# Patient Record
Sex: FEMALE | Race: WHITE | NOT HISPANIC OR LATINO | Employment: OTHER | URBAN - METROPOLITAN AREA
[De-identification: names, ages, dates, MRNs, and addresses within clinical notes are randomized per-mention and may not be internally consistent; named-entity substitution may affect disease eponyms.]

---

## 2017-01-06 ENCOUNTER — TRANSCRIBE ORDERS (OUTPATIENT)
Dept: SLEEP CENTER | Facility: CLINIC | Age: 72
End: 2017-01-06

## 2017-01-06 DIAGNOSIS — G47.33 OSA (OBSTRUCTIVE SLEEP APNEA): Primary | ICD-10-CM

## 2017-01-09 ENCOUNTER — HOSPITAL ENCOUNTER (OUTPATIENT)
Dept: RADIOLOGY | Facility: HOSPITAL | Age: 72
Discharge: HOME | End: 2017-01-09
Admitting: INTERNAL MEDICINE
Payer: MEDICARE

## 2017-01-09 DIAGNOSIS — N18.6 END STAGE RENAL DISEASE (HCC): ICD-10-CM

## 2017-01-09 PROCEDURE — 36593 DECLOT VASCULAR DEVICE: CPT

## 2017-01-09 PROCEDURE — 36598 INJ W/FLUOR EVAL CV DEVICE: CPT

## 2017-01-09 RX ADMIN — ALTEPLASE 2 MG: 2.2 INJECTION, POWDER, LYOPHILIZED, FOR SOLUTION INTRAVENOUS at 09:31

## 2017-01-09 RX ADMIN — IOHEXOL 30 ML: 300 INJECTION, SOLUTION INTRAVENOUS at 09:20

## 2017-01-18 ENCOUNTER — GENERIC CONVERSION - ENCOUNTER (OUTPATIENT)
Dept: OTHER | Facility: OTHER | Age: 72
End: 2017-01-18

## 2017-02-08 ENCOUNTER — GENERIC CONVERSION - ENCOUNTER (OUTPATIENT)
Dept: OTHER | Facility: OTHER | Age: 72
End: 2017-02-08

## 2017-02-16 ENCOUNTER — HOSPITAL ENCOUNTER (OUTPATIENT)
Dept: SLEEP CENTER | Facility: CLINIC | Age: 72
Discharge: HOME | End: 2017-02-16
Payer: MEDICARE

## 2017-02-16 ENCOUNTER — ALLSCRIPTS OFFICE VISIT (OUTPATIENT)
Dept: OTHER | Facility: OTHER | Age: 72
End: 2017-02-16

## 2017-02-16 DIAGNOSIS — G47.33 OSA (OBSTRUCTIVE SLEEP APNEA): ICD-10-CM

## 2017-02-16 DIAGNOSIS — N18.6 END STAGE RENAL DISEASE (HCC): ICD-10-CM

## 2017-02-16 DIAGNOSIS — E66.2 MORBID (SEVERE) OBESITY WITH ALVEOLAR HYPOVENTILATION (HCC): ICD-10-CM

## 2017-02-16 DIAGNOSIS — L98.7 EXCESSIVE AND REDUNDANT SKIN AND SUBCUTANEOUS TISSUE: ICD-10-CM

## 2017-02-16 PROCEDURE — 95811 POLYSOM 6/>YRS CPAP 4/> PARM: CPT

## 2017-02-22 ENCOUNTER — GENERIC CONVERSION - ENCOUNTER (OUTPATIENT)
Dept: OTHER | Facility: OTHER | Age: 72
End: 2017-02-22

## 2017-03-09 ENCOUNTER — ALLSCRIPTS OFFICE VISIT (OUTPATIENT)
Dept: OTHER | Facility: OTHER | Age: 72
End: 2017-03-09

## 2017-03-22 ENCOUNTER — HOSPITAL ENCOUNTER (OUTPATIENT)
Dept: PULMONOLOGY | Facility: HOSPITAL | Age: 72
Discharge: HOME | End: 2017-03-22
Attending: INTERNAL MEDICINE
Payer: MEDICARE

## 2017-03-22 ENCOUNTER — TRANSCRIBE ORDERS (OUTPATIENT)
Dept: LAB | Facility: HOSPITAL | Age: 72
End: 2017-03-22

## 2017-03-22 DIAGNOSIS — E66.2 MORBID (SEVERE) OBESITY WITH ALVEOLAR HYPOVENTILATION (HCC): ICD-10-CM

## 2017-03-22 LAB
BASE EXCESS BLDA CALC-SCNC: 0 MMOL/L (ref -2–3)
CA-I BLD-SCNC: 1.15 MMOL/L (ref 1.12–1.32)
DS:DELIVERY SYSTEM: ABNORMAL
FIO2 GAS DIL.REBREATH: 28 L
GLUCOSE SERPL-MCNC: 228 MG/DL (ref 65–140)
HCO3 BLDA-SCNC: 26.5 MMOL/L (ref 22–28)
HCT VFR BLD CALC: 27 % (ref 34.8–46.1)
HGB BLDA-MCNC: 9.2 G/DL (ref 11.5–15.4)
PCO2 BLD: 28 MMOL/L (ref 21–32)
PCO2 BLD: 57.6 MM HG (ref 36–44)
PH BLD: 7.27 [PH] (ref 7.35–7.45)
PO2 BLD: 93 MM HG (ref 75–129)
POTASSIUM BLD-SCNC: 4.5 MMOL/L (ref 3.5–5.3)
RESPIRATORY RATE: 0
SAO2 % BLD FROM PO2: 96 % (ref 95–98)
SODIUM BLD-SCNC: 138 MMOL/L (ref 136–145)
SPECIMEN SOURCE: ABNORMAL
VENT TYPE: 0

## 2017-03-22 PROCEDURE — 85014 HEMATOCRIT: CPT

## 2017-03-22 PROCEDURE — 36600 WITHDRAWAL OF ARTERIAL BLOOD: CPT

## 2017-03-22 PROCEDURE — 82803 BLOOD GASES ANY COMBINATION: CPT

## 2017-03-22 PROCEDURE — 82947 ASSAY GLUCOSE BLOOD QUANT: CPT

## 2017-03-22 PROCEDURE — 84132 ASSAY OF SERUM POTASSIUM: CPT

## 2017-03-22 PROCEDURE — 82330 ASSAY OF CALCIUM: CPT

## 2017-03-22 PROCEDURE — 84295 ASSAY OF SERUM SODIUM: CPT

## 2017-03-28 ENCOUNTER — GENERIC CONVERSION - ENCOUNTER (OUTPATIENT)
Dept: OTHER | Facility: OTHER | Age: 72
End: 2017-03-28

## 2017-04-06 RX ORDER — SEVELAMER CARBONATE 800 MG/1
800 TABLET, FILM COATED ORAL
COMMUNITY
End: 2017-07-31

## 2017-04-06 RX ORDER — B-COMPLEX WITH VITAMIN C
1 TABLET ORAL
COMMUNITY
End: 2017-08-14

## 2017-04-06 RX ORDER — OMEPRAZOLE 20 MG/1
20 CAPSULE, DELAYED RELEASE ORAL DAILY
COMMUNITY

## 2017-04-06 RX ORDER — FENOPROFEN CALCIUM 200 MG
CAPSULE ORAL 2 TIMES DAILY
COMMUNITY
End: 2017-11-23

## 2017-04-06 RX ORDER — ZINC OXIDE
OINTMENT (GRAM) TOPICAL AS NEEDED
COMMUNITY

## 2017-04-11 ENCOUNTER — GENERIC CONVERSION - ENCOUNTER (OUTPATIENT)
Dept: OTHER | Facility: OTHER | Age: 72
End: 2017-04-11

## 2017-04-11 ENCOUNTER — ANESTHESIA EVENT (OUTPATIENT)
Dept: PERIOP | Facility: HOSPITAL | Age: 72
DRG: 673 | End: 2017-04-11
Payer: MEDICARE

## 2017-04-18 ENCOUNTER — HOSPITAL ENCOUNTER (INPATIENT)
Facility: HOSPITAL | Age: 72
LOS: 1 days | Discharge: NURSING, REHAB OR LONG-TERM CARE | DRG: 673 | End: 2017-04-19
Attending: SURGERY | Admitting: SURGERY
Payer: MEDICARE

## 2017-04-18 ENCOUNTER — ANESTHESIA (OUTPATIENT)
Dept: PERIOP | Facility: HOSPITAL | Age: 72
DRG: 673 | End: 2017-04-18
Payer: MEDICARE

## 2017-04-18 ENCOUNTER — GENERIC CONVERSION - ENCOUNTER (OUTPATIENT)
Dept: OTHER | Facility: OTHER | Age: 72
End: 2017-04-18

## 2017-04-18 VITALS — OXYGEN SATURATION: 98 %

## 2017-04-18 DIAGNOSIS — N18.4 CKD (CHRONIC KIDNEY DISEASE) STAGE 4, GFR 15-29 ML/MIN (HCC): Primary | Chronic | ICD-10-CM

## 2017-04-18 DIAGNOSIS — Z99.2 END-STAGE RENAL DISEASE ON HEMODIALYSIS (HCC): Chronic | ICD-10-CM

## 2017-04-18 DIAGNOSIS — N18.6 END-STAGE RENAL DISEASE ON HEMODIALYSIS (HCC): Chronic | ICD-10-CM

## 2017-04-18 LAB
ABO GROUP BLD: NORMAL
APTT PPP: 30 SECONDS (ref 24–36)
BASOPHILS # BLD AUTO: 0.02 THOUSANDS/ΜL (ref 0–0.1)
BASOPHILS NFR BLD AUTO: 0 % (ref 0–1)
BLD GP AB SCN SERPL QL: NEGATIVE
EOSINOPHIL # BLD AUTO: 0.22 THOUSAND/ΜL (ref 0–0.61)
EOSINOPHIL NFR BLD AUTO: 3 % (ref 0–6)
ERYTHROCYTE [DISTWIDTH] IN BLOOD BY AUTOMATED COUNT: 14.7 % (ref 11.6–15.1)
GLUCOSE SERPL-MCNC: 193 MG/DL (ref 65–140)
GLUCOSE SERPL-MCNC: 202 MG/DL (ref 65–140)
GLUCOSE SERPL-MCNC: 214 MG/DL (ref 65–140)
GLUCOSE SERPL-MCNC: 215 MG/DL (ref 65–140)
GLUCOSE SERPL-MCNC: 259 MG/DL (ref 65–140)
HCT VFR BLD AUTO: 28.6 % (ref 34.8–46.1)
HGB BLD-MCNC: 8.9 G/DL (ref 11.5–15.4)
INR PPP: 1.22 (ref 0.86–1.16)
LYMPHOCYTES # BLD AUTO: 1.48 THOUSANDS/ΜL (ref 0.6–4.47)
LYMPHOCYTES NFR BLD AUTO: 18 % (ref 14–44)
MCH RBC QN AUTO: 29.2 PG (ref 26.8–34.3)
MCHC RBC AUTO-ENTMCNC: 31.1 G/DL (ref 31.4–37.4)
MCV RBC AUTO: 94 FL (ref 82–98)
MONOCYTES # BLD AUTO: 0.69 THOUSAND/ΜL (ref 0.17–1.22)
MONOCYTES NFR BLD AUTO: 9 % (ref 4–12)
NEUTROPHILS # BLD AUTO: 5.65 THOUSANDS/ΜL (ref 1.85–7.62)
NEUTS SEG NFR BLD AUTO: 70 % (ref 43–75)
NRBC BLD AUTO-RTO: 0 /100 WBCS
PLATELET # BLD AUTO: 138 THOUSANDS/UL (ref 149–390)
PMV BLD AUTO: 12.3 FL (ref 8.9–12.7)
POTASSIUM SERPL-SCNC: 5.4 MMOL/L (ref 3.5–5.3)
PROTHROMBIN TIME: 15.5 SECONDS (ref 12–14.3)
RBC # BLD AUTO: 3.05 MILLION/UL (ref 3.81–5.12)
RH BLD: POSITIVE
SPECIMEN EXPIRATION DATE: NORMAL
WBC # BLD AUTO: 8.09 THOUSAND/UL (ref 4.31–10.16)

## 2017-04-18 PROCEDURE — 85610 PROTHROMBIN TIME: CPT | Performed by: SURGERY

## 2017-04-18 PROCEDURE — 0J0D3ZZ ALTERATION OF RIGHT UPPER ARM SUBCUTANEOUS TISSUE AND FASCIA, PERCUTANEOUS APPROACH: ICD-10-PCS | Performed by: SURGERY

## 2017-04-18 PROCEDURE — 84295 ASSAY OF SERUM SODIUM: CPT

## 2017-04-18 PROCEDURE — 94760 N-INVAS EAR/PLS OXIMETRY 1: CPT

## 2017-04-18 PROCEDURE — 0JBD0ZZ EXCISION OF RIGHT UPPER ARM SUBCUTANEOUS TISSUE AND FASCIA, OPEN APPROACH: ICD-10-PCS | Performed by: SURGERY

## 2017-04-18 PROCEDURE — 86901 BLOOD TYPING SEROLOGIC RH(D): CPT | Performed by: SURGERY

## 2017-04-18 PROCEDURE — 86900 BLOOD TYPING SEROLOGIC ABO: CPT | Performed by: SURGERY

## 2017-04-18 PROCEDURE — 86850 RBC ANTIBODY SCREEN: CPT | Performed by: SURGERY

## 2017-04-18 PROCEDURE — 05S90ZZ REPOSITION RIGHT BRACHIAL VEIN, OPEN APPROACH: ICD-10-PCS | Performed by: SURGERY

## 2017-04-18 PROCEDURE — 84132 ASSAY OF SERUM POTASSIUM: CPT | Performed by: SURGERY

## 2017-04-18 PROCEDURE — 85014 HEMATOCRIT: CPT

## 2017-04-18 PROCEDURE — 85730 THROMBOPLASTIN TIME PARTIAL: CPT | Performed by: SURGERY

## 2017-04-18 PROCEDURE — 85025 COMPLETE CBC W/AUTO DIFF WBC: CPT | Performed by: ANESTHESIOLOGY

## 2017-04-18 PROCEDURE — 82803 BLOOD GASES ANY COMBINATION: CPT

## 2017-04-18 PROCEDURE — 84132 ASSAY OF SERUM POTASSIUM: CPT

## 2017-04-18 PROCEDURE — 82948 REAGENT STRIP/BLOOD GLUCOSE: CPT

## 2017-04-18 PROCEDURE — 82330 ASSAY OF CALCIUM: CPT

## 2017-04-18 PROCEDURE — 94660 CPAP INITIATION&MGMT: CPT

## 2017-04-18 PROCEDURE — 82947 ASSAY GLUCOSE BLOOD QUANT: CPT

## 2017-04-18 RX ORDER — ONDANSETRON 2 MG/ML
4 INJECTION INTRAMUSCULAR; INTRAVENOUS EVERY 6 HOURS PRN
Status: DISCONTINUED | OUTPATIENT
Start: 2017-04-18 | End: 2017-04-19 | Stop reason: HOSPADM

## 2017-04-18 RX ORDER — ONDANSETRON 2 MG/ML
INJECTION INTRAMUSCULAR; INTRAVENOUS AS NEEDED
Status: DISCONTINUED | OUTPATIENT
Start: 2017-04-18 | End: 2017-04-18 | Stop reason: SURG

## 2017-04-18 RX ORDER — PROPOFOL 10 MG/ML
INJECTION, EMULSION INTRAVENOUS CONTINUOUS PRN
Status: DISCONTINUED | OUTPATIENT
Start: 2017-04-18 | End: 2017-04-18

## 2017-04-18 RX ORDER — FENTANYL CITRATE 50 UG/ML
INJECTION, SOLUTION INTRAMUSCULAR; INTRAVENOUS AS NEEDED
Status: DISCONTINUED | OUTPATIENT
Start: 2017-04-18 | End: 2017-04-18 | Stop reason: SURG

## 2017-04-18 RX ORDER — ALBUTEROL SULFATE 2.5 MG/3ML
2.5 SOLUTION RESPIRATORY (INHALATION) EVERY 6 HOURS PRN
Status: DISCONTINUED | OUTPATIENT
Start: 2017-04-18 | End: 2017-04-18

## 2017-04-18 RX ORDER — PROPOFOL 10 MG/ML
INJECTION, EMULSION INTRAVENOUS AS NEEDED
Status: DISCONTINUED | OUTPATIENT
Start: 2017-04-18 | End: 2017-04-18 | Stop reason: SURG

## 2017-04-18 RX ORDER — SENNOSIDES 8.6 MG
2 TABLET ORAL DAILY PRN
Status: DISCONTINUED | OUTPATIENT
Start: 2017-04-18 | End: 2017-04-19 | Stop reason: HOSPADM

## 2017-04-18 RX ORDER — ROCURONIUM BROMIDE 10 MG/ML
INJECTION, SOLUTION INTRAVENOUS AS NEEDED
Status: DISCONTINUED | OUTPATIENT
Start: 2017-04-18 | End: 2017-04-18 | Stop reason: SURG

## 2017-04-18 RX ORDER — CHLORHEXIDINE GLUCONATE 0.12 MG/ML
15 RINSE ORAL ONCE
Status: DISCONTINUED | OUTPATIENT
Start: 2017-04-18 | End: 2017-04-18 | Stop reason: HOSPADM

## 2017-04-18 RX ORDER — OXYCODONE HYDROCHLORIDE AND ACETAMINOPHEN 5; 325 MG/1; MG/1
1 TABLET ORAL EVERY 4 HOURS PRN
Status: DISCONTINUED | OUTPATIENT
Start: 2017-04-18 | End: 2017-04-19 | Stop reason: HOSPADM

## 2017-04-18 RX ORDER — CLINDAMYCIN PHOSPHATE 900 MG/50ML
900 INJECTION INTRAVENOUS ONCE
Status: COMPLETED | OUTPATIENT
Start: 2017-04-18 | End: 2017-04-18

## 2017-04-18 RX ORDER — EPHEDRINE SULFATE 50 MG/ML
INJECTION, SOLUTION INTRAVENOUS AS NEEDED
Status: DISCONTINUED | OUTPATIENT
Start: 2017-04-18 | End: 2017-04-18 | Stop reason: SURG

## 2017-04-18 RX ORDER — LEVOTHYROXINE SODIUM 0.03 MG/1
25 TABLET ORAL
Status: DISCONTINUED | OUTPATIENT
Start: 2017-04-19 | End: 2017-04-19 | Stop reason: HOSPADM

## 2017-04-18 RX ORDER — ALLOPURINOL 100 MG/1
200 TABLET ORAL
Status: DISCONTINUED | OUTPATIENT
Start: 2017-04-19 | End: 2017-04-19 | Stop reason: HOSPADM

## 2017-04-18 RX ORDER — SODIUM CHLORIDE 9 MG/ML
20 INJECTION, SOLUTION INTRAVENOUS CONTINUOUS
Status: DISCONTINUED | OUTPATIENT
Start: 2017-04-18 | End: 2017-04-18

## 2017-04-18 RX ORDER — SODIUM CHLORIDE 9 MG/ML
INJECTION, SOLUTION INTRAVENOUS CONTINUOUS PRN
Status: DISCONTINUED | OUTPATIENT
Start: 2017-04-18 | End: 2017-04-18

## 2017-04-18 RX ORDER — SODIUM CHLORIDE, SODIUM LACTATE, POTASSIUM CHLORIDE, CALCIUM CHLORIDE 600; 310; 30; 20 MG/100ML; MG/100ML; MG/100ML; MG/100ML
INJECTION, SOLUTION INTRAVENOUS CONTINUOUS PRN
Status: DISCONTINUED | OUTPATIENT
Start: 2017-04-18 | End: 2017-04-18

## 2017-04-18 RX ORDER — PREGABALIN 75 MG/1
150 CAPSULE ORAL
Status: DISCONTINUED | OUTPATIENT
Start: 2017-04-18 | End: 2017-04-19 | Stop reason: HOSPADM

## 2017-04-18 RX ORDER — HEPARIN SODIUM 5000 [USP'U]/ML
5000 INJECTION, SOLUTION INTRAVENOUS; SUBCUTANEOUS EVERY 8 HOURS SCHEDULED
Status: DISCONTINUED | OUTPATIENT
Start: 2017-04-18 | End: 2017-04-19 | Stop reason: HOSPADM

## 2017-04-18 RX ORDER — GLYCOPYRROLATE 0.2 MG/ML
INJECTION INTRAMUSCULAR; INTRAVENOUS AS NEEDED
Status: DISCONTINUED | OUTPATIENT
Start: 2017-04-18 | End: 2017-04-18 | Stop reason: SURG

## 2017-04-18 RX ORDER — PRAVASTATIN SODIUM 80 MG/1
80 TABLET ORAL DAILY
Status: DISCONTINUED | OUTPATIENT
Start: 2017-04-19 | End: 2017-04-19 | Stop reason: HOSPADM

## 2017-04-18 RX ORDER — FENTANYL CITRATE/PF 50 MCG/ML
25 SYRINGE (ML) INJECTION
Status: DISCONTINUED | OUTPATIENT
Start: 2017-04-18 | End: 2017-04-18 | Stop reason: HOSPADM

## 2017-04-18 RX ORDER — PANTOPRAZOLE SODIUM 20 MG/1
20 TABLET, DELAYED RELEASE ORAL
Status: DISCONTINUED | OUTPATIENT
Start: 2017-04-19 | End: 2017-04-19 | Stop reason: HOSPADM

## 2017-04-18 RX ORDER — LEVALBUTEROL 1.25 MG/.5ML
1.25 SOLUTION, CONCENTRATE RESPIRATORY (INHALATION) ONCE AS NEEDED
Status: DISCONTINUED | OUTPATIENT
Start: 2017-04-18 | End: 2017-04-18 | Stop reason: HOSPADM

## 2017-04-18 RX ORDER — FAMOTIDINE 20 MG/1
20 TABLET, FILM COATED ORAL 2 TIMES DAILY PRN
Status: DISCONTINUED | OUTPATIENT
Start: 2017-04-18 | End: 2017-04-19 | Stop reason: HOSPADM

## 2017-04-18 RX ORDER — LIDOCAINE HYDROCHLORIDE 10 MG/ML
INJECTION, SOLUTION INFILTRATION; PERINEURAL AS NEEDED
Status: DISCONTINUED | OUTPATIENT
Start: 2017-04-18 | End: 2017-04-18 | Stop reason: SURG

## 2017-04-18 RX ORDER — DIPHENHYDRAMINE HYDROCHLORIDE 50 MG/ML
12.5 INJECTION INTRAMUSCULAR; INTRAVENOUS
Status: DISCONTINUED | OUTPATIENT
Start: 2017-04-18 | End: 2017-04-18 | Stop reason: HOSPADM

## 2017-04-18 RX ORDER — DOCUSATE SODIUM 100 MG/1
100 CAPSULE, LIQUID FILLED ORAL DAILY
Status: DISCONTINUED | OUTPATIENT
Start: 2017-04-19 | End: 2017-04-19 | Stop reason: HOSPADM

## 2017-04-18 RX ORDER — SEVELAMER HYDROCHLORIDE 800 MG/1
800 TABLET, FILM COATED ORAL
Status: DISCONTINUED | OUTPATIENT
Start: 2017-04-18 | End: 2017-04-19 | Stop reason: HOSPADM

## 2017-04-18 RX ORDER — ONDANSETRON 2 MG/ML
4 INJECTION INTRAMUSCULAR; INTRAVENOUS ONCE
Status: DISCONTINUED | OUTPATIENT
Start: 2017-04-18 | End: 2017-04-18 | Stop reason: HOSPADM

## 2017-04-18 RX ORDER — ALBUMIN, HUMAN INJ 5% 5 %
12.5 SOLUTION INTRAVENOUS ONCE
Status: COMPLETED | OUTPATIENT
Start: 2017-04-18 | End: 2017-04-18

## 2017-04-18 RX ADMIN — LIDOCAINE HYDROCHLORIDE 50 MG: 10 INJECTION, SOLUTION INFILTRATION; PERINEURAL at 08:18

## 2017-04-18 RX ADMIN — PROPOFOL 100 MCG/KG/MIN: 10 INJECTION, EMULSION INTRAVENOUS at 08:23

## 2017-04-18 RX ADMIN — NEOSTIGMINE METHYLSULFATE 4 MG: 1 INJECTION INTRAMUSCULAR; INTRAVENOUS; SUBCUTANEOUS at 11:07

## 2017-04-18 RX ADMIN — HUMAN INSULIN 2 UNITS: 100 INJECTION, SOLUTION SUBCUTANEOUS at 14:56

## 2017-04-18 RX ADMIN — SODIUM CHLORIDE: 0.9 INJECTION, SOLUTION INTRAVENOUS at 08:28

## 2017-04-18 RX ADMIN — ALBUMIN HUMAN 12.5 G: 0.05 INJECTION, SOLUTION INTRAVENOUS at 13:30

## 2017-04-18 RX ADMIN — RENAGEL 800 MG: 800 TABLET ORAL at 20:30

## 2017-04-18 RX ADMIN — INSULIN LISPRO 2 UNITS: 100 INJECTION, SOLUTION INTRAVENOUS; SUBCUTANEOUS at 21:20

## 2017-04-18 RX ADMIN — CLINDAMYCIN PHOSPHATE 900 MG: 18 INJECTION, SOLUTION INTRAVENOUS at 08:40

## 2017-04-18 RX ADMIN — FENTANYL CITRATE 25 MCG: 50 INJECTION INTRAMUSCULAR; INTRAVENOUS at 14:02

## 2017-04-18 RX ADMIN — EPHEDRINE SULFATE 10 MG: 50 INJECTION, SOLUTION INTRAMUSCULAR; INTRAVENOUS; SUBCUTANEOUS at 08:42

## 2017-04-18 RX ADMIN — NEOSTIGMINE METHYLSULFATE 2 MG: 1 INJECTION INTRAMUSCULAR; INTRAVENOUS; SUBCUTANEOUS at 11:36

## 2017-04-18 RX ADMIN — GLYCOPYRROLATE 0.2 MG: 0.2 INJECTION INTRAMUSCULAR; INTRAVENOUS at 11:36

## 2017-04-18 RX ADMIN — PROPOFOL 300 MG: 10 INJECTION, EMULSION INTRAVENOUS at 08:18

## 2017-04-18 RX ADMIN — FENTANYL CITRATE 25 MCG: 50 INJECTION INTRAMUSCULAR; INTRAVENOUS at 15:47

## 2017-04-18 RX ADMIN — EPHEDRINE SULFATE 5 MG: 50 INJECTION, SOLUTION INTRAMUSCULAR; INTRAVENOUS; SUBCUTANEOUS at 08:33

## 2017-04-18 RX ADMIN — ROCURONIUM BROMIDE 10 MG: 10 INJECTION, SOLUTION INTRAVENOUS at 10:15

## 2017-04-18 RX ADMIN — EPHEDRINE SULFATE 5 MG: 50 INJECTION, SOLUTION INTRAMUSCULAR; INTRAVENOUS; SUBCUTANEOUS at 10:15

## 2017-04-18 RX ADMIN — EPHEDRINE SULFATE 10 MG: 50 INJECTION, SOLUTION INTRAMUSCULAR; INTRAVENOUS; SUBCUTANEOUS at 09:36

## 2017-04-18 RX ADMIN — PREGABALIN 150 MG: 75 CAPSULE ORAL at 21:19

## 2017-04-18 RX ADMIN — METOPROLOL TARTRATE 2.5 MG: 5 INJECTION, SOLUTION INTRAVENOUS at 11:17

## 2017-04-18 RX ADMIN — PHENYLEPHRINE HYDROCHLORIDE 25 MCG/MIN: 10 INJECTION INTRAVENOUS at 12:35

## 2017-04-18 RX ADMIN — ROCURONIUM BROMIDE 20 MG: 10 INJECTION, SOLUTION INTRAVENOUS at 09:05

## 2017-04-18 RX ADMIN — INSULIN HUMAN 5 UNITS: 100 INJECTION, SOLUTION PARENTERAL at 09:50

## 2017-04-18 RX ADMIN — FENTANYL CITRATE 50 MCG: 50 INJECTION, SOLUTION INTRAMUSCULAR; INTRAVENOUS at 10:15

## 2017-04-18 RX ADMIN — ONDANSETRON 4 MG: 2 INJECTION INTRAMUSCULAR; INTRAVENOUS at 11:07

## 2017-04-18 RX ADMIN — HEPARIN SODIUM 5000 UNITS: 5000 INJECTION, SOLUTION INTRAVENOUS; SUBCUTANEOUS at 17:49

## 2017-04-18 RX ADMIN — INSULIN HUMAN 5 UNITS: 100 INJECTION, SOLUTION PARENTERAL at 10:55

## 2017-04-18 RX ADMIN — DEXMEDETOMIDINE HYDROCHLORIDE 0.4 MCG/KG/HR: 100 INJECTION, SOLUTION INTRAVENOUS at 08:23

## 2017-04-18 RX ADMIN — FENTANYL CITRATE 50 MCG: 50 INJECTION, SOLUTION INTRAMUSCULAR; INTRAVENOUS at 09:03

## 2017-04-18 RX ADMIN — GLYCOPYRROLATE 0.6 MG: 0.2 INJECTION INTRAMUSCULAR; INTRAVENOUS at 11:07

## 2017-04-18 RX ADMIN — SODIUM CHLORIDE: 0.9 INJECTION, SOLUTION INTRAVENOUS at 07:34

## 2017-04-18 RX ADMIN — ROCURONIUM BROMIDE 50 MG: 10 INJECTION, SOLUTION INTRAVENOUS at 08:18

## 2017-04-18 RX ADMIN — FAMOTIDINE 20 MG: 20 TABLET ORAL at 22:46

## 2017-04-19 ENCOUNTER — APPOINTMENT (INPATIENT)
Dept: DIALYSIS | Facility: HOSPITAL | Age: 72
DRG: 673 | End: 2017-04-19
Payer: MEDICARE

## 2017-04-19 VITALS
HEART RATE: 81 BPM | RESPIRATION RATE: 20 BRPM | BODY MASS INDEX: 50.02 KG/M2 | HEIGHT: 64 IN | TEMPERATURE: 97.5 F | WEIGHT: 293 LBS | OXYGEN SATURATION: 100 % | SYSTOLIC BLOOD PRESSURE: 127 MMHG | DIASTOLIC BLOOD PRESSURE: 60 MMHG

## 2017-04-19 LAB
ANION GAP SERPL CALCULATED.3IONS-SCNC: 7 MMOL/L (ref 4–13)
ANION GAP SERPL CALCULATED.3IONS-SCNC: 7 MMOL/L (ref 4–13)
BASE EXCESS BLDA CALC-SCNC: -2.2 MMOL/L
BASE EXCESS BLDA CALC-SCNC: -4 MMOL/L (ref -2–3)
BASOPHILS # BLD AUTO: 0.01 THOUSANDS/ΜL (ref 0–0.1)
BASOPHILS NFR BLD AUTO: 0 % (ref 0–1)
BUN SERPL-MCNC: 41 MG/DL (ref 5–25)
BUN SERPL-MCNC: 43 MG/DL (ref 5–25)
CA-I BLD-SCNC: 1.09 MMOL/L (ref 1.12–1.32)
CA-I BLD-SCNC: 1.16 MMOL/L (ref 1.12–1.32)
CALCIUM SERPL-MCNC: 7.9 MG/DL (ref 8.3–10.1)
CALCIUM SERPL-MCNC: 8 MG/DL (ref 8.3–10.1)
CHLORIDE SERPL-SCNC: 103 MMOL/L (ref 100–108)
CHLORIDE SERPL-SCNC: 104 MMOL/L (ref 100–108)
CO2 SERPL-SCNC: 27 MMOL/L (ref 21–32)
CO2 SERPL-SCNC: 27 MMOL/L (ref 21–32)
CREAT SERPL-MCNC: 3.73 MG/DL (ref 0.6–1.3)
CREAT SERPL-MCNC: 3.83 MG/DL (ref 0.6–1.3)
EOSINOPHIL # BLD AUTO: 0.12 THOUSAND/ΜL (ref 0–0.61)
EOSINOPHIL NFR BLD AUTO: 1 % (ref 0–6)
ERYTHROCYTE [DISTWIDTH] IN BLOOD BY AUTOMATED COUNT: 14.9 % (ref 11.6–15.1)
ERYTHROCYTE [DISTWIDTH] IN BLOOD BY AUTOMATED COUNT: 15.1 % (ref 11.6–15.1)
EST. AVERAGE GLUCOSE BLD GHB EST-MCNC: 189 MG/DL
GFR SERPL CREATININE-BSD FRML MDRD: 11.5 ML/MIN/1.73SQ M
GFR SERPL CREATININE-BSD FRML MDRD: 11.9 ML/MIN/1.73SQ M
GLUCOSE SERPL-MCNC: 206 MG/DL (ref 65–140)
GLUCOSE SERPL-MCNC: 235 MG/DL (ref 65–140)
GLUCOSE SERPL-MCNC: 249 MG/DL (ref 65–140)
GLUCOSE SERPL-MCNC: 249 MG/DL (ref 65–140)
GLUCOSE SERPL-MCNC: 265 MG/DL (ref 65–140)
GLUCOSE SERPL-MCNC: 284 MG/DL (ref 65–140)
HBA1C MFR BLD: 8.2 % (ref 4.2–6.3)
HCO3 BLDA-SCNC: 21.4 MMOL/L (ref 22–28)
HCO3 BLDA-SCNC: 25.7 MMOL/L (ref 22–28)
HCT VFR BLD AUTO: 24.9 % (ref 34.8–46.1)
HCT VFR BLD AUTO: 26.6 % (ref 34.8–46.1)
HCT VFR BLD AUTO: 26.7 % (ref 34.8–46.1)
HCT VFR BLD CALC: 25 % (ref 34.8–46.1)
HGB BLD-MCNC: 7.7 G/DL (ref 11.5–15.4)
HGB BLD-MCNC: 8.1 G/DL (ref 11.5–15.4)
HGB BLD-MCNC: 8.2 G/DL (ref 11.5–15.4)
HGB BLDA-MCNC: 8.5 G/DL (ref 11.5–15.4)
INR PPP: 1.38 (ref 0.86–1.16)
LYMPHOCYTES # BLD AUTO: 1.36 THOUSANDS/ΜL (ref 0.6–4.47)
LYMPHOCYTES NFR BLD AUTO: 15 % (ref 14–44)
MCH RBC QN AUTO: 29.3 PG (ref 26.8–34.3)
MCH RBC QN AUTO: 29.4 PG (ref 26.8–34.3)
MCHC RBC AUTO-ENTMCNC: 30.7 G/DL (ref 31.4–37.4)
MCHC RBC AUTO-ENTMCNC: 30.9 G/DL (ref 31.4–37.4)
MCV RBC AUTO: 95 FL (ref 82–98)
MCV RBC AUTO: 96 FL (ref 82–98)
MONOCYTES # BLD AUTO: 0.91 THOUSAND/ΜL (ref 0.17–1.22)
MONOCYTES NFR BLD AUTO: 10 % (ref 4–12)
NEUTROPHILS # BLD AUTO: 6.65 THOUSANDS/ΜL (ref 1.85–7.62)
NEUTS SEG NFR BLD AUTO: 74 % (ref 43–75)
NRBC BLD AUTO-RTO: 0 /100 WBCS
O2 CT BLDA-SCNC: 11.9 ML/DL (ref 16–23)
OXYHGB MFR BLDA: 93.4 % (ref 94–97)
PCO2 BLD: 23 MMOL/L (ref 21–32)
PCO2 BLD: 41 MM HG (ref 36–44)
PCO2 BLDA: 62.7 MM HG (ref 36–44)
PH BLD: 7.33 [PH] (ref 7.35–7.45)
PH BLDA: 7.23 [PH] (ref 7.35–7.45)
PHOSPHATE SERPL-MCNC: 4.3 MG/DL (ref 2.3–4.1)
PLATELET # BLD AUTO: 119 THOUSANDS/UL (ref 149–390)
PLATELET # BLD AUTO: 136 THOUSANDS/UL (ref 149–390)
PMV BLD AUTO: 12.2 FL (ref 8.9–12.7)
PMV BLD AUTO: 12.9 FL (ref 8.9–12.7)
PO2 BLD: 94 MM HG (ref 75–129)
PO2 BLDA: 78.4 MM HG (ref 75–129)
POTASSIUM BLD-SCNC: 3.5 MMOL/L (ref 3.5–5.3)
POTASSIUM SERPL-SCNC: 5 MMOL/L (ref 3.5–5.3)
POTASSIUM SERPL-SCNC: 5 MMOL/L (ref 3.5–5.3)
PROTHROMBIN TIME: 17 SECONDS (ref 12–14.3)
RBC # BLD AUTO: 2.63 MILLION/UL (ref 3.81–5.12)
RBC # BLD AUTO: 2.79 MILLION/UL (ref 3.81–5.12)
SAO2 % BLD FROM PO2: 97 % (ref 95–98)
SODIUM BLD-SCNC: 140 MMOL/L (ref 136–145)
SODIUM SERPL-SCNC: 137 MMOL/L (ref 136–145)
SODIUM SERPL-SCNC: 138 MMOL/L (ref 136–145)
SPECIMEN SOURCE: ABNORMAL
SPECIMEN SOURCE: ABNORMAL
WBC # BLD AUTO: 7.42 THOUSAND/UL (ref 4.31–10.16)
WBC # BLD AUTO: 9.09 THOUSAND/UL (ref 4.31–10.16)

## 2017-04-19 PROCEDURE — 94760 N-INVAS EAR/PLS OXIMETRY 1: CPT | Performed by: SOCIAL WORKER

## 2017-04-19 PROCEDURE — 84100 ASSAY OF PHOSPHORUS: CPT | Performed by: SURGERY

## 2017-04-19 PROCEDURE — 85025 COMPLETE CBC W/AUTO DIFF WBC: CPT | Performed by: SURGERY

## 2017-04-19 PROCEDURE — 82948 REAGENT STRIP/BLOOD GLUCOSE: CPT

## 2017-04-19 PROCEDURE — 80048 BASIC METABOLIC PNL TOTAL CA: CPT | Performed by: SURGERY

## 2017-04-19 PROCEDURE — 85027 COMPLETE CBC AUTOMATED: CPT | Performed by: SURGERY

## 2017-04-19 PROCEDURE — 85014 HEMATOCRIT: CPT | Performed by: PHYSICIAN ASSISTANT

## 2017-04-19 PROCEDURE — 82805 BLOOD GASES W/O2 SATURATION: CPT | Performed by: SURGERY

## 2017-04-19 PROCEDURE — 82330 ASSAY OF CALCIUM: CPT | Performed by: SURGERY

## 2017-04-19 PROCEDURE — 85610 PROTHROMBIN TIME: CPT | Performed by: SURGERY

## 2017-04-19 PROCEDURE — 85018 HEMOGLOBIN: CPT | Performed by: PHYSICIAN ASSISTANT

## 2017-04-19 PROCEDURE — 94640 AIRWAY INHALATION TREATMENT: CPT | Performed by: SOCIAL WORKER

## 2017-04-19 PROCEDURE — 83036 HEMOGLOBIN GLYCOSYLATED A1C: CPT | Performed by: SURGERY

## 2017-04-19 RX ORDER — SODIUM CHLORIDE FOR INHALATION 0.9 %
3 VIAL, NEBULIZER (ML) INHALATION
Status: DISCONTINUED | OUTPATIENT
Start: 2017-04-19 | End: 2017-04-19 | Stop reason: HOSPADM

## 2017-04-19 RX ORDER — LEVALBUTEROL 1.25 MG/.5ML
1.25 SOLUTION, CONCENTRATE RESPIRATORY (INHALATION) 3 TIMES DAILY
Status: DISCONTINUED | OUTPATIENT
Start: 2017-04-19 | End: 2017-04-19 | Stop reason: HOSPADM

## 2017-04-19 RX ORDER — OXYCODONE HYDROCHLORIDE AND ACETAMINOPHEN 5; 325 MG/1; MG/1
1 TABLET ORAL EVERY 4 HOURS PRN
Qty: 30 TABLET | Refills: 0 | Status: SHIPPED | OUTPATIENT
Start: 2017-04-19 | End: 2017-04-29

## 2017-04-19 RX ADMIN — INSULIN LISPRO 8 UNITS: 100 INJECTION, SOLUTION INTRAVENOUS; SUBCUTANEOUS at 15:50

## 2017-04-19 RX ADMIN — LEVALBUTEROL HYDROCHLORIDE 1.25 MG: 1.25 SOLUTION, CONCENTRATE RESPIRATORY (INHALATION) at 16:17

## 2017-04-19 RX ADMIN — PANTOPRAZOLE SODIUM 20 MG: 20 TABLET, DELAYED RELEASE ORAL at 05:24

## 2017-04-19 RX ADMIN — HEPARIN SODIUM 5000 UNITS: 5000 INJECTION, SOLUTION INTRAVENOUS; SUBCUTANEOUS at 15:51

## 2017-04-19 RX ADMIN — HEPARIN SODIUM 5000 UNITS: 5000 INJECTION, SOLUTION INTRAVENOUS; SUBCUTANEOUS at 05:16

## 2017-04-19 RX ADMIN — DOXERCALCIFEROL 1.5 MCG: 2 INJECTION, SOLUTION INTRAVENOUS at 11:10

## 2017-04-19 RX ADMIN — RENAGEL 800 MG: 800 TABLET ORAL at 08:16

## 2017-04-19 RX ADMIN — ALLOPURINOL 200 MG: 100 TABLET ORAL at 08:16

## 2017-04-19 RX ADMIN — INSULIN LISPRO 8 UNITS: 100 INJECTION, SOLUTION INTRAVENOUS; SUBCUTANEOUS at 06:11

## 2017-04-19 RX ADMIN — DOCUSATE SODIUM 100 MG: 100 CAPSULE, LIQUID FILLED ORAL at 08:16

## 2017-04-19 RX ADMIN — ISODIUM CHLORIDE 3 ML: 0.03 SOLUTION RESPIRATORY (INHALATION) at 16:17

## 2017-04-19 RX ADMIN — PRAVASTATIN SODIUM 80 MG: 80 TABLET ORAL at 08:16

## 2017-04-19 RX ADMIN — LEVOTHYROXINE SODIUM 25 MCG: 25 TABLET ORAL at 05:24

## 2017-04-19 RX ADMIN — RENAGEL 800 MG: 800 TABLET ORAL at 15:52

## 2017-05-04 ENCOUNTER — ALLSCRIPTS OFFICE VISIT (OUTPATIENT)
Dept: OTHER | Facility: OTHER | Age: 72
End: 2017-05-04

## 2017-05-09 ENCOUNTER — ALLSCRIPTS OFFICE VISIT (OUTPATIENT)
Dept: OTHER | Facility: OTHER | Age: 72
End: 2017-05-09

## 2017-05-11 ENCOUNTER — ALLSCRIPTS OFFICE VISIT (OUTPATIENT)
Dept: OTHER | Facility: OTHER | Age: 72
End: 2017-05-11

## 2017-05-11 DIAGNOSIS — G47.33 OBSTRUCTIVE SLEEP APNEA: ICD-10-CM

## 2017-05-11 DIAGNOSIS — J96.12 CHRONIC RESPIRATORY FAILURE WITH HYPERCAPNIA (HCC): ICD-10-CM

## 2017-05-25 ENCOUNTER — HOSPITAL ENCOUNTER (OUTPATIENT)
Dept: NON INVASIVE DIAGNOSTICS | Facility: CLINIC | Age: 72
Discharge: HOME | End: 2017-05-25
Payer: MEDICARE

## 2017-05-25 DIAGNOSIS — L98.7 EXCESSIVE AND REDUNDANT SKIN AND SUBCUTANEOUS TISSUE: ICD-10-CM

## 2017-05-25 DIAGNOSIS — N18.6 END STAGE RENAL DISEASE (HCC): ICD-10-CM

## 2017-05-25 DIAGNOSIS — E66.01 MORBID (SEVERE) OBESITY DUE TO EXCESS CALORIES (HCC): ICD-10-CM

## 2017-05-25 PROCEDURE — 93990 DOPPLER FLOW TESTING: CPT

## 2017-05-31 ENCOUNTER — HOSPITAL ENCOUNTER (OUTPATIENT)
Dept: PULMONOLOGY | Facility: HOSPITAL | Age: 72
Discharge: HOME | End: 2017-05-31
Attending: INTERNAL MEDICINE
Payer: MEDICARE

## 2017-05-31 DIAGNOSIS — J96.12 CHRONIC RESPIRATORY FAILURE WITH HYPERCAPNIA (HCC): ICD-10-CM

## 2017-05-31 DIAGNOSIS — G47.33 OBSTRUCTIVE SLEEP APNEA: ICD-10-CM

## 2017-05-31 LAB
BASE EXCESS BLDA CALC-SCNC: -4 MMOL/L (ref -2–3)
CA-I BLD-SCNC: 1.23 MMOL/L (ref 1.12–1.32)
GLUCOSE SERPL-MCNC: 235 MG/DL (ref 65–140)
HCO3 BLDA-SCNC: 23.3 MMOL/L (ref 22–28)
HCT VFR BLD CALC: 28 % (ref 34.8–46.1)
HGB BLDA-MCNC: 9.5 G/DL (ref 11.5–15.4)
PCO2 BLD: 25 MMOL/L (ref 21–32)
PCO2 BLD: 56.3 MM HG (ref 36–44)
PH BLD: 7.22 [PH] (ref 7.35–7.45)
PO2 BLD: 90 MM HG (ref 75–129)
POTASSIUM BLD-SCNC: 4.2 MMOL/L (ref 3.5–5.3)
SAO2 % BLD FROM PO2: 95 % (ref 95–98)
SODIUM BLD-SCNC: 135 MMOL/L (ref 136–145)
SPECIMEN SOURCE: ABNORMAL

## 2017-05-31 PROCEDURE — 82947 ASSAY GLUCOSE BLOOD QUANT: CPT

## 2017-05-31 PROCEDURE — 82803 BLOOD GASES ANY COMBINATION: CPT

## 2017-05-31 PROCEDURE — 84295 ASSAY OF SERUM SODIUM: CPT

## 2017-05-31 PROCEDURE — 82330 ASSAY OF CALCIUM: CPT

## 2017-05-31 PROCEDURE — 85014 HEMATOCRIT: CPT

## 2017-05-31 PROCEDURE — 84132 ASSAY OF SERUM POTASSIUM: CPT

## 2017-05-31 PROCEDURE — 36600 WITHDRAWAL OF ARTERIAL BLOOD: CPT

## 2017-06-08 ENCOUNTER — ALLSCRIPTS OFFICE VISIT (OUTPATIENT)
Dept: OTHER | Facility: OTHER | Age: 72
End: 2017-06-08

## 2017-06-13 ENCOUNTER — HOSPITAL ENCOUNTER (OUTPATIENT)
Dept: RADIOLOGY | Facility: HOSPITAL | Age: 72
Discharge: HOME | End: 2017-06-13
Attending: SURGERY | Admitting: RADIOLOGY
Payer: MEDICARE

## 2017-06-13 VITALS
HEART RATE: 75 BPM | OXYGEN SATURATION: 99 % | SYSTOLIC BLOOD PRESSURE: 170 MMHG | DIASTOLIC BLOOD PRESSURE: 77 MMHG | RESPIRATION RATE: 20 BRPM

## 2017-06-13 DIAGNOSIS — N18.6 END STAGE RENAL DISEASE (HCC): ICD-10-CM

## 2017-06-13 PROCEDURE — C1894 INTRO/SHEATH, NON-LASER: HCPCS

## 2017-06-13 PROCEDURE — C1769 GUIDE WIRE: HCPCS

## 2017-06-13 PROCEDURE — 36902 INTRO CATH DIALYSIS CIRCUIT: CPT

## 2017-06-13 PROCEDURE — C1725 CATH, TRANSLUMIN NON-LASER: HCPCS

## 2017-06-13 RX ADMIN — IOHEXOL 45 ML: 300 INJECTION, SOLUTION INTRAVENOUS at 18:20

## 2017-07-31 ENCOUNTER — APPOINTMENT (EMERGENCY)
Dept: RADIOLOGY | Facility: HOSPITAL | Age: 72
End: 2017-07-31
Payer: MEDICARE

## 2017-07-31 ENCOUNTER — HOSPITAL ENCOUNTER (EMERGENCY)
Facility: HOSPITAL | Age: 72
Discharge: HOME | End: 2017-08-01
Attending: EMERGENCY MEDICINE | Admitting: EMERGENCY MEDICINE
Payer: MEDICARE

## 2017-07-31 DIAGNOSIS — M19.011 DEGENERATIVE JOINT DISEASE OF RIGHT SHOULDER: Primary | ICD-10-CM

## 2017-07-31 DIAGNOSIS — M25.511 RIGHT SHOULDER PAIN: ICD-10-CM

## 2017-07-31 DIAGNOSIS — M54.2 NECK PAIN ON RIGHT SIDE: ICD-10-CM

## 2017-07-31 DIAGNOSIS — M50.30 DEGENERATIVE DISC DISEASE, CERVICAL: ICD-10-CM

## 2017-07-31 PROCEDURE — 71010 HB CHEST X-RAY 1 VIEW FRONTAL (PORTABLE): CPT

## 2017-07-31 PROCEDURE — 72125 CT NECK SPINE W/O DYE: CPT

## 2017-07-31 PROCEDURE — 73030 X-RAY EXAM OF SHOULDER: CPT

## 2017-07-31 RX ORDER — ONDANSETRON 4 MG/1
4 TABLET, FILM COATED ORAL DAILY
Status: ON HOLD | COMMUNITY
End: 2017-12-03

## 2017-07-31 RX ORDER — SODIUM POLYSTYRENE SULFONATE 15 G/60ML
SUSPENSION ORAL; RECTAL
COMMUNITY
End: 2017-08-21 | Stop reason: HOSPADM

## 2017-07-31 RX ORDER — ACETAMINOPHEN AND CODEINE PHOSPHATE 300; 30 MG/1; MG/1
1-2 TABLET ORAL EVERY 6 HOURS PRN
Qty: 8 TABLET | Refills: 0 | Status: SHIPPED | OUTPATIENT
Start: 2017-07-31 | End: 2017-08-14

## 2017-07-31 RX ORDER — MELATONIN
1000 DAILY
COMMUNITY
End: 2017-11-23

## 2017-07-31 RX ORDER — ACETAMINOPHEN 325 MG/1
975 TABLET ORAL ONCE
Status: COMPLETED | OUTPATIENT
Start: 2017-07-31 | End: 2017-07-31

## 2017-07-31 RX ADMIN — ACETAMINOPHEN 975 MG: 325 TABLET, FILM COATED ORAL at 22:15

## 2017-08-01 VITALS
DIASTOLIC BLOOD PRESSURE: 65 MMHG | SYSTOLIC BLOOD PRESSURE: 148 MMHG | TEMPERATURE: 98.2 F | BODY MASS INDEX: 59.37 KG/M2 | OXYGEN SATURATION: 98 % | HEART RATE: 98 BPM | RESPIRATION RATE: 18 BRPM | WEIGHT: 293 LBS

## 2017-08-01 PROCEDURE — 99284 EMERGENCY DEPT VISIT MOD MDM: CPT

## 2017-08-03 ENCOUNTER — ALLSCRIPTS OFFICE VISIT (OUTPATIENT)
Dept: OTHER | Facility: OTHER | Age: 72
End: 2017-08-03

## 2017-08-03 ENCOUNTER — HOSPITAL ENCOUNTER (OUTPATIENT)
Dept: RADIOLOGY | Facility: HOSPITAL | Age: 72
Discharge: HOME | End: 2017-08-03
Attending: INTERNAL MEDICINE | Admitting: RADIOLOGY
Payer: MEDICARE

## 2017-08-03 DIAGNOSIS — Z99.2 ESRD ON DIALYSIS (HCC): ICD-10-CM

## 2017-08-03 DIAGNOSIS — N18.6 ESRD ON DIALYSIS (HCC): ICD-10-CM

## 2017-08-03 PROCEDURE — 36589 REMOVAL TUNNELED CV CATH: CPT

## 2017-08-14 ENCOUNTER — GENERIC CONVERSION - ENCOUNTER (OUTPATIENT)
Dept: OTHER | Facility: OTHER | Age: 72
End: 2017-08-14

## 2017-08-14 ENCOUNTER — HOSPITAL ENCOUNTER (INPATIENT)
Facility: HOSPITAL | Age: 72
LOS: 7 days | Discharge: NURSING, REHAB OR LONG-TERM CARE | DRG: 463 | End: 2017-08-21
Attending: EMERGENCY MEDICINE | Admitting: COLON & RECTAL SURGERY
Payer: MEDICARE

## 2017-08-14 ENCOUNTER — APPOINTMENT (INPATIENT)
Dept: DIALYSIS | Facility: HOSPITAL | Age: 72
DRG: 463 | End: 2017-08-14
Payer: MEDICARE

## 2017-08-14 ENCOUNTER — APPOINTMENT (INPATIENT)
Dept: RADIOLOGY | Facility: HOSPITAL | Age: 72
DRG: 463 | End: 2017-08-14
Payer: MEDICARE

## 2017-08-14 ENCOUNTER — APPOINTMENT (EMERGENCY)
Dept: RADIOLOGY | Facility: HOSPITAL | Age: 72
DRG: 463 | End: 2017-08-14
Payer: MEDICARE

## 2017-08-14 DIAGNOSIS — E66.01 MORBID OBESITY DUE TO EXCESS CALORIES (HCC): ICD-10-CM

## 2017-08-14 DIAGNOSIS — K92.2 LOWER GI BLEED: ICD-10-CM

## 2017-08-14 DIAGNOSIS — IMO0001 IDDM (INSULIN DEPENDENT DIABETES MELLITUS): ICD-10-CM

## 2017-08-14 DIAGNOSIS — N18.4 CKD (CHRONIC KIDNEY DISEASE) STAGE 4, GFR 15-29 ML/MIN (HCC): Chronic | ICD-10-CM

## 2017-08-14 DIAGNOSIS — N18.6 END-STAGE RENAL DISEASE ON HEMODIALYSIS (HCC): Chronic | ICD-10-CM

## 2017-08-14 DIAGNOSIS — Z99.2 END-STAGE RENAL DISEASE ON HEMODIALYSIS (HCC): Chronic | ICD-10-CM

## 2017-08-14 DIAGNOSIS — J96.02 ACUTE HYPERCAPNIC RESPIRATORY FAILURE (HCC): ICD-10-CM

## 2017-08-14 DIAGNOSIS — K92.2 GASTROINTESTINAL HEMORRHAGE, UNSPECIFIED GASTROINTESTINAL HEMORRHAGE TYPE: ICD-10-CM

## 2017-08-14 DIAGNOSIS — L89.152 SACRAL DECUBITUS ULCER, STAGE II (HCC): Primary | ICD-10-CM

## 2017-08-14 DIAGNOSIS — I50.32 CHRONIC DIASTOLIC CHF (CONGESTIVE HEART FAILURE) (HCC): Chronic | ICD-10-CM

## 2017-08-14 LAB
ABO GROUP BLD: NORMAL
ALBUMIN SERPL BCP-MCNC: 1.6 G/DL (ref 3.5–5)
ALP SERPL-CCNC: 96 U/L (ref 46–116)
ALT SERPL W P-5'-P-CCNC: 17 U/L (ref 12–78)
ANION GAP SERPL CALCULATED.3IONS-SCNC: 10 MMOL/L (ref 4–13)
ANION GAP SERPL CALCULATED.3IONS-SCNC: 7 MMOL/L (ref 4–13)
ANISOCYTOSIS BLD QL SMEAR: PRESENT
APTT PPP: 52 SECONDS (ref 23–35)
AST SERPL W P-5'-P-CCNC: 28 U/L (ref 5–45)
BASOPHILS # BLD AUTO: 0.01 THOUSANDS/ΜL (ref 0–0.1)
BASOPHILS # BLD MANUAL: 0 THOUSAND/UL (ref 0–0.1)
BASOPHILS NFR BLD AUTO: 0 % (ref 0–1)
BASOPHILS NFR MAR MANUAL: 0 % (ref 0–1)
BILIRUB SERPL-MCNC: 0.3 MG/DL (ref 0.2–1)
BLD GP AB SCN SERPL QL: NEGATIVE
BUN SERPL-MCNC: 51 MG/DL (ref 5–25)
BUN SERPL-MCNC: 68 MG/DL (ref 5–25)
CALCIUM SERPL-MCNC: 7.9 MG/DL (ref 8.3–10.1)
CALCIUM SERPL-MCNC: 8.5 MG/DL (ref 8.3–10.1)
CHLORIDE SERPL-SCNC: 100 MMOL/L (ref 100–108)
CHLORIDE SERPL-SCNC: 98 MMOL/L (ref 100–108)
CO2 SERPL-SCNC: 25 MMOL/L (ref 21–32)
CO2 SERPL-SCNC: 26 MMOL/L (ref 21–32)
CREAT SERPL-MCNC: 3.17 MG/DL (ref 0.6–1.3)
CREAT SERPL-MCNC: 4.22 MG/DL (ref 0.6–1.3)
EOSINOPHIL # BLD AUTO: 0.25 THOUSAND/ΜL (ref 0–0.61)
EOSINOPHIL # BLD MANUAL: 0.17 THOUSAND/UL (ref 0–0.4)
EOSINOPHIL NFR BLD AUTO: 2 % (ref 0–6)
EOSINOPHIL NFR BLD MANUAL: 2 % (ref 0–6)
ERYTHROCYTE [DISTWIDTH] IN BLOOD BY AUTOMATED COUNT: 15 % (ref 11.6–15.1)
ERYTHROCYTE [DISTWIDTH] IN BLOOD BY AUTOMATED COUNT: 15 % (ref 11.6–15.1)
GFR SERPL CREATININE-BSD FRML MDRD: 10 ML/MIN/1.73SQ M
GFR SERPL CREATININE-BSD FRML MDRD: 14 ML/MIN/1.73SQ M
GLUCOSE SERPL-MCNC: 152 MG/DL (ref 65–140)
GLUCOSE SERPL-MCNC: 188 MG/DL (ref 65–140)
GLUCOSE SERPL-MCNC: 199 MG/DL (ref 65–140)
GLUCOSE SERPL-MCNC: 222 MG/DL (ref 65–140)
HCT VFR BLD AUTO: 14.8 % (ref 34.8–46.1)
HCT VFR BLD AUTO: 24 % (ref 34.8–46.1)
HCT VFR BLD AUTO: 24 % (ref 34.8–46.1)
HCT VFR BLD AUTO: 26.6 % (ref 34.8–46.1)
HCT VFR BLD AUTO: 27.3 % (ref 34.8–46.1)
HGB BLD-MCNC: 4.8 G/DL (ref 11.5–15.4)
HGB BLD-MCNC: 7.6 G/DL (ref 11.5–15.4)
HGB BLD-MCNC: 7.6 G/DL (ref 11.5–15.4)
HGB BLD-MCNC: 8.8 G/DL (ref 11.5–15.4)
HGB BLD-MCNC: 8.9 G/DL (ref 11.5–15.4)
HYPERCHROMIA BLD QL SMEAR: PRESENT
INR PPP: 1.59 (ref 0.86–1.16)
INR PPP: 1.66 (ref 0.86–1.16)
INR PPP: 2.41 (ref 0.86–1.16)
LYMPHOCYTES # BLD AUTO: 0.43 THOUSAND/UL (ref 0.6–4.47)
LYMPHOCYTES # BLD AUTO: 1.1 THOUSANDS/ΜL (ref 0.6–4.47)
LYMPHOCYTES # BLD AUTO: 5 % (ref 14–44)
LYMPHOCYTES NFR BLD AUTO: 9 % (ref 14–44)
MAGNESIUM SERPL-MCNC: 2.1 MG/DL (ref 1.6–2.6)
MCH RBC QN AUTO: 28.5 PG (ref 26.8–34.3)
MCH RBC QN AUTO: 29.1 PG (ref 26.8–34.3)
MCHC RBC AUTO-ENTMCNC: 31.7 G/DL (ref 31.4–37.4)
MCHC RBC AUTO-ENTMCNC: 32.4 G/DL (ref 31.4–37.4)
MCV RBC AUTO: 90 FL (ref 82–98)
MCV RBC AUTO: 90 FL (ref 82–98)
MONOCYTES # BLD AUTO: 0.17 THOUSAND/UL (ref 0–1.22)
MONOCYTES # BLD AUTO: 0.96 THOUSAND/ΜL (ref 0.17–1.22)
MONOCYTES NFR BLD AUTO: 8 % (ref 4–12)
MONOCYTES NFR BLD: 2 % (ref 4–12)
NEUTROPHILS # BLD AUTO: 9.21 THOUSANDS/ΜL (ref 1.85–7.62)
NEUTROPHILS # BLD MANUAL: 7.85 THOUSAND/UL (ref 1.85–7.62)
NEUTS BAND NFR BLD MANUAL: 2 % (ref 0–8)
NEUTS SEG NFR BLD AUTO: 81 % (ref 43–75)
NEUTS SEG NFR BLD AUTO: 89 % (ref 43–75)
NRBC BLD AUTO-RTO: 0 /100 WBCS
NRBC BLD AUTO-RTO: 0 /100 WBCS
PHOSPHATE SERPL-MCNC: 4.2 MG/DL (ref 2.3–4.1)
PLATELET # BLD AUTO: 186 THOUSANDS/UL (ref 149–390)
PLATELET # BLD AUTO: 278 THOUSANDS/UL (ref 149–390)
PLATELET BLD QL SMEAR: ADEQUATE
PMV BLD AUTO: 11.4 FL (ref 8.9–12.7)
PMV BLD AUTO: 12.2 FL (ref 8.9–12.7)
POTASSIUM SERPL-SCNC: 4.5 MMOL/L (ref 3.5–5.3)
POTASSIUM SERPL-SCNC: 5.2 MMOL/L (ref 3.5–5.3)
PROT SERPL-MCNC: 6.9 G/DL (ref 6.4–8.2)
PROTHROMBIN TIME: 19.1 SECONDS (ref 12.1–14.4)
PROTHROMBIN TIME: 19.7 SECONDS (ref 12.1–14.4)
PROTHROMBIN TIME: 26.5 SECONDS (ref 12.1–14.4)
RBC # BLD AUTO: 1.65 MILLION/UL (ref 3.81–5.12)
RBC # BLD AUTO: 2.67 MILLION/UL (ref 3.81–5.12)
RBC MORPH BLD: PRESENT
RH BLD: POSITIVE
SODIUM SERPL-SCNC: 133 MMOL/L (ref 136–145)
SODIUM SERPL-SCNC: 133 MMOL/L (ref 136–145)
SPECIMEN EXPIRATION DATE: NORMAL
WBC # BLD AUTO: 11.65 THOUSAND/UL (ref 4.31–10.16)
WBC # BLD AUTO: 8.63 THOUSAND/UL (ref 4.31–10.16)

## 2017-08-14 PROCEDURE — C9132 KCENTRA, PER I.U.: HCPCS | Performed by: SURGERY

## 2017-08-14 PROCEDURE — 85610 PROTHROMBIN TIME: CPT | Performed by: SURGERY

## 2017-08-14 PROCEDURE — 85018 HEMOGLOBIN: CPT | Performed by: INTERNAL MEDICINE

## 2017-08-14 PROCEDURE — 82948 REAGENT STRIP/BLOOD GLUCOSE: CPT

## 2017-08-14 PROCEDURE — 5A1D60Z PERFORMANCE OF URINARY FILTRATION, MULTIPLE: ICD-10-PCS | Performed by: INTERNAL MEDICINE

## 2017-08-14 PROCEDURE — 85730 THROMBOPLASTIN TIME PARTIAL: CPT | Performed by: EMERGENCY MEDICINE

## 2017-08-14 PROCEDURE — 74178 CT ABD&PLV WO CNTR FLWD CNTR: CPT

## 2017-08-14 PROCEDURE — 85025 COMPLETE CBC W/AUTO DIFF WBC: CPT | Performed by: EMERGENCY MEDICINE

## 2017-08-14 PROCEDURE — 96375 TX/PRO/DX INJ NEW DRUG ADDON: CPT

## 2017-08-14 PROCEDURE — 86923 COMPATIBILITY TEST ELECTRIC: CPT

## 2017-08-14 PROCEDURE — 85014 HEMATOCRIT: CPT | Performed by: SURGERY

## 2017-08-14 PROCEDURE — 30233K1 TRANSFUSION OF NONAUTOLOGOUS FROZEN PLASMA INTO PERIPHERAL VEIN, PERCUTANEOUS APPROACH: ICD-10-PCS | Performed by: HOSPITALIST

## 2017-08-14 PROCEDURE — 85014 HEMATOCRIT: CPT | Performed by: INTERNAL MEDICINE

## 2017-08-14 PROCEDURE — 85027 COMPLETE CBC AUTOMATED: CPT | Performed by: EMERGENCY MEDICINE

## 2017-08-14 PROCEDURE — P9021 RED BLOOD CELLS UNIT: HCPCS

## 2017-08-14 PROCEDURE — 86900 BLOOD TYPING SEROLOGIC ABO: CPT

## 2017-08-14 PROCEDURE — 80053 COMPREHEN METABOLIC PANEL: CPT | Performed by: EMERGENCY MEDICINE

## 2017-08-14 PROCEDURE — 80048 BASIC METABOLIC PNL TOTAL CA: CPT | Performed by: INTERNAL MEDICINE

## 2017-08-14 PROCEDURE — 85610 PROTHROMBIN TIME: CPT | Performed by: INTERNAL MEDICINE

## 2017-08-14 PROCEDURE — 86901 BLOOD TYPING SEROLOGIC RH(D): CPT

## 2017-08-14 PROCEDURE — P9016 RBC LEUKOCYTES REDUCED: HCPCS

## 2017-08-14 PROCEDURE — 71010 HB CHEST X-RAY 1 VIEW FRONTAL: CPT

## 2017-08-14 PROCEDURE — 85007 BL SMEAR W/DIFF WBC COUNT: CPT | Performed by: EMERGENCY MEDICINE

## 2017-08-14 PROCEDURE — 83735 ASSAY OF MAGNESIUM: CPT | Performed by: INTERNAL MEDICINE

## 2017-08-14 PROCEDURE — 86850 RBC ANTIBODY SCREEN: CPT

## 2017-08-14 PROCEDURE — 96361 HYDRATE IV INFUSION ADD-ON: CPT

## 2017-08-14 PROCEDURE — 02HV33Z INSERTION OF INFUSION DEVICE INTO SUPERIOR VENA CAVA, PERCUTANEOUS APPROACH: ICD-10-PCS | Performed by: RADIOLOGY

## 2017-08-14 PROCEDURE — 96365 THER/PROPH/DIAG IV INF INIT: CPT

## 2017-08-14 PROCEDURE — 30233N1 TRANSFUSION OF NONAUTOLOGOUS RED BLOOD CELLS INTO PERIPHERAL VEIN, PERCUTANEOUS APPROACH: ICD-10-PCS | Performed by: HOSPITALIST

## 2017-08-14 PROCEDURE — 99285 EMERGENCY DEPT VISIT HI MDM: CPT

## 2017-08-14 PROCEDURE — 84100 ASSAY OF PHOSPHORUS: CPT | Performed by: INTERNAL MEDICINE

## 2017-08-14 PROCEDURE — 85610 PROTHROMBIN TIME: CPT | Performed by: EMERGENCY MEDICINE

## 2017-08-14 PROCEDURE — 85018 HEMOGLOBIN: CPT | Performed by: SURGERY

## 2017-08-14 PROCEDURE — 74176 CT ABD & PELVIS W/O CONTRAST: CPT

## 2017-08-14 PROCEDURE — 36415 COLL VENOUS BLD VENIPUNCTURE: CPT | Performed by: EMERGENCY MEDICINE

## 2017-08-14 PROCEDURE — C9113 INJ PANTOPRAZOLE SODIUM, VIA: HCPCS | Performed by: INTERNAL MEDICINE

## 2017-08-14 RX ORDER — CHOLECALCIFEROL (VITAMIN D3) 125 MCG
1000 CAPSULE ORAL DAILY
Status: DISCONTINUED | OUTPATIENT
Start: 2017-08-15 | End: 2017-08-21 | Stop reason: HOSPADM

## 2017-08-14 RX ORDER — DIAPER,BRIEF,INFANT-TODD,DISP
EACH MISCELLANEOUS 2 TIMES DAILY
Status: DISCONTINUED | OUTPATIENT
Start: 2017-08-14 | End: 2017-08-21 | Stop reason: HOSPADM

## 2017-08-14 RX ORDER — GUAIFENESIN 100 MG/5ML
200 SYRUP ORAL EVERY 4 HOURS PRN
COMMUNITY
End: 2017-12-03 | Stop reason: HOSPADM

## 2017-08-14 RX ORDER — GUAIFENESIN 100 MG/5ML
200 SOLUTION ORAL EVERY 4 HOURS PRN
Status: DISCONTINUED | OUTPATIENT
Start: 2017-08-14 | End: 2017-08-21 | Stop reason: HOSPADM

## 2017-08-14 RX ORDER — B-COMPLEX WITH VITAMIN C
1 TABLET ORAL
Status: DISCONTINUED | OUTPATIENT
Start: 2017-08-15 | End: 2017-08-21 | Stop reason: HOSPADM

## 2017-08-14 RX ORDER — ACETAMINOPHEN 325 MG/1
650 TABLET ORAL EVERY 6 HOURS PRN
Status: DISCONTINUED | OUTPATIENT
Start: 2017-08-14 | End: 2017-08-16

## 2017-08-14 RX ORDER — SODIUM CHLORIDE 9 MG/ML
50 INJECTION, SOLUTION INTRAVENOUS CONTINUOUS
Status: DISCONTINUED | OUTPATIENT
Start: 2017-08-14 | End: 2017-08-14

## 2017-08-14 RX ORDER — MELATONIN
1000 DAILY
Status: DISCONTINUED | OUTPATIENT
Start: 2017-08-15 | End: 2017-08-21 | Stop reason: HOSPADM

## 2017-08-14 RX ORDER — PREGABALIN 50 MG/1
50 CAPSULE ORAL
Status: DISCONTINUED | OUTPATIENT
Start: 2017-08-14 | End: 2017-08-15

## 2017-08-14 RX ORDER — DIPHENHYDRAMINE HYDROCHLORIDE 50 MG/ML
25 INJECTION INTRAMUSCULAR; INTRAVENOUS
Status: COMPLETED | OUTPATIENT
Start: 2017-08-14 | End: 2017-08-14

## 2017-08-14 RX ORDER — INSULIN GLARGINE 100 [IU]/ML
35 INJECTION, SOLUTION SUBCUTANEOUS
Status: DISCONTINUED | OUTPATIENT
Start: 2017-08-14 | End: 2017-08-16

## 2017-08-14 RX ORDER — ALBUTEROL SULFATE 2.5 MG/3ML
2.5 SOLUTION RESPIRATORY (INHALATION) EVERY 6 HOURS PRN
Status: DISCONTINUED | OUTPATIENT
Start: 2017-08-14 | End: 2017-08-14

## 2017-08-14 RX ORDER — LIDOCAINE HYDROCHLORIDE AND EPINEPHRINE 10; 10 MG/ML; UG/ML
10 INJECTION, SOLUTION INFILTRATION; PERINEURAL ONCE
Status: DISCONTINUED | OUTPATIENT
Start: 2017-08-14 | End: 2017-08-14

## 2017-08-14 RX ORDER — ZINC OXIDE
OINTMENT (GRAM) TOPICAL AS NEEDED
Status: DISCONTINUED | OUTPATIENT
Start: 2017-08-14 | End: 2017-08-14

## 2017-08-14 RX ORDER — GEL BASE NO.41
GEL (GRAM) MISCELLANEOUS
COMMUNITY
End: 2017-11-23

## 2017-08-14 RX ORDER — DIPHENHYDRAMINE HYDROCHLORIDE 50 MG/ML
INJECTION INTRAMUSCULAR; INTRAVENOUS
Status: COMPLETED
Start: 2017-08-14 | End: 2017-08-14

## 2017-08-14 RX ORDER — MUSCLE RUB CREAM 100; 150 MG/G; MG/G
CREAM TOPICAL 2 TIMES DAILY
Status: DISCONTINUED | OUTPATIENT
Start: 2017-08-14 | End: 2017-08-21 | Stop reason: HOSPADM

## 2017-08-14 RX ORDER — WARFARIN SODIUM 5 MG/1
5 TABLET ORAL
COMMUNITY
End: 2017-08-27

## 2017-08-14 RX ORDER — RANITIDINE 150 MG/1
150 TABLET ORAL 2 TIMES DAILY
COMMUNITY
End: 2017-08-21 | Stop reason: HOSPADM

## 2017-08-14 RX ORDER — WARFARIN SODIUM 3 MG/1
TABLET ORAL
COMMUNITY
End: 2017-08-27

## 2017-08-14 RX ORDER — PRAVASTATIN SODIUM 80 MG/1
80 TABLET ORAL DAILY
Status: DISCONTINUED | OUTPATIENT
Start: 2017-08-15 | End: 2017-08-21 | Stop reason: HOSPADM

## 2017-08-14 RX ORDER — PANTOPRAZOLE SODIUM 40 MG/1
40 INJECTION, POWDER, FOR SOLUTION INTRAVENOUS EVERY 12 HOURS SCHEDULED
Status: DISCONTINUED | OUTPATIENT
Start: 2017-08-14 | End: 2017-08-19

## 2017-08-14 RX ORDER — LEVOTHYROXINE SODIUM 0.03 MG/1
25 TABLET ORAL
Status: DISCONTINUED | OUTPATIENT
Start: 2017-08-15 | End: 2017-08-21 | Stop reason: HOSPADM

## 2017-08-14 RX ORDER — ALLOPURINOL 300 MG/1
300 TABLET ORAL
Status: DISCONTINUED | OUTPATIENT
Start: 2017-08-17 | End: 2017-08-21 | Stop reason: HOSPADM

## 2017-08-14 RX ORDER — HYDROCORTISONE ACETATE 25 MG/1
25 SUPPOSITORY RECTAL ONCE
COMMUNITY
Start: 2017-08-14 | End: 2017-08-21 | Stop reason: HOSPADM

## 2017-08-14 RX ORDER — ALLOPURINOL 100 MG/1
200 TABLET ORAL
Status: DISCONTINUED | OUTPATIENT
Start: 2017-08-15 | End: 2017-08-21 | Stop reason: HOSPADM

## 2017-08-14 RX ORDER — LATANOPROST 50 UG/ML
1 SOLUTION/ DROPS OPHTHALMIC
Status: DISCONTINUED | OUTPATIENT
Start: 2017-08-14 | End: 2017-08-21 | Stop reason: HOSPADM

## 2017-08-14 RX ADMIN — HYDROMORPHONE HYDROCHLORIDE 1 MG: 1 INJECTION, SOLUTION INTRAMUSCULAR; INTRAVENOUS; SUBCUTANEOUS at 20:32

## 2017-08-14 RX ADMIN — DOXERCALCIFEROL 1 MCG: 2 INJECTION, SOLUTION INTRAVENOUS at 20:55

## 2017-08-14 RX ADMIN — PHYTONADIONE 10 MG: 10 INJECTION, EMULSION INTRAMUSCULAR; INTRAVENOUS; SUBCUTANEOUS at 14:28

## 2017-08-14 RX ADMIN — PANTOPRAZOLE SODIUM 40 MG: 40 INJECTION, POWDER, FOR SOLUTION INTRAVENOUS at 21:14

## 2017-08-14 RX ADMIN — SODIUM CHLORIDE 1000 ML: 0.9 INJECTION, SOLUTION INTRAVENOUS at 12:47

## 2017-08-14 RX ADMIN — HYDROCORTISONE SODIUM SUCCINATE 50 MG: 100 INJECTION, POWDER, FOR SOLUTION INTRAMUSCULAR; INTRAVENOUS at 17:18

## 2017-08-14 RX ADMIN — DESMOPRESSIN ACETATE 45.6 MCG: 4 SOLUTION INTRAVENOUS at 14:10

## 2017-08-14 RX ADMIN — SODIUM CHLORIDE 50 ML/HR: 0.9 INJECTION, SOLUTION INTRAVENOUS at 16:14

## 2017-08-14 RX ADMIN — HYDROCORTISONE: 1 CREAM TOPICAL at 20:53

## 2017-08-14 RX ADMIN — DIPHENHYDRAMINE HYDROCHLORIDE 25 MG: 50 INJECTION, SOLUTION INTRAMUSCULAR; INTRAVENOUS at 21:13

## 2017-08-14 RX ADMIN — PREGABALIN 50 MG: 50 CAPSULE ORAL at 22:50

## 2017-08-14 RX ADMIN — INSULIN GLARGINE 35 UNITS: 100 INJECTION, SOLUTION SUBCUTANEOUS at 22:58

## 2017-08-14 RX ADMIN — MENTHOL, METHYL SALICYLATE: 10; 15 CREAM TOPICAL at 20:54

## 2017-08-14 RX ADMIN — HYDROMORPHONE HYDROCHLORIDE 1 MG: 1 INJECTION, SOLUTION INTRAMUSCULAR; INTRAVENOUS; SUBCUTANEOUS at 10:06

## 2017-08-14 RX ADMIN — POLYETHYLENE GLYCOL 3350, SODIUM SULFATE ANHYDROUS, SODIUM BICARBONATE, SODIUM CHLORIDE, POTASSIUM CHLORIDE 4000 ML: 236; 22.74; 6.74; 5.86; 2.97 POWDER, FOR SOLUTION ORAL at 22:47

## 2017-08-14 RX ADMIN — BISACODYL 10 MG: 5 TABLET, COATED ORAL at 22:49

## 2017-08-14 RX ADMIN — SODIUM CHLORIDE 500 ML: 0.9 INJECTION, SOLUTION INTRAVENOUS at 21:14

## 2017-08-14 RX ADMIN — PROTHROMBIN, COAGULATION FACTOR VII HUMAN, COAGULATION FACTOR IX HUMAN, COAGULATION FACTOR X HUMAN, PROTEIN C, PROTEIN S HUMAN, AND WATER 2168 UNITS: KIT at 14:21

## 2017-08-14 RX ADMIN — DIPHENHYDRAMINE HYDROCHLORIDE 25 MG: 50 INJECTION, SOLUTION INTRAMUSCULAR; INTRAVENOUS at 17:19

## 2017-08-14 RX ADMIN — LATANOPROST 1 DROP: 50 SOLUTION OPHTHALMIC at 22:51

## 2017-08-14 RX ADMIN — IODIXANOL 100 ML: 320 INJECTION, SOLUTION INTRAVASCULAR at 22:12

## 2017-08-14 RX ADMIN — HYDROCORTISONE SODIUM SUCCINATE 50 MG: 100 INJECTION, POWDER, FOR SOLUTION INTRAMUSCULAR; INTRAVENOUS at 21:20

## 2017-08-15 ENCOUNTER — ANESTHESIA EVENT (INPATIENT)
Dept: GASTROENTEROLOGY | Facility: HOSPITAL | Age: 72
DRG: 463 | End: 2017-08-15
Payer: MEDICARE

## 2017-08-15 ENCOUNTER — ANESTHESIA (INPATIENT)
Dept: GASTROENTEROLOGY | Facility: HOSPITAL | Age: 72
DRG: 463 | End: 2017-08-15
Payer: MEDICARE

## 2017-08-15 ENCOUNTER — GENERIC CONVERSION - ENCOUNTER (OUTPATIENT)
Dept: OTHER | Facility: OTHER | Age: 72
End: 2017-08-15

## 2017-08-15 PROBLEM — K92.2 GI BLEED: Status: ACTIVE | Noted: 2017-08-15

## 2017-08-15 LAB
ABO GROUP BLD BPU: NORMAL
ANION GAP SERPL CALCULATED.3IONS-SCNC: 8 MMOL/L (ref 4–13)
ARTERIAL PATENCY WRIST A: YES
BASE EXCESS BLDA CALC-SCNC: -3.4 MMOL/L
BASOPHILS # BLD MANUAL: 0 THOUSAND/UL (ref 0–0.1)
BASOPHILS NFR MAR MANUAL: 0 % (ref 0–1)
BPU ID: NORMAL
BUN SERPL-MCNC: 46 MG/DL (ref 5–25)
CALCIUM SERPL-MCNC: 7.6 MG/DL (ref 8.3–10.1)
CHLORIDE SERPL-SCNC: 102 MMOL/L (ref 100–108)
CO2 SERPL-SCNC: 24 MMOL/L (ref 21–32)
CREAT SERPL-MCNC: 3.07 MG/DL (ref 0.6–1.3)
EOSINOPHIL # BLD MANUAL: 0 THOUSAND/UL (ref 0–0.4)
EOSINOPHIL NFR BLD MANUAL: 0 % (ref 0–6)
ERYTHROCYTE [DISTWIDTH] IN BLOOD BY AUTOMATED COUNT: 15.5 % (ref 11.6–15.1)
GFR SERPL CREATININE-BSD FRML MDRD: 14 ML/MIN/1.73SQ M
GIANT PLATELETS BLD QL SMEAR: PRESENT
GLUCOSE SERPL-MCNC: 134 MG/DL (ref 65–140)
GLUCOSE SERPL-MCNC: 180 MG/DL (ref 65–140)
GLUCOSE SERPL-MCNC: 188 MG/DL (ref 65–140)
GLUCOSE SERPL-MCNC: 200 MG/DL (ref 65–140)
GLUCOSE SERPL-MCNC: 211 MG/DL (ref 65–140)
GLUCOSE SERPL-MCNC: 231 MG/DL (ref 65–140)
HCO3 BLDA-SCNC: 23.9 MMOL/L (ref 22–28)
HCT VFR BLD AUTO: 25.7 % (ref 34.8–46.1)
HCT VFR BLD AUTO: 28.7 % (ref 34.8–46.1)
HGB BLD-MCNC: 8.4 G/DL (ref 11.5–15.4)
HGB BLD-MCNC: 9.2 G/DL (ref 11.5–15.4)
LYMPHOCYTES # BLD AUTO: 0.48 THOUSAND/UL (ref 0.6–4.47)
LYMPHOCYTES # BLD AUTO: 4 % (ref 14–44)
MAGNESIUM SERPL-MCNC: 2 MG/DL (ref 1.6–2.6)
MCH RBC QN AUTO: 27.8 PG (ref 26.8–34.3)
MCHC RBC AUTO-ENTMCNC: 32.1 G/DL (ref 31.4–37.4)
MCV RBC AUTO: 87 FL (ref 82–98)
MONOCYTES # BLD AUTO: 0.24 THOUSAND/UL (ref 0–1.22)
MONOCYTES NFR BLD: 2 % (ref 4–12)
NASAL CANNULA: 2
NEUTROPHILS # BLD MANUAL: 11.12 THOUSAND/UL (ref 1.85–7.62)
NEUTS SEG NFR BLD AUTO: 92 % (ref 43–75)
NRBC BLD AUTO-RTO: 0 /100 WBCS
O2 CT BLDA-SCNC: 13 ML/DL (ref 16–23)
OXYHGB MFR BLDA: 97.2 % (ref 94–97)
PCO2 BLDA: 55.1 MM HG (ref 36–44)
PH BLDA: 7.25 [PH] (ref 7.35–7.45)
PHOSPHATE SERPL-MCNC: 4.6 MG/DL (ref 2.3–4.1)
PLATELET # BLD AUTO: 240 THOUSANDS/UL (ref 149–390)
PLATELET BLD QL SMEAR: ADEQUATE
PMV BLD AUTO: 11.5 FL (ref 8.9–12.7)
PO2 BLDA: 137.3 MM HG (ref 75–129)
POTASSIUM SERPL-SCNC: 4.4 MMOL/L (ref 3.5–5.3)
RBC # BLD AUTO: 3.31 MILLION/UL (ref 3.81–5.12)
SODIUM SERPL-SCNC: 134 MMOL/L (ref 136–145)
SPECIMEN SOURCE: ABNORMAL
UNIT DISPENSE STATUS: NORMAL
UNIT PRODUCT CODE: NORMAL
UNIT RH: NORMAL
VARIANT LYMPHS # BLD AUTO: 2 %
WBC # BLD AUTO: 12.09 THOUSAND/UL (ref 4.31–10.16)

## 2017-08-15 PROCEDURE — 94760 N-INVAS EAR/PLS OXIMETRY 1: CPT

## 2017-08-15 PROCEDURE — 82948 REAGENT STRIP/BLOOD GLUCOSE: CPT

## 2017-08-15 PROCEDURE — 85027 COMPLETE CBC AUTOMATED: CPT | Performed by: SURGERY

## 2017-08-15 PROCEDURE — 85018 HEMOGLOBIN: CPT | Performed by: FAMILY MEDICINE

## 2017-08-15 PROCEDURE — 85014 HEMATOCRIT: CPT | Performed by: FAMILY MEDICINE

## 2017-08-15 PROCEDURE — 84100 ASSAY OF PHOSPHORUS: CPT | Performed by: SURGERY

## 2017-08-15 PROCEDURE — C9113 INJ PANTOPRAZOLE SODIUM, VIA: HCPCS | Performed by: INTERNAL MEDICINE

## 2017-08-15 PROCEDURE — 80048 BASIC METABOLIC PNL TOTAL CA: CPT | Performed by: SURGERY

## 2017-08-15 PROCEDURE — 83735 ASSAY OF MAGNESIUM: CPT | Performed by: SURGERY

## 2017-08-15 PROCEDURE — 85007 BL SMEAR W/DIFF WBC COUNT: CPT | Performed by: SURGERY

## 2017-08-15 PROCEDURE — 36600 WITHDRAWAL OF ARTERIAL BLOOD: CPT

## 2017-08-15 PROCEDURE — 0DJD8ZZ INSPECTION OF LOWER INTESTINAL TRACT, VIA NATURAL OR ARTIFICIAL OPENING ENDOSCOPIC: ICD-10-PCS | Performed by: COLON & RECTAL SURGERY

## 2017-08-15 PROCEDURE — 82805 BLOOD GASES W/O2 SATURATION: CPT | Performed by: FAMILY MEDICINE

## 2017-08-15 RX ORDER — NYSTATIN 100000 U/G
CREAM TOPICAL 2 TIMES DAILY
Status: DISCONTINUED | OUTPATIENT
Start: 2017-08-15 | End: 2017-08-21 | Stop reason: HOSPADM

## 2017-08-15 RX ORDER — POLYETHYLENE GLYCOL 3350 17 G/17G
17 POWDER, FOR SOLUTION ORAL DAILY PRN
Status: DISCONTINUED | OUTPATIENT
Start: 2017-08-15 | End: 2017-08-21 | Stop reason: HOSPADM

## 2017-08-15 RX ORDER — PREGABALIN 50 MG/1
50 CAPSULE ORAL 2 TIMES DAILY
Status: DISCONTINUED | OUTPATIENT
Start: 2017-08-15 | End: 2017-08-16

## 2017-08-15 RX ORDER — PROPOFOL 10 MG/ML
INJECTION, EMULSION INTRAVENOUS CONTINUOUS PRN
Status: DISCONTINUED | OUTPATIENT
Start: 2017-08-15 | End: 2017-08-15 | Stop reason: SURG

## 2017-08-15 RX ORDER — SODIUM CHLORIDE 9 MG/ML
INJECTION, SOLUTION INTRAVENOUS CONTINUOUS PRN
Status: DISCONTINUED | OUTPATIENT
Start: 2017-08-15 | End: 2017-08-15 | Stop reason: SURG

## 2017-08-15 RX ORDER — SENNOSIDES 8.6 MG
1 TABLET ORAL
Status: DISCONTINUED | OUTPATIENT
Start: 2017-08-15 | End: 2017-08-21 | Stop reason: HOSPADM

## 2017-08-15 RX ORDER — DOCUSATE SODIUM 100 MG/1
100 CAPSULE, LIQUID FILLED ORAL 2 TIMES DAILY
Status: DISCONTINUED | OUTPATIENT
Start: 2017-08-15 | End: 2017-08-21 | Stop reason: HOSPADM

## 2017-08-15 RX ADMIN — SENNOSIDES 8.6 MG: 8.6 TABLET, FILM COATED ORAL at 21:25

## 2017-08-15 RX ADMIN — PANTOPRAZOLE SODIUM 40 MG: 40 INJECTION, POWDER, FOR SOLUTION INTRAVENOUS at 08:24

## 2017-08-15 RX ADMIN — DOCUSATE SODIUM 100 MG: 100 CAPSULE, LIQUID FILLED ORAL at 14:11

## 2017-08-15 RX ADMIN — INSULIN LISPRO 1 UNITS: 100 INJECTION, SOLUTION INTRAVENOUS; SUBCUTANEOUS at 00:23

## 2017-08-15 RX ADMIN — HYDROMORPHONE HYDROCHLORIDE 1 MG: 1 INJECTION, SOLUTION INTRAMUSCULAR; INTRAVENOUS; SUBCUTANEOUS at 22:42

## 2017-08-15 RX ADMIN — HYDROMORPHONE HYDROCHLORIDE 1 MG: 1 INJECTION, SOLUTION INTRAMUSCULAR; INTRAVENOUS; SUBCUTANEOUS at 05:22

## 2017-08-15 RX ADMIN — HYDROCORTISONE: 1 CREAM TOPICAL at 18:28

## 2017-08-15 RX ADMIN — INSULIN LISPRO 2 UNITS: 100 INJECTION, SOLUTION INTRAVENOUS; SUBCUTANEOUS at 05:46

## 2017-08-15 RX ADMIN — LEVOTHYROXINE SODIUM 25 MCG: 25 TABLET ORAL at 05:45

## 2017-08-15 RX ADMIN — LATANOPROST 1 DROP: 50 SOLUTION OPHTHALMIC at 21:25

## 2017-08-15 RX ADMIN — HYDROMORPHONE HYDROCHLORIDE 1 MG: 1 INJECTION, SOLUTION INTRAMUSCULAR; INTRAVENOUS; SUBCUTANEOUS at 14:03

## 2017-08-15 RX ADMIN — HYDROCORTISONE: 1 CREAM TOPICAL at 08:45

## 2017-08-15 RX ADMIN — PANTOPRAZOLE SODIUM 40 MG: 40 INJECTION, POWDER, FOR SOLUTION INTRAVENOUS at 21:25

## 2017-08-15 RX ADMIN — HYDROMORPHONE HYDROCHLORIDE 1 MG: 1 INJECTION, SOLUTION INTRAMUSCULAR; INTRAVENOUS; SUBCUTANEOUS at 01:29

## 2017-08-15 RX ADMIN — PROPOFOL 30 MCG/KG/MIN: 10 INJECTION, EMULSION INTRAVENOUS at 11:12

## 2017-08-15 RX ADMIN — MENTHOL, METHYL SALICYLATE: 10; 15 CREAM TOPICAL at 08:44

## 2017-08-15 RX ADMIN — SODIUM CHLORIDE: 0.9 INJECTION, SOLUTION INTRAVENOUS at 11:10

## 2017-08-15 RX ADMIN — MENTHOL, METHYL SALICYLATE: 10; 15 CREAM TOPICAL at 18:29

## 2017-08-15 RX ADMIN — DEXMEDETOMIDINE HYDROCHLORIDE 12 MCG: 100 INJECTION, SOLUTION INTRAVENOUS at 11:12

## 2017-08-15 RX ADMIN — INSULIN LISPRO 2 UNITS: 100 INJECTION, SOLUTION INTRAVENOUS; SUBCUTANEOUS at 18:29

## 2017-08-15 RX ADMIN — DOCUSATE SODIUM 100 MG: 100 CAPSULE, LIQUID FILLED ORAL at 18:27

## 2017-08-15 RX ADMIN — CALCIUM ACETATE 667 MG: 667 CAPSULE ORAL at 18:27

## 2017-08-15 RX ADMIN — NYSTATIN: 100000 CREAM TOPICAL at 18:28

## 2017-08-15 RX ADMIN — INSULIN GLARGINE 35 UNITS: 100 INJECTION, SOLUTION SUBCUTANEOUS at 21:25

## 2017-08-15 RX ADMIN — PREGABALIN 50 MG: 50 CAPSULE ORAL at 18:27

## 2017-08-15 RX ADMIN — NYSTATIN: 100000 CREAM TOPICAL at 08:43

## 2017-08-15 RX ADMIN — HYDROMORPHONE HYDROCHLORIDE 1 MG: 1 INJECTION, SOLUTION INTRAMUSCULAR; INTRAVENOUS; SUBCUTANEOUS at 18:09

## 2017-08-16 ENCOUNTER — APPOINTMENT (INPATIENT)
Dept: DIALYSIS | Facility: HOSPITAL | Age: 72
DRG: 463 | End: 2017-08-16
Payer: MEDICARE

## 2017-08-16 PROBLEM — K92.2 GI BLEED: Status: RESOLVED | Noted: 2017-08-15 | Resolved: 2017-08-16

## 2017-08-16 LAB
ANION GAP SERPL CALCULATED.3IONS-SCNC: 10 MMOL/L (ref 4–13)
BUN SERPL-MCNC: 60 MG/DL (ref 5–25)
CA-I BLD-SCNC: 1.02 MMOL/L (ref 1.12–1.32)
CALCIUM SERPL-MCNC: 7.8 MG/DL (ref 8.3–10.1)
CHLORIDE SERPL-SCNC: 100 MMOL/L (ref 100–108)
CO2 SERPL-SCNC: 26 MMOL/L (ref 21–32)
CREAT SERPL-MCNC: 4.15 MG/DL (ref 0.6–1.3)
ERYTHROCYTE [DISTWIDTH] IN BLOOD BY AUTOMATED COUNT: 15.7 % (ref 11.6–15.1)
GFR SERPL CREATININE-BSD FRML MDRD: 10 ML/MIN/1.73SQ M
GLUCOSE SERPL-MCNC: 154 MG/DL (ref 65–140)
GLUCOSE SERPL-MCNC: 192 MG/DL (ref 65–140)
GLUCOSE SERPL-MCNC: 203 MG/DL (ref 65–140)
GLUCOSE SERPL-MCNC: 208 MG/DL (ref 65–140)
GLUCOSE SERPL-MCNC: 220 MG/DL (ref 65–140)
GLUCOSE SERPL-MCNC: 286 MG/DL (ref 65–140)
HCT VFR BLD AUTO: 25.3 % (ref 34.8–46.1)
HCT VFR BLD AUTO: 28.3 % (ref 34.8–46.1)
HGB BLD-MCNC: 8 G/DL (ref 11.5–15.4)
HGB BLD-MCNC: 8.9 G/DL (ref 11.5–15.4)
MAGNESIUM SERPL-MCNC: 2.2 MG/DL (ref 1.6–2.6)
MCH RBC QN AUTO: 28.1 PG (ref 26.8–34.3)
MCHC RBC AUTO-ENTMCNC: 31.6 G/DL (ref 31.4–37.4)
MCV RBC AUTO: 89 FL (ref 82–98)
PHOSPHATE SERPL-MCNC: 5.3 MG/DL (ref 2.3–4.1)
PLATELET # BLD AUTO: 212 THOUSANDS/UL (ref 149–390)
PMV BLD AUTO: 11.1 FL (ref 8.9–12.7)
POTASSIUM SERPL-SCNC: 3.9 MMOL/L (ref 3.5–5.3)
RBC # BLD AUTO: 2.85 MILLION/UL (ref 3.81–5.12)
SODIUM SERPL-SCNC: 136 MMOL/L (ref 136–145)
WBC # BLD AUTO: 9.12 THOUSAND/UL (ref 4.31–10.16)

## 2017-08-16 PROCEDURE — 84100 ASSAY OF PHOSPHORUS: CPT | Performed by: FAMILY MEDICINE

## 2017-08-16 PROCEDURE — 83735 ASSAY OF MAGNESIUM: CPT | Performed by: FAMILY MEDICINE

## 2017-08-16 PROCEDURE — 94760 N-INVAS EAR/PLS OXIMETRY 1: CPT

## 2017-08-16 PROCEDURE — 80048 BASIC METABOLIC PNL TOTAL CA: CPT | Performed by: FAMILY MEDICINE

## 2017-08-16 PROCEDURE — 82330 ASSAY OF CALCIUM: CPT | Performed by: FAMILY MEDICINE

## 2017-08-16 PROCEDURE — 82948 REAGENT STRIP/BLOOD GLUCOSE: CPT

## 2017-08-16 PROCEDURE — 85027 COMPLETE CBC AUTOMATED: CPT | Performed by: FAMILY MEDICINE

## 2017-08-16 PROCEDURE — C9113 INJ PANTOPRAZOLE SODIUM, VIA: HCPCS | Performed by: INTERNAL MEDICINE

## 2017-08-16 PROCEDURE — 85018 HEMOGLOBIN: CPT | Performed by: EMERGENCY MEDICINE

## 2017-08-16 PROCEDURE — 85014 HEMATOCRIT: CPT | Performed by: EMERGENCY MEDICINE

## 2017-08-16 RX ORDER — ACETAMINOPHEN 325 MG/1
975 TABLET ORAL EVERY 8 HOURS SCHEDULED
Status: DISCONTINUED | OUTPATIENT
Start: 2017-08-16 | End: 2017-08-21 | Stop reason: HOSPADM

## 2017-08-16 RX ORDER — PREGABALIN 75 MG/1
75 CAPSULE ORAL 2 TIMES DAILY
Status: DISCONTINUED | OUTPATIENT
Start: 2017-08-16 | End: 2017-08-21 | Stop reason: HOSPADM

## 2017-08-16 RX ORDER — LIDOCAINE 50 MG/G
1 PATCH TOPICAL DAILY
Status: DISCONTINUED | OUTPATIENT
Start: 2017-08-16 | End: 2017-08-21 | Stop reason: HOSPADM

## 2017-08-16 RX ORDER — BISACODYL 10 MG
10 SUPPOSITORY, RECTAL RECTAL DAILY PRN
Status: DISCONTINUED | OUTPATIENT
Start: 2017-08-16 | End: 2017-08-17

## 2017-08-16 RX ORDER — HYDROMORPHONE HYDROCHLORIDE 2 MG/1
2 TABLET ORAL EVERY 4 HOURS PRN
Status: DISCONTINUED | OUTPATIENT
Start: 2017-08-16 | End: 2017-08-18

## 2017-08-16 RX ORDER — INSULIN GLARGINE 100 [IU]/ML
40 INJECTION, SOLUTION SUBCUTANEOUS
Status: DISCONTINUED | OUTPATIENT
Start: 2017-08-16 | End: 2017-08-21 | Stop reason: HOSPADM

## 2017-08-16 RX ADMIN — INSULIN LISPRO 2 UNITS: 100 INJECTION, SOLUTION INTRAVENOUS; SUBCUTANEOUS at 00:10

## 2017-08-16 RX ADMIN — INSULIN GLARGINE 40 UNITS: 100 INJECTION, SOLUTION SUBCUTANEOUS at 21:13

## 2017-08-16 RX ADMIN — HYDROMORPHONE HYDROCHLORIDE 0.5 MG: 1 INJECTION, SOLUTION INTRAMUSCULAR; INTRAVENOUS; SUBCUTANEOUS at 21:22

## 2017-08-16 RX ADMIN — HYDROCORTISONE 1 APPLICATION: 1 CREAM TOPICAL at 17:53

## 2017-08-16 RX ADMIN — ALLOPURINOL 200 MG: 100 TABLET ORAL at 09:06

## 2017-08-16 RX ADMIN — ACETAMINOPHEN 975 MG: 325 TABLET ORAL at 15:49

## 2017-08-16 RX ADMIN — PANTOPRAZOLE SODIUM 40 MG: 40 INJECTION, POWDER, FOR SOLUTION INTRAVENOUS at 09:02

## 2017-08-16 RX ADMIN — NYSTATIN 1 APPLICATION: 100000 CREAM TOPICAL at 17:52

## 2017-08-16 RX ADMIN — SENNOSIDES 8.6 MG: 8.6 TABLET, FILM COATED ORAL at 21:13

## 2017-08-16 RX ADMIN — NYSTATIN 1 APPLICATION: 100000 CREAM TOPICAL at 09:02

## 2017-08-16 RX ADMIN — LIDOCAINE 1 PATCH: 50 PATCH CUTANEOUS at 15:48

## 2017-08-16 RX ADMIN — HYDROMORPHONE HYDROCHLORIDE 2 MG: 2 TABLET ORAL at 19:03

## 2017-08-16 RX ADMIN — INSULIN LISPRO 2 UNITS: 100 INJECTION, SOLUTION INTRAVENOUS; SUBCUTANEOUS at 13:39

## 2017-08-16 RX ADMIN — DOCUSATE SODIUM 100 MG: 100 CAPSULE, LIQUID FILLED ORAL at 17:52

## 2017-08-16 RX ADMIN — MENTHOL, METHYL SALICYLATE: 10; 15 CREAM TOPICAL at 17:52

## 2017-08-16 RX ADMIN — MENTHOL, METHYL SALICYLATE 1 APPLICATION: 10; 15 CREAM TOPICAL at 09:02

## 2017-08-16 RX ADMIN — PRAVASTATIN SODIUM 80 MG: 80 TABLET ORAL at 09:04

## 2017-08-16 RX ADMIN — DOCUSATE SODIUM 100 MG: 100 CAPSULE, LIQUID FILLED ORAL at 09:00

## 2017-08-16 RX ADMIN — HYDROMORPHONE HYDROCHLORIDE 1 MG: 1 INJECTION, SOLUTION INTRAMUSCULAR; INTRAVENOUS; SUBCUTANEOUS at 14:04

## 2017-08-16 RX ADMIN — HYDROMORPHONE HYDROCHLORIDE 1 MG: 1 INJECTION, SOLUTION INTRAMUSCULAR; INTRAVENOUS; SUBCUTANEOUS at 04:05

## 2017-08-16 RX ADMIN — BISACODYL 10 MG: 10 SUPPOSITORY RECTAL at 15:49

## 2017-08-16 RX ADMIN — PANTOPRAZOLE SODIUM 40 MG: 40 INJECTION, POWDER, FOR SOLUTION INTRAVENOUS at 21:13

## 2017-08-16 RX ADMIN — ACETAMINOPHEN 975 MG: 325 TABLET ORAL at 21:13

## 2017-08-16 RX ADMIN — PREGABALIN 75 MG: 75 CAPSULE ORAL at 09:00

## 2017-08-16 RX ADMIN — LEVOTHYROXINE SODIUM 25 MCG: 25 TABLET ORAL at 06:54

## 2017-08-16 RX ADMIN — INSULIN LISPRO 2 UNITS: 100 INJECTION, SOLUTION INTRAVENOUS; SUBCUTANEOUS at 06:18

## 2017-08-16 RX ADMIN — CALCIUM ACETATE 667 MG: 667 CAPSULE ORAL at 15:49

## 2017-08-16 RX ADMIN — CALCIUM ACETATE 667 MG: 667 CAPSULE ORAL at 09:00

## 2017-08-16 RX ADMIN — PREGABALIN 75 MG: 75 CAPSULE ORAL at 17:52

## 2017-08-16 RX ADMIN — INSULIN LISPRO 2 UNITS: 100 INJECTION, SOLUTION INTRAVENOUS; SUBCUTANEOUS at 09:00

## 2017-08-16 RX ADMIN — INSULIN LISPRO 4 UNITS: 100 INJECTION, SOLUTION INTRAVENOUS; SUBCUTANEOUS at 17:52

## 2017-08-16 RX ADMIN — LIDOCAINE 1 PATCH: 50 PATCH CUTANEOUS at 15:49

## 2017-08-16 RX ADMIN — HYDROMORPHONE HYDROCHLORIDE 1 MG: 1 INJECTION, SOLUTION INTRAMUSCULAR; INTRAVENOUS; SUBCUTANEOUS at 09:03

## 2017-08-16 RX ADMIN — LATANOPROST 1 DROP: 50 SOLUTION OPHTHALMIC at 21:13

## 2017-08-16 RX ADMIN — CALCIUM CARBONATE 500 MG (1,250 MG)-VITAMIN D3 200 UNIT TABLET 1 TABLET: at 09:05

## 2017-08-16 RX ADMIN — CYANOCOBALAMIN TAB 500 MCG 1000 MCG: 500 TAB at 09:04

## 2017-08-16 RX ADMIN — HYDROCORTISONE: 1 CREAM TOPICAL at 09:01

## 2017-08-16 RX ADMIN — CALCIUM ACETATE 667 MG: 667 CAPSULE ORAL at 13:39

## 2017-08-16 RX ADMIN — VITAMIN D, TAB 1000IU (100/BT) 1000 UNITS: 25 TAB at 09:00

## 2017-08-17 LAB
ABO GROUP BLD BPU: NORMAL
ANION GAP SERPL CALCULATED.3IONS-SCNC: 9 MMOL/L (ref 4–13)
BASOPHILS # BLD AUTO: 0.01 THOUSANDS/ΜL (ref 0–0.1)
BASOPHILS NFR BLD AUTO: 0 % (ref 0–1)
BPU ID: NORMAL
BUN SERPL-MCNC: 39 MG/DL (ref 5–25)
CALCIUM SERPL-MCNC: 7.9 MG/DL (ref 8.3–10.1)
CHLORIDE SERPL-SCNC: 97 MMOL/L (ref 100–108)
CO2 SERPL-SCNC: 28 MMOL/L (ref 21–32)
CREAT SERPL-MCNC: 3.29 MG/DL (ref 0.6–1.3)
EOSINOPHIL # BLD AUTO: 0.15 THOUSAND/ΜL (ref 0–0.61)
EOSINOPHIL NFR BLD AUTO: 2 % (ref 0–6)
ERYTHROCYTE [DISTWIDTH] IN BLOOD BY AUTOMATED COUNT: 15.6 % (ref 11.6–15.1)
GFR SERPL CREATININE-BSD FRML MDRD: 13 ML/MIN/1.73SQ M
GLUCOSE SERPL-MCNC: 179 MG/DL (ref 65–140)
GLUCOSE SERPL-MCNC: 185 MG/DL (ref 65–140)
GLUCOSE SERPL-MCNC: 189 MG/DL (ref 65–140)
GLUCOSE SERPL-MCNC: 190 MG/DL (ref 65–140)
GLUCOSE SERPL-MCNC: 220 MG/DL (ref 65–140)
HCT VFR BLD AUTO: 23.5 % (ref 34.8–46.1)
HGB BLD-MCNC: 7.3 G/DL (ref 11.5–15.4)
LYMPHOCYTES # BLD AUTO: 0.88 THOUSANDS/ΜL (ref 0.6–4.47)
LYMPHOCYTES NFR BLD AUTO: 10 % (ref 14–44)
MAGNESIUM SERPL-MCNC: 2.2 MG/DL (ref 1.6–2.6)
MCH RBC QN AUTO: 27.3 PG (ref 26.8–34.3)
MCHC RBC AUTO-ENTMCNC: 31.1 G/DL (ref 31.4–37.4)
MCV RBC AUTO: 88 FL (ref 82–98)
MONOCYTES # BLD AUTO: 1.12 THOUSAND/ΜL (ref 0.17–1.22)
MONOCYTES NFR BLD AUTO: 13 % (ref 4–12)
NEUTROPHILS # BLD AUTO: 6.51 THOUSANDS/ΜL (ref 1.85–7.62)
NEUTS SEG NFR BLD AUTO: 75 % (ref 43–75)
NRBC BLD AUTO-RTO: 0 /100 WBCS
PLATELET # BLD AUTO: 196 THOUSANDS/UL (ref 149–390)
PMV BLD AUTO: 11.3 FL (ref 8.9–12.7)
POTASSIUM SERPL-SCNC: 4.2 MMOL/L (ref 3.5–5.3)
RBC # BLD AUTO: 2.67 MILLION/UL (ref 3.81–5.12)
SODIUM SERPL-SCNC: 134 MMOL/L (ref 136–145)
UNIT DISPENSE STATUS: NORMAL
UNIT PRODUCT CODE: NORMAL
UNIT RH: NORMAL
WBC # BLD AUTO: 8.72 THOUSAND/UL (ref 4.31–10.16)

## 2017-08-17 PROCEDURE — 83735 ASSAY OF MAGNESIUM: CPT | Performed by: PHYSICIAN ASSISTANT

## 2017-08-17 PROCEDURE — 82948 REAGENT STRIP/BLOOD GLUCOSE: CPT

## 2017-08-17 PROCEDURE — C9113 INJ PANTOPRAZOLE SODIUM, VIA: HCPCS | Performed by: INTERNAL MEDICINE

## 2017-08-17 PROCEDURE — 80048 BASIC METABOLIC PNL TOTAL CA: CPT | Performed by: PHYSICIAN ASSISTANT

## 2017-08-17 PROCEDURE — 94760 N-INVAS EAR/PLS OXIMETRY 1: CPT

## 2017-08-17 PROCEDURE — 94660 CPAP INITIATION&MGMT: CPT

## 2017-08-17 PROCEDURE — 85025 COMPLETE CBC W/AUTO DIFF WBC: CPT | Performed by: PHYSICIAN ASSISTANT

## 2017-08-17 RX ORDER — WARFARIN SODIUM 5 MG/1
5 TABLET ORAL
Status: COMPLETED | OUTPATIENT
Start: 2017-08-17 | End: 2017-08-17

## 2017-08-17 RX ADMIN — INSULIN LISPRO 2 UNITS: 100 INJECTION, SOLUTION INTRAVENOUS; SUBCUTANEOUS at 11:40

## 2017-08-17 RX ADMIN — PANTOPRAZOLE SODIUM 40 MG: 40 INJECTION, POWDER, FOR SOLUTION INTRAVENOUS at 08:24

## 2017-08-17 RX ADMIN — PREGABALIN 75 MG: 75 CAPSULE ORAL at 19:01

## 2017-08-17 RX ADMIN — ALLOPURINOL 300 MG: 300 TABLET ORAL at 08:26

## 2017-08-17 RX ADMIN — PRAVASTATIN SODIUM 80 MG: 80 TABLET ORAL at 08:25

## 2017-08-17 RX ADMIN — ACETAMINOPHEN 975 MG: 325 TABLET ORAL at 22:31

## 2017-08-17 RX ADMIN — INSULIN LISPRO 2 UNITS: 100 INJECTION, SOLUTION INTRAVENOUS; SUBCUTANEOUS at 08:24

## 2017-08-17 RX ADMIN — CALCIUM ACETATE 667 MG: 667 CAPSULE ORAL at 11:40

## 2017-08-17 RX ADMIN — HYDROMORPHONE HYDROCHLORIDE 2 MG: 2 TABLET ORAL at 07:14

## 2017-08-17 RX ADMIN — CYANOCOBALAMIN TAB 500 MCG 1000 MCG: 500 TAB at 08:25

## 2017-08-17 RX ADMIN — CALCIUM ACETATE 667 MG: 667 CAPSULE ORAL at 08:25

## 2017-08-17 RX ADMIN — LEVOTHYROXINE SODIUM 25 MCG: 25 TABLET ORAL at 06:45

## 2017-08-17 RX ADMIN — INSULIN GLARGINE 40 UNITS: 100 INJECTION, SOLUTION SUBCUTANEOUS at 22:31

## 2017-08-17 RX ADMIN — LATANOPROST 1 DROP: 50 SOLUTION OPHTHALMIC at 22:33

## 2017-08-17 RX ADMIN — ACETAMINOPHEN 975 MG: 325 TABLET ORAL at 06:45

## 2017-08-17 RX ADMIN — INSULIN LISPRO 2 UNITS: 100 INJECTION, SOLUTION INTRAVENOUS; SUBCUTANEOUS at 15:59

## 2017-08-17 RX ADMIN — HYDROCORTISONE: 1 CREAM TOPICAL at 08:24

## 2017-08-17 RX ADMIN — WARFARIN SODIUM 5 MG: 5 TABLET ORAL at 19:01

## 2017-08-17 RX ADMIN — PREGABALIN 75 MG: 75 CAPSULE ORAL at 08:25

## 2017-08-17 RX ADMIN — PANTOPRAZOLE SODIUM 40 MG: 40 INJECTION, POWDER, FOR SOLUTION INTRAVENOUS at 22:31

## 2017-08-17 RX ADMIN — CALCIUM ACETATE 667 MG: 667 CAPSULE ORAL at 15:57

## 2017-08-17 RX ADMIN — NYSTATIN: 100000 CREAM TOPICAL at 08:23

## 2017-08-17 RX ADMIN — METOPROLOL TARTRATE 12.5 MG: 25 TABLET ORAL at 08:25

## 2017-08-17 RX ADMIN — MENTHOL, METHYL SALICYLATE: 10; 15 CREAM TOPICAL at 08:23

## 2017-08-17 RX ADMIN — DOCUSATE SODIUM 100 MG: 100 CAPSULE, LIQUID FILLED ORAL at 08:26

## 2017-08-17 RX ADMIN — DOCUSATE SODIUM 100 MG: 100 CAPSULE, LIQUID FILLED ORAL at 19:01

## 2017-08-17 RX ADMIN — VITAMIN D, TAB 1000IU (100/BT) 1000 UNITS: 25 TAB at 08:25

## 2017-08-17 RX ADMIN — HYDROCORTISONE: 1 CREAM TOPICAL at 19:02

## 2017-08-17 RX ADMIN — MENTHOL, METHYL SALICYLATE: 10; 15 CREAM TOPICAL at 19:01

## 2017-08-17 RX ADMIN — ACETAMINOPHEN 975 MG: 325 TABLET ORAL at 14:03

## 2017-08-17 RX ADMIN — LIDOCAINE 1 PATCH: 50 PATCH CUTANEOUS at 15:56

## 2017-08-17 RX ADMIN — CALCIUM CARBONATE 500 MG (1,250 MG)-VITAMIN D3 200 UNIT TABLET 1 TABLET: at 08:25

## 2017-08-17 RX ADMIN — SENNOSIDES 8.6 MG: 8.6 TABLET, FILM COATED ORAL at 22:33

## 2017-08-17 RX ADMIN — NYSTATIN: 100000 CREAM TOPICAL at 19:01

## 2017-08-17 RX ADMIN — HYDROMORPHONE HYDROCHLORIDE 2 MG: 2 TABLET ORAL at 11:39

## 2017-08-18 ENCOUNTER — APPOINTMENT (INPATIENT)
Dept: DIALYSIS | Facility: HOSPITAL | Age: 72
DRG: 463 | End: 2017-08-18
Payer: MEDICARE

## 2017-08-18 PROBLEM — K62.5 RECTAL BLEEDING: Status: ACTIVE | Noted: 2017-08-18

## 2017-08-18 PROBLEM — D62 ACUTE BLOOD LOSS ANEMIA: Status: ACTIVE | Noted: 2017-08-18

## 2017-08-18 LAB
ANION GAP SERPL CALCULATED.3IONS-SCNC: 10 MMOL/L (ref 4–13)
BASOPHILS # BLD AUTO: 0.02 THOUSANDS/ΜL (ref 0–0.1)
BASOPHILS NFR BLD AUTO: 0 % (ref 0–1)
BUN SERPL-MCNC: 50 MG/DL (ref 5–25)
CALCIUM SERPL-MCNC: 8.2 MG/DL (ref 8.3–10.1)
CHLORIDE SERPL-SCNC: 97 MMOL/L (ref 100–108)
CO2 SERPL-SCNC: 26 MMOL/L (ref 21–32)
CREAT SERPL-MCNC: 4.09 MG/DL (ref 0.6–1.3)
EOSINOPHIL # BLD AUTO: 0.12 THOUSAND/ΜL (ref 0–0.61)
EOSINOPHIL NFR BLD AUTO: 1 % (ref 0–6)
ERYTHROCYTE [DISTWIDTH] IN BLOOD BY AUTOMATED COUNT: 15.2 % (ref 11.6–15.1)
GFR SERPL CREATININE-BSD FRML MDRD: 10 ML/MIN/1.73SQ M
GLUCOSE SERPL-MCNC: 138 MG/DL (ref 65–140)
GLUCOSE SERPL-MCNC: 142 MG/DL (ref 65–140)
GLUCOSE SERPL-MCNC: 144 MG/DL (ref 65–140)
GLUCOSE SERPL-MCNC: 178 MG/DL (ref 65–140)
GLUCOSE SERPL-MCNC: 240 MG/DL (ref 65–140)
HCT VFR BLD AUTO: 24.7 % (ref 34.8–46.1)
HGB BLD-MCNC: 7.8 G/DL (ref 11.5–15.4)
INR PPP: 2.11 (ref 0.86–1.16)
LYMPHOCYTES # BLD AUTO: 1.03 THOUSANDS/ΜL (ref 0.6–4.47)
LYMPHOCYTES NFR BLD AUTO: 12 % (ref 14–44)
MCH RBC QN AUTO: 28.1 PG (ref 26.8–34.3)
MCHC RBC AUTO-ENTMCNC: 31.6 G/DL (ref 31.4–37.4)
MCV RBC AUTO: 89 FL (ref 82–98)
MONOCYTES # BLD AUTO: 1.08 THOUSAND/ΜL (ref 0.17–1.22)
MONOCYTES NFR BLD AUTO: 13 % (ref 4–12)
NEUTROPHILS # BLD AUTO: 6.32 THOUSANDS/ΜL (ref 1.85–7.62)
NEUTS SEG NFR BLD AUTO: 74 % (ref 43–75)
NRBC BLD AUTO-RTO: 0 /100 WBCS
PLATELET # BLD AUTO: 213 THOUSANDS/UL (ref 149–390)
PMV BLD AUTO: 11.3 FL (ref 8.9–12.7)
POTASSIUM SERPL-SCNC: 4.7 MMOL/L (ref 3.5–5.3)
PROTHROMBIN TIME: 23.9 SECONDS (ref 12.1–14.4)
RBC # BLD AUTO: 2.78 MILLION/UL (ref 3.81–5.12)
SODIUM SERPL-SCNC: 133 MMOL/L (ref 136–145)
WBC # BLD AUTO: 8.66 THOUSAND/UL (ref 4.31–10.16)

## 2017-08-18 PROCEDURE — C9113 INJ PANTOPRAZOLE SODIUM, VIA: HCPCS | Performed by: INTERNAL MEDICINE

## 2017-08-18 PROCEDURE — 85610 PROTHROMBIN TIME: CPT | Performed by: HOSPITALIST

## 2017-08-18 PROCEDURE — 85025 COMPLETE CBC W/AUTO DIFF WBC: CPT | Performed by: HOSPITALIST

## 2017-08-18 PROCEDURE — 80048 BASIC METABOLIC PNL TOTAL CA: CPT | Performed by: HOSPITALIST

## 2017-08-18 PROCEDURE — 94660 CPAP INITIATION&MGMT: CPT

## 2017-08-18 PROCEDURE — 82948 REAGENT STRIP/BLOOD GLUCOSE: CPT

## 2017-08-18 PROCEDURE — 94760 N-INVAS EAR/PLS OXIMETRY 1: CPT

## 2017-08-18 RX ORDER — HYDROMORPHONE HYDROCHLORIDE 2 MG/1
2 TABLET ORAL 3 TIMES DAILY PRN
Status: DISCONTINUED | OUTPATIENT
Start: 2017-08-18 | End: 2017-08-21 | Stop reason: HOSPADM

## 2017-08-18 RX ORDER — WARFARIN SODIUM 3 MG/1
3 TABLET ORAL
Status: COMPLETED | OUTPATIENT
Start: 2017-08-18 | End: 2017-08-18

## 2017-08-18 RX ADMIN — ACETAMINOPHEN 975 MG: 325 TABLET ORAL at 21:28

## 2017-08-18 RX ADMIN — PRAVASTATIN SODIUM 80 MG: 80 TABLET ORAL at 15:02

## 2017-08-18 RX ADMIN — HYDROCORTISONE: 1 CREAM TOPICAL at 18:18

## 2017-08-18 RX ADMIN — VITAMIN D, TAB 1000IU (100/BT) 1000 UNITS: 25 TAB at 15:02

## 2017-08-18 RX ADMIN — HYDROMORPHONE HYDROCHLORIDE 2 MG: 2 TABLET ORAL at 21:31

## 2017-08-18 RX ADMIN — CYANOCOBALAMIN TAB 500 MCG 1000 MCG: 500 TAB at 15:02

## 2017-08-18 RX ADMIN — PREGABALIN 75 MG: 75 CAPSULE ORAL at 18:18

## 2017-08-18 RX ADMIN — CALCIUM ACETATE 667 MG: 667 CAPSULE ORAL at 16:33

## 2017-08-18 RX ADMIN — LATANOPROST 1 DROP: 50 SOLUTION OPHTHALMIC at 21:30

## 2017-08-18 RX ADMIN — HYDROCORTISONE: 1 CREAM TOPICAL at 15:13

## 2017-08-18 RX ADMIN — MENTHOL, METHYL SALICYLATE: 10; 15 CREAM TOPICAL at 18:18

## 2017-08-18 RX ADMIN — INSULIN GLARGINE 40 UNITS: 100 INJECTION, SOLUTION SUBCUTANEOUS at 21:29

## 2017-08-18 RX ADMIN — NYSTATIN: 100000 CREAM TOPICAL at 15:11

## 2017-08-18 RX ADMIN — ACETAMINOPHEN 975 MG: 325 TABLET ORAL at 05:26

## 2017-08-18 RX ADMIN — DOCUSATE SODIUM 100 MG: 100 CAPSULE, LIQUID FILLED ORAL at 15:03

## 2017-08-18 RX ADMIN — NYSTATIN: 100000 CREAM TOPICAL at 18:18

## 2017-08-18 RX ADMIN — SENNOSIDES 8.6 MG: 8.6 TABLET, FILM COATED ORAL at 21:30

## 2017-08-18 RX ADMIN — PREGABALIN 75 MG: 75 CAPSULE ORAL at 15:03

## 2017-08-18 RX ADMIN — HYDROMORPHONE HYDROCHLORIDE 1 MG: 1 INJECTION, SOLUTION INTRAMUSCULAR; INTRAVENOUS; SUBCUTANEOUS at 15:07

## 2017-08-18 RX ADMIN — DOCUSATE SODIUM 100 MG: 100 CAPSULE, LIQUID FILLED ORAL at 18:18

## 2017-08-18 RX ADMIN — LIDOCAINE 1 PATCH: 50 PATCH CUTANEOUS at 15:15

## 2017-08-18 RX ADMIN — LEVOTHYROXINE SODIUM 25 MCG: 25 TABLET ORAL at 05:26

## 2017-08-18 RX ADMIN — PANTOPRAZOLE SODIUM 40 MG: 40 INJECTION, POWDER, FOR SOLUTION INTRAVENOUS at 15:01

## 2017-08-18 RX ADMIN — WARFARIN SODIUM 3 MG: 3 TABLET ORAL at 18:18

## 2017-08-18 RX ADMIN — ACETAMINOPHEN 975 MG: 325 TABLET ORAL at 15:02

## 2017-08-18 RX ADMIN — INSULIN LISPRO 2 UNITS: 100 INJECTION, SOLUTION INTRAVENOUS; SUBCUTANEOUS at 16:32

## 2017-08-18 RX ADMIN — CALCIUM ACETATE 667 MG: 667 CAPSULE ORAL at 15:01

## 2017-08-18 RX ADMIN — PANTOPRAZOLE SODIUM 40 MG: 40 INJECTION, POWDER, FOR SOLUTION INTRAVENOUS at 21:29

## 2017-08-18 RX ADMIN — CALCIUM CARBONATE 500 MG (1,250 MG)-VITAMIN D3 200 UNIT TABLET 1 TABLET: at 15:03

## 2017-08-18 RX ADMIN — MENTHOL, METHYL SALICYLATE: 10; 15 CREAM TOPICAL at 15:11

## 2017-08-18 RX ADMIN — ALLOPURINOL 200 MG: 100 TABLET ORAL at 15:02

## 2017-08-18 RX ADMIN — HYDROMORPHONE HYDROCHLORIDE 2 MG: 2 TABLET ORAL at 08:04

## 2017-08-19 LAB
ANION GAP SERPL CALCULATED.3IONS-SCNC: 8 MMOL/L (ref 4–13)
BASOPHILS # BLD AUTO: 0.02 THOUSANDS/ΜL (ref 0–0.1)
BASOPHILS NFR BLD AUTO: 0 % (ref 0–1)
BUN SERPL-MCNC: 33 MG/DL (ref 5–25)
CALCIUM SERPL-MCNC: 8.5 MG/DL (ref 8.3–10.1)
CHLORIDE SERPL-SCNC: 97 MMOL/L (ref 100–108)
CO2 SERPL-SCNC: 28 MMOL/L (ref 21–32)
CREAT SERPL-MCNC: 3.04 MG/DL (ref 0.6–1.3)
EOSINOPHIL # BLD AUTO: 0.17 THOUSAND/ΜL (ref 0–0.61)
EOSINOPHIL NFR BLD AUTO: 2 % (ref 0–6)
ERYTHROCYTE [DISTWIDTH] IN BLOOD BY AUTOMATED COUNT: 14.8 % (ref 11.6–15.1)
GFR SERPL CREATININE-BSD FRML MDRD: 15 ML/MIN/1.73SQ M
GLUCOSE SERPL-MCNC: 150 MG/DL (ref 65–140)
GLUCOSE SERPL-MCNC: 158 MG/DL (ref 65–140)
GLUCOSE SERPL-MCNC: 181 MG/DL (ref 65–140)
GLUCOSE SERPL-MCNC: 192 MG/DL (ref 65–140)
GLUCOSE SERPL-MCNC: 216 MG/DL (ref 65–140)
HCT VFR BLD AUTO: 25.4 % (ref 34.8–46.1)
HGB BLD-MCNC: 8.1 G/DL (ref 11.5–15.4)
LYMPHOCYTES # BLD AUTO: 1.12 THOUSANDS/ΜL (ref 0.6–4.47)
LYMPHOCYTES NFR BLD AUTO: 13 % (ref 14–44)
MCH RBC QN AUTO: 28.2 PG (ref 26.8–34.3)
MCHC RBC AUTO-ENTMCNC: 31.9 G/DL (ref 31.4–37.4)
MCV RBC AUTO: 89 FL (ref 82–98)
MONOCYTES # BLD AUTO: 0.87 THOUSAND/ΜL (ref 0.17–1.22)
MONOCYTES NFR BLD AUTO: 10 % (ref 4–12)
NEUTROPHILS # BLD AUTO: 6.42 THOUSANDS/ΜL (ref 1.85–7.62)
NEUTS SEG NFR BLD AUTO: 75 % (ref 43–75)
NRBC BLD AUTO-RTO: 0 /100 WBCS
PLATELET # BLD AUTO: 225 THOUSANDS/UL (ref 149–390)
PMV BLD AUTO: 11.2 FL (ref 8.9–12.7)
POTASSIUM SERPL-SCNC: 4.4 MMOL/L (ref 3.5–5.3)
RBC # BLD AUTO: 2.87 MILLION/UL (ref 3.81–5.12)
SODIUM SERPL-SCNC: 133 MMOL/L (ref 136–145)
WBC # BLD AUTO: 8.65 THOUSAND/UL (ref 4.31–10.16)

## 2017-08-19 PROCEDURE — 94760 N-INVAS EAR/PLS OXIMETRY 1: CPT

## 2017-08-19 PROCEDURE — 85025 COMPLETE CBC W/AUTO DIFF WBC: CPT | Performed by: HOSPITALIST

## 2017-08-19 PROCEDURE — 82948 REAGENT STRIP/BLOOD GLUCOSE: CPT

## 2017-08-19 PROCEDURE — 94660 CPAP INITIATION&MGMT: CPT

## 2017-08-19 PROCEDURE — 80048 BASIC METABOLIC PNL TOTAL CA: CPT | Performed by: HOSPITALIST

## 2017-08-19 PROCEDURE — C9113 INJ PANTOPRAZOLE SODIUM, VIA: HCPCS | Performed by: INTERNAL MEDICINE

## 2017-08-19 RX ORDER — PANTOPRAZOLE SODIUM 40 MG/1
40 TABLET, DELAYED RELEASE ORAL
Status: DISCONTINUED | OUTPATIENT
Start: 2017-08-19 | End: 2017-08-21 | Stop reason: HOSPADM

## 2017-08-19 RX ADMIN — DOCUSATE SODIUM 100 MG: 100 CAPSULE, LIQUID FILLED ORAL at 08:11

## 2017-08-19 RX ADMIN — INSULIN LISPRO 2 UNITS: 100 INJECTION, SOLUTION INTRAVENOUS; SUBCUTANEOUS at 16:15

## 2017-08-19 RX ADMIN — MENTHOL, METHYL SALICYLATE: 10; 15 CREAM TOPICAL at 16:15

## 2017-08-19 RX ADMIN — CYANOCOBALAMIN TAB 500 MCG 1000 MCG: 500 TAB at 08:11

## 2017-08-19 RX ADMIN — DOCUSATE SODIUM 100 MG: 100 CAPSULE, LIQUID FILLED ORAL at 16:14

## 2017-08-19 RX ADMIN — INSULIN GLARGINE 40 UNITS: 100 INJECTION, SOLUTION SUBCUTANEOUS at 23:05

## 2017-08-19 RX ADMIN — CALCIUM CARBONATE 500 MG (1,250 MG)-VITAMIN D3 200 UNIT TABLET 1 TABLET: at 08:12

## 2017-08-19 RX ADMIN — METOPROLOL TARTRATE 12.5 MG: 25 TABLET ORAL at 08:11

## 2017-08-19 RX ADMIN — HYDROMORPHONE HYDROCHLORIDE 2 MG: 2 TABLET ORAL at 23:07

## 2017-08-19 RX ADMIN — PREGABALIN 75 MG: 75 CAPSULE ORAL at 16:14

## 2017-08-19 RX ADMIN — CALCIUM ACETATE 667 MG: 667 CAPSULE ORAL at 16:13

## 2017-08-19 RX ADMIN — LIDOCAINE 1 PATCH: 50 PATCH CUTANEOUS at 16:14

## 2017-08-19 RX ADMIN — HYDROMORPHONE HYDROCHLORIDE 2 MG: 2 TABLET ORAL at 08:11

## 2017-08-19 RX ADMIN — HYDROCORTISONE: 1 CREAM TOPICAL at 08:16

## 2017-08-19 RX ADMIN — INSULIN LISPRO 2 UNITS: 100 INJECTION, SOLUTION INTRAVENOUS; SUBCUTANEOUS at 11:24

## 2017-08-19 RX ADMIN — PRAVASTATIN SODIUM 80 MG: 80 TABLET ORAL at 08:11

## 2017-08-19 RX ADMIN — PREGABALIN 75 MG: 75 CAPSULE ORAL at 08:10

## 2017-08-19 RX ADMIN — PANTOPRAZOLE SODIUM 40 MG: 40 INJECTION, POWDER, FOR SOLUTION INTRAVENOUS at 08:12

## 2017-08-19 RX ADMIN — SENNOSIDES 8.6 MG: 8.6 TABLET, FILM COATED ORAL at 23:05

## 2017-08-19 RX ADMIN — CALCIUM ACETATE 667 MG: 667 CAPSULE ORAL at 11:24

## 2017-08-19 RX ADMIN — HYDROMORPHONE HYDROCHLORIDE 2 MG: 2 TABLET ORAL at 16:13

## 2017-08-19 RX ADMIN — NYSTATIN: 100000 CREAM TOPICAL at 16:15

## 2017-08-19 RX ADMIN — LATANOPROST 1 DROP: 50 SOLUTION OPHTHALMIC at 23:05

## 2017-08-19 RX ADMIN — ACETAMINOPHEN 975 MG: 325 TABLET ORAL at 06:10

## 2017-08-19 RX ADMIN — VITAMIN D, TAB 1000IU (100/BT) 1000 UNITS: 25 TAB at 08:12

## 2017-08-19 RX ADMIN — LEVOTHYROXINE SODIUM 25 MCG: 25 TABLET ORAL at 06:10

## 2017-08-19 RX ADMIN — MENTHOL, METHYL SALICYLATE: 10; 15 CREAM TOPICAL at 08:16

## 2017-08-19 RX ADMIN — ALLOPURINOL 200 MG: 100 TABLET ORAL at 08:10

## 2017-08-19 RX ADMIN — ACETAMINOPHEN 975 MG: 325 TABLET ORAL at 23:04

## 2017-08-19 RX ADMIN — HYDROCORTISONE: 1 CREAM TOPICAL at 16:15

## 2017-08-19 RX ADMIN — ACETAMINOPHEN 975 MG: 325 TABLET ORAL at 13:56

## 2017-08-19 RX ADMIN — NYSTATIN: 100000 CREAM TOPICAL at 08:17

## 2017-08-19 RX ADMIN — CALCIUM ACETATE 667 MG: 667 CAPSULE ORAL at 08:11

## 2017-08-20 LAB
ANION GAP SERPL CALCULATED.3IONS-SCNC: 9 MMOL/L (ref 4–13)
BASOPHILS # BLD AUTO: 0.01 THOUSANDS/ΜL (ref 0–0.1)
BASOPHILS NFR BLD AUTO: 0 % (ref 0–1)
BUN SERPL-MCNC: 46 MG/DL (ref 5–25)
CALCIUM SERPL-MCNC: 8.4 MG/DL (ref 8.3–10.1)
CHLORIDE SERPL-SCNC: 98 MMOL/L (ref 100–108)
CO2 SERPL-SCNC: 26 MMOL/L (ref 21–32)
CREAT SERPL-MCNC: 3.95 MG/DL (ref 0.6–1.3)
EOSINOPHIL # BLD AUTO: 0.2 THOUSAND/ΜL (ref 0–0.61)
EOSINOPHIL NFR BLD AUTO: 2 % (ref 0–6)
ERYTHROCYTE [DISTWIDTH] IN BLOOD BY AUTOMATED COUNT: 14.9 % (ref 11.6–15.1)
GFR SERPL CREATININE-BSD FRML MDRD: 11 ML/MIN/1.73SQ M
GLUCOSE SERPL-MCNC: 118 MG/DL (ref 65–140)
GLUCOSE SERPL-MCNC: 171 MG/DL (ref 65–140)
GLUCOSE SERPL-MCNC: 174 MG/DL (ref 65–140)
GLUCOSE SERPL-MCNC: 177 MG/DL (ref 65–140)
GLUCOSE SERPL-MCNC: 178 MG/DL (ref 65–140)
HCT VFR BLD AUTO: 23 % (ref 34.8–46.1)
HGB BLD-MCNC: 7.5 G/DL (ref 11.5–15.4)
INR PPP: 2.38 (ref 0.86–1.16)
LYMPHOCYTES # BLD AUTO: 0.91 THOUSANDS/ΜL (ref 0.6–4.47)
LYMPHOCYTES NFR BLD AUTO: 11 % (ref 14–44)
MCH RBC QN AUTO: 28.7 PG (ref 26.8–34.3)
MCHC RBC AUTO-ENTMCNC: 32.6 G/DL (ref 31.4–37.4)
MCV RBC AUTO: 88 FL (ref 82–98)
MONOCYTES # BLD AUTO: 1.24 THOUSAND/ΜL (ref 0.17–1.22)
MONOCYTES NFR BLD AUTO: 15 % (ref 4–12)
NEUTROPHILS # BLD AUTO: 5.76 THOUSANDS/ΜL (ref 1.85–7.62)
NEUTS SEG NFR BLD AUTO: 72 % (ref 43–75)
NRBC BLD AUTO-RTO: 0 /100 WBCS
PLATELET # BLD AUTO: 217 THOUSANDS/UL (ref 149–390)
PMV BLD AUTO: 11.1 FL (ref 8.9–12.7)
POTASSIUM SERPL-SCNC: 4.7 MMOL/L (ref 3.5–5.3)
PROTHROMBIN TIME: 26.3 SECONDS (ref 12.1–14.4)
RBC # BLD AUTO: 2.61 MILLION/UL (ref 3.81–5.12)
SODIUM SERPL-SCNC: 133 MMOL/L (ref 136–145)
WBC # BLD AUTO: 8.17 THOUSAND/UL (ref 4.31–10.16)

## 2017-08-20 PROCEDURE — 80048 BASIC METABOLIC PNL TOTAL CA: CPT | Performed by: HOSPITALIST

## 2017-08-20 PROCEDURE — 82948 REAGENT STRIP/BLOOD GLUCOSE: CPT

## 2017-08-20 PROCEDURE — 94760 N-INVAS EAR/PLS OXIMETRY 1: CPT

## 2017-08-20 PROCEDURE — 94660 CPAP INITIATION&MGMT: CPT

## 2017-08-20 PROCEDURE — 85025 COMPLETE CBC W/AUTO DIFF WBC: CPT | Performed by: HOSPITALIST

## 2017-08-20 PROCEDURE — 85610 PROTHROMBIN TIME: CPT | Performed by: HOSPITALIST

## 2017-08-20 RX ORDER — HYDROCORTISONE ACETATE 25 MG/1
25 SUPPOSITORY RECTAL 2 TIMES DAILY
Status: DISCONTINUED | OUTPATIENT
Start: 2017-08-20 | End: 2017-08-21 | Stop reason: HOSPADM

## 2017-08-20 RX ORDER — HYDROCORTISONE ACETATE 25 MG/1
25 SUPPOSITORY RECTAL 2 TIMES DAILY
Status: DISCONTINUED | OUTPATIENT
Start: 2017-08-20 | End: 2017-08-20

## 2017-08-20 RX ADMIN — PREGABALIN 75 MG: 75 CAPSULE ORAL at 10:09

## 2017-08-20 RX ADMIN — CALCIUM CARBONATE 500 MG (1,250 MG)-VITAMIN D3 200 UNIT TABLET 1 TABLET: at 07:20

## 2017-08-20 RX ADMIN — PREGABALIN 75 MG: 75 CAPSULE ORAL at 17:55

## 2017-08-20 RX ADMIN — NYSTATIN: 100000 CREAM TOPICAL at 10:10

## 2017-08-20 RX ADMIN — MENTHOL, METHYL SALICYLATE: 10; 15 CREAM TOPICAL at 17:55

## 2017-08-20 RX ADMIN — NYSTATIN: 100000 CREAM TOPICAL at 17:54

## 2017-08-20 RX ADMIN — ALLOPURINOL 200 MG: 100 TABLET ORAL at 10:09

## 2017-08-20 RX ADMIN — HYDROMORPHONE HYDROCHLORIDE 2 MG: 2 TABLET ORAL at 21:02

## 2017-08-20 RX ADMIN — DOCUSATE SODIUM 100 MG: 100 CAPSULE, LIQUID FILLED ORAL at 17:55

## 2017-08-20 RX ADMIN — INSULIN LISPRO 2 UNITS: 100 INJECTION, SOLUTION INTRAVENOUS; SUBCUTANEOUS at 17:54

## 2017-08-20 RX ADMIN — INSULIN LISPRO 2 UNITS: 100 INJECTION, SOLUTION INTRAVENOUS; SUBCUTANEOUS at 13:03

## 2017-08-20 RX ADMIN — LATANOPROST 1 DROP: 50 SOLUTION OPHTHALMIC at 21:03

## 2017-08-20 RX ADMIN — CALCIUM ACETATE 667 MG: 667 CAPSULE ORAL at 07:20

## 2017-08-20 RX ADMIN — CALCIUM ACETATE 667 MG: 667 CAPSULE ORAL at 13:02

## 2017-08-20 RX ADMIN — MENTHOL, METHYL SALICYLATE: 10; 15 CREAM TOPICAL at 10:13

## 2017-08-20 RX ADMIN — CYANOCOBALAMIN TAB 500 MCG 1000 MCG: 500 TAB at 10:08

## 2017-08-20 RX ADMIN — LIDOCAINE 1 PATCH: 50 PATCH CUTANEOUS at 17:58

## 2017-08-20 RX ADMIN — HYDROCORTISONE ACETATE 25 MG: 25 SUPPOSITORY RECTAL at 17:40

## 2017-08-20 RX ADMIN — PANTOPRAZOLE SODIUM 40 MG: 40 TABLET, DELAYED RELEASE ORAL at 07:20

## 2017-08-20 RX ADMIN — ACETAMINOPHEN 975 MG: 325 TABLET ORAL at 21:01

## 2017-08-20 RX ADMIN — HYDROMORPHONE HYDROCHLORIDE 2 MG: 2 TABLET ORAL at 14:18

## 2017-08-20 RX ADMIN — ACETAMINOPHEN 975 MG: 325 TABLET ORAL at 07:20

## 2017-08-20 RX ADMIN — HYDROCORTISONE: 1 CREAM TOPICAL at 18:01

## 2017-08-20 RX ADMIN — INSULIN GLARGINE 40 UNITS: 100 INJECTION, SOLUTION SUBCUTANEOUS at 21:02

## 2017-08-20 RX ADMIN — LEVOTHYROXINE SODIUM 25 MCG: 25 TABLET ORAL at 07:20

## 2017-08-20 RX ADMIN — HYDROMORPHONE HYDROCHLORIDE 2 MG: 2 TABLET ORAL at 10:08

## 2017-08-20 RX ADMIN — METOPROLOL TARTRATE 12.5 MG: 25 TABLET ORAL at 10:07

## 2017-08-20 RX ADMIN — DOCUSATE SODIUM 100 MG: 100 CAPSULE, LIQUID FILLED ORAL at 10:08

## 2017-08-20 RX ADMIN — HYDROCORTISONE: 1 CREAM TOPICAL at 10:10

## 2017-08-20 RX ADMIN — ACETAMINOPHEN 975 MG: 325 TABLET ORAL at 14:17

## 2017-08-20 RX ADMIN — SENNOSIDES 8.6 MG: 8.6 TABLET, FILM COATED ORAL at 21:02

## 2017-08-20 RX ADMIN — VITAMIN D, TAB 1000IU (100/BT) 1000 UNITS: 25 TAB at 10:07

## 2017-08-20 RX ADMIN — CALCIUM ACETATE 667 MG: 667 CAPSULE ORAL at 17:55

## 2017-08-20 RX ADMIN — PRAVASTATIN SODIUM 80 MG: 80 TABLET ORAL at 10:07

## 2017-08-21 ENCOUNTER — APPOINTMENT (INPATIENT)
Dept: DIALYSIS | Facility: HOSPITAL | Age: 72
DRG: 463 | End: 2017-08-21
Payer: MEDICARE

## 2017-08-21 VITALS
BODY MASS INDEX: 50.02 KG/M2 | SYSTOLIC BLOOD PRESSURE: 116 MMHG | OXYGEN SATURATION: 98 % | DIASTOLIC BLOOD PRESSURE: 50 MMHG | HEIGHT: 64 IN | HEART RATE: 98 BPM | RESPIRATION RATE: 16 BRPM | TEMPERATURE: 99.1 F | WEIGHT: 293 LBS

## 2017-08-21 LAB
ANION GAP SERPL CALCULATED.3IONS-SCNC: 11 MMOL/L (ref 4–13)
BASOPHILS # BLD AUTO: 0.01 THOUSANDS/ΜL (ref 0–0.1)
BASOPHILS NFR BLD AUTO: 0 % (ref 0–1)
BUN SERPL-MCNC: 53 MG/DL (ref 5–25)
CALCIUM SERPL-MCNC: 8.3 MG/DL (ref 8.3–10.1)
CHLORIDE SERPL-SCNC: 99 MMOL/L (ref 100–108)
CO2 SERPL-SCNC: 20 MMOL/L (ref 21–32)
CREAT SERPL-MCNC: 4.58 MG/DL (ref 0.6–1.3)
EOSINOPHIL # BLD AUTO: 0.2 THOUSAND/ΜL (ref 0–0.61)
EOSINOPHIL NFR BLD AUTO: 3 % (ref 0–6)
ERYTHROCYTE [DISTWIDTH] IN BLOOD BY AUTOMATED COUNT: 14.7 % (ref 11.6–15.1)
GFR SERPL CREATININE-BSD FRML MDRD: 9 ML/MIN/1.73SQ M
GLUCOSE SERPL-MCNC: 154 MG/DL (ref 65–140)
GLUCOSE SERPL-MCNC: 182 MG/DL (ref 65–140)
GLUCOSE SERPL-MCNC: 247 MG/DL (ref 65–140)
HCT VFR BLD AUTO: 23.2 % (ref 34.8–46.1)
HGB BLD-MCNC: 7.4 G/DL (ref 11.5–15.4)
LYMPHOCYTES # BLD AUTO: 1.25 THOUSANDS/ΜL (ref 0.6–4.47)
LYMPHOCYTES NFR BLD AUTO: 16 % (ref 14–44)
MCH RBC QN AUTO: 28.2 PG (ref 26.8–34.3)
MCHC RBC AUTO-ENTMCNC: 31.9 G/DL (ref 31.4–37.4)
MCV RBC AUTO: 89 FL (ref 82–98)
MONOCYTES # BLD AUTO: 0.76 THOUSAND/ΜL (ref 0.17–1.22)
MONOCYTES NFR BLD AUTO: 10 % (ref 4–12)
NEUTROPHILS # BLD AUTO: 5.35 THOUSANDS/ΜL (ref 1.85–7.62)
NEUTS SEG NFR BLD AUTO: 71 % (ref 43–75)
NRBC BLD AUTO-RTO: 0 /100 WBCS
PLATELET # BLD AUTO: 205 THOUSANDS/UL (ref 149–390)
PMV BLD AUTO: 11.2 FL (ref 8.9–12.7)
POTASSIUM SERPL-SCNC: 5.1 MMOL/L (ref 3.5–5.3)
RBC # BLD AUTO: 2.62 MILLION/UL (ref 3.81–5.12)
SODIUM SERPL-SCNC: 130 MMOL/L (ref 136–145)
WBC # BLD AUTO: 7.6 THOUSAND/UL (ref 4.31–10.16)

## 2017-08-21 PROCEDURE — 85025 COMPLETE CBC W/AUTO DIFF WBC: CPT | Performed by: HOSPITALIST

## 2017-08-21 PROCEDURE — 80048 BASIC METABOLIC PNL TOTAL CA: CPT | Performed by: HOSPITALIST

## 2017-08-21 PROCEDURE — 82948 REAGENT STRIP/BLOOD GLUCOSE: CPT

## 2017-08-21 PROCEDURE — 0JB70ZZ EXCISION OF BACK SUBCUTANEOUS TISSUE AND FASCIA, OPEN APPROACH: ICD-10-PCS | Performed by: SURGERY

## 2017-08-21 RX ORDER — LIDOCAINE 50 MG/G
1 PATCH TOPICAL DAILY
Qty: 30 PATCH | Refills: 0 | Status: SHIPPED | OUTPATIENT
Start: 2017-08-21 | End: 2017-11-23

## 2017-08-21 RX ORDER — HYDROMORPHONE HYDROCHLORIDE 2 MG/1
2 TABLET ORAL 3 TIMES DAILY PRN
Qty: 30 TABLET | Refills: 0 | Status: SHIPPED | OUTPATIENT
Start: 2017-08-21 | End: 2017-09-07 | Stop reason: HOSPADM

## 2017-08-21 RX ORDER — INSULIN GLARGINE 100 [IU]/ML
40 INJECTION, SOLUTION SUBCUTANEOUS
Qty: 720 UNITS | Refills: 0 | Status: SHIPPED | OUTPATIENT
Start: 2017-08-21 | End: 2017-11-23

## 2017-08-21 RX ORDER — PREGABALIN 75 MG/1
75 CAPSULE ORAL 2 TIMES DAILY
Qty: 20 CAPSULE | Refills: 0 | Status: SHIPPED | OUTPATIENT
Start: 2017-08-21 | End: 2017-12-19 | Stop reason: HOSPADM

## 2017-08-21 RX ORDER — HYDROCORTISONE ACETATE 25 MG/1
25 SUPPOSITORY RECTAL 2 TIMES DAILY
Qty: 12 SUPPOSITORY | Refills: 0 | Status: SHIPPED | OUTPATIENT
Start: 2017-08-21 | End: 2017-11-23

## 2017-08-21 RX ADMIN — HYDROMORPHONE HYDROCHLORIDE 2 MG: 2 TABLET ORAL at 17:52

## 2017-08-21 RX ADMIN — LIDOCAINE 1 PATCH: 50 PATCH CUTANEOUS at 17:51

## 2017-08-21 RX ADMIN — HYDROMORPHONE HYDROCHLORIDE 2 MG: 2 TABLET ORAL at 03:28

## 2017-08-21 RX ADMIN — PREGABALIN 75 MG: 75 CAPSULE ORAL at 17:52

## 2017-08-21 RX ADMIN — PREGABALIN 75 MG: 75 CAPSULE ORAL at 15:10

## 2017-08-21 RX ADMIN — INSULIN LISPRO 4 UNITS: 100 INJECTION, SOLUTION INTRAVENOUS; SUBCUTANEOUS at 17:52

## 2017-08-21 RX ADMIN — HYDROMORPHONE HYDROCHLORIDE 2 MG: 2 TABLET ORAL at 15:10

## 2017-08-21 RX ADMIN — LEVOTHYROXINE SODIUM 25 MCG: 25 TABLET ORAL at 05:48

## 2017-08-21 RX ADMIN — CYANOCOBALAMIN TAB 500 MCG 1000 MCG: 500 TAB at 15:10

## 2017-08-21 RX ADMIN — ALLOPURINOL 300 MG: 300 TABLET ORAL at 15:10

## 2017-08-21 RX ADMIN — CALCIUM ACETATE 667 MG: 667 CAPSULE ORAL at 17:52

## 2017-08-21 RX ADMIN — MENTHOL, METHYL SALICYLATE: 10; 15 CREAM TOPICAL at 17:51

## 2017-08-21 RX ADMIN — MENTHOL, METHYL SALICYLATE: 10; 15 CREAM TOPICAL at 15:12

## 2017-08-21 RX ADMIN — NYSTATIN: 100000 CREAM TOPICAL at 17:53

## 2017-08-21 RX ADMIN — VITAMIN D, TAB 1000IU (100/BT) 1000 UNITS: 25 TAB at 15:10

## 2017-08-21 RX ADMIN — ACETAMINOPHEN 975 MG: 325 TABLET ORAL at 14:54

## 2017-08-21 RX ADMIN — ACETAMINOPHEN 975 MG: 325 TABLET ORAL at 05:48

## 2017-08-21 RX ADMIN — HYDROCORTISONE: 1 CREAM TOPICAL at 17:51

## 2017-08-21 RX ADMIN — PANTOPRAZOLE SODIUM 40 MG: 40 TABLET, DELAYED RELEASE ORAL at 05:48

## 2017-08-24 ENCOUNTER — APPOINTMENT (EMERGENCY)
Dept: RADIOLOGY | Facility: HOSPITAL | Age: 72
DRG: 813 | End: 2017-08-24
Payer: MEDICARE

## 2017-08-24 ENCOUNTER — HOSPITAL ENCOUNTER (EMERGENCY)
Facility: HOSPITAL | Age: 72
Discharge: HOME | DRG: 813 | End: 2017-08-24
Attending: EMERGENCY MEDICINE | Admitting: EMERGENCY MEDICINE
Payer: MEDICARE

## 2017-08-24 VITALS
DIASTOLIC BLOOD PRESSURE: 63 MMHG | RESPIRATION RATE: 16 BRPM | SYSTOLIC BLOOD PRESSURE: 138 MMHG | BODY MASS INDEX: 59.22 KG/M2 | WEIGHT: 293 LBS | OXYGEN SATURATION: 94 % | TEMPERATURE: 99 F | HEART RATE: 77 BPM

## 2017-08-24 DIAGNOSIS — M54.12 CERVICAL RADICULOPATHY: ICD-10-CM

## 2017-08-24 DIAGNOSIS — D50.0 IRON DEFICIENCY ANEMIA DUE TO CHRONIC BLOOD LOSS: Primary | ICD-10-CM

## 2017-08-24 LAB
ABO GROUP BLD: NORMAL
ALBUMIN SERPL BCP-MCNC: 1.6 G/DL (ref 3.5–5)
ALP SERPL-CCNC: 74 U/L (ref 46–116)
ALT SERPL W P-5'-P-CCNC: 15 U/L (ref 12–78)
ANION GAP SERPL CALCULATED.3IONS-SCNC: 7 MMOL/L (ref 4–13)
AST SERPL W P-5'-P-CCNC: 21 U/L (ref 5–45)
ATRIAL RATE: 74 BPM
BASOPHILS # BLD AUTO: 0.01 THOUSANDS/ΜL (ref 0–0.1)
BASOPHILS NFR BLD AUTO: 0 % (ref 0–1)
BILIRUB SERPL-MCNC: 0.27 MG/DL (ref 0.2–1)
BLD GP AB SCN SERPL QL: NEGATIVE
BUN SERPL-MCNC: 31 MG/DL (ref 5–25)
CALCIUM SERPL-MCNC: 8.3 MG/DL (ref 8.3–10.1)
CHLORIDE SERPL-SCNC: 100 MMOL/L (ref 100–108)
CO2 SERPL-SCNC: 29 MMOL/L (ref 21–32)
CREAT SERPL-MCNC: 2.59 MG/DL (ref 0.6–1.3)
EOSINOPHIL # BLD AUTO: 0.2 THOUSAND/ΜL (ref 0–0.61)
EOSINOPHIL NFR BLD AUTO: 3 % (ref 0–6)
ERYTHROCYTE [DISTWIDTH] IN BLOOD BY AUTOMATED COUNT: 14.8 % (ref 11.6–15.1)
GFR SERPL CREATININE-BSD FRML MDRD: 18 ML/MIN/1.73SQ M
GLUCOSE SERPL-MCNC: 197 MG/DL (ref 65–140)
HCT VFR BLD AUTO: 24.8 % (ref 34.8–46.1)
HGB BLD-MCNC: 7.8 G/DL (ref 11.5–15.4)
INR PPP: 2 (ref 0.86–1.16)
LYMPHOCYTES # BLD AUTO: 1.16 THOUSANDS/ΜL (ref 0.6–4.47)
LYMPHOCYTES NFR BLD AUTO: 15 % (ref 14–44)
MCH RBC QN AUTO: 28.1 PG (ref 26.8–34.3)
MCHC RBC AUTO-ENTMCNC: 31.5 G/DL (ref 31.4–37.4)
MCV RBC AUTO: 89 FL (ref 82–98)
MONOCYTES # BLD AUTO: 0.83 THOUSAND/ΜL (ref 0.17–1.22)
MONOCYTES NFR BLD AUTO: 11 % (ref 4–12)
NEUTROPHILS # BLD AUTO: 5.47 THOUSANDS/ΜL (ref 1.85–7.62)
NEUTS SEG NFR BLD AUTO: 71 % (ref 43–75)
NRBC BLD AUTO-RTO: 0 /100 WBCS
P AXIS: 32 DEGREES
PLATELET # BLD AUTO: 242 THOUSANDS/UL (ref 149–390)
PMV BLD AUTO: 10.8 FL (ref 8.9–12.7)
POTASSIUM SERPL-SCNC: 4.1 MMOL/L (ref 3.5–5.3)
PR INTERVAL: 216 MS
PROT SERPL-MCNC: 6.8 G/DL (ref 6.4–8.2)
PROTHROMBIN TIME: 22.9 SECONDS (ref 12.1–14.4)
QRS AXIS: 33 DEGREES
QRSD INTERVAL: 104 MS
QT INTERVAL: 370 MS
QTC INTERVAL: 410 MS
RBC # BLD AUTO: 2.78 MILLION/UL (ref 3.81–5.12)
RH BLD: POSITIVE
SODIUM SERPL-SCNC: 136 MMOL/L (ref 136–145)
SPECIMEN EXPIRATION DATE: NORMAL
T WAVE AXIS: 56 DEGREES
TROPONIN I SERPL-MCNC: 0.05 NG/ML
VENTRICULAR RATE: 74 BPM
WBC # BLD AUTO: 7.7 THOUSAND/UL (ref 4.31–10.16)

## 2017-08-24 PROCEDURE — 36415 COLL VENOUS BLD VENIPUNCTURE: CPT

## 2017-08-24 PROCEDURE — 93005 ELECTROCARDIOGRAM TRACING: CPT | Performed by: EMERGENCY MEDICINE

## 2017-08-24 PROCEDURE — 80053 COMPREHEN METABOLIC PANEL: CPT | Performed by: EMERGENCY MEDICINE

## 2017-08-24 PROCEDURE — 99285 EMERGENCY DEPT VISIT HI MDM: CPT

## 2017-08-24 PROCEDURE — 86901 BLOOD TYPING SEROLOGIC RH(D): CPT | Performed by: EMERGENCY MEDICINE

## 2017-08-24 PROCEDURE — 71010 HB CHEST X-RAY 1 VIEW FRONTAL (PORTABLE): CPT

## 2017-08-24 PROCEDURE — 84484 ASSAY OF TROPONIN QUANT: CPT | Performed by: EMERGENCY MEDICINE

## 2017-08-24 PROCEDURE — 86900 BLOOD TYPING SEROLOGIC ABO: CPT | Performed by: EMERGENCY MEDICINE

## 2017-08-24 PROCEDURE — 73020 X-RAY EXAM OF SHOULDER: CPT

## 2017-08-24 PROCEDURE — 86850 RBC ANTIBODY SCREEN: CPT | Performed by: EMERGENCY MEDICINE

## 2017-08-24 PROCEDURE — 85610 PROTHROMBIN TIME: CPT | Performed by: EMERGENCY MEDICINE

## 2017-08-24 PROCEDURE — 85025 COMPLETE CBC W/AUTO DIFF WBC: CPT | Performed by: EMERGENCY MEDICINE

## 2017-08-24 RX ORDER — PREDNISONE 20 MG/1
40 TABLET ORAL DAILY
Qty: 8 TABLET | Refills: 0 | Status: SHIPPED | OUTPATIENT
Start: 2017-08-25 | End: 2017-12-14 | Stop reason: HOSPADM

## 2017-08-24 RX ORDER — PREDNISONE 20 MG/1
40 TABLET ORAL ONCE
Status: COMPLETED | OUTPATIENT
Start: 2017-08-24 | End: 2017-08-24

## 2017-08-24 RX ORDER — PREDNISONE 20 MG/1
40 TABLET ORAL DAILY
Qty: 8 TABLET | Refills: 0 | Status: SHIPPED | OUTPATIENT
Start: 2017-08-25 | End: 2017-08-27

## 2017-08-24 RX ADMIN — PREDNISONE 40 MG: 20 TABLET ORAL at 15:53

## 2017-08-27 ENCOUNTER — HOSPITAL ENCOUNTER (INPATIENT)
Facility: HOSPITAL | Age: 72
LOS: 11 days | Discharge: NURSING, REHAB OR LONG-TERM CARE | DRG: 813 | End: 2017-09-07
Attending: EMERGENCY MEDICINE | Admitting: ANESTHESIOLOGY
Payer: MEDICARE

## 2017-08-27 DIAGNOSIS — Z99.2 END-STAGE RENAL DISEASE ON HEMODIALYSIS (HCC): Chronic | ICD-10-CM

## 2017-08-27 DIAGNOSIS — K62.5 BRIGHT RED BLOOD PER RECTUM: Primary | ICD-10-CM

## 2017-08-27 DIAGNOSIS — M79.601 CHRONIC PAIN OF RIGHT UPPER EXTREMITY: Chronic | ICD-10-CM

## 2017-08-27 DIAGNOSIS — G89.29 CHRONIC PAIN OF RIGHT UPPER EXTREMITY: Chronic | ICD-10-CM

## 2017-08-27 DIAGNOSIS — M25.511 ACUTE PAIN OF RIGHT SHOULDER: ICD-10-CM

## 2017-08-27 DIAGNOSIS — D62 ACUTE BLOOD LOSS ANEMIA: ICD-10-CM

## 2017-08-27 DIAGNOSIS — N18.6 END-STAGE RENAL DISEASE ON HEMODIALYSIS (HCC): Chronic | ICD-10-CM

## 2017-08-27 DIAGNOSIS — K62.5 RECTAL BLEEDING: ICD-10-CM

## 2017-08-27 PROBLEM — Z86.711 HISTORY OF PULMONARY EMBOLISM: Status: ACTIVE | Noted: 2017-08-27

## 2017-08-27 LAB
ABO GROUP BLD: NORMAL
ALBUMIN SERPL BCP-MCNC: 1.8 G/DL (ref 3.5–5)
ALP SERPL-CCNC: 71 U/L (ref 46–116)
ALT SERPL W P-5'-P-CCNC: 15 U/L (ref 12–78)
ANION GAP BLD CALC-SCNC: 16 MMOL/L (ref 4–13)
ANION GAP SERPL CALCULATED.3IONS-SCNC: 8 MMOL/L (ref 4–13)
ANISOCYTOSIS BLD QL SMEAR: PRESENT
APTT PPP: 34 SECONDS (ref 23–35)
AST SERPL W P-5'-P-CCNC: 17 U/L (ref 5–45)
BASOPHILS # BLD MANUAL: 0.09 THOUSAND/UL (ref 0–0.1)
BASOPHILS NFR MAR MANUAL: 1 % (ref 0–1)
BILIRUB SERPL-MCNC: 0.2 MG/DL (ref 0.2–1)
BLD GP AB SCN SERPL QL: NEGATIVE
BUN BLD-MCNC: 79 MG/DL (ref 5–25)
BUN SERPL-MCNC: 78 MG/DL (ref 5–25)
CA-I BLD-SCNC: 1.17 MMOL/L (ref 1.12–1.32)
CALCIUM SERPL-MCNC: 8.6 MG/DL (ref 8.3–10.1)
CHLORIDE BLD-SCNC: 95 MMOL/L (ref 100–108)
CHLORIDE SERPL-SCNC: 99 MMOL/L (ref 100–108)
CO2 SERPL-SCNC: 28 MMOL/L (ref 21–32)
CREAT BLD-MCNC: 3.2 MG/DL (ref 0.6–1.3)
CREAT SERPL-MCNC: 3.34 MG/DL (ref 0.6–1.3)
EOSINOPHIL # BLD MANUAL: 0 THOUSAND/UL (ref 0–0.4)
EOSINOPHIL NFR BLD MANUAL: 0 % (ref 0–6)
ERYTHROCYTE [DISTWIDTH] IN BLOOD BY AUTOMATED COUNT: 14.6 % (ref 11.6–15.1)
GFR SERPL CREATININE-BSD FRML MDRD: 13 ML/MIN/1.73SQ M
GFR SERPL CREATININE-BSD FRML MDRD: 14 ML/MIN/1.73SQ M
GIANT PLATELETS BLD QL SMEAR: PRESENT
GLUCOSE SERPL-MCNC: 226 MG/DL (ref 65–140)
GLUCOSE SERPL-MCNC: 229 MG/DL (ref 65–140)
GLUCOSE SERPL-MCNC: 233 MG/DL (ref 65–140)
HCT VFR BLD AUTO: 21.3 % (ref 34.8–46.1)
HCT VFR BLD CALC: 22 % (ref 34.8–46.1)
HGB BLD-MCNC: 6.7 G/DL (ref 11.5–15.4)
HGB BLDA-MCNC: 7.5 G/DL (ref 11.5–15.4)
INR PPP: 2.09 (ref 0.86–1.16)
LYMPHOCYTES # BLD AUTO: 0.28 THOUSAND/UL (ref 0.6–4.47)
LYMPHOCYTES # BLD AUTO: 3 % (ref 14–44)
MCH RBC QN AUTO: 28 PG (ref 26.8–34.3)
MCHC RBC AUTO-ENTMCNC: 31.5 G/DL (ref 31.4–37.4)
MCV RBC AUTO: 89 FL (ref 82–98)
METAMYELOCYTES NFR BLD MANUAL: 1 % (ref 0–1)
MONOCYTES # BLD AUTO: 0.19 THOUSAND/UL (ref 0–1.22)
MONOCYTES NFR BLD: 2 % (ref 4–12)
NEUTROPHILS # BLD MANUAL: 8.7 THOUSAND/UL (ref 1.85–7.62)
NEUTS SEG NFR BLD AUTO: 93 % (ref 43–75)
NRBC BLD AUTO-RTO: 0 /100 WBCS
PCO2 BLD: 28 MMOL/L (ref 21–32)
PLATELET # BLD AUTO: 298 THOUSANDS/UL (ref 149–390)
PLATELET BLD QL SMEAR: ADEQUATE
PMV BLD AUTO: 11.4 FL (ref 8.9–12.7)
POTASSIUM BLD-SCNC: 4.8 MMOL/L (ref 3.5–5.3)
POTASSIUM SERPL-SCNC: 4.7 MMOL/L (ref 3.5–5.3)
PROT SERPL-MCNC: 6.5 G/DL (ref 6.4–8.2)
PROTHROMBIN TIME: 23.7 SECONDS (ref 12.1–14.4)
RBC # BLD AUTO: 2.39 MILLION/UL (ref 3.81–5.12)
RBC MORPH BLD: PRESENT
RH BLD: POSITIVE
SODIUM BLD-SCNC: 133 MMOL/L (ref 136–145)
SODIUM SERPL-SCNC: 135 MMOL/L (ref 136–145)
SPECIMEN EXPIRATION DATE: NORMAL
SPECIMEN SOURCE: ABNORMAL
WBC # BLD AUTO: 9.36 THOUSAND/UL (ref 4.31–10.16)

## 2017-08-27 PROCEDURE — 96365 THER/PROPH/DIAG IV INF INIT: CPT

## 2017-08-27 PROCEDURE — 30233N1 TRANSFUSION OF NONAUTOLOGOUS RED BLOOD CELLS INTO PERIPHERAL VEIN, PERCUTANEOUS APPROACH: ICD-10-PCS | Performed by: INTERNAL MEDICINE

## 2017-08-27 PROCEDURE — 93005 ELECTROCARDIOGRAM TRACING: CPT | Performed by: INTERNAL MEDICINE

## 2017-08-27 PROCEDURE — 36415 COLL VENOUS BLD VENIPUNCTURE: CPT | Performed by: EMERGENCY MEDICINE

## 2017-08-27 PROCEDURE — 85730 THROMBOPLASTIN TIME PARTIAL: CPT | Performed by: EMERGENCY MEDICINE

## 2017-08-27 PROCEDURE — 36430 TRANSFUSION BLD/BLD COMPNT: CPT

## 2017-08-27 PROCEDURE — P9040 RBC LEUKOREDUCED IRRADIATED: HCPCS

## 2017-08-27 PROCEDURE — 85014 HEMATOCRIT: CPT

## 2017-08-27 PROCEDURE — 80047 BASIC METABLC PNL IONIZED CA: CPT

## 2017-08-27 PROCEDURE — 85007 BL SMEAR W/DIFF WBC COUNT: CPT | Performed by: EMERGENCY MEDICINE

## 2017-08-27 PROCEDURE — 86901 BLOOD TYPING SEROLOGIC RH(D): CPT | Performed by: EMERGENCY MEDICINE

## 2017-08-27 PROCEDURE — C9113 INJ PANTOPRAZOLE SODIUM, VIA: HCPCS | Performed by: INTERNAL MEDICINE

## 2017-08-27 PROCEDURE — C9132 KCENTRA, PER I.U.: HCPCS | Performed by: EMERGENCY MEDICINE

## 2017-08-27 PROCEDURE — 82272 OCCULT BLD FECES 1-3 TESTS: CPT

## 2017-08-27 PROCEDURE — 82948 REAGENT STRIP/BLOOD GLUCOSE: CPT

## 2017-08-27 PROCEDURE — 86900 BLOOD TYPING SEROLOGIC ABO: CPT | Performed by: EMERGENCY MEDICINE

## 2017-08-27 PROCEDURE — 99285 EMERGENCY DEPT VISIT HI MDM: CPT

## 2017-08-27 PROCEDURE — 85610 PROTHROMBIN TIME: CPT | Performed by: EMERGENCY MEDICINE

## 2017-08-27 PROCEDURE — 85027 COMPLETE CBC AUTOMATED: CPT | Performed by: EMERGENCY MEDICINE

## 2017-08-27 PROCEDURE — P9021 RED BLOOD CELLS UNIT: HCPCS

## 2017-08-27 PROCEDURE — 86923 COMPATIBILITY TEST ELECTRIC: CPT

## 2017-08-27 PROCEDURE — 86850 RBC ANTIBODY SCREEN: CPT | Performed by: EMERGENCY MEDICINE

## 2017-08-27 PROCEDURE — 80053 COMPREHEN METABOLIC PANEL: CPT | Performed by: EMERGENCY MEDICINE

## 2017-08-27 RX ORDER — DIAPER,BRIEF,INFANT-TODD,DISP
EACH MISCELLANEOUS 2 TIMES DAILY
Status: DISCONTINUED | OUTPATIENT
Start: 2017-08-27 | End: 2017-09-07 | Stop reason: HOSPADM

## 2017-08-27 RX ORDER — ONDANSETRON 2 MG/ML
4 INJECTION INTRAMUSCULAR; INTRAVENOUS EVERY 6 HOURS PRN
Status: DISCONTINUED | OUTPATIENT
Start: 2017-08-27 | End: 2017-09-07 | Stop reason: HOSPADM

## 2017-08-27 RX ORDER — ALBUTEROL SULFATE 2.5 MG/3ML
2.5 SOLUTION RESPIRATORY (INHALATION) EVERY 6 HOURS PRN
Status: DISCONTINUED | OUTPATIENT
Start: 2017-08-27 | End: 2017-09-07 | Stop reason: HOSPADM

## 2017-08-27 RX ORDER — NYSTATIN 100000 [USP'U]/G
POWDER TOPICAL 2 TIMES DAILY
Status: DISCONTINUED | OUTPATIENT
Start: 2017-08-27 | End: 2017-09-07 | Stop reason: HOSPADM

## 2017-08-27 RX ORDER — INSULIN GLARGINE 100 [IU]/ML
10 INJECTION, SOLUTION SUBCUTANEOUS
Status: DISCONTINUED | OUTPATIENT
Start: 2017-08-28 | End: 2017-09-07 | Stop reason: HOSPADM

## 2017-08-27 RX ORDER — LIDOCAINE 50 MG/G
1 PATCH TOPICAL DAILY
Status: DISCONTINUED | OUTPATIENT
Start: 2017-08-28 | End: 2017-09-03

## 2017-08-27 RX ORDER — INSULIN GLARGINE 100 [IU]/ML
20 INJECTION, SOLUTION SUBCUTANEOUS
Status: DISCONTINUED | OUTPATIENT
Start: 2017-08-27 | End: 2017-08-27

## 2017-08-27 RX ORDER — HYDRALAZINE HYDROCHLORIDE 20 MG/ML
5 INJECTION INTRAMUSCULAR; INTRAVENOUS EVERY 6 HOURS PRN
Status: DISCONTINUED | OUTPATIENT
Start: 2017-08-27 | End: 2017-09-07 | Stop reason: HOSPADM

## 2017-08-27 RX ORDER — MUSCLE RUB CREAM 100; 150 MG/G; MG/G
CREAM TOPICAL 2 TIMES DAILY
Status: DISCONTINUED | OUTPATIENT
Start: 2017-08-27 | End: 2017-09-03

## 2017-08-27 RX ORDER — SODIUM CHLORIDE 9 MG/ML
75 INJECTION, SOLUTION INTRAVENOUS CONTINUOUS
Status: DISCONTINUED | OUTPATIENT
Start: 2017-08-27 | End: 2017-08-27

## 2017-08-27 RX ORDER — LATANOPROST 50 UG/ML
1 SOLUTION/ DROPS OPHTHALMIC
Status: DISCONTINUED | OUTPATIENT
Start: 2017-08-27 | End: 2017-09-07 | Stop reason: HOSPADM

## 2017-08-27 RX ORDER — ZINC OXIDE
OINTMENT (GRAM) TOPICAL AS NEEDED
Status: DISCONTINUED | OUTPATIENT
Start: 2017-08-27 | End: 2017-08-27 | Stop reason: RX

## 2017-08-27 RX ADMIN — PHYTONADIONE 10 MG: 10 INJECTION, EMULSION INTRAMUSCULAR; INTRAVENOUS; SUBCUTANEOUS at 19:52

## 2017-08-27 RX ADMIN — SODIUM CHLORIDE 8 MG/HR: 9 INJECTION, SOLUTION INTRAVENOUS at 22:26

## 2017-08-27 RX ADMIN — SODIUM CHLORIDE 80 MG: 9 INJECTION, SOLUTION INTRAVENOUS at 22:26

## 2017-08-27 RX ADMIN — SODIUM CHLORIDE 75 ML/HR: 0.9 INJECTION, SOLUTION INTRAVENOUS at 22:22

## 2017-08-27 RX ADMIN — PROTHROMBIN, COAGULATION FACTOR VII HUMAN, COAGULATION FACTOR IX HUMAN, COAGULATION FACTOR X HUMAN, PROTEIN C, PROTEIN S HUMAN, AND WATER 2710 UNITS: KIT at 20:23

## 2017-08-28 ENCOUNTER — ANESTHESIA EVENT (INPATIENT)
Dept: GASTROENTEROLOGY | Facility: HOSPITAL | Age: 72
DRG: 813 | End: 2017-08-28
Payer: MEDICARE

## 2017-08-28 ENCOUNTER — APPOINTMENT (INPATIENT)
Dept: DIALYSIS | Facility: HOSPITAL | Age: 72
DRG: 813 | End: 2017-08-28
Payer: MEDICARE

## 2017-08-28 LAB
ABO GROUP BLD BPU: NORMAL
ABO GROUP BLD BPU: NORMAL
ALBUMIN SERPL BCP-MCNC: 1.9 G/DL (ref 3.5–5)
ALP SERPL-CCNC: 71 U/L (ref 46–116)
ALT SERPL W P-5'-P-CCNC: 13 U/L (ref 12–78)
ANION GAP SERPL CALCULATED.3IONS-SCNC: 8 MMOL/L (ref 4–13)
APTT PPP: 27 SECONDS (ref 23–35)
AST SERPL W P-5'-P-CCNC: 14 U/L (ref 5–45)
ATRIAL RATE: 63 BPM
ATRIAL RATE: 64 BPM
BILIRUB SERPL-MCNC: 0.75 MG/DL (ref 0.2–1)
BPU ID: NORMAL
BPU ID: NORMAL
BUN SERPL-MCNC: 86 MG/DL (ref 5–25)
CALCIUM SERPL-MCNC: 8.8 MG/DL (ref 8.3–10.1)
CHLORIDE SERPL-SCNC: 98 MMOL/L (ref 100–108)
CO2 SERPL-SCNC: 27 MMOL/L (ref 21–32)
CREAT SERPL-MCNC: 3.61 MG/DL (ref 0.6–1.3)
ERYTHROCYTE [DISTWIDTH] IN BLOOD BY AUTOMATED COUNT: 14.9 % (ref 11.6–15.1)
EST. AVERAGE GLUCOSE BLD GHB EST-MCNC: 154 MG/DL
GFR SERPL CREATININE-BSD FRML MDRD: 12 ML/MIN/1.73SQ M
GLUCOSE SERPL-MCNC: 100 MG/DL (ref 65–140)
GLUCOSE SERPL-MCNC: 123 MG/DL (ref 65–140)
GLUCOSE SERPL-MCNC: 135 MG/DL (ref 65–140)
GLUCOSE SERPL-MCNC: 153 MG/DL (ref 65–140)
HBA1C MFR BLD: 7 % (ref 4.2–6.3)
HCT VFR BLD AUTO: 23.2 % (ref 34.8–46.1)
HCT VFR BLD AUTO: 25.2 % (ref 34.8–46.1)
HGB BLD-MCNC: 7.7 G/DL (ref 11.5–15.4)
HGB BLD-MCNC: 7.9 G/DL (ref 11.5–15.4)
HGB BLD-MCNC: 8 G/DL (ref 11.5–15.4)
HGB BLD-MCNC: 8.3 G/DL (ref 11.5–15.4)
INR PPP: 1.22 (ref 0.86–1.16)
MAGNESIUM SERPL-MCNC: 2.2 MG/DL (ref 1.6–2.6)
MCH RBC QN AUTO: 28.9 PG (ref 26.8–34.3)
MCHC RBC AUTO-ENTMCNC: 32.9 G/DL (ref 31.4–37.4)
MCV RBC AUTO: 88 FL (ref 82–98)
P AXIS: 50 DEGREES
P AXIS: 69 DEGREES
PHOSPHATE SERPL-MCNC: 3.1 MG/DL (ref 2.3–4.1)
PLATELET # BLD AUTO: 249 THOUSANDS/UL (ref 149–390)
PMV BLD AUTO: 10.8 FL (ref 8.9–12.7)
POTASSIUM SERPL-SCNC: 4.5 MMOL/L (ref 3.5–5.3)
PR INTERVAL: 200 MS
PR INTERVAL: 233 MS
PROT SERPL-MCNC: 6.4 G/DL (ref 6.4–8.2)
PROTHROMBIN TIME: 15.5 SECONDS (ref 12.1–14.4)
QRS AXIS: 20 DEGREES
QRS AXIS: 30 DEGREES
QRSD INTERVAL: 100 MS
QRSD INTERVAL: 92 MS
QT INTERVAL: 396 MS
QT INTERVAL: 433 MS
QTC INTERVAL: 408 MS
QTC INTERVAL: 443 MS
RBC # BLD AUTO: 2.87 MILLION/UL (ref 3.81–5.12)
SODIUM SERPL-SCNC: 133 MMOL/L (ref 136–145)
T WAVE AXIS: 76 DEGREES
T WAVE AXIS: 76 DEGREES
UNIT DISPENSE STATUS: NORMAL
UNIT DISPENSE STATUS: NORMAL
UNIT PRODUCT CODE: NORMAL
UNIT PRODUCT CODE: NORMAL
UNIT RH: NORMAL
UNIT RH: NORMAL
VENTRICULAR RATE: 63 BPM
VENTRICULAR RATE: 64 BPM
WBC # BLD AUTO: 9.94 THOUSAND/UL (ref 4.31–10.16)

## 2017-08-28 PROCEDURE — 94660 CPAP INITIATION&MGMT: CPT

## 2017-08-28 PROCEDURE — 83735 ASSAY OF MAGNESIUM: CPT | Performed by: INTERNAL MEDICINE

## 2017-08-28 PROCEDURE — 85610 PROTHROMBIN TIME: CPT | Performed by: INTERNAL MEDICINE

## 2017-08-28 PROCEDURE — 85018 HEMOGLOBIN: CPT | Performed by: PHYSICIAN ASSISTANT

## 2017-08-28 PROCEDURE — 5A1D60Z PERFORMANCE OF URINARY FILTRATION, MULTIPLE: ICD-10-PCS | Performed by: INTERNAL MEDICINE

## 2017-08-28 PROCEDURE — 80053 COMPREHEN METABOLIC PANEL: CPT | Performed by: INTERNAL MEDICINE

## 2017-08-28 PROCEDURE — C9113 INJ PANTOPRAZOLE SODIUM, VIA: HCPCS | Performed by: INTERNAL MEDICINE

## 2017-08-28 PROCEDURE — 82948 REAGENT STRIP/BLOOD GLUCOSE: CPT

## 2017-08-28 PROCEDURE — 83036 HEMOGLOBIN GLYCOSYLATED A1C: CPT | Performed by: INTERNAL MEDICINE

## 2017-08-28 PROCEDURE — 94640 AIRWAY INHALATION TREATMENT: CPT

## 2017-08-28 PROCEDURE — 85014 HEMATOCRIT: CPT | Performed by: PHYSICIAN ASSISTANT

## 2017-08-28 PROCEDURE — 84100 ASSAY OF PHOSPHORUS: CPT | Performed by: INTERNAL MEDICINE

## 2017-08-28 PROCEDURE — 93005 ELECTROCARDIOGRAM TRACING: CPT | Performed by: INTERNAL MEDICINE

## 2017-08-28 PROCEDURE — 94760 N-INVAS EAR/PLS OXIMETRY 1: CPT

## 2017-08-28 PROCEDURE — 85027 COMPLETE CBC AUTOMATED: CPT | Performed by: INTERNAL MEDICINE

## 2017-08-28 PROCEDURE — 85730 THROMBOPLASTIN TIME PARTIAL: CPT | Performed by: INTERNAL MEDICINE

## 2017-08-28 RX ORDER — INSULIN GLARGINE 100 [IU]/ML
10 INJECTION, SOLUTION SUBCUTANEOUS ONCE
Status: COMPLETED | OUTPATIENT
Start: 2017-08-28 | End: 2017-08-28

## 2017-08-28 RX ORDER — SODIUM CHLORIDE FOR INHALATION 0.9 %
VIAL, NEBULIZER (ML) INHALATION
Status: COMPLETED
Start: 2017-08-28 | End: 2017-08-28

## 2017-08-28 RX ORDER — ACETAMINOPHEN 325 MG/1
650 TABLET ORAL EVERY 6 HOURS PRN
Status: DISCONTINUED | OUTPATIENT
Start: 2017-08-28 | End: 2017-08-28

## 2017-08-28 RX ORDER — HYDROMORPHONE HYDROCHLORIDE 2 MG/1
2 TABLET ORAL 3 TIMES DAILY PRN
Status: DISPENSED | OUTPATIENT
Start: 2017-08-28 | End: 2017-09-03

## 2017-08-28 RX ORDER — LEVALBUTEROL 1.25 MG/.5ML
1.25 SOLUTION, CONCENTRATE RESPIRATORY (INHALATION)
Status: DISCONTINUED | OUTPATIENT
Start: 2017-08-28 | End: 2017-08-30

## 2017-08-28 RX ADMIN — LEVALBUTEROL HYDROCHLORIDE 1.25 MG: 1.25 SOLUTION, CONCENTRATE RESPIRATORY (INHALATION) at 19:36

## 2017-08-28 RX ADMIN — INSULIN LISPRO 4 UNITS: 100 INJECTION, SOLUTION INTRAVENOUS; SUBCUTANEOUS at 00:09

## 2017-08-28 RX ADMIN — MENTHOL, METHYL SALICYLATE: 10; 15 CREAM TOPICAL at 22:34

## 2017-08-28 RX ADMIN — INSULIN GLARGINE 10 UNITS: 100 INJECTION, SOLUTION SUBCUTANEOUS at 01:29

## 2017-08-28 RX ADMIN — INSULIN LISPRO 2 UNITS: 100 INJECTION, SOLUTION INTRAVENOUS; SUBCUTANEOUS at 06:01

## 2017-08-28 RX ADMIN — HYDROCORTISONE: 1 OINTMENT TOPICAL at 09:55

## 2017-08-28 RX ADMIN — MENTHOL, METHYL SALICYLATE: 10; 15 CREAM TOPICAL at 09:55

## 2017-08-28 RX ADMIN — HYDROCORTISONE: 1 OINTMENT TOPICAL at 18:40

## 2017-08-28 RX ADMIN — HYDROMORPHONE HYDROCHLORIDE 2 MG: 2 TABLET ORAL at 17:55

## 2017-08-28 RX ADMIN — LIDOCAINE 1 PATCH: 50 PATCH CUTANEOUS at 09:55

## 2017-08-28 RX ADMIN — HYDROMORPHONE HYDROCHLORIDE 2 MG: 2 TABLET ORAL at 22:33

## 2017-08-28 RX ADMIN — NYSTATIN: 100000 POWDER TOPICAL at 18:40

## 2017-08-28 RX ADMIN — ISODIUM CHLORIDE 3 ML: 0.03 SOLUTION RESPIRATORY (INHALATION) at 19:36

## 2017-08-28 RX ADMIN — INSULIN GLARGINE 10 UNITS: 100 INJECTION, SOLUTION SUBCUTANEOUS at 22:43

## 2017-08-28 RX ADMIN — NYSTATIN: 100000 POWDER TOPICAL at 09:55

## 2017-08-28 RX ADMIN — BISACODYL 20 MG: 5 TABLET, COATED ORAL at 17:00

## 2017-08-28 RX ADMIN — SODIUM CHLORIDE 8 MG/HR: 9 INJECTION, SOLUTION INTRAVENOUS at 22:33

## 2017-08-28 RX ADMIN — SODIUM CHLORIDE 8 MG/HR: 9 INJECTION, SOLUTION INTRAVENOUS at 11:35

## 2017-08-28 RX ADMIN — POLYETHYLENE GLYCOL 3350, SODIUM SULFATE ANHYDROUS, SODIUM BICARBONATE, SODIUM CHLORIDE, POTASSIUM CHLORIDE 4000 ML: 236; 22.74; 6.74; 5.86; 2.97 POWDER, FOR SOLUTION ORAL at 12:00

## 2017-08-28 RX ADMIN — LATANOPROST 1 DROP: 50 SOLUTION/ DROPS OPHTHALMIC at 22:44

## 2017-08-29 ENCOUNTER — ANESTHESIA (INPATIENT)
Dept: GASTROENTEROLOGY | Facility: HOSPITAL | Age: 72
DRG: 813 | End: 2017-08-29
Payer: MEDICARE

## 2017-08-29 ENCOUNTER — APPOINTMENT (INPATIENT)
Dept: RADIOLOGY | Facility: HOSPITAL | Age: 72
DRG: 813 | End: 2017-08-29
Payer: MEDICARE

## 2017-08-29 PROBLEM — M25.511 ACUTE PAIN OF RIGHT SHOULDER: Status: ACTIVE | Noted: 2017-08-29

## 2017-08-29 PROBLEM — D68.9 ACQUIRED COAGULATION DISORDER (HCC): Status: ACTIVE | Noted: 2017-08-29

## 2017-08-29 LAB
ANION GAP SERPL CALCULATED.3IONS-SCNC: 9 MMOL/L (ref 4–13)
BASOPHILS # BLD AUTO: 0.02 THOUSANDS/ΜL (ref 0–0.1)
BASOPHILS NFR BLD AUTO: 0 % (ref 0–1)
BUN SERPL-MCNC: 62 MG/DL (ref 5–25)
CALCIUM SERPL-MCNC: 7.9 MG/DL (ref 8.3–10.1)
CHLORIDE SERPL-SCNC: 101 MMOL/L (ref 100–108)
CO2 SERPL-SCNC: 28 MMOL/L (ref 21–32)
CREAT SERPL-MCNC: 3.08 MG/DL (ref 0.6–1.3)
EOSINOPHIL # BLD AUTO: 0.1 THOUSAND/ΜL (ref 0–0.61)
EOSINOPHIL NFR BLD AUTO: 1 % (ref 0–6)
ERYTHROCYTE [DISTWIDTH] IN BLOOD BY AUTOMATED COUNT: 15.2 % (ref 11.6–15.1)
GFR SERPL CREATININE-BSD FRML MDRD: 14 ML/MIN/1.73SQ M
GLUCOSE SERPL-MCNC: 103 MG/DL (ref 65–140)
GLUCOSE SERPL-MCNC: 120 MG/DL (ref 65–140)
GLUCOSE SERPL-MCNC: 129 MG/DL (ref 65–140)
GLUCOSE SERPL-MCNC: 134 MG/DL (ref 65–140)
GLUCOSE SERPL-MCNC: 97 MG/DL (ref 65–140)
HCT VFR BLD AUTO: 23.1 % (ref 34.8–46.1)
HGB BLD-MCNC: 7.3 G/DL (ref 11.5–15.4)
HGB BLD-MCNC: 7.4 G/DL (ref 11.5–15.4)
HGB BLD-MCNC: 7.7 G/DL (ref 11.5–15.4)
LYMPHOCYTES # BLD AUTO: 1.23 THOUSANDS/ΜL (ref 0.6–4.47)
LYMPHOCYTES NFR BLD AUTO: 12 % (ref 14–44)
MCH RBC QN AUTO: 29.2 PG (ref 26.8–34.3)
MCHC RBC AUTO-ENTMCNC: 33.3 G/DL (ref 31.4–37.4)
MCV RBC AUTO: 88 FL (ref 82–98)
MONOCYTES # BLD AUTO: 1.06 THOUSAND/ΜL (ref 0.17–1.22)
MONOCYTES NFR BLD AUTO: 10 % (ref 4–12)
NEUTROPHILS # BLD AUTO: 8 THOUSANDS/ΜL (ref 1.85–7.62)
NEUTS SEG NFR BLD AUTO: 77 % (ref 43–75)
NRBC BLD AUTO-RTO: 0 /100 WBCS
PLATELET # BLD AUTO: 218 THOUSANDS/UL (ref 149–390)
PMV BLD AUTO: 11.1 FL (ref 8.9–12.7)
POTASSIUM SERPL-SCNC: 4.2 MMOL/L (ref 3.5–5.3)
RBC # BLD AUTO: 2.64 MILLION/UL (ref 3.81–5.12)
SODIUM SERPL-SCNC: 138 MMOL/L (ref 136–145)
WBC # BLD AUTO: 10.53 THOUSAND/UL (ref 4.31–10.16)

## 2017-08-29 PROCEDURE — 73020 X-RAY EXAM OF SHOULDER: CPT

## 2017-08-29 PROCEDURE — 94760 N-INVAS EAR/PLS OXIMETRY 1: CPT

## 2017-08-29 PROCEDURE — 80048 BASIC METABOLIC PNL TOTAL CA: CPT | Performed by: PHYSICIAN ASSISTANT

## 2017-08-29 PROCEDURE — 85018 HEMOGLOBIN: CPT | Performed by: PHYSICIAN ASSISTANT

## 2017-08-29 PROCEDURE — 82948 REAGENT STRIP/BLOOD GLUCOSE: CPT

## 2017-08-29 PROCEDURE — 85025 COMPLETE CBC W/AUTO DIFF WBC: CPT | Performed by: PHYSICIAN ASSISTANT

## 2017-08-29 PROCEDURE — C9113 INJ PANTOPRAZOLE SODIUM, VIA: HCPCS | Performed by: INTERNAL MEDICINE

## 2017-08-29 PROCEDURE — 3E0U33Z INTRODUCTION OF ANTI-INFLAMMATORY INTO JOINTS, PERCUTANEOUS APPROACH: ICD-10-PCS | Performed by: RADIOLOGY

## 2017-08-29 PROCEDURE — 3E0U3BZ INTRODUCTION OF ANESTHETIC AGENT INTO JOINTS, PERCUTANEOUS APPROACH: ICD-10-PCS | Performed by: RADIOLOGY

## 2017-08-29 PROCEDURE — 0DJD8ZZ INSPECTION OF LOWER INTESTINAL TRACT, VIA NATURAL OR ARTIFICIAL OPENING ENDOSCOPIC: ICD-10-PCS | Performed by: INTERNAL MEDICINE

## 2017-08-29 PROCEDURE — C9113 INJ PANTOPRAZOLE SODIUM, VIA: HCPCS | Performed by: NURSE PRACTITIONER

## 2017-08-29 RX ORDER — SODIUM CHLORIDE FOR INHALATION 0.9 %
VIAL, NEBULIZER (ML) INHALATION
Status: DISCONTINUED
Start: 2017-08-29 | End: 2017-08-29 | Stop reason: WASHOUT

## 2017-08-29 RX ORDER — KETAMINE HYDROCHLORIDE 50 MG/ML
INJECTION, SOLUTION, CONCENTRATE INTRAMUSCULAR; INTRAVENOUS AS NEEDED
Status: DISCONTINUED | OUTPATIENT
Start: 2017-08-29 | End: 2017-08-29 | Stop reason: SURG

## 2017-08-29 RX ORDER — LIDOCAINE HYDROCHLORIDE 10 MG/ML
INJECTION, SOLUTION INFILTRATION; PERINEURAL AS NEEDED
Status: DISCONTINUED | OUTPATIENT
Start: 2017-08-29 | End: 2017-08-29 | Stop reason: SURG

## 2017-08-29 RX ORDER — PANTOPRAZOLE SODIUM 40 MG/1
40 INJECTION, POWDER, FOR SOLUTION INTRAVENOUS EVERY 12 HOURS SCHEDULED
Status: DISCONTINUED | OUTPATIENT
Start: 2017-08-29 | End: 2017-09-02

## 2017-08-29 RX ADMIN — HYDROMORPHONE HYDROCHLORIDE 0.5 MG: 1 INJECTION, SOLUTION INTRAMUSCULAR; INTRAVENOUS; SUBCUTANEOUS at 06:03

## 2017-08-29 RX ADMIN — HYDROMORPHONE HYDROCHLORIDE 2 MG: 2 TABLET ORAL at 05:39

## 2017-08-29 RX ADMIN — MENTHOL, METHYL SALICYLATE: 10; 15 CREAM TOPICAL at 08:01

## 2017-08-29 RX ADMIN — PANTOPRAZOLE SODIUM 40 MG: 40 INJECTION, POWDER, FOR SOLUTION INTRAVENOUS at 21:45

## 2017-08-29 RX ADMIN — BISACODYL 10 MG: 5 TABLET, COATED ORAL at 16:55

## 2017-08-29 RX ADMIN — HYDROMORPHONE HYDROCHLORIDE 2 MG: 2 TABLET ORAL at 08:59

## 2017-08-29 RX ADMIN — NYSTATIN: 100000 POWDER TOPICAL at 17:44

## 2017-08-29 RX ADMIN — LATANOPROST 1 DROP: 50 SOLUTION/ DROPS OPHTHALMIC at 21:45

## 2017-08-29 RX ADMIN — HYDROCORTISONE 1 APPLICATION: 1 OINTMENT TOPICAL at 08:02

## 2017-08-29 RX ADMIN — HYDROCORTISONE: 1 OINTMENT TOPICAL at 17:44

## 2017-08-29 RX ADMIN — HYDROMORPHONE HYDROCHLORIDE 2 MG: 2 TABLET ORAL at 21:45

## 2017-08-29 RX ADMIN — POLYETHYLENE GLYCOL 3350, SODIUM CHLORIDE, POTASSIUM CHLORIDE, SODIUM BICARBONATE, AND SODIUM SULFATE 500 ML: 240; 5.84; 2.98; 6.72; 22.72 POWDER, FOR SOLUTION NASOGASTRIC; ORAL at 17:30

## 2017-08-29 RX ADMIN — MENTHOL, METHYL SALICYLATE: 10; 15 CREAM TOPICAL at 17:43

## 2017-08-29 RX ADMIN — NYSTATIN 1 APPLICATION: 100000 POWDER TOPICAL at 08:02

## 2017-08-29 RX ADMIN — ONDANSETRON 4 MG: 2 INJECTION INTRAMUSCULAR; INTRAVENOUS at 16:56

## 2017-08-29 RX ADMIN — SODIUM CHLORIDE 8 MG/HR: 9 INJECTION, SOLUTION INTRAVENOUS at 08:48

## 2017-08-29 RX ADMIN — KETAMINE HYDROCHLORIDE 10 MG: 50 INJECTION, SOLUTION INTRAMUSCULAR; INTRAVENOUS at 14:08

## 2017-08-29 RX ADMIN — LIDOCAINE HYDROCHLORIDE 100 MG: 10 INJECTION, SOLUTION INFILTRATION; PERINEURAL at 14:08

## 2017-08-29 RX ADMIN — LIDOCAINE 1 PATCH: 50 PATCH CUTANEOUS at 08:04

## 2017-08-29 RX ADMIN — INSULIN GLARGINE 10 UNITS: 100 INJECTION, SOLUTION SUBCUTANEOUS at 21:45

## 2017-08-30 ENCOUNTER — APPOINTMENT (INPATIENT)
Dept: DIALYSIS | Facility: HOSPITAL | Age: 72
DRG: 813 | End: 2017-08-30
Attending: INTERNAL MEDICINE
Payer: MEDICARE

## 2017-08-30 LAB
ANION GAP SERPL CALCULATED.3IONS-SCNC: 9 MMOL/L (ref 4–13)
BASOPHILS # BLD AUTO: 0.02 THOUSANDS/ΜL (ref 0–0.1)
BASOPHILS NFR BLD AUTO: 0 % (ref 0–1)
BUN SERPL-MCNC: 67 MG/DL (ref 5–25)
CA-I BLD-SCNC: 0.97 MMOL/L (ref 1.12–1.32)
CALCIUM SERPL-MCNC: 7.9 MG/DL (ref 8.3–10.1)
CHLORIDE SERPL-SCNC: 99 MMOL/L (ref 100–108)
CO2 SERPL-SCNC: 29 MMOL/L (ref 21–32)
CREAT SERPL-MCNC: 3.7 MG/DL (ref 0.6–1.3)
EOSINOPHIL # BLD AUTO: 0.19 THOUSAND/ΜL (ref 0–0.61)
EOSINOPHIL NFR BLD AUTO: 2 % (ref 0–6)
ERYTHROCYTE [DISTWIDTH] IN BLOOD BY AUTOMATED COUNT: 15.7 % (ref 11.6–15.1)
GFR SERPL CREATININE-BSD FRML MDRD: 12 ML/MIN/1.73SQ M
GLUCOSE SERPL-MCNC: 136 MG/DL (ref 65–140)
GLUCOSE SERPL-MCNC: 167 MG/DL (ref 65–140)
GLUCOSE SERPL-MCNC: 168 MG/DL (ref 65–140)
GLUCOSE SERPL-MCNC: 195 MG/DL (ref 65–140)
GLUCOSE SERPL-MCNC: 207 MG/DL (ref 65–140)
GLUCOSE SERPL-MCNC: 211 MG/DL (ref 65–140)
HCT VFR BLD AUTO: 22.2 % (ref 34.8–46.1)
HGB BLD-MCNC: 7.2 G/DL (ref 11.5–15.4)
LYMPHOCYTES # BLD AUTO: 1.6 THOUSANDS/ΜL (ref 0.6–4.47)
LYMPHOCYTES NFR BLD AUTO: 18 % (ref 14–44)
MCH RBC QN AUTO: 28.5 PG (ref 26.8–34.3)
MCHC RBC AUTO-ENTMCNC: 32.4 G/DL (ref 31.4–37.4)
MCV RBC AUTO: 88 FL (ref 82–98)
MONOCYTES # BLD AUTO: 0.99 THOUSAND/ΜL (ref 0.17–1.22)
MONOCYTES NFR BLD AUTO: 11 % (ref 4–12)
NEUTROPHILS # BLD AUTO: 5.82 THOUSANDS/ΜL (ref 1.85–7.62)
NEUTS SEG NFR BLD AUTO: 69 % (ref 43–75)
NRBC BLD AUTO-RTO: 0 /100 WBCS
PLATELET # BLD AUTO: 220 THOUSANDS/UL (ref 149–390)
PMV BLD AUTO: 11.1 FL (ref 8.9–12.7)
POTASSIUM SERPL-SCNC: 4 MMOL/L (ref 3.5–5.3)
RBC # BLD AUTO: 2.53 MILLION/UL (ref 3.81–5.12)
SODIUM SERPL-SCNC: 137 MMOL/L (ref 136–145)
WBC # BLD AUTO: 8.69 THOUSAND/UL (ref 4.31–10.16)

## 2017-08-30 PROCEDURE — 82330 ASSAY OF CALCIUM: CPT | Performed by: PHYSICIAN ASSISTANT

## 2017-08-30 PROCEDURE — 82948 REAGENT STRIP/BLOOD GLUCOSE: CPT

## 2017-08-30 PROCEDURE — 80048 BASIC METABOLIC PNL TOTAL CA: CPT | Performed by: PHYSICIAN ASSISTANT

## 2017-08-30 PROCEDURE — 94760 N-INVAS EAR/PLS OXIMETRY 1: CPT

## 2017-08-30 PROCEDURE — 94640 AIRWAY INHALATION TREATMENT: CPT

## 2017-08-30 PROCEDURE — C9113 INJ PANTOPRAZOLE SODIUM, VIA: HCPCS | Performed by: NURSE PRACTITIONER

## 2017-08-30 PROCEDURE — 85025 COMPLETE CBC W/AUTO DIFF WBC: CPT | Performed by: PHYSICIAN ASSISTANT

## 2017-08-30 RX ORDER — LEVALBUTEROL 1.25 MG/.5ML
1.25 SOLUTION, CONCENTRATE RESPIRATORY (INHALATION) EVERY 6 HOURS PRN
Status: DISCONTINUED | OUTPATIENT
Start: 2017-08-30 | End: 2017-09-02

## 2017-08-30 RX ORDER — SODIUM CHLORIDE FOR INHALATION 0.9 %
VIAL, NEBULIZER (ML) INHALATION
Status: COMPLETED
Start: 2017-08-30 | End: 2017-08-30

## 2017-08-30 RX ORDER — HEPARIN SODIUM 5000 [USP'U]/ML
7500 INJECTION, SOLUTION INTRAVENOUS; SUBCUTANEOUS EVERY 8 HOURS SCHEDULED
Status: DISCONTINUED | OUTPATIENT
Start: 2017-08-30 | End: 2017-08-31

## 2017-08-30 RX ORDER — ACETAMINOPHEN 325 MG/1
650 TABLET ORAL EVERY 6 HOURS PRN
Status: DISCONTINUED | OUTPATIENT
Start: 2017-08-30 | End: 2017-09-07 | Stop reason: HOSPADM

## 2017-08-30 RX ADMIN — CALCIUM GLUCONATE 3 G: 94 INJECTION, SOLUTION INTRAVENOUS at 06:15

## 2017-08-30 RX ADMIN — PANTOPRAZOLE SODIUM 40 MG: 40 INJECTION, POWDER, FOR SOLUTION INTRAVENOUS at 21:39

## 2017-08-30 RX ADMIN — HYDROCORTISONE: 1 OINTMENT TOPICAL at 19:30

## 2017-08-30 RX ADMIN — MENTHOL, METHYL SALICYLATE: 10; 15 CREAM TOPICAL at 10:29

## 2017-08-30 RX ADMIN — HEPARIN SODIUM 7500 UNITS: 5000 INJECTION, SOLUTION INTRAVENOUS; SUBCUTANEOUS at 13:19

## 2017-08-30 RX ADMIN — LATANOPROST 1 DROP: 50 SOLUTION/ DROPS OPHTHALMIC at 21:39

## 2017-08-30 RX ADMIN — INSULIN LISPRO 2 UNITS: 100 INJECTION, SOLUTION INTRAVENOUS; SUBCUTANEOUS at 06:15

## 2017-08-30 RX ADMIN — HYDROCORTISONE: 1 OINTMENT TOPICAL at 10:28

## 2017-08-30 RX ADMIN — LIDOCAINE 1 PATCH: 50 PATCH CUTANEOUS at 10:28

## 2017-08-30 RX ADMIN — INSULIN LISPRO 2 UNITS: 100 INJECTION, SOLUTION INTRAVENOUS; SUBCUTANEOUS at 13:18

## 2017-08-30 RX ADMIN — NYSTATIN: 100000 POWDER TOPICAL at 10:29

## 2017-08-30 RX ADMIN — HEPARIN SODIUM 7500 UNITS: 5000 INJECTION, SOLUTION INTRAVENOUS; SUBCUTANEOUS at 21:39

## 2017-08-30 RX ADMIN — HYDROMORPHONE HYDROCHLORIDE 2 MG: 2 TABLET ORAL at 06:15

## 2017-08-30 RX ADMIN — INSULIN GLARGINE 10 UNITS: 100 INJECTION, SOLUTION SUBCUTANEOUS at 21:39

## 2017-08-30 RX ADMIN — NYSTATIN: 100000 POWDER TOPICAL at 19:30

## 2017-08-30 RX ADMIN — HYDROMORPHONE HYDROCHLORIDE 2 MG: 2 TABLET ORAL at 17:01

## 2017-08-30 RX ADMIN — INSULIN LISPRO 4 UNITS: 100 INJECTION, SOLUTION INTRAVENOUS; SUBCUTANEOUS at 18:08

## 2017-08-30 RX ADMIN — BISACODYL 10 MG: 5 TABLET, COATED ORAL at 13:17

## 2017-08-30 RX ADMIN — LEVALBUTEROL HYDROCHLORIDE 1.25 MG: 1.25 SOLUTION, CONCENTRATE RESPIRATORY (INHALATION) at 07:56

## 2017-08-30 RX ADMIN — MENTHOL, METHYL SALICYLATE: 10; 15 CREAM TOPICAL at 19:30

## 2017-08-30 RX ADMIN — PANTOPRAZOLE SODIUM 40 MG: 40 INJECTION, POWDER, FOR SOLUTION INTRAVENOUS at 10:30

## 2017-08-30 RX ADMIN — ISODIUM CHLORIDE 3 ML: 0.03 SOLUTION RESPIRATORY (INHALATION) at 07:56

## 2017-08-31 ENCOUNTER — APPOINTMENT (INPATIENT)
Dept: RADIOLOGY | Facility: HOSPITAL | Age: 72
DRG: 813 | End: 2017-08-31
Payer: MEDICARE

## 2017-08-31 LAB
ABO GROUP BLD: NORMAL
ANION GAP SERPL CALCULATED.3IONS-SCNC: 10 MMOL/L (ref 4–13)
BASOPHILS # BLD AUTO: 0.01 THOUSANDS/ΜL (ref 0–0.1)
BASOPHILS NFR BLD AUTO: 0 % (ref 0–1)
BLD GP AB SCN SERPL QL: NEGATIVE
BUN SERPL-MCNC: 31 MG/DL (ref 5–25)
CA-I BLD-SCNC: 1.02 MMOL/L (ref 1.12–1.32)
CALCIUM SERPL-MCNC: 7.8 MG/DL (ref 8.3–10.1)
CHLORIDE SERPL-SCNC: 98 MMOL/L (ref 100–108)
CO2 SERPL-SCNC: 28 MMOL/L (ref 21–32)
CREAT SERPL-MCNC: 2.6 MG/DL (ref 0.6–1.3)
EOSINOPHIL # BLD AUTO: 0.27 THOUSAND/ΜL (ref 0–0.61)
EOSINOPHIL NFR BLD AUTO: 4 % (ref 0–6)
ERYTHROCYTE [DISTWIDTH] IN BLOOD BY AUTOMATED COUNT: 15.8 % (ref 11.6–15.1)
GFR SERPL CREATININE-BSD FRML MDRD: 18 ML/MIN/1.73SQ M
GLUCOSE SERPL-MCNC: 132 MG/DL (ref 65–140)
GLUCOSE SERPL-MCNC: 152 MG/DL (ref 65–140)
GLUCOSE SERPL-MCNC: 170 MG/DL (ref 65–140)
GLUCOSE SERPL-MCNC: 174 MG/DL (ref 65–140)
GLUCOSE SERPL-MCNC: 235 MG/DL (ref 65–140)
HCT VFR BLD AUTO: 20.7 % (ref 34.8–46.1)
HCT VFR BLD AUTO: 26.5 % (ref 34.8–46.1)
HGB BLD-MCNC: 6.7 G/DL (ref 11.5–15.4)
HGB BLD-MCNC: 8.6 G/DL (ref 11.5–15.4)
LYMPHOCYTES # BLD AUTO: 1.12 THOUSANDS/ΜL (ref 0.6–4.47)
LYMPHOCYTES NFR BLD AUTO: 15 % (ref 14–44)
MCH RBC QN AUTO: 29 PG (ref 26.8–34.3)
MCHC RBC AUTO-ENTMCNC: 32.4 G/DL (ref 31.4–37.4)
MCV RBC AUTO: 90 FL (ref 82–98)
MONOCYTES # BLD AUTO: 1.05 THOUSAND/ΜL (ref 0.17–1.22)
MONOCYTES NFR BLD AUTO: 14 % (ref 4–12)
NEUTROPHILS # BLD AUTO: 4.93 THOUSANDS/ΜL (ref 1.85–7.62)
NEUTS SEG NFR BLD AUTO: 67 % (ref 43–75)
NRBC BLD AUTO-RTO: 0 /100 WBCS
PLATELET # BLD AUTO: 201 THOUSANDS/UL (ref 149–390)
PMV BLD AUTO: 11.8 FL (ref 8.9–12.7)
POTASSIUM SERPL-SCNC: 3.8 MMOL/L (ref 3.5–5.3)
RBC # BLD AUTO: 2.31 MILLION/UL (ref 3.81–5.12)
RH BLD: POSITIVE
SODIUM SERPL-SCNC: 136 MMOL/L (ref 136–145)
SPECIMEN EXPIRATION DATE: NORMAL
WBC # BLD AUTO: 7.45 THOUSAND/UL (ref 4.31–10.16)

## 2017-08-31 PROCEDURE — P9040 RBC LEUKOREDUCED IRRADIATED: HCPCS

## 2017-08-31 PROCEDURE — 82330 ASSAY OF CALCIUM: CPT | Performed by: PHYSICIAN ASSISTANT

## 2017-08-31 PROCEDURE — 86901 BLOOD TYPING SEROLOGIC RH(D): CPT | Performed by: PHYSICIAN ASSISTANT

## 2017-08-31 PROCEDURE — 85014 HEMATOCRIT: CPT | Performed by: INTERNAL MEDICINE

## 2017-08-31 PROCEDURE — 94760 N-INVAS EAR/PLS OXIMETRY 1: CPT

## 2017-08-31 PROCEDURE — 82948 REAGENT STRIP/BLOOD GLUCOSE: CPT

## 2017-08-31 PROCEDURE — 86923 COMPATIBILITY TEST ELECTRIC: CPT

## 2017-08-31 PROCEDURE — 86900 BLOOD TYPING SEROLOGIC ABO: CPT | Performed by: PHYSICIAN ASSISTANT

## 2017-08-31 PROCEDURE — 80048 BASIC METABOLIC PNL TOTAL CA: CPT | Performed by: PHYSICIAN ASSISTANT

## 2017-08-31 PROCEDURE — 86850 RBC ANTIBODY SCREEN: CPT | Performed by: PHYSICIAN ASSISTANT

## 2017-08-31 PROCEDURE — 85018 HEMOGLOBIN: CPT | Performed by: INTERNAL MEDICINE

## 2017-08-31 PROCEDURE — 85025 COMPLETE CBC W/AUTO DIFF WBC: CPT | Performed by: PHYSICIAN ASSISTANT

## 2017-08-31 PROCEDURE — C9113 INJ PANTOPRAZOLE SODIUM, VIA: HCPCS | Performed by: NURSE PRACTITIONER

## 2017-08-31 PROCEDURE — 94660 CPAP INITIATION&MGMT: CPT

## 2017-08-31 RX ORDER — CALCIUM CARBONATE 500(1250)
2 TABLET ORAL
Status: DISCONTINUED | OUTPATIENT
Start: 2017-08-31 | End: 2017-09-07 | Stop reason: HOSPADM

## 2017-08-31 RX ORDER — HYDROMORPHONE HYDROCHLORIDE 2 MG/1
2 TABLET ORAL ONCE
Status: COMPLETED | OUTPATIENT
Start: 2017-08-31 | End: 2017-08-31

## 2017-08-31 RX ADMIN — INSULIN LISPRO 4 UNITS: 100 INJECTION, SOLUTION INTRAVENOUS; SUBCUTANEOUS at 18:16

## 2017-08-31 RX ADMIN — INSULIN LISPRO 2 UNITS: 100 INJECTION, SOLUTION INTRAVENOUS; SUBCUTANEOUS at 05:34

## 2017-08-31 RX ADMIN — PANTOPRAZOLE SODIUM 40 MG: 40 INJECTION, POWDER, FOR SOLUTION INTRAVENOUS at 10:03

## 2017-08-31 RX ADMIN — LIDOCAINE 1 PATCH: 50 PATCH CUTANEOUS at 10:04

## 2017-08-31 RX ADMIN — Medication 2 TABLET: at 18:08

## 2017-08-31 RX ADMIN — INSULIN LISPRO 2 UNITS: 100 INJECTION, SOLUTION INTRAVENOUS; SUBCUTANEOUS at 00:40

## 2017-08-31 RX ADMIN — HYDROMORPHONE HYDROCHLORIDE 2 MG: 2 TABLET ORAL at 12:15

## 2017-08-31 RX ADMIN — PANTOPRAZOLE SODIUM 40 MG: 40 INJECTION, POWDER, FOR SOLUTION INTRAVENOUS at 20:19

## 2017-08-31 RX ADMIN — Medication 2 TABLET: at 14:51

## 2017-08-31 RX ADMIN — HEPARIN SODIUM 7500 UNITS: 5000 INJECTION, SOLUTION INTRAVENOUS; SUBCUTANEOUS at 05:35

## 2017-08-31 RX ADMIN — POLYETHYLENE GLYCOL 3350, SODIUM SULFATE ANHYDROUS, SODIUM BICARBONATE, SODIUM CHLORIDE, POTASSIUM CHLORIDE 4000 ML: 236; 22.74; 6.74; 5.86; 2.97 POWDER, FOR SOLUTION ORAL at 00:40

## 2017-08-31 RX ADMIN — HYDROMORPHONE HYDROCHLORIDE 2 MG: 2 TABLET ORAL at 05:35

## 2017-08-31 RX ADMIN — HYDROMORPHONE HYDROCHLORIDE 2 MG: 2 TABLET ORAL at 23:11

## 2017-08-31 RX ADMIN — METOPROLOL TARTRATE 12.5 MG: 25 TABLET ORAL at 18:08

## 2017-08-31 RX ADMIN — NYSTATIN: 100000 POWDER TOPICAL at 10:03

## 2017-08-31 RX ADMIN — MENTHOL, METHYL SALICYLATE: 10; 15 CREAM TOPICAL at 10:03

## 2017-08-31 RX ADMIN — HYDROCORTISONE: 1 OINTMENT TOPICAL at 10:04

## 2017-08-31 RX ADMIN — HYDRALAZINE HYDROCHLORIDE 5 MG: 20 INJECTION INTRAMUSCULAR; INTRAVENOUS at 06:28

## 2017-08-31 RX ADMIN — INSULIN LISPRO 2 UNITS: 100 INJECTION, SOLUTION INTRAVENOUS; SUBCUTANEOUS at 12:16

## 2017-08-31 RX ADMIN — HYDROMORPHONE HYDROCHLORIDE 2 MG: 2 TABLET ORAL at 16:00

## 2017-08-31 RX ADMIN — LATANOPROST 1 DROP: 50 SOLUTION/ DROPS OPHTHALMIC at 21:54

## 2017-08-31 RX ADMIN — NYSTATIN: 100000 POWDER TOPICAL at 18:08

## 2017-08-31 RX ADMIN — MENTHOL, METHYL SALICYLATE: 10; 15 CREAM TOPICAL at 18:07

## 2017-08-31 RX ADMIN — INSULIN GLARGINE 10 UNITS: 100 INJECTION, SOLUTION SUBCUTANEOUS at 21:54

## 2017-08-31 RX ADMIN — ONDANSETRON 4 MG: 2 INJECTION INTRAMUSCULAR; INTRAVENOUS at 05:35

## 2017-09-01 ENCOUNTER — ANESTHESIA (INPATIENT)
Dept: GASTROENTEROLOGY | Facility: HOSPITAL | Age: 72
DRG: 813 | End: 2017-09-01
Payer: MEDICARE

## 2017-09-01 ENCOUNTER — APPOINTMENT (INPATIENT)
Dept: DIALYSIS | Facility: HOSPITAL | Age: 72
DRG: 813 | End: 2017-09-01
Attending: FAMILY MEDICINE
Payer: MEDICARE

## 2017-09-01 ENCOUNTER — ANESTHESIA EVENT (INPATIENT)
Dept: GASTROENTEROLOGY | Facility: HOSPITAL | Age: 72
DRG: 813 | End: 2017-09-01
Payer: MEDICARE

## 2017-09-01 LAB
ABO GROUP BLD BPU: NORMAL
ABO GROUP BLD BPU: NORMAL
ANION GAP SERPL CALCULATED.3IONS-SCNC: 11 MMOL/L (ref 4–13)
BASOPHILS # BLD AUTO: 0.01 THOUSANDS/ΜL (ref 0–0.1)
BASOPHILS NFR BLD AUTO: 0 % (ref 0–1)
BPU ID: NORMAL
BPU ID: NORMAL
BUN SERPL-MCNC: 36 MG/DL (ref 5–25)
CA-I BLD-SCNC: 1.06 MMOL/L (ref 1.12–1.32)
CALCIUM SERPL-MCNC: 8.1 MG/DL (ref 8.3–10.1)
CHLORIDE SERPL-SCNC: 96 MMOL/L (ref 100–108)
CO2 SERPL-SCNC: 27 MMOL/L (ref 21–32)
CREAT SERPL-MCNC: 3.29 MG/DL (ref 0.6–1.3)
EOSINOPHIL # BLD AUTO: 0.2 THOUSAND/ΜL (ref 0–0.61)
EOSINOPHIL NFR BLD AUTO: 3 % (ref 0–6)
ERYTHROCYTE [DISTWIDTH] IN BLOOD BY AUTOMATED COUNT: 16.1 % (ref 11.6–15.1)
FERRITIN SERPL-MCNC: 971 NG/ML (ref 8–388)
GFR SERPL CREATININE-BSD FRML MDRD: 13 ML/MIN/1.73SQ M
GLUCOSE SERPL-MCNC: 124 MG/DL (ref 65–140)
GLUCOSE SERPL-MCNC: 146 MG/DL (ref 65–140)
GLUCOSE SERPL-MCNC: 150 MG/DL (ref 65–140)
GLUCOSE SERPL-MCNC: 158 MG/DL (ref 65–140)
GLUCOSE SERPL-MCNC: 167 MG/DL (ref 65–140)
GLUCOSE SERPL-MCNC: 204 MG/DL (ref 65–140)
HCT VFR BLD AUTO: 26 % (ref 34.8–46.1)
HGB BLD-MCNC: 8.5 G/DL (ref 11.5–15.4)
IRON SATN MFR SERPL: 11 %
IRON SERPL-MCNC: 14 UG/DL (ref 50–170)
LYMPHOCYTES # BLD AUTO: 0.93 THOUSANDS/ΜL (ref 0.6–4.47)
LYMPHOCYTES NFR BLD AUTO: 14 % (ref 14–44)
MAGNESIUM SERPL-MCNC: 1.8 MG/DL (ref 1.6–2.6)
MCH RBC QN AUTO: 28.5 PG (ref 26.8–34.3)
MCHC RBC AUTO-ENTMCNC: 32.7 G/DL (ref 31.4–37.4)
MCV RBC AUTO: 87 FL (ref 82–98)
MONOCYTES # BLD AUTO: 0.98 THOUSAND/ΜL (ref 0.17–1.22)
MONOCYTES NFR BLD AUTO: 14 % (ref 4–12)
NEUTROPHILS # BLD AUTO: 4.61 THOUSANDS/ΜL (ref 1.85–7.62)
NEUTS SEG NFR BLD AUTO: 69 % (ref 43–75)
NRBC BLD AUTO-RTO: 0 /100 WBCS
PHOSPHATE SERPL-MCNC: 2.8 MG/DL (ref 2.3–4.1)
PLATELET # BLD AUTO: 193 THOUSANDS/UL (ref 149–390)
PMV BLD AUTO: 11.5 FL (ref 8.9–12.7)
POTASSIUM SERPL-SCNC: 4 MMOL/L (ref 3.5–5.3)
PTH-INTACT SERPL-MCNC: 230.5 PG/ML (ref 14–72)
RBC # BLD AUTO: 2.98 MILLION/UL (ref 3.81–5.12)
SODIUM SERPL-SCNC: 134 MMOL/L (ref 136–145)
TIBC SERPL-MCNC: 125 UG/DL (ref 250–450)
UNIT DISPENSE STATUS: NORMAL
UNIT DISPENSE STATUS: NORMAL
UNIT PRODUCT CODE: NORMAL
UNIT PRODUCT CODE: NORMAL
UNIT RH: NORMAL
UNIT RH: NORMAL
WBC # BLD AUTO: 6.79 THOUSAND/UL (ref 4.31–10.16)

## 2017-09-01 PROCEDURE — 82948 REAGENT STRIP/BLOOD GLUCOSE: CPT

## 2017-09-01 PROCEDURE — 94660 CPAP INITIATION&MGMT: CPT

## 2017-09-01 PROCEDURE — 85025 COMPLETE CBC W/AUTO DIFF WBC: CPT | Performed by: PHYSICIAN ASSISTANT

## 2017-09-01 PROCEDURE — 82330 ASSAY OF CALCIUM: CPT | Performed by: INTERNAL MEDICINE

## 2017-09-01 PROCEDURE — 82728 ASSAY OF FERRITIN: CPT | Performed by: INTERNAL MEDICINE

## 2017-09-01 PROCEDURE — 83970 ASSAY OF PARATHORMONE: CPT | Performed by: INTERNAL MEDICINE

## 2017-09-01 PROCEDURE — 83735 ASSAY OF MAGNESIUM: CPT | Performed by: INTERNAL MEDICINE

## 2017-09-01 PROCEDURE — 83540 ASSAY OF IRON: CPT | Performed by: INTERNAL MEDICINE

## 2017-09-01 PROCEDURE — 80048 BASIC METABOLIC PNL TOTAL CA: CPT | Performed by: PHYSICIAN ASSISTANT

## 2017-09-01 PROCEDURE — 84100 ASSAY OF PHOSPHORUS: CPT | Performed by: INTERNAL MEDICINE

## 2017-09-01 PROCEDURE — C9113 INJ PANTOPRAZOLE SODIUM, VIA: HCPCS | Performed by: NURSE PRACTITIONER

## 2017-09-01 PROCEDURE — 83550 IRON BINDING TEST: CPT | Performed by: INTERNAL MEDICINE

## 2017-09-01 RX ORDER — ONDANSETRON 2 MG/ML
INJECTION INTRAMUSCULAR; INTRAVENOUS AS NEEDED
Status: DISCONTINUED | OUTPATIENT
Start: 2017-09-01 | End: 2017-09-01 | Stop reason: SURG

## 2017-09-01 RX ORDER — SODIUM CHLORIDE 9 MG/ML
INJECTION, SOLUTION INTRAVENOUS CONTINUOUS PRN
Status: DISCONTINUED | OUTPATIENT
Start: 2017-09-01 | End: 2017-09-01 | Stop reason: SURG

## 2017-09-01 RX ORDER — PROPOFOL 10 MG/ML
INJECTION, EMULSION INTRAVENOUS AS NEEDED
Status: DISCONTINUED | OUTPATIENT
Start: 2017-09-01 | End: 2017-09-01 | Stop reason: SURG

## 2017-09-01 RX ORDER — MIDAZOLAM HYDROCHLORIDE 1 MG/ML
INJECTION INTRAMUSCULAR; INTRAVENOUS AS NEEDED
Status: DISCONTINUED | OUTPATIENT
Start: 2017-09-01 | End: 2017-09-01 | Stop reason: SURG

## 2017-09-01 RX ORDER — KETAMINE HYDROCHLORIDE 50 MG/ML
INJECTION, SOLUTION, CONCENTRATE INTRAMUSCULAR; INTRAVENOUS AS NEEDED
Status: DISCONTINUED | OUTPATIENT
Start: 2017-09-01 | End: 2017-09-01 | Stop reason: SURG

## 2017-09-01 RX ADMIN — DOXERCALCIFEROL 1 MCG: 2 INJECTION, SOLUTION INTRAVENOUS at 15:37

## 2017-09-01 RX ADMIN — HYDROCORTISONE: 1 OINTMENT TOPICAL at 21:03

## 2017-09-01 RX ADMIN — PANTOPRAZOLE SODIUM 40 MG: 40 INJECTION, POWDER, FOR SOLUTION INTRAVENOUS at 23:01

## 2017-09-01 RX ADMIN — PROPOFOL 10 MG: 10 INJECTION, EMULSION INTRAVENOUS at 12:59

## 2017-09-01 RX ADMIN — PROPOFOL 10 MG: 10 INJECTION, EMULSION INTRAVENOUS at 13:05

## 2017-09-01 RX ADMIN — INSULIN LISPRO 4 UNITS: 100 INJECTION, SOLUTION INTRAVENOUS; SUBCUTANEOUS at 00:12

## 2017-09-01 RX ADMIN — MENTHOL, METHYL SALICYLATE: 10; 15 CREAM TOPICAL at 10:06

## 2017-09-01 RX ADMIN — PROPOFOL 20 MG: 10 INJECTION, EMULSION INTRAVENOUS at 12:55

## 2017-09-01 RX ADMIN — PROPOFOL 10 MG: 10 INJECTION, EMULSION INTRAVENOUS at 13:18

## 2017-09-01 RX ADMIN — NYSTATIN: 100000 POWDER TOPICAL at 10:06

## 2017-09-01 RX ADMIN — MIDAZOLAM HYDROCHLORIDE 1 MG: 1 INJECTION, SOLUTION INTRAMUSCULAR; INTRAVENOUS at 12:56

## 2017-09-01 RX ADMIN — INSULIN LISPRO 2 UNITS: 100 INJECTION, SOLUTION INTRAVENOUS; SUBCUTANEOUS at 05:53

## 2017-09-01 RX ADMIN — MENTHOL, METHYL SALICYLATE: 10; 15 CREAM TOPICAL at 20:47

## 2017-09-01 RX ADMIN — HYDROMORPHONE HYDROCHLORIDE 2 MG: 2 TABLET ORAL at 14:31

## 2017-09-01 RX ADMIN — ONDANSETRON 4 MG: 2 INJECTION INTRAMUSCULAR; INTRAVENOUS at 13:24

## 2017-09-01 RX ADMIN — PROPOFOL 20 MG: 10 INJECTION, EMULSION INTRAVENOUS at 13:11

## 2017-09-01 RX ADMIN — SODIUM CHLORIDE: 0.9 INJECTION, SOLUTION INTRAVENOUS at 12:50

## 2017-09-01 RX ADMIN — INSULIN GLARGINE 10 UNITS: 100 INJECTION, SOLUTION SUBCUTANEOUS at 23:01

## 2017-09-01 RX ADMIN — KETAMINE HYDROCHLORIDE 30 MG: 50 INJECTION, SOLUTION INTRAMUSCULAR; INTRAVENOUS at 12:53

## 2017-09-01 RX ADMIN — HYDROMORPHONE HYDROCHLORIDE 2 MG: 2 TABLET ORAL at 20:57

## 2017-09-01 RX ADMIN — KETAMINE HYDROCHLORIDE 10 MG: 50 INJECTION, SOLUTION INTRAMUSCULAR; INTRAVENOUS at 13:00

## 2017-09-01 RX ADMIN — PANTOPRAZOLE SODIUM 40 MG: 40 INJECTION, POWDER, FOR SOLUTION INTRAVENOUS at 10:06

## 2017-09-01 RX ADMIN — KETAMINE HYDROCHLORIDE 10 MG: 50 INJECTION, SOLUTION INTRAMUSCULAR; INTRAVENOUS at 12:57

## 2017-09-01 RX ADMIN — HYDROMORPHONE HYDROCHLORIDE 2 MG: 2 TABLET ORAL at 06:53

## 2017-09-01 RX ADMIN — MIDAZOLAM HYDROCHLORIDE 1 MG: 1 INJECTION, SOLUTION INTRAMUSCULAR; INTRAVENOUS at 13:05

## 2017-09-01 RX ADMIN — LATANOPROST 1 DROP: 50 SOLUTION/ DROPS OPHTHALMIC at 23:05

## 2017-09-02 LAB
ANION GAP SERPL CALCULATED.3IONS-SCNC: 10 MMOL/L (ref 4–13)
BUN SERPL-MCNC: 21 MG/DL (ref 5–25)
CA-I BLD-SCNC: 1.02 MMOL/L (ref 1.12–1.32)
CALCIUM SERPL-MCNC: 7.8 MG/DL (ref 8.3–10.1)
CHLORIDE SERPL-SCNC: 97 MMOL/L (ref 100–108)
CO2 SERPL-SCNC: 28 MMOL/L (ref 21–32)
CREAT SERPL-MCNC: 2.51 MG/DL (ref 0.6–1.3)
DEPRECATED D DIMER PPP: 2766 NG/ML (FEU) (ref 0–424)
ERYTHROCYTE [DISTWIDTH] IN BLOOD BY AUTOMATED COUNT: 16 % (ref 11.6–15.1)
FERRITIN SERPL-MCNC: 907 NG/ML (ref 8–388)
GFR SERPL CREATININE-BSD FRML MDRD: 18 ML/MIN/1.73SQ M
GLUCOSE SERPL-MCNC: 187 MG/DL (ref 65–140)
GLUCOSE SERPL-MCNC: 192 MG/DL (ref 65–140)
GLUCOSE SERPL-MCNC: 208 MG/DL (ref 65–140)
GLUCOSE SERPL-MCNC: 218 MG/DL (ref 65–140)
GLUCOSE SERPL-MCNC: 255 MG/DL (ref 65–140)
HCT VFR BLD AUTO: 24.6 % (ref 34.8–46.1)
HGB BLD-MCNC: 8.2 G/DL (ref 11.5–15.4)
IRON SATN MFR SERPL: 10 %
IRON SERPL-MCNC: 23 UG/DL (ref 50–170)
MAGNESIUM SERPL-MCNC: 1.8 MG/DL (ref 1.6–2.6)
MCH RBC QN AUTO: 29.2 PG (ref 26.8–34.3)
MCHC RBC AUTO-ENTMCNC: 33.3 G/DL (ref 31.4–37.4)
MCV RBC AUTO: 88 FL (ref 82–98)
PHOSPHATE SERPL-MCNC: 2.5 MG/DL (ref 2.3–4.1)
PLATELET # BLD AUTO: 166 THOUSANDS/UL (ref 149–390)
PMV BLD AUTO: 11.3 FL (ref 8.9–12.7)
POTASSIUM SERPL-SCNC: 4.1 MMOL/L (ref 3.5–5.3)
RBC # BLD AUTO: 2.81 MILLION/UL (ref 3.81–5.12)
SODIUM SERPL-SCNC: 135 MMOL/L (ref 136–145)
TIBC SERPL-MCNC: 230 UG/DL (ref 250–450)
WBC # BLD AUTO: 5.71 THOUSAND/UL (ref 4.31–10.16)

## 2017-09-02 PROCEDURE — 83540 ASSAY OF IRON: CPT | Performed by: INTERNAL MEDICINE

## 2017-09-02 PROCEDURE — C9113 INJ PANTOPRAZOLE SODIUM, VIA: HCPCS | Performed by: NURSE PRACTITIONER

## 2017-09-02 PROCEDURE — 84100 ASSAY OF PHOSPHORUS: CPT | Performed by: INTERNAL MEDICINE

## 2017-09-02 PROCEDURE — 85379 FIBRIN DEGRADATION QUANT: CPT | Performed by: INTERNAL MEDICINE

## 2017-09-02 PROCEDURE — 85027 COMPLETE CBC AUTOMATED: CPT | Performed by: INTERNAL MEDICINE

## 2017-09-02 PROCEDURE — 82330 ASSAY OF CALCIUM: CPT | Performed by: INTERNAL MEDICINE

## 2017-09-02 PROCEDURE — 82948 REAGENT STRIP/BLOOD GLUCOSE: CPT

## 2017-09-02 PROCEDURE — 83735 ASSAY OF MAGNESIUM: CPT | Performed by: INTERNAL MEDICINE

## 2017-09-02 PROCEDURE — 83550 IRON BINDING TEST: CPT | Performed by: INTERNAL MEDICINE

## 2017-09-02 PROCEDURE — 80048 BASIC METABOLIC PNL TOTAL CA: CPT | Performed by: INTERNAL MEDICINE

## 2017-09-02 PROCEDURE — 82728 ASSAY OF FERRITIN: CPT | Performed by: INTERNAL MEDICINE

## 2017-09-02 RX ORDER — HEPARIN SODIUM 5000 [USP'U]/ML
5000 INJECTION, SOLUTION INTRAVENOUS; SUBCUTANEOUS EVERY 8 HOURS SCHEDULED
Status: DISCONTINUED | OUTPATIENT
Start: 2017-09-02 | End: 2017-09-03

## 2017-09-02 RX ORDER — PANTOPRAZOLE SODIUM 40 MG/1
40 TABLET, DELAYED RELEASE ORAL
Status: DISCONTINUED | OUTPATIENT
Start: 2017-09-03 | End: 2017-09-07 | Stop reason: HOSPADM

## 2017-09-02 RX ORDER — DOCUSATE SODIUM 100 MG/1
100 CAPSULE, LIQUID FILLED ORAL DAILY
Status: DISCONTINUED | OUTPATIENT
Start: 2017-09-02 | End: 2017-09-07 | Stop reason: HOSPADM

## 2017-09-02 RX ADMIN — INSULIN LISPRO 3 UNITS: 100 INJECTION, SOLUTION INTRAVENOUS; SUBCUTANEOUS at 22:48

## 2017-09-02 RX ADMIN — HYDROMORPHONE HYDROCHLORIDE 2 MG: 2 TABLET ORAL at 13:34

## 2017-09-02 RX ADMIN — DOCUSATE SODIUM 100 MG: 100 CAPSULE, LIQUID FILLED ORAL at 11:18

## 2017-09-02 RX ADMIN — NYSTATIN: 100000 POWDER TOPICAL at 08:46

## 2017-09-02 RX ADMIN — ACETAMINOPHEN 650 MG: 325 TABLET, FILM COATED ORAL at 22:50

## 2017-09-02 RX ADMIN — HYDROMORPHONE HYDROCHLORIDE 2 MG: 2 TABLET ORAL at 20:12

## 2017-09-02 RX ADMIN — HYDROCORTISONE: 1 OINTMENT TOPICAL at 17:44

## 2017-09-02 RX ADMIN — PANTOPRAZOLE SODIUM 40 MG: 40 INJECTION, POWDER, FOR SOLUTION INTRAVENOUS at 08:45

## 2017-09-02 RX ADMIN — METOPROLOL TARTRATE 12.5 MG: 25 TABLET ORAL at 08:45

## 2017-09-02 RX ADMIN — HEPARIN SODIUM 5000 UNITS: 5000 INJECTION, SOLUTION INTRAVENOUS; SUBCUTANEOUS at 22:51

## 2017-09-02 RX ADMIN — NYSTATIN: 100000 POWDER TOPICAL at 17:44

## 2017-09-02 RX ADMIN — LIDOCAINE 1 PATCH: 50 PATCH CUTANEOUS at 08:45

## 2017-09-02 RX ADMIN — METOPROLOL TARTRATE 12.5 MG: 25 TABLET ORAL at 17:44

## 2017-09-02 RX ADMIN — ACETAMINOPHEN 650 MG: 325 TABLET, FILM COATED ORAL at 08:45

## 2017-09-02 RX ADMIN — HYDROCORTISONE: 1 OINTMENT TOPICAL at 08:46

## 2017-09-02 RX ADMIN — ACETAMINOPHEN 650 MG: 325 TABLET, FILM COATED ORAL at 17:44

## 2017-09-02 RX ADMIN — INSULIN GLARGINE 10 UNITS: 100 INJECTION, SOLUTION SUBCUTANEOUS at 22:49

## 2017-09-02 RX ADMIN — LATANOPROST 1 DROP: 50 SOLUTION/ DROPS OPHTHALMIC at 22:50

## 2017-09-02 RX ADMIN — MENTHOL, METHYL SALICYLATE: 10; 15 CREAM TOPICAL at 08:46

## 2017-09-02 RX ADMIN — HYDROMORPHONE HYDROCHLORIDE 2 MG: 2 TABLET ORAL at 05:53

## 2017-09-02 RX ADMIN — HEPARIN SODIUM 5000 UNITS: 5000 INJECTION, SOLUTION INTRAVENOUS; SUBCUTANEOUS at 13:35

## 2017-09-02 RX ADMIN — Medication 2 TABLET: at 17:45

## 2017-09-02 RX ADMIN — MENTHOL, METHYL SALICYLATE: 10; 15 CREAM TOPICAL at 17:44

## 2017-09-03 LAB
ANION GAP SERPL CALCULATED.3IONS-SCNC: 9 MMOL/L (ref 4–13)
BUN SERPL-MCNC: 24 MG/DL (ref 5–25)
CALCIUM SERPL-MCNC: 8.2 MG/DL (ref 8.3–10.1)
CHLORIDE SERPL-SCNC: 96 MMOL/L (ref 100–108)
CO2 SERPL-SCNC: 28 MMOL/L (ref 21–32)
CREAT SERPL-MCNC: 3.18 MG/DL (ref 0.6–1.3)
ERYTHROCYTE [DISTWIDTH] IN BLOOD BY AUTOMATED COUNT: 15.6 % (ref 11.6–15.1)
GFR SERPL CREATININE-BSD FRML MDRD: 14 ML/MIN/1.73SQ M
GLUCOSE SERPL-MCNC: 147 MG/DL (ref 65–140)
GLUCOSE SERPL-MCNC: 153 MG/DL (ref 65–140)
GLUCOSE SERPL-MCNC: 169 MG/DL (ref 65–140)
GLUCOSE SERPL-MCNC: 210 MG/DL (ref 65–140)
GLUCOSE SERPL-MCNC: 226 MG/DL (ref 65–140)
HCT VFR BLD AUTO: 24.2 % (ref 34.8–46.1)
HGB BLD-MCNC: 7.9 G/DL (ref 11.5–15.4)
MCH RBC QN AUTO: 28.9 PG (ref 26.8–34.3)
MCHC RBC AUTO-ENTMCNC: 32.6 G/DL (ref 31.4–37.4)
MCV RBC AUTO: 89 FL (ref 82–98)
PLATELET # BLD AUTO: 177 THOUSANDS/UL (ref 149–390)
PMV BLD AUTO: 11.3 FL (ref 8.9–12.7)
POTASSIUM SERPL-SCNC: 3.8 MMOL/L (ref 3.5–5.3)
RBC # BLD AUTO: 2.73 MILLION/UL (ref 3.81–5.12)
SODIUM SERPL-SCNC: 133 MMOL/L (ref 136–145)
WBC # BLD AUTO: 5.88 THOUSAND/UL (ref 4.31–10.16)

## 2017-09-03 PROCEDURE — 85027 COMPLETE CBC AUTOMATED: CPT | Performed by: INTERNAL MEDICINE

## 2017-09-03 PROCEDURE — 94760 N-INVAS EAR/PLS OXIMETRY 1: CPT

## 2017-09-03 PROCEDURE — 94660 CPAP INITIATION&MGMT: CPT

## 2017-09-03 PROCEDURE — 80048 BASIC METABOLIC PNL TOTAL CA: CPT | Performed by: INTERNAL MEDICINE

## 2017-09-03 PROCEDURE — 82948 REAGENT STRIP/BLOOD GLUCOSE: CPT

## 2017-09-03 RX ORDER — MUSCLE RUB CREAM 100; 150 MG/G; MG/G
CREAM TOPICAL EVERY 6 HOURS PRN
Status: DISCONTINUED | OUTPATIENT
Start: 2017-09-03 | End: 2017-09-07 | Stop reason: HOSPADM

## 2017-09-03 RX ORDER — LIDOCAINE 50 MG/G
2 PATCH TOPICAL DAILY
Status: DISCONTINUED | OUTPATIENT
Start: 2017-09-03 | End: 2017-09-07 | Stop reason: HOSPADM

## 2017-09-03 RX ORDER — HYDROMORPHONE HYDROCHLORIDE 2 MG/1
2 TABLET ORAL 3 TIMES DAILY PRN
Status: DISPENSED | OUTPATIENT
Start: 2017-09-03 | End: 2017-09-06

## 2017-09-03 RX ORDER — DIAZEPAM 2 MG/1
2 TABLET ORAL ONCE
Status: COMPLETED | OUTPATIENT
Start: 2017-09-03 | End: 2017-09-03

## 2017-09-03 RX ORDER — MUSCLE RUB CREAM 100; 150 MG/G; MG/G
CREAM TOPICAL
Status: DISCONTINUED | OUTPATIENT
Start: 2017-09-03 | End: 2017-09-03

## 2017-09-03 RX ADMIN — Medication 2 TABLET: at 11:35

## 2017-09-03 RX ADMIN — DIAZEPAM 2 MG: 2 TABLET ORAL at 01:40

## 2017-09-03 RX ADMIN — APIXABAN 2.5 MG: 2.5 TABLET, FILM COATED ORAL at 20:10

## 2017-09-03 RX ADMIN — MENTHOL, METHYL SALICYLATE: 10; 15 CREAM TOPICAL at 08:55

## 2017-09-03 RX ADMIN — APIXABAN 2.5 MG: 2.5 TABLET, FILM COATED ORAL at 11:35

## 2017-09-03 RX ADMIN — HYDROMORPHONE HYDROCHLORIDE 2 MG: 2 TABLET ORAL at 20:37

## 2017-09-03 RX ADMIN — MENTHOL, METHYL SALICYLATE: 10; 15 CREAM TOPICAL at 17:05

## 2017-09-03 RX ADMIN — PANTOPRAZOLE SODIUM 40 MG: 40 TABLET, DELAYED RELEASE ORAL at 05:28

## 2017-09-03 RX ADMIN — DOCUSATE SODIUM 100 MG: 100 CAPSULE, LIQUID FILLED ORAL at 08:54

## 2017-09-03 RX ADMIN — INSULIN LISPRO 2 UNITS: 100 INJECTION, SOLUTION INTRAVENOUS; SUBCUTANEOUS at 22:33

## 2017-09-03 RX ADMIN — Medication 2 TABLET: at 17:05

## 2017-09-03 RX ADMIN — ACETAMINOPHEN 650 MG: 325 TABLET, FILM COATED ORAL at 17:05

## 2017-09-03 RX ADMIN — METOPROLOL TARTRATE 12.5 MG: 25 TABLET ORAL at 17:06

## 2017-09-03 RX ADMIN — INSULIN GLARGINE 10 UNITS: 100 INJECTION, SOLUTION SUBCUTANEOUS at 22:36

## 2017-09-03 RX ADMIN — HYDROMORPHONE HYDROCHLORIDE 2 MG: 2 TABLET ORAL at 03:19

## 2017-09-03 RX ADMIN — ACETAMINOPHEN 650 MG: 325 TABLET, FILM COATED ORAL at 08:54

## 2017-09-03 RX ADMIN — LIDOCAINE 2 PATCH: 50 PATCH CUTANEOUS at 05:26

## 2017-09-03 RX ADMIN — HEPARIN SODIUM 5000 UNITS: 5000 INJECTION, SOLUTION INTRAVENOUS; SUBCUTANEOUS at 05:27

## 2017-09-03 RX ADMIN — HYDROMORPHONE HYDROCHLORIDE 2 MG: 2 TABLET ORAL at 11:35

## 2017-09-03 RX ADMIN — NYSTATIN: 100000 POWDER TOPICAL at 08:56

## 2017-09-03 RX ADMIN — METOPROLOL TARTRATE 12.5 MG: 25 TABLET ORAL at 08:54

## 2017-09-03 RX ADMIN — HYDROCORTISONE: 1 OINTMENT TOPICAL at 17:05

## 2017-09-03 RX ADMIN — NYSTATIN: 100000 POWDER TOPICAL at 17:06

## 2017-09-03 RX ADMIN — LATANOPROST 1 DROP: 50 SOLUTION/ DROPS OPHTHALMIC at 22:35

## 2017-09-03 RX ADMIN — HYDROCORTISONE: 1 OINTMENT TOPICAL at 08:55

## 2017-09-03 RX ADMIN — Medication 2 TABLET: at 08:54

## 2017-09-04 ENCOUNTER — APPOINTMENT (INPATIENT)
Dept: DIALYSIS | Facility: HOSPITAL | Age: 72
DRG: 813 | End: 2017-09-04
Attending: FAMILY MEDICINE
Payer: MEDICARE

## 2017-09-04 LAB
ANION GAP SERPL CALCULATED.3IONS-SCNC: 8 MMOL/L (ref 4–13)
BUN SERPL-MCNC: 29 MG/DL (ref 5–25)
CALCIUM SERPL-MCNC: 7.8 MG/DL (ref 8.3–10.1)
CHLORIDE SERPL-SCNC: 97 MMOL/L (ref 100–108)
CO2 SERPL-SCNC: 29 MMOL/L (ref 21–32)
CREAT SERPL-MCNC: 3.54 MG/DL (ref 0.6–1.3)
CRP SERPL QL: >90 MG/L
ERYTHROCYTE [DISTWIDTH] IN BLOOD BY AUTOMATED COUNT: 15.2 % (ref 11.6–15.1)
ERYTHROCYTE [SEDIMENTATION RATE] IN BLOOD: 79 MM/HOUR (ref 0–20)
GFR SERPL CREATININE-BSD FRML MDRD: 12 ML/MIN/1.73SQ M
GLUCOSE SERPL-MCNC: 131 MG/DL (ref 65–140)
GLUCOSE SERPL-MCNC: 147 MG/DL (ref 65–140)
GLUCOSE SERPL-MCNC: 163 MG/DL (ref 65–140)
GLUCOSE SERPL-MCNC: 169 MG/DL (ref 65–140)
GLUCOSE SERPL-MCNC: 205 MG/DL (ref 65–140)
HCT VFR BLD AUTO: 24.4 % (ref 34.8–46.1)
HGB BLD-MCNC: 7.7 G/DL (ref 11.5–15.4)
MCH RBC QN AUTO: 28 PG (ref 26.8–34.3)
MCHC RBC AUTO-ENTMCNC: 31.6 G/DL (ref 31.4–37.4)
MCV RBC AUTO: 89 FL (ref 82–98)
PLATELET # BLD AUTO: 184 THOUSANDS/UL (ref 149–390)
PMV BLD AUTO: 11.1 FL (ref 8.9–12.7)
POTASSIUM SERPL-SCNC: 4.1 MMOL/L (ref 3.5–5.3)
RBC # BLD AUTO: 2.75 MILLION/UL (ref 3.81–5.12)
SODIUM SERPL-SCNC: 134 MMOL/L (ref 136–145)
WBC # BLD AUTO: 5.97 THOUSAND/UL (ref 4.31–10.16)

## 2017-09-04 PROCEDURE — 85027 COMPLETE CBC AUTOMATED: CPT | Performed by: INTERNAL MEDICINE

## 2017-09-04 PROCEDURE — 94760 N-INVAS EAR/PLS OXIMETRY 1: CPT

## 2017-09-04 PROCEDURE — 80048 BASIC METABOLIC PNL TOTAL CA: CPT | Performed by: INTERNAL MEDICINE

## 2017-09-04 PROCEDURE — 85652 RBC SED RATE AUTOMATED: CPT | Performed by: INTERNAL MEDICINE

## 2017-09-04 PROCEDURE — 86140 C-REACTIVE PROTEIN: CPT | Performed by: INTERNAL MEDICINE

## 2017-09-04 PROCEDURE — 94660 CPAP INITIATION&MGMT: CPT

## 2017-09-04 PROCEDURE — 82948 REAGENT STRIP/BLOOD GLUCOSE: CPT

## 2017-09-04 RX ORDER — POLYETHYLENE GLYCOL 3350 17 G/17G
17 POWDER, FOR SOLUTION ORAL DAILY PRN
Status: DISCONTINUED | OUTPATIENT
Start: 2017-09-04 | End: 2017-09-07 | Stop reason: HOSPADM

## 2017-09-04 RX ORDER — SENNOSIDES 8.6 MG
1 TABLET ORAL
Status: DISCONTINUED | OUTPATIENT
Start: 2017-09-04 | End: 2017-09-07 | Stop reason: HOSPADM

## 2017-09-04 RX ADMIN — INSULIN LISPRO 2 UNITS: 100 INJECTION, SOLUTION INTRAVENOUS; SUBCUTANEOUS at 22:19

## 2017-09-04 RX ADMIN — MENTHOL, METHYL SALICYLATE: 10; 15 CREAM TOPICAL at 08:29

## 2017-09-04 RX ADMIN — HYDROMORPHONE HYDROCHLORIDE 2 MG: 2 TABLET ORAL at 08:27

## 2017-09-04 RX ADMIN — LIDOCAINE 2 PATCH: 50 PATCH CUTANEOUS at 08:11

## 2017-09-04 RX ADMIN — SENNOSIDES 8.6 MG: 8.6 TABLET, FILM COATED ORAL at 22:19

## 2017-09-04 RX ADMIN — IRON SUCROSE 100 MG: 20 INJECTION, SOLUTION INTRAVENOUS at 10:00

## 2017-09-04 RX ADMIN — APIXABAN 2.5 MG: 2.5 TABLET, FILM COATED ORAL at 18:08

## 2017-09-04 RX ADMIN — ACETAMINOPHEN 650 MG: 325 TABLET, FILM COATED ORAL at 05:50

## 2017-09-04 RX ADMIN — PANTOPRAZOLE SODIUM 40 MG: 40 TABLET, DELAYED RELEASE ORAL at 05:50

## 2017-09-04 RX ADMIN — NYSTATIN: 100000 POWDER TOPICAL at 18:08

## 2017-09-04 RX ADMIN — DOXERCALCIFEROL 1 MCG: 2 INJECTION, SOLUTION INTRAVENOUS at 11:08

## 2017-09-04 RX ADMIN — LATANOPROST 1 DROP: 50 SOLUTION/ DROPS OPHTHALMIC at 22:19

## 2017-09-04 RX ADMIN — Medication 2 TABLET: at 18:09

## 2017-09-04 RX ADMIN — HYDROMORPHONE HYDROCHLORIDE 2 MG: 2 TABLET ORAL at 14:40

## 2017-09-04 RX ADMIN — INSULIN GLARGINE 10 UNITS: 100 INJECTION, SOLUTION SUBCUTANEOUS at 22:19

## 2017-09-05 ENCOUNTER — GENERIC CONVERSION - ENCOUNTER (OUTPATIENT)
Dept: OTHER | Facility: OTHER | Age: 72
End: 2017-09-05

## 2017-09-05 ENCOUNTER — APPOINTMENT (INPATIENT)
Dept: RADIOLOGY | Facility: HOSPITAL | Age: 72
DRG: 813 | End: 2017-09-05
Payer: MEDICARE

## 2017-09-05 LAB
ANION GAP SERPL CALCULATED.3IONS-SCNC: 5 MMOL/L (ref 4–13)
BASOPHILS # BLD AUTO: 0.01 THOUSANDS/ΜL (ref 0–0.1)
BASOPHILS NFR BLD AUTO: 0 % (ref 0–1)
BUN SERPL-MCNC: 13 MG/DL (ref 5–25)
CALCIUM SERPL-MCNC: 8.1 MG/DL (ref 8.3–10.1)
CHLORIDE SERPL-SCNC: 98 MMOL/L (ref 100–108)
CO2 SERPL-SCNC: 32 MMOL/L (ref 21–32)
CREAT SERPL-MCNC: 2.45 MG/DL (ref 0.6–1.3)
EOSINOPHIL # BLD AUTO: 0.15 THOUSAND/ΜL (ref 0–0.61)
EOSINOPHIL NFR BLD AUTO: 2 % (ref 0–6)
ERYTHROCYTE [DISTWIDTH] IN BLOOD BY AUTOMATED COUNT: 15.1 % (ref 11.6–15.1)
GFR SERPL CREATININE-BSD FRML MDRD: 19 ML/MIN/1.73SQ M
GLUCOSE SERPL-MCNC: 130 MG/DL (ref 65–140)
GLUCOSE SERPL-MCNC: 142 MG/DL (ref 65–140)
GLUCOSE SERPL-MCNC: 145 MG/DL (ref 65–140)
GLUCOSE SERPL-MCNC: 237 MG/DL (ref 65–140)
GLUCOSE SERPL-MCNC: 255 MG/DL (ref 65–140)
HCT VFR BLD AUTO: 24.2 % (ref 34.8–46.1)
HGB BLD-MCNC: 7.6 G/DL (ref 11.5–15.4)
LYMPHOCYTES # BLD AUTO: 1 THOUSANDS/ΜL (ref 0.6–4.47)
LYMPHOCYTES NFR BLD AUTO: 16 % (ref 14–44)
MCH RBC QN AUTO: 27.9 PG (ref 26.8–34.3)
MCHC RBC AUTO-ENTMCNC: 31.4 G/DL (ref 31.4–37.4)
MCV RBC AUTO: 89 FL (ref 82–98)
MONOCYTES # BLD AUTO: 0.94 THOUSAND/ΜL (ref 0.17–1.22)
MONOCYTES NFR BLD AUTO: 15 % (ref 4–12)
NEUTROPHILS # BLD AUTO: 4.04 THOUSANDS/ΜL (ref 1.85–7.62)
NEUTS SEG NFR BLD AUTO: 67 % (ref 43–75)
NRBC BLD AUTO-RTO: 0 /100 WBCS
PLATELET # BLD AUTO: 194 THOUSANDS/UL (ref 149–390)
PMV BLD AUTO: 11.4 FL (ref 8.9–12.7)
POTASSIUM SERPL-SCNC: 3.9 MMOL/L (ref 3.5–5.3)
RBC # BLD AUTO: 2.72 MILLION/UL (ref 3.81–5.12)
SODIUM SERPL-SCNC: 135 MMOL/L (ref 136–145)
WBC # BLD AUTO: 6.19 THOUSAND/UL (ref 4.31–10.16)

## 2017-09-05 PROCEDURE — 20610 DRAIN/INJ JOINT/BURSA W/O US: CPT

## 2017-09-05 PROCEDURE — 94760 N-INVAS EAR/PLS OXIMETRY 1: CPT

## 2017-09-05 PROCEDURE — 82948 REAGENT STRIP/BLOOD GLUCOSE: CPT

## 2017-09-05 PROCEDURE — 85025 COMPLETE CBC W/AUTO DIFF WBC: CPT | Performed by: INTERNAL MEDICINE

## 2017-09-05 PROCEDURE — 80048 BASIC METABOLIC PNL TOTAL CA: CPT | Performed by: INTERNAL MEDICINE

## 2017-09-05 PROCEDURE — 94760 N-INVAS EAR/PLS OXIMETRY 1: CPT | Performed by: SOCIAL WORKER

## 2017-09-05 PROCEDURE — 77002 NEEDLE LOCALIZATION BY XRAY: CPT

## 2017-09-05 RX ORDER — LIDOCAINE HYDROCHLORIDE 10 MG/ML
10 INJECTION, SOLUTION INFILTRATION; PERINEURAL
Status: COMPLETED | OUTPATIENT
Start: 2017-09-05 | End: 2017-09-05

## 2017-09-05 RX ORDER — METHYLPREDNISOLONE ACETATE 80 MG/ML
80 INJECTION, SUSPENSION INTRA-ARTICULAR; INTRALESIONAL; INTRAMUSCULAR; SOFT TISSUE
Status: COMPLETED | OUTPATIENT
Start: 2017-09-05 | End: 2017-09-05

## 2017-09-05 RX ORDER — BUPIVACAINE HYDROCHLORIDE 2.5 MG/ML
30 INJECTION, SOLUTION EPIDURAL; INFILTRATION; INTRACAUDAL
Status: COMPLETED | OUTPATIENT
Start: 2017-09-05 | End: 2017-09-05

## 2017-09-05 RX ADMIN — METHYLPREDNISOLONE ACETATE 80 MG: 80 INJECTION, SUSPENSION INTRA-ARTICULAR; INTRALESIONAL; INTRAMUSCULAR; SOFT TISSUE at 13:50

## 2017-09-05 RX ADMIN — POLYETHYLENE GLYCOL 3350 17 G: 17 POWDER, FOR SOLUTION ORAL at 09:12

## 2017-09-05 RX ADMIN — Medication 2 TABLET: at 16:41

## 2017-09-05 RX ADMIN — APIXABAN 2.5 MG: 2.5 TABLET, FILM COATED ORAL at 09:10

## 2017-09-05 RX ADMIN — METOPROLOL TARTRATE 12.5 MG: 25 TABLET ORAL at 16:40

## 2017-09-05 RX ADMIN — Medication 2 TABLET: at 11:30

## 2017-09-05 RX ADMIN — APIXABAN 2.5 MG: 2.5 TABLET, FILM COATED ORAL at 16:40

## 2017-09-05 RX ADMIN — METOPROLOL TARTRATE 12.5 MG: 25 TABLET ORAL at 09:10

## 2017-09-05 RX ADMIN — Medication 2 TABLET: at 09:11

## 2017-09-05 RX ADMIN — IOHEXOL 3 ML: 300 INJECTION, SOLUTION INTRAVENOUS at 13:50

## 2017-09-05 RX ADMIN — LIDOCAINE 2 PATCH: 50 PATCH CUTANEOUS at 09:10

## 2017-09-05 RX ADMIN — HYDROCORTISONE: 1 OINTMENT TOPICAL at 09:11

## 2017-09-05 RX ADMIN — LATANOPROST 1 DROP: 50 SOLUTION/ DROPS OPHTHALMIC at 22:40

## 2017-09-05 RX ADMIN — HYDROMORPHONE HYDROCHLORIDE 2 MG: 2 TABLET ORAL at 16:40

## 2017-09-05 RX ADMIN — NYSTATIN: 100000 POWDER TOPICAL at 16:42

## 2017-09-05 RX ADMIN — INSULIN GLARGINE 10 UNITS: 100 INJECTION, SOLUTION SUBCUTANEOUS at 22:39

## 2017-09-05 RX ADMIN — BUPIVACAINE HYDROCHLORIDE 2 ML: 2.5 INJECTION, SOLUTION EPIDURAL; INFILTRATION; INTRACAUDAL at 13:50

## 2017-09-05 RX ADMIN — MENTHOL, METHYL SALICYLATE: 10; 15 CREAM TOPICAL at 16:41

## 2017-09-05 RX ADMIN — HYDROCORTISONE: 1 OINTMENT TOPICAL at 16:40

## 2017-09-05 RX ADMIN — INSULIN LISPRO 3 UNITS: 100 INJECTION, SOLUTION INTRAVENOUS; SUBCUTANEOUS at 22:39

## 2017-09-05 RX ADMIN — ACETAMINOPHEN 650 MG: 325 TABLET, FILM COATED ORAL at 11:31

## 2017-09-05 RX ADMIN — PANTOPRAZOLE SODIUM 40 MG: 40 TABLET, DELAYED RELEASE ORAL at 05:14

## 2017-09-05 RX ADMIN — HYDROMORPHONE HYDROCHLORIDE 2 MG: 2 TABLET ORAL at 09:10

## 2017-09-05 RX ADMIN — LIDOCAINE HYDROCHLORIDE 2 ML: 10 INJECTION, SOLUTION INFILTRATION; PERINEURAL at 13:50

## 2017-09-05 RX ADMIN — NYSTATIN: 100000 POWDER TOPICAL at 09:13

## 2017-09-05 RX ADMIN — DOCUSATE SODIUM 100 MG: 100 CAPSULE, LIQUID FILLED ORAL at 09:10

## 2017-09-06 ENCOUNTER — APPOINTMENT (INPATIENT)
Dept: DIALYSIS | Facility: HOSPITAL | Age: 72
DRG: 813 | End: 2017-09-06
Attending: INTERNAL MEDICINE
Payer: MEDICARE

## 2017-09-06 LAB
ANION GAP SERPL CALCULATED.3IONS-SCNC: 6 MMOL/L (ref 4–13)
BUN SERPL-MCNC: 22 MG/DL (ref 5–25)
CALCIUM SERPL-MCNC: 8.2 MG/DL (ref 8.3–10.1)
CHLORIDE SERPL-SCNC: 97 MMOL/L (ref 100–108)
CO2 SERPL-SCNC: 30 MMOL/L (ref 21–32)
CREAT SERPL-MCNC: 3.25 MG/DL (ref 0.6–1.3)
ERYTHROCYTE [DISTWIDTH] IN BLOOD BY AUTOMATED COUNT: 14.6 % (ref 11.6–15.1)
GFR SERPL CREATININE-BSD FRML MDRD: 13 ML/MIN/1.73SQ M
GLUCOSE SERPL-MCNC: 183 MG/DL (ref 65–140)
GLUCOSE SERPL-MCNC: 211 MG/DL (ref 65–140)
GLUCOSE SERPL-MCNC: 249 MG/DL (ref 65–140)
GLUCOSE SERPL-MCNC: 251 MG/DL (ref 65–140)
GLUCOSE SERPL-MCNC: 292 MG/DL (ref 65–140)
HCT VFR BLD AUTO: 24.8 % (ref 34.8–46.1)
HGB BLD-MCNC: 7.8 G/DL (ref 11.5–15.4)
MCH RBC QN AUTO: 28.3 PG (ref 26.8–34.3)
MCHC RBC AUTO-ENTMCNC: 31.5 G/DL (ref 31.4–37.4)
MCV RBC AUTO: 90 FL (ref 82–98)
PLATELET # BLD AUTO: 212 THOUSANDS/UL (ref 149–390)
PMV BLD AUTO: 11.3 FL (ref 8.9–12.7)
POTASSIUM SERPL-SCNC: 4.9 MMOL/L (ref 3.5–5.3)
RBC # BLD AUTO: 2.76 MILLION/UL (ref 3.81–5.12)
SODIUM SERPL-SCNC: 133 MMOL/L (ref 136–145)
WBC # BLD AUTO: 5.4 THOUSAND/UL (ref 4.31–10.16)

## 2017-09-06 PROCEDURE — 94760 N-INVAS EAR/PLS OXIMETRY 1: CPT

## 2017-09-06 PROCEDURE — 82948 REAGENT STRIP/BLOOD GLUCOSE: CPT

## 2017-09-06 PROCEDURE — 85027 COMPLETE CBC AUTOMATED: CPT | Performed by: INTERNAL MEDICINE

## 2017-09-06 PROCEDURE — 80048 BASIC METABOLIC PNL TOTAL CA: CPT | Performed by: INTERNAL MEDICINE

## 2017-09-06 RX ADMIN — DOCUSATE SODIUM 100 MG: 100 CAPSULE, LIQUID FILLED ORAL at 14:53

## 2017-09-06 RX ADMIN — IRON SUCROSE 100 MG: 20 INJECTION, SOLUTION INTRAVENOUS at 09:51

## 2017-09-06 RX ADMIN — LATANOPROST 1 DROP: 50 SOLUTION/ DROPS OPHTHALMIC at 22:20

## 2017-09-06 RX ADMIN — HYDROCORTISONE: 1 OINTMENT TOPICAL at 22:20

## 2017-09-06 RX ADMIN — Medication 2 TABLET: at 14:53

## 2017-09-06 RX ADMIN — HYDROMORPHONE HYDROCHLORIDE 2 MG: 2 TABLET ORAL at 08:20

## 2017-09-06 RX ADMIN — SENNOSIDES 8.6 MG: 8.6 TABLET, FILM COATED ORAL at 22:20

## 2017-09-06 RX ADMIN — NYSTATIN: 100000 POWDER TOPICAL at 14:55

## 2017-09-06 RX ADMIN — HYDROMORPHONE HYDROCHLORIDE 2 MG: 2 TABLET ORAL at 14:54

## 2017-09-06 RX ADMIN — LIDOCAINE 2 PATCH: 50 PATCH CUTANEOUS at 14:54

## 2017-09-06 RX ADMIN — PANTOPRAZOLE SODIUM 40 MG: 40 TABLET, DELAYED RELEASE ORAL at 05:03

## 2017-09-06 RX ADMIN — INSULIN LISPRO 3 UNITS: 100 INJECTION, SOLUTION INTRAVENOUS; SUBCUTANEOUS at 22:19

## 2017-09-06 RX ADMIN — HYDROMORPHONE HYDROCHLORIDE 2 MG: 2 TABLET ORAL at 19:42

## 2017-09-06 RX ADMIN — NYSTATIN: 100000 POWDER TOPICAL at 22:20

## 2017-09-06 RX ADMIN — APIXABAN 2.5 MG: 2.5 TABLET, FILM COATED ORAL at 22:19

## 2017-09-06 RX ADMIN — APIXABAN 2.5 MG: 2.5 TABLET, FILM COATED ORAL at 14:53

## 2017-09-06 RX ADMIN — DOXERCALCIFEROL 1 MCG: 2 INJECTION, SOLUTION INTRAVENOUS at 09:52

## 2017-09-06 RX ADMIN — INSULIN GLARGINE 10 UNITS: 100 INJECTION, SOLUTION SUBCUTANEOUS at 22:20

## 2017-09-07 VITALS
SYSTOLIC BLOOD PRESSURE: 154 MMHG | DIASTOLIC BLOOD PRESSURE: 68 MMHG | WEIGHT: 293 LBS | TEMPERATURE: 98.3 F | HEART RATE: 70 BPM | BODY MASS INDEX: 50.02 KG/M2 | RESPIRATION RATE: 18 BRPM | HEIGHT: 64 IN | OXYGEN SATURATION: 100 %

## 2017-09-07 PROBLEM — D62 ACUTE BLOOD LOSS ANEMIA: Status: RESOLVED | Noted: 2017-08-18 | Resolved: 2017-09-07

## 2017-09-07 PROBLEM — K62.5 RECTAL BLEEDING: Status: RESOLVED | Noted: 2017-08-18 | Resolved: 2017-09-07

## 2017-09-07 LAB
ANION GAP SERPL CALCULATED.3IONS-SCNC: 8 MMOL/L (ref 4–13)
BUN SERPL-MCNC: 16 MG/DL (ref 5–25)
CALCIUM SERPL-MCNC: 8.5 MG/DL (ref 8.3–10.1)
CHLORIDE SERPL-SCNC: 96 MMOL/L (ref 100–108)
CO2 SERPL-SCNC: 29 MMOL/L (ref 21–32)
CREAT SERPL-MCNC: 2.23 MG/DL (ref 0.6–1.3)
ERYTHROCYTE [DISTWIDTH] IN BLOOD BY AUTOMATED COUNT: 14.8 % (ref 11.6–15.1)
GFR SERPL CREATININE-BSD FRML MDRD: 21 ML/MIN/1.73SQ M
GLUCOSE SERPL-MCNC: 217 MG/DL (ref 65–140)
GLUCOSE SERPL-MCNC: 233 MG/DL (ref 65–140)
GLUCOSE SERPL-MCNC: 237 MG/DL (ref 65–140)
GLUCOSE SERPL-MCNC: 302 MG/DL (ref 65–140)
HCT VFR BLD AUTO: 26 % (ref 34.8–46.1)
HGB BLD-MCNC: 8.3 G/DL (ref 11.5–15.4)
MCH RBC QN AUTO: 28.2 PG (ref 26.8–34.3)
MCHC RBC AUTO-ENTMCNC: 31.9 G/DL (ref 31.4–37.4)
MCV RBC AUTO: 88 FL (ref 82–98)
PLATELET # BLD AUTO: 252 THOUSANDS/UL (ref 149–390)
PMV BLD AUTO: 10.8 FL (ref 8.9–12.7)
POTASSIUM SERPL-SCNC: 4.1 MMOL/L (ref 3.5–5.3)
RBC # BLD AUTO: 2.94 MILLION/UL (ref 3.81–5.12)
SODIUM SERPL-SCNC: 133 MMOL/L (ref 136–145)
WBC # BLD AUTO: 7.64 THOUSAND/UL (ref 4.31–10.16)

## 2017-09-07 PROCEDURE — 80048 BASIC METABOLIC PNL TOTAL CA: CPT | Performed by: INTERNAL MEDICINE

## 2017-09-07 PROCEDURE — 85027 COMPLETE CBC AUTOMATED: CPT | Performed by: INTERNAL MEDICINE

## 2017-09-07 PROCEDURE — 82948 REAGENT STRIP/BLOOD GLUCOSE: CPT

## 2017-09-07 RX ORDER — NYSTATIN 100000 [USP'U]/G
POWDER TOPICAL 2 TIMES DAILY
Qty: 15 G | Refills: 0
Start: 2017-09-07

## 2017-09-07 RX ORDER — POLYETHYLENE GLYCOL 3350 17 G/17G
17 POWDER, FOR SOLUTION ORAL DAILY
Qty: 14 EACH | Refills: 0
Start: 2017-09-07

## 2017-09-07 RX ORDER — POLYETHYLENE GLYCOL 3350 17 G/17G
17 POWDER, FOR SOLUTION ORAL ONCE
Status: COMPLETED | OUTPATIENT
Start: 2017-09-07 | End: 2017-09-07

## 2017-09-07 RX ORDER — HYDROMORPHONE HYDROCHLORIDE 2 MG/1
2 TABLET ORAL EVERY 4 HOURS PRN
Status: DISCONTINUED | OUTPATIENT
Start: 2017-09-07 | End: 2017-09-07 | Stop reason: HOSPADM

## 2017-09-07 RX ORDER — CALCIUM CARBONATE 500(1250)
2 TABLET ORAL
Qty: 180 TABLET | Refills: 0
Start: 2017-09-07

## 2017-09-07 RX ORDER — HYDROMORPHONE HYDROCHLORIDE 2 MG/1
2 TABLET ORAL EVERY 4 HOURS PRN
Qty: 30 TABLET | Refills: 0
Start: 2017-09-07 | End: 2017-09-10

## 2017-09-07 RX ADMIN — Medication 2 TABLET: at 08:10

## 2017-09-07 RX ADMIN — Medication 2 TABLET: at 17:03

## 2017-09-07 RX ADMIN — HYDROMORPHONE HYDROCHLORIDE 2 MG: 2 TABLET ORAL at 13:14

## 2017-09-07 RX ADMIN — LIDOCAINE 2 PATCH: 50 PATCH CUTANEOUS at 13:16

## 2017-09-07 RX ADMIN — PANTOPRAZOLE SODIUM 40 MG: 40 TABLET, DELAYED RELEASE ORAL at 05:20

## 2017-09-07 RX ADMIN — HYDROMORPHONE HYDROCHLORIDE 2 MG: 2 TABLET ORAL at 08:09

## 2017-09-07 RX ADMIN — METOPROLOL TARTRATE 12.5 MG: 25 TABLET ORAL at 08:09

## 2017-09-07 RX ADMIN — APIXABAN 2.5 MG: 2.5 TABLET, FILM COATED ORAL at 17:04

## 2017-09-07 RX ADMIN — ACETAMINOPHEN 650 MG: 325 TABLET, FILM COATED ORAL at 00:32

## 2017-09-07 RX ADMIN — Medication 2 TABLET: at 11:38

## 2017-09-07 RX ADMIN — POLYETHYLENE GLYCOL 3350 17 G: 17 POWDER, FOR SOLUTION ORAL at 13:16

## 2017-09-07 RX ADMIN — HYDROMORPHONE HYDROCHLORIDE 2 MG: 2 TABLET ORAL at 19:24

## 2017-09-07 RX ADMIN — DOCUSATE SODIUM 100 MG: 100 CAPSULE, LIQUID FILLED ORAL at 08:09

## 2017-09-07 RX ADMIN — APIXABAN 2.5 MG: 2.5 TABLET, FILM COATED ORAL at 08:09

## 2017-09-07 RX ADMIN — HYDROCORTISONE: 1 OINTMENT TOPICAL at 17:06

## 2017-09-07 RX ADMIN — NYSTATIN: 100000 POWDER TOPICAL at 08:14

## 2017-09-07 RX ADMIN — ACETAMINOPHEN 650 MG: 325 TABLET, FILM COATED ORAL at 06:03

## 2017-09-07 RX ADMIN — HYDROCORTISONE: 1 OINTMENT TOPICAL at 13:16

## 2017-09-07 RX ADMIN — METOPROLOL TARTRATE 12.5 MG: 25 TABLET ORAL at 17:04

## 2017-09-07 RX ADMIN — NYSTATIN: 100000 POWDER TOPICAL at 17:11

## 2017-09-07 RX ADMIN — POLYETHYLENE GLYCOL 3350 17 G: 17 POWDER, FOR SOLUTION ORAL at 08:18

## 2017-10-12 ENCOUNTER — ALLSCRIPTS OFFICE VISIT (OUTPATIENT)
Dept: OTHER | Facility: OTHER | Age: 72
End: 2017-10-12

## 2017-11-23 ENCOUNTER — APPOINTMENT (EMERGENCY)
Dept: RADIOLOGY | Facility: HOSPITAL | Age: 72
DRG: 177 | End: 2017-11-23
Payer: MEDICARE

## 2017-11-23 ENCOUNTER — HOSPITAL ENCOUNTER (INPATIENT)
Facility: HOSPITAL | Age: 72
LOS: 10 days | Discharge: NURSING, REHAB OR LONG-TERM CARE | DRG: 177 | End: 2017-12-03
Attending: EMERGENCY MEDICINE | Admitting: HOSPITALIST
Payer: MEDICARE

## 2017-11-23 DIAGNOSIS — N18.9 CKD (CHRONIC KIDNEY DISEASE): ICD-10-CM

## 2017-11-23 DIAGNOSIS — N18.6 END-STAGE RENAL DISEASE ON HEMODIALYSIS (HCC): Chronic | ICD-10-CM

## 2017-11-23 DIAGNOSIS — J18.9 HCAP (HEALTHCARE-ASSOCIATED PNEUMONIA): Primary | ICD-10-CM

## 2017-11-23 DIAGNOSIS — Z99.2 END-STAGE RENAL DISEASE ON HEMODIALYSIS (HCC): Chronic | ICD-10-CM

## 2017-11-23 DIAGNOSIS — I21.4 NSTEMI (NON-ST ELEVATED MYOCARDIAL INFARCTION) (HCC): ICD-10-CM

## 2017-11-23 DIAGNOSIS — G93.40 ENCEPHALOPATHY: ICD-10-CM

## 2017-11-23 LAB
ALBUMIN SERPL BCP-MCNC: 2.4 G/DL (ref 3.5–5)
ALP SERPL-CCNC: 73 U/L (ref 46–116)
ALT SERPL W P-5'-P-CCNC: 29 U/L (ref 12–78)
ANION GAP SERPL CALCULATED.3IONS-SCNC: 7 MMOL/L (ref 4–13)
APTT PPP: 26 SECONDS (ref 23–35)
AST SERPL W P-5'-P-CCNC: 30 U/L (ref 5–45)
BASE EXCESS BLDA CALC-SCNC: 7 MMOL/L (ref -2–3)
BASOPHILS # BLD AUTO: 0.02 THOUSANDS/ΜL (ref 0–0.1)
BASOPHILS NFR BLD AUTO: 0 % (ref 0–1)
BILIRUB SERPL-MCNC: 0.36 MG/DL (ref 0.2–1)
BUN SERPL-MCNC: 33 MG/DL (ref 5–25)
CA-I BLD-SCNC: 1.01 MMOL/L (ref 1.12–1.32)
CALCIUM SERPL-MCNC: 9 MG/DL (ref 8.3–10.1)
CHLORIDE SERPL-SCNC: 95 MMOL/L (ref 100–108)
CO2 SERPL-SCNC: 34 MMOL/L (ref 21–32)
CREAT SERPL-MCNC: 2.12 MG/DL (ref 0.6–1.3)
EOSINOPHIL # BLD AUTO: 0.11 THOUSAND/ΜL (ref 0–0.61)
EOSINOPHIL NFR BLD AUTO: 1 % (ref 0–6)
ERYTHROCYTE [DISTWIDTH] IN BLOOD BY AUTOMATED COUNT: 16.1 % (ref 11.6–15.1)
GFR SERPL CREATININE-BSD FRML MDRD: 23 ML/MIN/1.73SQ M
GLUCOSE SERPL-MCNC: 151 MG/DL (ref 65–140)
GLUCOSE SERPL-MCNC: 157 MG/DL (ref 65–140)
GLUCOSE SERPL-MCNC: 178 MG/DL (ref 65–140)
GLUCOSE SERPL-MCNC: 207 MG/DL (ref 65–140)
HCO3 BLDA-SCNC: 32.9 MMOL/L (ref 24–30)
HCT VFR BLD AUTO: 32.1 % (ref 34.8–46.1)
HCT VFR BLD CALC: 29 % (ref 34.8–46.1)
HGB BLD-MCNC: 9.9 G/DL (ref 11.5–15.4)
HGB BLDA-MCNC: 9.9 G/DL (ref 11.5–15.4)
INR PPP: 1.09 (ref 0.86–1.16)
LACTATE SERPL-SCNC: 1.6 MMOL/L (ref 0.5–2)
LYMPHOCYTES # BLD AUTO: 0.73 THOUSANDS/ΜL (ref 0.6–4.47)
LYMPHOCYTES NFR BLD AUTO: 8 % (ref 14–44)
MCH RBC QN AUTO: 29.8 PG (ref 26.8–34.3)
MCHC RBC AUTO-ENTMCNC: 30.8 G/DL (ref 31.4–37.4)
MCV RBC AUTO: 97 FL (ref 82–98)
MONOCYTES # BLD AUTO: 0.99 THOUSAND/ΜL (ref 0.17–1.22)
MONOCYTES NFR BLD AUTO: 11 % (ref 4–12)
NEUTROPHILS # BLD AUTO: 7.12 THOUSANDS/ΜL (ref 1.85–7.62)
NEUTS SEG NFR BLD AUTO: 80 % (ref 43–75)
NRBC BLD AUTO-RTO: 0 /100 WBCS
PCO2 BLD: 35 MMOL/L (ref 21–32)
PCO2 BLD: 55.9 MM HG (ref 42–50)
PH BLD: 7.38 [PH] (ref 7.3–7.4)
PLATELET # BLD AUTO: 141 THOUSANDS/UL (ref 149–390)
PMV BLD AUTO: 12.8 FL (ref 8.9–12.7)
PO2 BLD: 32 MM HG (ref 35–45)
POTASSIUM BLD-SCNC: 3.5 MMOL/L (ref 3.5–5.3)
POTASSIUM SERPL-SCNC: 3.8 MMOL/L (ref 3.5–5.3)
PROT SERPL-MCNC: 6.8 G/DL (ref 6.4–8.2)
PROTHROMBIN TIME: 14.1 SECONDS (ref 12.1–14.4)
RBC # BLD AUTO: 3.32 MILLION/UL (ref 3.81–5.12)
SAO2 % BLD FROM PO2: 59 % (ref 95–98)
SODIUM BLD-SCNC: 133 MMOL/L (ref 136–145)
SODIUM SERPL-SCNC: 136 MMOL/L (ref 136–145)
SPECIMEN SOURCE: ABNORMAL
TROPONIN I BLD-MCNC: 0.1 NG/ML (ref 0–0.08)
TROPONIN I BLD-MCNC: 0.14 NG/ML (ref 0–0.08)
WBC # BLD AUTO: 9.13 THOUSAND/UL (ref 4.31–10.16)

## 2017-11-23 PROCEDURE — 87147 CULTURE TYPE IMMUNOLOGIC: CPT | Performed by: INTERNAL MEDICINE

## 2017-11-23 PROCEDURE — 94760 N-INVAS EAR/PLS OXIMETRY 1: CPT

## 2017-11-23 PROCEDURE — 85025 COMPLETE CBC W/AUTO DIFF WBC: CPT | Performed by: EMERGENCY MEDICINE

## 2017-11-23 PROCEDURE — 99285 EMERGENCY DEPT VISIT HI MDM: CPT

## 2017-11-23 PROCEDURE — 71010 HB CHEST X-RAY 1 VIEW FRONTAL (PORTABLE): CPT

## 2017-11-23 PROCEDURE — 83605 ASSAY OF LACTIC ACID: CPT | Performed by: EMERGENCY MEDICINE

## 2017-11-23 PROCEDURE — 74176 CT ABD & PELVIS W/O CONTRAST: CPT

## 2017-11-23 PROCEDURE — 36415 COLL VENOUS BLD VENIPUNCTURE: CPT | Performed by: EMERGENCY MEDICINE

## 2017-11-23 PROCEDURE — 82948 REAGENT STRIP/BLOOD GLUCOSE: CPT

## 2017-11-23 PROCEDURE — 84295 ASSAY OF SERUM SODIUM: CPT

## 2017-11-23 PROCEDURE — 93005 ELECTROCARDIOGRAM TRACING: CPT

## 2017-11-23 PROCEDURE — 94640 AIRWAY INHALATION TREATMENT: CPT

## 2017-11-23 PROCEDURE — 84484 ASSAY OF TROPONIN QUANT: CPT

## 2017-11-23 PROCEDURE — 80053 COMPREHEN METABOLIC PANEL: CPT | Performed by: EMERGENCY MEDICINE

## 2017-11-23 PROCEDURE — 82803 BLOOD GASES ANY COMBINATION: CPT

## 2017-11-23 PROCEDURE — 85730 THROMBOPLASTIN TIME PARTIAL: CPT | Performed by: EMERGENCY MEDICINE

## 2017-11-23 PROCEDURE — 85610 PROTHROMBIN TIME: CPT | Performed by: EMERGENCY MEDICINE

## 2017-11-23 PROCEDURE — 87081 CULTURE SCREEN ONLY: CPT | Performed by: INTERNAL MEDICINE

## 2017-11-23 PROCEDURE — 84132 ASSAY OF SERUM POTASSIUM: CPT

## 2017-11-23 PROCEDURE — 94660 CPAP INITIATION&MGMT: CPT

## 2017-11-23 PROCEDURE — 87040 BLOOD CULTURE FOR BACTERIA: CPT | Performed by: EMERGENCY MEDICINE

## 2017-11-23 PROCEDURE — 82330 ASSAY OF CALCIUM: CPT

## 2017-11-23 PROCEDURE — 93005 ELECTROCARDIOGRAM TRACING: CPT | Performed by: EMERGENCY MEDICINE

## 2017-11-23 PROCEDURE — 82947 ASSAY GLUCOSE BLOOD QUANT: CPT

## 2017-11-23 PROCEDURE — 85014 HEMATOCRIT: CPT

## 2017-11-23 PROCEDURE — 71250 CT THORAX DX C-: CPT

## 2017-11-23 RX ORDER — SODIUM CHLORIDE 9 MG/ML
75 INJECTION, SOLUTION INTRAVENOUS CONTINUOUS
Status: DISCONTINUED | OUTPATIENT
Start: 2017-11-23 | End: 2017-11-23

## 2017-11-23 RX ORDER — OMEPRAZOLE 20 MG/1
40 CAPSULE, DELAYED RELEASE ORAL
COMMUNITY
End: 2017-12-03 | Stop reason: HOSPADM

## 2017-11-23 RX ORDER — GUAIFENESIN 100 MG/5ML
200 SOLUTION ORAL EVERY 4 HOURS PRN
Status: DISCONTINUED | OUTPATIENT
Start: 2017-11-23 | End: 2017-11-29

## 2017-11-23 RX ORDER — ASPIRIN 81 MG/1
324 TABLET, CHEWABLE ORAL ONCE
Status: COMPLETED | OUTPATIENT
Start: 2017-11-23 | End: 2017-11-23

## 2017-11-23 RX ORDER — ZINC OXIDE
OINTMENT (GRAM) TOPICAL AS NEEDED
Status: DISCONTINUED | OUTPATIENT
Start: 2017-11-23 | End: 2017-11-23

## 2017-11-23 RX ORDER — PRAVASTATIN SODIUM 80 MG/1
80 TABLET ORAL DAILY
Status: DISCONTINUED | OUTPATIENT
Start: 2017-11-23 | End: 2017-12-03 | Stop reason: HOSPADM

## 2017-11-23 RX ORDER — NYSTATIN 100000 [USP'U]/G
POWDER TOPICAL 2 TIMES DAILY
Status: DISCONTINUED | OUTPATIENT
Start: 2017-11-23 | End: 2017-12-03 | Stop reason: HOSPADM

## 2017-11-23 RX ORDER — HYDROMORPHONE HYDROCHLORIDE 2 MG/1
2 TABLET ORAL EVERY 6 HOURS PRN
Status: ON HOLD | COMMUNITY
End: 2017-12-03

## 2017-11-23 RX ORDER — ALBUTEROL SULFATE 2.5 MG/3ML
2.5 SOLUTION RESPIRATORY (INHALATION) EVERY 6 HOURS PRN
Status: DISCONTINUED | OUTPATIENT
Start: 2017-11-23 | End: 2017-11-23

## 2017-11-23 RX ORDER — FERROUS SULFATE 325(65) MG
325 TABLET ORAL
COMMUNITY

## 2017-11-23 RX ORDER — INSULIN GLARGINE 100 [IU]/ML
40 INJECTION, SOLUTION SUBCUTANEOUS
Status: DISCONTINUED | OUTPATIENT
Start: 2017-11-23 | End: 2017-11-25

## 2017-11-23 RX ORDER — POLYETHYLENE GLYCOL 3350 17 G/17G
17 POWDER, FOR SOLUTION ORAL DAILY
Status: DISCONTINUED | OUTPATIENT
Start: 2017-11-23 | End: 2017-12-03 | Stop reason: HOSPADM

## 2017-11-23 RX ORDER — DOCUSATE SODIUM 100 MG/1
100 CAPSULE, LIQUID FILLED ORAL 2 TIMES DAILY
Status: DISCONTINUED | OUTPATIENT
Start: 2017-11-23 | End: 2017-12-03 | Stop reason: HOSPADM

## 2017-11-23 RX ORDER — LATANOPROST 50 UG/ML
1 SOLUTION/ DROPS OPHTHALMIC
Status: DISCONTINUED | OUTPATIENT
Start: 2017-11-23 | End: 2017-12-03 | Stop reason: HOSPADM

## 2017-11-23 RX ORDER — ALLOPURINOL 100 MG/1
200 TABLET ORAL
Status: DISCONTINUED | OUTPATIENT
Start: 2017-11-24 | End: 2017-12-03 | Stop reason: HOSPADM

## 2017-11-23 RX ORDER — ALBUTEROL SULFATE 2.5 MG/3ML
2.5 SOLUTION RESPIRATORY (INHALATION) EVERY 4 HOURS PRN
Status: DISCONTINUED | OUTPATIENT
Start: 2017-11-23 | End: 2017-12-03 | Stop reason: HOSPADM

## 2017-11-23 RX ORDER — PREDNISONE 20 MG/1
40 TABLET ORAL 2 TIMES DAILY WITH MEALS
Status: DISCONTINUED | OUTPATIENT
Start: 2017-11-23 | End: 2017-12-03 | Stop reason: HOSPADM

## 2017-11-23 RX ORDER — ACETAMINOPHEN 325 MG/1
650 TABLET ORAL EVERY 6 HOURS PRN
Status: DISCONTINUED | OUTPATIENT
Start: 2017-11-23 | End: 2017-12-03 | Stop reason: HOSPADM

## 2017-11-23 RX ORDER — LEVOTHYROXINE SODIUM 0.03 MG/1
25 TABLET ORAL
Status: DISCONTINUED | OUTPATIENT
Start: 2017-11-24 | End: 2017-11-29

## 2017-11-23 RX ORDER — AZITHROMYCIN 250 MG/1
500 TABLET, FILM COATED ORAL EVERY 24 HOURS
COMMUNITY
End: 2017-12-03 | Stop reason: HOSPADM

## 2017-11-23 RX ORDER — CHOLECALCIFEROL (VITAMIN D3) 125 MCG
1000 CAPSULE ORAL DAILY
Status: DISCONTINUED | OUTPATIENT
Start: 2017-11-23 | End: 2017-12-03 | Stop reason: HOSPADM

## 2017-11-23 RX ORDER — FERROUS SULFATE 325(65) MG
325 TABLET ORAL
Status: DISCONTINUED | OUTPATIENT
Start: 2017-11-23 | End: 2017-12-03 | Stop reason: HOSPADM

## 2017-11-23 RX ORDER — FAMOTIDINE 20 MG/1
20 TABLET, FILM COATED ORAL DAILY
Status: DISCONTINUED | OUTPATIENT
Start: 2017-11-24 | End: 2017-12-03 | Stop reason: HOSPADM

## 2017-11-23 RX ORDER — PREGABALIN 75 MG/1
75 CAPSULE ORAL 2 TIMES DAILY
Status: DISCONTINUED | OUTPATIENT
Start: 2017-11-23 | End: 2017-12-03 | Stop reason: HOSPADM

## 2017-11-23 RX ORDER — SENNOSIDES 8.6 MG
2 TABLET ORAL DAILY PRN
Status: DISCONTINUED | OUTPATIENT
Start: 2017-11-23 | End: 2017-12-03 | Stop reason: HOSPADM

## 2017-11-23 RX ORDER — ALLOPURINOL 300 MG/1
300 TABLET ORAL
Status: DISCONTINUED | OUTPATIENT
Start: 2017-11-27 | End: 2017-12-03 | Stop reason: HOSPADM

## 2017-11-23 RX ORDER — PANTOPRAZOLE SODIUM 40 MG/1
40 TABLET, DELAYED RELEASE ORAL
Status: DISCONTINUED | OUTPATIENT
Start: 2017-11-23 | End: 2017-12-03 | Stop reason: HOSPADM

## 2017-11-23 RX ORDER — FAMOTIDINE 20 MG/1
20 TABLET, FILM COATED ORAL 2 TIMES DAILY PRN
Status: DISCONTINUED | OUTPATIENT
Start: 2017-11-23 | End: 2017-11-23

## 2017-11-23 RX ADMIN — APIXABAN 2.5 MG: 2.5 TABLET, FILM COATED ORAL at 18:10

## 2017-11-23 RX ADMIN — ASPIRIN 324 MG: 81 TABLET, CHEWABLE ORAL at 11:42

## 2017-11-23 RX ADMIN — INSULIN GLARGINE 40 UNITS: 100 INJECTION, SOLUTION SUBCUTANEOUS at 21:57

## 2017-11-23 RX ADMIN — VANCOMYCIN HYDROCHLORIDE 2000 MG: 1 INJECTION, POWDER, LYOPHILIZED, FOR SOLUTION INTRAVENOUS at 13:12

## 2017-11-23 RX ADMIN — ALBUTEROL SULFATE 2.5 MG: 2.5 SOLUTION RESPIRATORY (INHALATION) at 16:01

## 2017-11-23 RX ADMIN — PIPERACILLIN SODIUM,TAZOBACTAM SODIUM 3.38 G: 3; .375 INJECTION, POWDER, FOR SOLUTION INTRAVENOUS at 18:11

## 2017-11-23 RX ADMIN — PREDNISONE 40 MG: 20 TABLET ORAL at 18:10

## 2017-11-23 RX ADMIN — PREGABALIN 75 MG: 75 CAPSULE ORAL at 18:10

## 2017-11-23 RX ADMIN — PIPERACILLIN SODIUM,TAZOBACTAM SODIUM 4.5 G: 4; .5 INJECTION, POWDER, FOR SOLUTION INTRAVENOUS at 12:35

## 2017-11-23 RX ADMIN — Medication 3.38 G: at 23:38

## 2017-11-23 RX ADMIN — LATANOPROST 1 DROP: 50 SOLUTION OPHTHALMIC at 21:57

## 2017-11-23 RX ADMIN — PANTOPRAZOLE SODIUM 40 MG: 40 TABLET, DELAYED RELEASE ORAL at 18:10

## 2017-11-23 NOTE — ED ATTENDING ATTESTATION
Dewayne Joy MD, saw and evaluated the patient  I have discussed the patient with the resident/non-physician practitioner and agree with the resident's/non-physician practitioner's findings, Plan of Care, and MDM as documented in the resident's/non-physician practitioner's note, except where noted  All available labs and Radiology studies were reviewed  At this point I agree with the current assessment done in the Emergency Department  I have conducted an independent evaluation of this patient including a focused history and a physical exam     50-year-old female, on BiPAP, on chronic oxygen therapy (3 L nasal cannula) from the nursing home for evaluation of increased cough  Patient reports cough which is nonproductive as well as increased shortness of breath  Patient denies any chest pain  Patient denies any abdominal pain, nausea, vomiting, diarrhea  There has been no reported fever  Ten systems reviewed negative except as noted in the history of present illness  On exam supra morbidly obese female lying recumbent to the stretcher  Head is normocephalic and atraumatic  Mucous membranes are dry  Neck is supple without meningismus  Lung exam is very limited secondary to patient's body habitus, however lung sounds are diminished throughout  Heart is regular rate and rhythm with no murmurs rubs or gallops  Patient's abdomen is obese, soft, nontender  Extremities are unremarkable in appearance  The patient has no calf tenderness  Negative Homans sign bilaterally  Distal pulses intact in 4 extremities  Assessment and plan:  50-year-old female on BiPAP and chronic oxygen therapy, presenting to the emergency department for evaluation of cough and increasing hypoxia      Labs Reviewed   CBC AND DIFFERENTIAL - Abnormal        Result Value Ref Range Status    RBC 3 32 (*) 3 81 - 5 12 Million/uL Final    Hemoglobin 9 9 (*) 11 5 - 15 4 g/dL Final    Hematocrit 32 1 (*) 34 8 - 46 1 % Final MCHC 30 8 (*) 31 4 - 37 4 g/dL Final    RDW 16 1 (*) 11 6 - 15 1 % Final    MPV 12 8 (*) 8 9 - 12 7 fL Final    Platelets 354 (*) 434 - 390 Thousands/uL Final    Neutrophils Relative 80 (*) 43 - 75 % Final    Lymphocytes Relative 8 (*) 14 - 44 % Final    WBC 9 13  4 31 - 10 16 Thousand/uL Final    MCV 97  82 - 98 fL Final    MCH 29 8  26 8 - 34 3 pg Final    nRBC 0  /100 WBCs Final    Monocytes Relative 11  4 - 12 % Final    Eosinophils Relative 1  0 - 6 % Final    Basophils Relative 0  0 - 1 % Final    Neutrophils Absolute 7 12  1 85 - 7 62 Thousands/µL Final    Lymphocytes Absolute 0 73  0 60 - 4 47 Thousands/µL Final    Monocytes Absolute 0 99  0 17 - 1 22 Thousand/µL Final    Eosinophils Absolute 0 11  0 00 - 0 61 Thousand/µL Final    Basophils Absolute 0 02  0 00 - 0 10 Thousands/µL Final   COMPREHENSIVE METABOLIC PANEL - Abnormal     Chloride 95 (*) 100 - 108 mmol/L Final    CO2 34 (*) 21 - 32 mmol/L Final    BUN 33 (*) 5 - 25 mg/dL Final    Creatinine 2 12 (*) 0 60 - 1 30 mg/dL Final    Comment: Standardized to IDMS reference method    Glucose 151 (*) 65 - 140 mg/dL Final    Comment:   If the patient is fasting, the ADA then defines impaired fasting glucose as > 100 mg/dL and diabetes as > or equal to 123 mg/dL  Specimen collection should occur prior to Sulfasalazine administration due to the potential for falsely depressed results  Specimen collection should occur prior to Sulfapyridine administration due to the potential for falsely elevated results  Albumin 2 4 (*) 3 5 - 5 0 g/dL Final    Sodium 136  136 - 145 mmol/L Final    Potassium 3 8  3 5 - 5 3 mmol/L Final    Comment: Slightly Hemolyzed; Results May be Affected    Anion Gap 7  4 - 13 mmol/L Final    Calcium 9 0  8 3 - 10 1 mg/dL Final    AST 30  5 - 45 U/L Final    Comment: Slightly Hemolyzed; Results May be Affected  Specimen collection should occur prior to Sulfasalazine administration due to the potential for falsely depressed results  ALT 29  12 - 78 U/L Final    Comment:   Specimen collection should occur prior to Sulfasalazine and/or Sulfapyridine administration due to the potential for falsely depressed results  Alkaline Phosphatase 73  46 - 116 U/L Final    Total Protein 6 8  6 4 - 8 2 g/dL Final    Total Bilirubin 0 36  0 20 - 1 00 mg/dL Final    eGFR 23  ml/min/1 73sq m Final    Narrative:     National Kid[employer] Disease Education Program recommendations are as follows:  GFR calculation is accurate only with a steady state creatinine  Chronic Kidney disease less than 60 ml/min/1 73 sq  meters  Kidney failure less than 15 ml/min/1 73 sq  meters  POCT BLOOD GAS (CG8+) - Abnormal     pCO2, Venkata i-STAT 55 9 (*) 42 0 - 50 0 mm HG Final    pO2, Venkata i-STAT 32 0 (*) 35 0 - 45 0 mm HG Final    BE, i-STAT 7 (*) -2 - 3 mmol/L Final    HCO3, Venkata i-STAT 32 9 (*) 24 0 - 30 0 mmol/L Final    CO2, i-STAT 35 (*) 21 - 32 mmol/L Final    O2 Sat, i-STAT 59 (*) 95 - 98 % Final    SODIUM, I-STAT 133 (*) 136 - 145 mmol/l Final    Calcium, Ionized i-STAT 1 01 (*) 1 12 - 1 32 mmol/L Final    Hct, i-STAT 29 (*) 34 8 - 46 1 % Final    Hgb, i-STAT 9 9 (*) 11 5 - 15 4 g/dl Final    Glucose, i-STAT 157 (*) 65 - 140 mg/dl Final    ph, Venkata ISTAT 7 378  7 300 - 7 400 Final    Potassium, i-STAT 3 5  3 5 - 5 3 mmol/L Final    Specimen Type VENOUS   Final   POCT TROPONIN - Abnormal     POC Troponin I 0 14 (*) 0 00 - 0 08 ng/ml Final    Specimen Type VENOUS   Final    Narrative:     [employer] i-Stat handheld analyzer 99% cutoff is > 0 08ng/mL in network Emergency Departments    o cTnI 99% cutoff is useful only when applied to patients in the clinical setting of myocardial ischemia  o cTnI 99% cutoff should be interpreted in the context of clinical history, ECG findings and possibly cardiac imaging to establish correct diagnosis  o cTnI 99% cutoff may be suggestive but clearly not indicative of a coronary event without the clinical setting of myocardial ischemia     POCT TROPONIN - Abnormal     POC Troponin I 0 10 (*) 0 00 - 0 08 ng/ml Final    Specimen Type VENOUS   Final    Narrative:     [employer] i-Stat handheld analyzer 99% cutoff is > 0 08ng/mL in network Emergency Departments    o cTnI 99% cutoff is useful only when applied to patients in the clinical setting of myocardial ischemia  o cTnI 99% cutoff should be interpreted in the context of clinical history, ECG findings and possibly cardiac imaging to establish correct diagnosis  o cTnI 99% cutoff may be suggestive but clearly not indicative of a coronary event without the clinical setting of myocardial ischemia  LACTIC ACID, PLASMA - Normal    LACTIC ACID 1 6  0 5 - 2 0 mmol/L Final    Narrative:     Result may be elevated if tourniquet was used during collection  PROTIME-INR - Normal    Protime 14 1  12 1 - 14 4 seconds Final    INR 1 09  0 86 - 1 16 Final   APTT - Normal    PTT 26  23 - 35 seconds Final    Narrative: Therapeutic Heparin Range = 60-90 seconds   BLOOD CULTURE   BLOOD CULTURE   POCT URINALYSIS DIPSTICK     CT chest abdomen pelvis wo contrast   Final Result      No acute intra-abdominal pelvic abnormality identified  Cholelithiasis without evidence for cholecystitis  Evaluation of the lungs limited due to obese body habitus and motion artifact  Subsegmental linear and nodular opacities in the RIGHT upper lung, primarily with mild peribronchial thickening centrally suggesting possible focus of acute airway    inflammation  Otherwise, appearance suggests dependent subsegmental atelectasis in both lung bases  There is moderate central pulmonary venous distention bilaterally suggesting component of fluid overload/pulmonary venous hypertension  No significant groundglass or airspace infiltrates  No pleural effusion  Borderline and mildly enlarged adenopathy in the RIGHT superior mediastinum                 Workstation performed: [id number]         XR chest portable   Final Result   Mild central vessel prominence  No consolidation  Workstation performed: [id number]           Elevated creatinine from end-stage renal disease  Patient is dialysis dependent

## 2017-11-23 NOTE — ED PROVIDER NOTES
History  Chief Complaint   Patient presents with    Altered Mental Status     Patient sent in from [facility] for change in mental status and fevers  Patient currently taking z-pack  This is a 78-year-old female presenting from [facility] for change in mental status, fever and cough  Report from nursing home is that patient has had a intermittent fever, was recently given azithromycin for productive cough  Does state that if she appears mildly more tired and confused than her baseline  Patient normally uses 3 L of nasal cannula oxygen during the day and BiPAP at night which she has done  At presentation she offers only short sentences for complaints including chest discomfort and cough  She denies any headache, focal numbness tingling weakness, back pain, abdominal pain, nausea or vomiting  She does not ambulate at baseline and does not get out of bed secondary to her morbid obesity  On exam she is mildly somnolent but awakens to questioning and follows commands without difficulty  Her responses are appropriate to questions  She has a nonfocal neurologic exam   Heart and lung sounds are distant secondary to body habitus  There is mild abdominal tenderness to the epigastrium  There is no evidence of skin breakdown underneath the pannus or in the  region  She has a stage I small stage II sacral decubitus on her backside  Patient does have frequent productive cough while in the room  she has no signs respiratory distress            Prior to Admission Medications   Prescriptions Last Dose Informant Patient Reported? Taking?    Camphor-Menthol-Methyl Sal (Anthony Carrero Ultra Strength) 4-10-30 % CREA   Yes Yes   Sig: Apply topically 2 (two) times a day B/l feet/knees and left shoulder   HYDROmorphone (DILAUDID) 2 mg tablet   Yes Yes   Sig: Take 2 mg by mouth every 6 (six) hours as needed for moderate pain   Insulin Glargine (BASAGLAR KWIKPEN) 100 UNIT/ML SOPN   Yes Yes   Sig: Inject 40 Units under the skin daily at bedtime   acetaminophen (TYLENOL) 325 mg tablet   No Yes   Sig: Take 2 tablets by mouth every 6 (six) hours as needed for mild pain, headaches or fever   albuterol (2 5 mg/3 mL) 0 083 % nebulizer solution   Yes Yes   Sig: Take 2 5 mg by nebulization every 6 (six) hours as needed for wheezing  allopurinol (ZYLOPRIM) 100 mg tablet   Yes Yes   Sig: Take 200 mg by mouth Indications: sun, tue, we, fri, sat  Sun, Tues, Wed, Fri, Sat    allopurinol (ZYLOPRIM) 300 mg tablet   Yes Yes   Sig: Take 300 mg by mouth Mon, and Thurs    apixaban (ELIQUIS) 2 5 mg   No Yes   Sig: Take 1 tablet by mouth 2 (two) times a day   azithromycin (ZITHROMAX) 250 mg tablet   Yes Yes   Sig: Take 500 mg by mouth every 24 hours   bisacodyl (DULCOLAX) 5 mg EC tablet   Yes Yes   Sig: Take 5 mg by mouth daily as needed for constipation   calcium carbonate (OYSTER SHELL,OSCAL) 500 mg   No Yes   Sig: Take 2 tablets by mouth 3 (three) times a day with meals   cyanocobalamin (VITAMIN B-12) 1,000 mcg tablet   Yes Yes   Sig: Take 1,000 mcg by mouth daily  docusate sodium (COLACE) 100 mg capsule   Yes No   Sig: Take 100 mg by mouth 2 (two) times a day     famotidine (PEPCID) 20 mg tablet   Yes Yes   Sig: Take 20 mg by mouth 2 (two) times a day as needed for heartburn  ferrous sulfate 325 (65 Fe) mg tablet   Yes Yes   Sig: Take 325 mg by mouth 3 (three) times a day with meals   guaiFENesin (ROBITUSSIN) 100 mg/5 mL syrup   Yes Yes   Sig: Take 200 mg by mouth every 4 (four) hours as needed for cough   insulin lispro protamine-insulin lispro (HumaLOG 50-50) 100 units/mL   Yes Yes   Sig: Inject 8 Units under the skin 2 (two) times a day before meals   latanoprost (XALATAN) 0 005 % ophthalmic solution   Yes Yes   Sig: Administer 1 drop to both eyes daily at bedtime  levothyroxine 25 mcg tablet   Yes Yes   Sig: Take 25 mcg by mouth daily     liver oil-zinc oxide (DESITIN) 40 % ointment   Yes Yes   Sig: Apply topically as needed for irritation To buttock every day and evening shift   To b/l thigh folds every day and evening shift    metoprolol tartrate (LOPRESSOR) 12 5 mg tablet   Yes Yes   Sig: Take 12 5 mg by mouth  Sun, Tues, Thurs, Sat  Hold am dose before dialysis Mon, Wed, and Fri   miconazole (MICOTIN) 2 % powder   Yes Yes   Sig: Apply topically as needed for itching Apply to b/l breast and panus twice a day   nystatin (MYCOSTATIN) powder   No Yes   Sig: Apply topically 2 (two) times a day   omeprazole (PriLOSEC) 20 mg delayed release capsule   Yes Yes   Sig: Take 20 mg by mouth daily     omeprazole (PriLOSEC) 20 mg delayed release capsule   Yes Yes   Sig: Take 40 mg by mouth daily at bedtime   ondansetron (ZOFRAN) 4 mg tablet   Yes Yes   Sig: Take 4 mg by mouth daily   polyethylene glycol (MIRALAX) 17 g packet   No Yes   Sig: Take 17 g by mouth daily   pravastatin (PRAVACHOL) 80 mg tablet   Yes Yes   Sig: Take 80 mg by mouth daily  predniSONE 20 mg tablet   No Yes   Sig: Take 2 tablets by mouth daily   pregabalin (LYRICA) 75 mg capsule   No Yes   Sig: Take 1 capsule by mouth 2 (two) times a day for 10 days   Patient taking differently: Take 50 mg by mouth 3 (three) times a day     senna (SENOKOT) 8 6 MG tablet   Yes Yes   Sig: Take 2 tablets by mouth daily as needed for constipation        Facility-Administered Medications: None       Past Medical History:   Diagnosis Date    Adrenal insufficiency (HCC)     Adrenocortical insufficiency (HCC)     Anemia     Bradycardia     Cardiac disease     Cataract     CHF (congestive heart failure) (HCC)     Constipation     CPAP (continuous positive airway pressure) dependence     Dependence on supplemental oxygen     Diabetes mellitus (HCC)     Difficulty walking     Disease of thyroid gland     Dysphagia     Encephalopathy     GERD (gastroesophageal reflux disease)     Glaucoma     Gout     History of transfusion     Hyperlipidemia     Hypertension     Insomnia     Insomnia  Obesity     Osteoarthritis     PONV (postoperative nausea and vomiting)     Renal disorder     renal failure    Sleep apnea        Past Surgical History:   Procedure Laterality Date    ABDOMINAL SURGERY      APPENDECTOMY      BODY LIFT LOWER Right 4/18/2017    Procedure: UPPER EXTREMITY EXCISION PANNUS ;  Surgeon: Harlan Bermudez MD;  [facility];  Service:     CARPAL TUNNEL RELEASE Bilateral     COLONOSCOPY N/A 8/15/2017    Procedure: COLONOSCOPY;  Surgeon: Christina Ann MD;  [facility]: [facility]; Service: Colorectal    COLONOSCOPY N/A 8/29/2017    Procedure: COLONOSCOPY;  Surgeon: Perla Romero MD;  [facility]; Service: Colorectal    COLONOSCOPY N/A 9/1/2017    Procedure: COLONOSCOPY;  Surgeon: Perla Romero MD;  [facility]; Service: Colorectal    HYSTERECTOMY      NE ANASTOMOSIS,AV,ANY SITE Right 8/18/2016    Procedure: CREATION OF RIGHT BRACHIOCEPHALIC FISTULA;  Surgeon: Peyton Caceres MD;  [facility];  Service: Vascular    NE REVISE AV FISTULA,W/O THROMBECTOMY Right 4/18/2017    Procedure: UPPER ARM SUPERFICIALIZATION FISTULA ;  Surgeon: Georges Flores MD;  [facility];  Service: Vascular       Family History   Problem Relation Age of Onset    Diabetes Mother     Heart disease Father      I have reviewed and agree with the history as documented  Social History   Substance Use Topics    Smoking status: Former Smoker     Quit date: 1967    Smokeless tobacco: Never Used    Alcohol use No        Review of Systems   Constitutional: Negative for chills, fatigue and fever  HENT: Negative for congestion  Eyes: Negative for visual disturbance  Respiratory: Positive for cough and shortness of breath  Negative for chest tightness  Cardiovascular: Positive for chest pain  Negative for palpitations  Gastrointestinal: Negative for abdominal pain, nausea and vomiting     Genitourinary: Negative for dysuria, flank pain, hematuria and urgency  Musculoskeletal: Negative for back pain and gait problem  Skin: Negative for rash  Allergic/Immunologic: Negative for immunocompromised state  Neurological: Negative for dizziness, syncope, weakness, light-headedness, numbness and headaches  Physical Exam  ED Triage Vitals   Temperature Pulse Respirations Blood Pressure SpO2   11/23/17 0907 11/23/17 0855 11/23/17 0855 11/23/17 0855 11/23/17 0855   99 °F (37 2 °C) 87 18 160/70 (!) 80 %      Temp Source Heart Rate Source Patient Position - Orthostatic VS BP Location FiO2 (%)   11/23/17 0907 11/23/17 1030 11/23/17 0855 11/23/17 0855 --   Rectal Monitor Lying Left arm       Pain Score       11/23/17 0855       4           Orthostatic Vital Signs  Vitals:    11/24/17 1400 11/24/17 1417 11/24/17 1423 11/24/17 1602   BP:  156/58 130/60 123/57   Pulse: 83 77 79 84   Patient Position - Orthostatic VS:    Lying       Physical Exam   Constitutional: She is oriented to person, place, and time  Vital signs are normal  She appears well-developed and well-nourished  She does not appear ill  No distress  HENT:   Head: Normocephalic and atraumatic  Head is without abrasion and without contusion  Right Ear: Tympanic membrane normal    Left Ear: Tympanic membrane normal    Nose: Nose normal    Mouth/Throat: Uvula is midline, oropharynx is clear and moist and mucous membranes are normal    Eyes: Conjunctivae and EOM are normal  Pupils are equal, round, and reactive to light  Neck: Trachea normal, normal range of motion, full passive range of motion without pain and phonation normal  Neck supple  No JVD present  No spinous process tenderness and no muscular tenderness present  Carotid bruit is not present  Normal range of motion present  No thyromegaly present  Cardiovascular: Normal rate, regular rhythm and intact distal pulses  Exam reveals no friction rub  No murmur heard  Pulmonary/Chest: Effort normal  No accessory muscle usage or stridor   No tachypnea  No respiratory distress  She has no decreased breath sounds  She has no wheezes  She has no rhonchi  She has no rales  She exhibits no tenderness, no crepitus, no edema and no retraction  Abdominal: Soft  Normal appearance and bowel sounds are normal  She exhibits no distension  There is tenderness in the epigastric area  There is no rigidity, no rebound, no guarding and no CVA tenderness  Morbidly obese abdomen   Musculoskeletal: Normal range of motion  Moves all extremities equally  Lymphadenopathy:     She has no cervical adenopathy  Neurological: She is alert and oriented to person, place, and time  She has normal strength  No cranial nerve deficit or sensory deficit  She exhibits normal muscle tone  Coordination normal  GCS eye subscore is 4  GCS verbal subscore is 5  GCS motor subscore is 6  Unremarkable cranial nerve exam  Pupils 4 mm equal round reactive to light  No nystagmus, normal extraocular motion  5 out of 5 upper and lower extremity strength  No subjective sensory deficits to the face, upper or lower extremity  Skin: Skin is warm, dry and intact  Capillary refill takes less than 2 seconds  No rash noted  She is not diaphoretic  Psychiatric: She has a normal mood and affect  Nursing note and vitals reviewed        ED Medications  Medications   acetaminophen (TYLENOL) tablet 650 mg (650 mg Oral Given 11/24/17 1155)   allopurinol (ZYLOPRIM) tablet 200 mg (200 mg Oral Given 11/24/17 1156)   allopurinol (ZYLOPRIM) tablet 300 mg (not administered)   apixaban (ELIQUIS) tablet 2 5 mg ( Oral Canceled Entry 11/24/17 1800)   bisacodyl (DULCOLAX) EC tablet 5 mg (5 mg Oral Given 11/24/17 1226)   cyanocobalamin (VITAMIN B-12) tablet 1,000 mcg (1,000 mcg Oral Given 11/24/17 1155)   docusate sodium (COLACE) capsule 100 mg ( Oral Canceled Entry 11/24/17 1800)   ferrous sulfate tablet 325 mg (325 mg Oral Given 11/24/17 1555)   guaiFENesin (ROBITUSSIN) oral solution 200 mg (200 mg Oral Given 11/24/17 1555)   insulin glargine (LANTUS) subcutaneous injection 40 Units (40 Units Subcutaneous Given 11/23/17 2157)   insulin lispro (HumaLOG) 100 units/mL subcutaneous injection 8 Units (8 Units Subcutaneous Given 11/24/17 1611)   latanoprost (XALATAN) 0 005 % ophthalmic solution 1 drop (1 drop Both Eyes Given 11/23/17 2157)   levothyroxine tablet 25 mcg (25 mcg Oral Given 11/24/17 0525)   metoprolol tartrate (LOPRESSOR) partial tablet 12 5 mg (not administered)   miconazole 2 % cream ( Topical Canceled Entry 11/24/17 1800)   nystatin (MYCOSTATIN) powder ( Topical Canceled Entry 11/24/17 1800)   pantoprazole (PROTONIX) EC tablet 40 mg (40 mg Oral Given 11/24/17 1559)   polyethylene glycol (MIRALAX) packet 17 g (0 g Oral Hold 11/24/17 1229)   pravastatin (PRAVACHOL) tablet 80 mg (80 mg Oral Given 11/24/17 1157)   predniSONE tablet 40 mg (40 mg Oral Given 11/24/17 1555)   pregabalin (LYRICA) capsule 75 mg ( Oral Canceled Entry 11/24/17 1838)   senna (SENOKOT) tablet 17 2 mg (17 2 mg Oral Given 11/24/17 1230)   vancomycin (VANCOCIN) 1,500 mg in sodium chloride 0 9 % 250 mL IVPB (1,500 mg Intravenous New Bag 11/24/17 1503)   albuterol inhalation solution 2 5 mg (2 5 mg Nebulization Given 11/24/17 2012)   pneumococcal 13-valent conjugate vaccine (PREVNAR-13) IM injection 0 5 mL (not administered)   famotidine (PEPCID) tablet 20 mg (20 mg Oral Given 11/24/17 1156)   doxercalciferol (HECTOROL) injection 0 5 mcg (not administered)   piperacillin-tazobactam (ZOSYN) 3 375 g in dextrose 5 % 50 mL IVPB (3 375 g Intravenous Given 11/24/17 1831)   aspirin chewable tablet 324 mg (324 mg Oral Given 11/23/17 1142)   piperacillin-tazobactam (ZOSYN) 4 5 g in dextrose 5 % 100 mL IVPB (4 5 g Intravenous Given 11/23/17 1235)   vancomycin (VANCOCIN) 2,000 mg in sodium chloride 0 9 % 500 mL IVPB (0 mg Intravenous Stopped 11/23/17 1601)       Diagnostic Studies  Results Reviewed     Procedure Component Value Units Date/Time Blood culture #1 [[id number]] Collected:  11/23/17 0927    Lab Status:  Preliminary result Specimen:  Blood from Hand, Right Updated:  11/24/17 1401     Blood Culture No Growth at 24 hrs  Blood culture #2 [[id number]] Collected:  11/23/17 0927    Lab Status:  Preliminary result Specimen:  Blood from Arm, Left Updated:  11/24/17 1401     Blood Culture No Growth at 24 hrs  POCT troponin [[id number]]  (Abnormal) Collected:  11/23/17 1156    Lab Status:  Final result Updated:  11/23/17 1210     POC Troponin I 0 10 (H) ng/ml      Specimen Type VENOUS    Narrative:         [employer] i-Stat handheld analyzer 99% cutoff is > 0 08ng/mL in network Emergency Departments    o cTnI 99% cutoff is useful only when applied to patients in the clinical setting of myocardial ischemia  o cTnI 99% cutoff should be interpreted in the context of clinical history, ECG findings and possibly cardiac imaging to establish correct diagnosis  o cTnI 99% cutoff may be suggestive but clearly not indicative of a coronary event without the clinical setting of myocardial ischemia  Comprehensive metabolic panel [[id number]]  (Abnormal) Collected:  11/23/17 0927    Lab Status:  Final result Specimen:  Blood from Arm, Left Updated:  11/23/17 1013     Sodium 136 mmol/L      Potassium 3 8 mmol/L      Chloride 95 (L) mmol/L      CO2 34 (H) mmol/L      Anion Gap 7 mmol/L      BUN 33 (H) mg/dL      Creatinine 2 12 (H) mg/dL      Glucose 151 (H) mg/dL      Calcium 9 0 mg/dL      AST 30 U/L      ALT 29 U/L      Alkaline Phosphatase 73 U/L      Total Protein 6 8 g/dL      Albumin 2 4 (L) g/dL      Total Bilirubin 0 36 mg/dL      eGFR 23 ml/min/1 73sq m     Narrative:         National Kidney Disease Education Program recommendations are as follows:  GFR calculation is accurate only with a steady state creatinine  Chronic Kidney disease less than 60 ml/min/1 73 sq  meters  Kidney failure less than 15 ml/min/1 73 sq  meters      Lactic acid x2 [[id number]]  (Normal) Collected:  11/23/17 0927    Lab Status:  Final result Specimen:  Blood from Arm, Left Updated:  11/23/17 1007     LACTIC ACID 1 6 mmol/L     Narrative:         Result may be elevated if tourniquet was used during collection  Protime-INR [[id number]]  (Normal) Collected:  11/23/17 0927    Lab Status:  Final result Specimen:  Blood from Arm, Left Updated:  11/23/17 1000     Protime 14 1 seconds      INR 1 09    APTT [[id number]]  (Normal) Collected:  11/23/17 0927    Lab Status:  Final result Specimen:  Blood from Arm, Left Updated:  11/23/17 1000     PTT 26 seconds     Narrative:          Therapeutic Heparin Range = 60-90 seconds    CBC and differential [[id number]]  (Abnormal) Collected:  11/23/17 0927    Lab Status:  Final result Specimen:  Blood from Arm, Left Updated:  11/23/17 0956     WBC 9 13 Thousand/uL      RBC 3 32 (L) Million/uL      Hemoglobin 9 9 (L) g/dL      Hematocrit 32 1 (L) %      MCV 97 fL      MCH 29 8 pg      MCHC 30 8 (L) g/dL      RDW 16 1 (H) %      MPV 12 8 (H) fL      Platelets 712 (L) Thousands/uL      nRBC 0 /100 WBCs      Neutrophils Relative 80 (H) %      Lymphocytes Relative 8 (L) %      Monocytes Relative 11 %      Eosinophils Relative 1 %      Basophils Relative 0 %      Neutrophils Absolute 7 12 Thousands/µL      Lymphocytes Absolute 0 73 Thousands/µL      Monocytes Absolute 0 99 Thousand/µL      Eosinophils Absolute 0 11 Thousand/µL      Basophils Absolute 0 02 Thousands/µL     POCT troponin [[id number]]  (Abnormal) Collected:  11/23/17 0921    Lab Status:  Final result Updated:  11/23/17 0934     POC Troponin I 0 14 (H) ng/ml      Specimen Type VENOUS    Narrative:         [employer] i-Stat handheld analyzer 99% cutoff is > 0 08ng/mL in network Emergency Departments    o cTnI 99% cutoff is useful only when applied to patients in the clinical setting of myocardial ischemia  o cTnI 99% cutoff should be interpreted in the context of clinical history, ECG findings and possibly cardiac imaging to establish correct diagnosis  o cTnI 99% cutoff may be suggestive but clearly not indicative of a coronary event without the clinical setting of myocardial ischemia  POCT Blood Gas (CG8+) [[id number]]  (Abnormal) Collected:  11/23/17 0923    Lab Status:  Final result Updated:  11/23/17 0927     ph, Venkata ISTAT 7 378     pCO2, Venkata i-STAT 55 9 (H) mm HG      pO2, Venkata i-STAT 32 0 (L) mm HG      BE, i-STAT 7 (H) mmol/L      HCO3, Venkata i-STAT 32 9 (H) mmol/L      CO2, i-STAT 35 (H) mmol/L      O2 Sat, i-STAT 59 (L) %      SODIUM, I-STAT 133 (L) mmol/l      Potassium, i-STAT 3 5 mmol/L      Calcium, Ionized i-STAT 1 01 (L) mmol/L      Hct, i-STAT 29 (L) %      Hgb, i-STAT 9 9 (L) g/dl      Glucose, i-STAT 157 (H) mg/dl      Specimen Type VENOUS    POCT urinalysis dipstick [[id number]]     Lab Status:  No result Specimen:  Urine                  IR temp HD cath   Final Result by Roberta Alvarado MD (11/24 1616)   Impression: Technically successful temporary dialysis catheter placement via the left internal jugular vein  Subsequent x-ray confirmed appropriate positioning within the right atrium, The catheter is ready for use  Workstation performed: [id number]         XR chest portable   Final Result by Lola Tanner MD (11/24 1456)      Line placement as described  Workstation performed: [id number]         CT chest abdomen pelvis wo contrast   Final Result by Bhavna Lopez MD (11/23 1042)      No acute intra-abdominal pelvic abnormality identified  Cholelithiasis without evidence for cholecystitis  Evaluation of the lungs limited due to obese body habitus and motion artifact  Subsegmental linear and nodular opacities in the RIGHT upper lung, primarily with mild peribronchial thickening centrally suggesting possible focus of acute airway    inflammation  Otherwise, appearance suggests dependent subsegmental atelectasis in both lung bases        There is moderate central pulmonary venous distention bilaterally suggesting component of fluid overload/pulmonary venous hypertension  No significant groundglass or airspace infiltrates  No pleural effusion  Borderline and mildly enlarged adenopathy in the RIGHT superior mediastinum  Workstation performed: [id number]         XR chest portable   Final Result by Trish Vance MD (11/23 8331)   Mild central vessel prominence  No consolidation  Workstation performed: [id number]         IR temp HD cath    (Results Pending)         Procedures  Procedures      Phone Consults  ED Phone Contact    ED Course  ED Course as of Nov 24 2040   Thu Nov 23, 2017   5991 Maintaining saturations greater than 95%  on 3 L    0944 POC Troponin I: (!) 0 14   1236 POC Troponin I: (!) 0 10      EKG:  Normal sinus rhythm, rate 86  Nonspecific ST-T wave changes laterally    Normal intervals                          MDM  CritCare Time    Disposition  Final diagnoses:   HCAP (healthcare-associated pneumonia)   NSTEMI (non-ST elevated myocardial infarction) ([place] [address] )   Encephalopathy   CKD (chronic kidney disease)     Time reflects when diagnosis was documented in both MDM as applicable and the Disposition within this note     Time User Action Codes Description Comment    11/23/2017 11:59 AM Jerilee Shown S Add [J18 9] HCAP (healthcare-associated pneumonia)     11/23/2017 11:59 AM Tayler Quarles Add [I21 4] [facility]non[facility]     11/23/2017 11:59 AM Jerilee Shown S Add [G93 40] Encephalopathy     11/23/2017 12:00 PM oJnathan Quarles S Add [N18 9] CKD (chronic kidney disease)     11/23/2017 12:46 PM Liam Moody Add [N18 6,  Z99 2] End-stage renal disease on hemodialysis ([facility] )     11/23/2017 12:46 PM Liam Moody S Modify [N18 6,  Z99 2] End-stage renal disease on hemodialysis ([facility] )     11/23/2017 12:46 PM Liam Moody S Modify [N18 6,  Z99 2] End-stage renal disease on hemodialysis [facility])       ED Disposition     ED Disposition Condition Comment    Admit  Case was discussed with JACKI and the patient's admission status was agreed to be Admission Status: inpatient status to the service of Dr Sheryle Lily   Follow-up Information    None       Current Discharge Medication List      CONTINUE these medications which have NOT CHANGED    Details   acetaminophen (TYLENOL) 325 mg tablet Take 2 tablets by mouth every 6 (six) hours as needed for mild pain, headaches or fever  Qty: 30 tablet, Refills: 0      albuterol (2 5 mg/3 mL) 0 083 % nebulizer solution Take 2 5 mg by nebulization every 6 (six) hours as needed for wheezing  !! allopurinol (ZYLOPRIM) 100 mg tablet Take 200 mg by mouth Indications: sun, tue, we, fri, sat  Sun, Tues, Wed, Fri, Sat       !! allopurinol (ZYLOPRIM) 300 mg tablet Take 300 mg by mouth Mon, and Thurs       apixaban (ELIQUIS) 2 5 mg Take 1 tablet by mouth 2 (two) times a day  Qty: 60 tablet, Refills: 0      azithromycin (ZITHROMAX) 250 mg tablet Take 500 mg by mouth every 24 hours      bisacodyl (DULCOLAX) 5 mg EC tablet Take 5 mg by mouth daily as needed for constipation      calcium carbonate (OYSTER SHELL,OSCAL) 500 mg Take 2 tablets by mouth 3 (three) times a day with meals  Qty: 180 tablet, Refills: 0      Camphor-Menthol-Methyl Sal (Anthony Carrero Ultra Strength) 4-10-30 % CREA Apply topically 2 (two) times a day B/l feet/knees and left shoulder      cyanocobalamin (VITAMIN B-12) 1,000 mcg tablet Take 1,000 mcg by mouth daily  famotidine (PEPCID) 20 mg tablet Take 20 mg by mouth 2 (two) times a day as needed for heartburn        ferrous sulfate 325 (65 Fe) mg tablet Take 325 mg by mouth 3 (three) times a day with meals      guaiFENesin (ROBITUSSIN) 100 mg/5 mL syrup Take 200 mg by mouth every 4 (four) hours as needed for cough      HYDROmorphone (DILAUDID) 2 mg tablet Take 2 mg by mouth every 6 (six) hours as needed for moderate pain      Insulin Glargine (BASAGLAR KWIKPEN) 100 UNIT/ML SOPN Inject 40 Units under the skin daily at bedtime      insulin lispro protamine-insulin lispro (HumaLOG 50-50) 100 units/mL Inject 8 Units under the skin 2 (two) times a day before meals      latanoprost (XALATAN) 0 005 % ophthalmic solution Administer 1 drop to both eyes daily at bedtime  levothyroxine 25 mcg tablet Take 25 mcg by mouth daily  liver oil-zinc oxide (DESITIN) 40 % ointment Apply topically as needed for irritation To buttock every day and evening shift   To b/l thigh folds every day and evening shift       metoprolol tartrate (LOPRESSOR) 12 5 mg tablet Take 12 5 mg by mouth  Sun, Tues, Thurs, Sat  Hold am dose before dialysis Mon, Wed, and Fri      miconazole (MICOTIN) 2 % powder Apply topically as needed for itching Apply to b/l breast and panus twice a day      nystatin (MYCOSTATIN) powder Apply topically 2 (two) times a day  Qty: 15 g, Refills: 0      !! omeprazole (PriLOSEC) 20 mg delayed release capsule Take 20 mg by mouth daily        !! omeprazole (PriLOSEC) 20 mg delayed release capsule Take 40 mg by mouth daily at bedtime      ondansetron (ZOFRAN) 4 mg tablet Take 4 mg by mouth daily      polyethylene glycol (MIRALAX) 17 g packet Take 17 g by mouth daily  Qty: 14 each, Refills: 0      pravastatin (PRAVACHOL) 80 mg tablet Take 80 mg by mouth daily  predniSONE 20 mg tablet Take 2 tablets by mouth daily  Qty: 8 tablet, Refills: 0      pregabalin (LYRICA) 75 mg capsule Take 1 capsule by mouth 2 (two) times a day for 10 days  Qty: 20 capsule, Refills: 0      senna (SENOKOT) 8 6 MG tablet Take 2 tablets by mouth daily as needed for constipation  docusate sodium (COLACE) 100 mg capsule Take 100 mg by mouth 2 (two) times a day         !! - Potential duplicate medications found  Please discuss with provider  No discharge procedures on file  ED Provider  Attending physically available and evaluated Marek Johnson I managed the patient along with the ED Attending      Electronically Signed by         Lita Cordero DO  Resident  11/24/17 [id number]

## 2017-11-23 NOTE — H&P
History and Physical - [facility] Internal Medicine    Patient Information: Nowell Crigler 67 y o  female MRN: [id number]  Unit/Bed#: ED 12 Encounter: [id number]  Admitting Physician: Edyta Perez DO  PCP: Cony Chopra MD  Date of Admission:  11/23/17    Assessment/Plan:    Hospital Problem List:     Principal Problem:    Pneumonia  Active Problems:    Chronic diastolic CHF (congestive heart failure) ([facility])    IDDM (insulin dependent diabetes mellitus) ([facility] )    Morbid obesity ([facility] )    Essential hypertension    History of pulmonary embolism    Acquired coagulopathy - Coumadin      Plan for the Primary Problem(s):  · Possible right-sided pneumonia versus tracheobronchitis  · Note CT imaging  · Continue with intravenous antibiotic therapy  · Bronchodilator therapy  · Pulmonary toilet  · BiPAP   · Level 2 step down    Plan for Additional Problems:   · Bradycardia by history  · End-stage renal disease-dialysis  · H/o PE-eliquis  · Morbid obesity  · Chronic diastolic heart failure  · Diabetes insulin  · Hyperlipidemia  · History of adrenal insufficiency-inc prednisone dose    VTE Prophylaxis: Apixaban (Eliquis)  / sequential compression device   Code Status:  Full code  POLST: There is no POLST form on file for this patient (pre-hospital)    Anticipated Length of Stay:  Patient will be admitted on an Inpatient basis with an anticipated length of stay of  greater than 2 midnights  Justification for Hospital Stay:  Needs further evaluation    Total Time for Visit, including Counseling / Coordination of Care: 45 minutes  Greater than 50% of this total time spent on direct patient counseling and coordination of care  Chief Complaint:   Shortness of breath and cough    History of Present Illness:    Nowell Crigler is a 67 y o  female who presents with presentation of acute contusion febrile illness and cough    The patient was sent in from the nursing home for in minute symptoms of fever after failing a problem Zithromax for productive cough  Patient presents to the hospital for further workup and evaluation  She has known history chronic hypoxia associated with the obesity hypoventilation syndrome  it is  on 3 liters nasal cannula and BiPAP at night  Nursing home staff reports that she typically is alert where oriented and quite conversant with no deficits at baseline  However she was sent in today for worsening confusion after field outpatient treatment with the Zithromax for presumed upper respiratory infection  Review of Systems:    Review of Systems   Constitutional: Negative for chills  HENT: Negative for congestion and drooling  Respiratory: Negative for apnea, cough, chest tightness and shortness of breath  Genitourinary: Negative for flank pain and frequency  Musculoskeletal: Negative for arthralgias  Neurological: Negative for dizziness, seizures, facial asymmetry and weakness  Hematological: Negative for adenopathy  All other systems reviewed and are negative  Patient opens eyes to verbal stimuli  She is alert where oriented to person place and time however she is quite lethargic currently      Past Medical and Surgical History:     Past Medical History:   Diagnosis Date    Adrenal insufficiency (HCC)     Adrenocortical insufficiency (HCC)     Anemia     Bradycardia     Cardiac disease     Cataract     CHF (congestive heart failure) (HCC)     Constipation     CPAP (continuous positive airway pressure) dependence     Dependence on supplemental oxygen     Diabetes mellitus (HCC)     Difficulty walking     Disease of thyroid gland     Dysphagia     Encephalopathy     GERD (gastroesophageal reflux disease)     Glaucoma     Gout     History of transfusion     Hyperlipidemia     Hypertension     Insomnia     Insomnia     Obesity     Osteoarthritis     PONV (postoperative nausea and vomiting)     Renal disorder     renal failure    Sleep apnea Past Surgical History:   Procedure Laterality Date    ABDOMINAL SURGERY      APPENDECTOMY      BODY LIFT LOWER Right 4/18/2017    Procedure: UPPER EXTREMITY EXCISION PANNUS ;  Surgeon: Mesfin Iniguez MD;  [facility];  Service:     CARPAL TUNNEL RELEASE Bilateral     COLONOSCOPY N/A 8/15/2017    Procedure: COLONOSCOPY;  Surgeon: Beto Sharpe MD;  Location: [facility]; Service: Colorectal    COLONOSCOPY N/A 8/29/2017    Procedure: COLONOSCOPY;  Surgeon: Conchis Jaeger MD;  [facility]; Service: Colorectal    COLONOSCOPY N/A 9/1/2017    Procedure: COLONOSCOPY;  Surgeon: Conchis Jaeger MD;  [facility]; Service: Colorectal    HYSTERECTOMY      MN ANASTOMOSIS,AV,ANY SITE Right 8/18/2016    Procedure: CREATION OF RIGHT BRACHIOCEPHALIC FISTULA;  Surgeon: Laureen Slade MD;  [facility];  Service: Vascular    MN REVISE AV FISTULA,W/O THROMBECTOMY Right 4/18/2017    Procedure: UPPER ARM SUPERFICIALIZATION FISTULA ;  Surgeon: Laureen Slade MD;  [facility];  Service: Vascular       Meds/Allergies:    Prior to Admission medications    Medication Sig Start Date End Date Taking? Authorizing Provider   acetaminophen (TYLENOL) 325 mg tablet Take 2 tablets by mouth every 6 (six) hours as needed for mild pain, headaches or fever 11/4/16  Yes LIZZETTE Ramos   albuterol (2 5 mg/3 mL) 0 083 % nebulizer solution Take 2 5 mg by nebulization every 6 (six) hours as needed for wheezing  Yes Historical Provider, MD   allopurinol (ZYLOPRIM) 100 mg tablet Take 200 mg by mouth Indications: sun, tue, we, fri, sat   Priya Verna, Wed, Fri, Sat    Yes Historical Provider, MD   allopurinol (ZYLOPRIM) 300 mg tablet Take 300 mg by mouth Mon, and Thurs    Yes Historical Provider, MD   apixaban (ELIQUIS) 2 5 mg Take 1 tablet by mouth 2 (two) times a day 9/7/17  Yes Payton Snow DO   azithromycin (ZITHROMAX) 250 mg tablet Take 500 mg by mouth every 24 hours   Yes Historical Provider, MD   bisacodyl (DULCOLAX) 5 mg EC tablet Take 5 mg by mouth daily as needed for constipation   Yes Historical Provider, MD   calcium carbonate (OYSTER SHELL,OSCAL) 500 mg Take 2 tablets by mouth 3 (three) times a day with meals 9/7/17  Yes Payton Snow,    Camphor-Menthol-Methyl Sal (Anthony Carrero Ultra Strength) 4-10-30 % CREA Apply topically 2 (two) times a day B/l feet/knees and left shoulder   Yes Historical Provider, MD   cyanocobalamin (VITAMIN B-12) 1,000 mcg tablet Take 1,000 mcg by mouth daily  Yes Historical Provider, MD   famotidine (PEPCID) 20 mg tablet Take 20 mg by mouth 2 (two) times a day as needed for heartburn  Yes Historical Provider, MD   ferrous sulfate 325 (65 Fe) mg tablet Take 325 mg by mouth 3 (three) times a day with meals   Yes Historical Provider, MD   guaiFENesin (ROBITUSSIN) 100 mg/5 mL syrup Take 200 mg by mouth every 4 (four) hours as needed for cough   Yes Historical Provider, MD   HYDROmorphone (DILAUDID) 2 mg tablet Take 2 mg by mouth every 6 (six) hours as needed for moderate pain   Yes Historical Provider, MD   Insulin Glargine (BASAGLAR KWIKPEN) 100 UNIT/ML SOPN Inject 40 Units under the skin daily at bedtime   Yes Historical Provider, MD   insulin lispro protamine-insulin lispro (HumaLOG 50-50) 100 units/mL Inject 8 Units under the skin 2 (two) times a day before meals   Yes Historical Provider, MD   latanoprost (XALATAN) 0 005 % ophthalmic solution Administer 1 drop to both eyes daily at bedtime  Yes Historical Provider, MD   levothyroxine 25 mcg tablet Take 25 mcg by mouth daily  Yes Historical Provider, MD   liver oil-zinc oxide (DESITIN) 40 % ointment Apply topically as needed for irritation To buttock every day and evening shift   To b/l thigh folds every day and evening shift    Yes Historical Provider, MD   metoprolol tartrate (LOPRESSOR) 12 5 mg tablet Take 12 5 mg by mouth   Sam Ford, Sat  Hold am dose before dialysis Mon, Wed, and Fri   Yes Historical Provider, MD   miconazole (MICOTIN) 2 % powder Apply topically as needed for itching Apply to b/l breast and panus twice a day   Yes Historical Provider, MD   nystatin (MYCOSTATIN) powder Apply topically 2 (two) times a day 9/7/17  Yes Payton Snow,    omeprazole (PriLOSEC) 20 mg delayed release capsule Take 20 mg by mouth daily     Yes Historical Provider, MD   omeprazole (PriLOSEC) 20 mg delayed release capsule Take 40 mg by mouth daily at bedtime   Yes Historical Provider, MD   ondansetron (ZOFRAN) 4 mg tablet Take 4 mg by mouth daily   Yes Historical Provider, MD   polyethylene glycol (MIRALAX) 17 g packet Take 17 g by mouth daily 9/7/17  Yes Sherry Palmer DO   pravastatin (PRAVACHOL) 80 mg tablet Take 80 mg by mouth daily  Yes Historical Provider, MD   predniSONE 20 mg tablet Take 2 tablets by mouth daily 8/25/17  Yes Joanie Chahal MD   pregabalin (LYRICA) 75 mg capsule Take 1 capsule by mouth 2 (two) times a day for 10 days  Patient taking differently: Take 50 mg by mouth 3 (three) times a day   8/21/17 11/23/17 Yes Yolanda , MD   senna (SENOKOT) 8 6 MG tablet Take 2 tablets by mouth daily as needed for constipation     Yes Historical Provider, MD   docusate sodium (COLACE) 100 mg capsule Take 100 mg by mouth 2 (two) times a day      Historical Provider, MD   calcium acetate (PHOSLO) 667 mg capsule Take 667 mg by mouth 3 (three) times a day with meals  11/23/17  Historical Provider, MD   calcium-vitamin D ([address]) 250-125 MG-UNIT per tablet Take 1 tablet by mouth daily 500/200  11/23/17  Historical Provider, MD   Carbomer Gel Base (HYDROGEL) GEL by Does not apply route  11/23/17  Historical Provider, MD   cholecalciferol (VITAMIN D3) 1,000 units tablet Take 1,000 Units by mouth daily  11/23/17  Historical Provider, MD   doxercalciferol (HECTOROL) 2 mcg/mL SOLN Infuse 0 5 mL into a venous catheter 3 (three) times a week 9/8/17 11/23/17  Sherry Palmer DO   hydrocortisone (ANUSOL-HC) 25 mg suppository Insert 1 suppository into the rectum 2 (two) times a day for 3 days 8/21/17 11/23/17  Emy Hinds MD   hydrocortisone 1 % lotion Apply topically 2 (two) times a day  11/23/17  Historical Provider, MD   insulin aspart (NovoLOG) 100 units/mL injection Inject 8 Units under the skin 3 (three) times a day before meals Every Sunday, Tuesday, Thursday and Saturday 11/23/17  Historical Provider, MD   insulin aspart (NovoLOG) 100 units/mL injection Inject 8 Units under the skin 2 (two) times a day before breakfast and lunch Twice a day on Monday, Wednesday and Friday 11/23/17  Historical Provider, MD   insulin glargine (LANTUS) 100 units/mL subcutaneous injection Inject 40 Units under the skin daily at bedtime for 30 days 8/21/17 11/23/17  Emy Hinds MD   lidocaine (LIDODERM) 5 % Place 1 patch on the skin daily Remove & Discard patch within 12 hours or as directed by MD 8/21/17 11/23/17  Emy Hinds MD   lidocaine (LIDODERM) 5 % Place 1 patch on the skin daily Remove & Discard patch within 12 hours or as directed by MD 8/21/17 11/23/17  Emy Hinds MD     I have reviewed home medications with patient personally  Allergies:    Allergies   Allergen Reactions    Codeine     Darvon [Propoxyphene]     Iodine     Keflex [Cephalexin]     Morphine And Related     Nsaids     Other      IV DYE and SEAFOOD       Social History:     Marital Status: Single   Occupation:  Retired  Patient [address]  Patient Pre-hospital Level of Mobility:  Limited  Patient Pre-hospital Diet Restrictions:  Denies  Substance Use History:   History   Alcohol Use No     History   Smoking Status    Former Smoker    Quit date: 1967   Smokeless Tobacco    Never Used     History   Drug Use No       Family History:    Family History   Problem Relation Age of Onset    Diabetes Mother     Heart disease Father        Physical Exam:     Vitals:   Blood Pressure: 151/65 (11/23/17 1130)  Pulse: 88 (11/23/17 1130)  Temperature: 99 °F (37 2 °C) (11/23/17 0907)  Temp Source: Rectal (11/23/17 0907)  Respirations: 22 (11/23/17 1130)  Height: 5' 4" (162 6 cm) (11/23/17 0855)  Weight - Scale: (!) 149 kg (328 lb) (11/23/17 0855)  SpO2: 95 % (11/23/17 1130)    Physical Exam   Constitutional: She is oriented to person, place, and time  She appears well-developed and well-nourished  HENT:   Head: Normocephalic and atraumatic  Eyes: Conjunctivae are normal    Cardiovascular: Normal rate and regular rhythm  No murmur heard  Pulmonary/Chest: She has wheezes  Abdominal: Bowel sounds are normal  She exhibits no distension  There is no tenderness  Musculoskeletal: She exhibits no edema  Neurological: She is alert and oriented to person, place, and time  Skin: Skin is warm and dry  No erythema  Psychiatric: She has a normal mood and affect  Her behavior is normal          Additional Data:     Lab Results: I have personally reviewed pertinent reports  Results from last 7 days  Lab Units 11/23/17 0927   WBC Thousand/uL 9 13   HEMOGLOBIN g/dL 9 9*   HEMATOCRIT % 32 1*   PLATELETS Thousands/uL 141*   NEUTROS PCT % 80*   LYMPHS PCT % 8*   MONOS PCT % 11   EOS PCT % 1       Results from last 7 days  Lab Units 11/23/17 0927   SODIUM mmol/L 136   POTASSIUM mmol/L 3 8   CHLORIDE mmol/L 95*   CO2 mmol/L 34*   BUN mg/dL 33*   CREATININE mg/dL 2 12*   CALCIUM mg/dL 9 0   TOTAL PROTEIN g/dL 6 8   BILIRUBIN TOTAL mg/dL 0 36   ALK PHOS U/L 73   ALT U/L 29   AST U/L 30   GLUCOSE RANDOM mg/dL 151*       Results from last 7 days  Lab Units 11/23/17  0927   INR  1 09       Imaging: I have personally reviewed pertinent reports  Ct Chest Abdomen Pelvis Wo Contrast    Result Date: 11/23/2017  Narrative: CT CHEST, ABDOMEN AND PELVIS WITHOUT IV CONTRAST INDICATION:  Cough  Hypoxia  Abdominal pain  End-stage renal disease on dialysis  Diabetes   COMPARISON: 8/14/2017: TECHNIQUE: CT examination of the chest, abdomen and pelvis was performed without intravenous contrast   Reformatted images were created in axial, sagittal, and coronal planes  Radiation dose length product (DLP) for this visit:  3281 29 mGy-cm   This examination, like all CT scans performed in the [facility], was performed utilizing techniques to minimize radiation dose exposure, including the use of iterative reconstruction and automated exposure control  Enteric contrast was administered  FINDINGS: CHEST LUNGS:  Imaging through the lungs degraded due to obese body habitus as well as motion artifact  There is subsegmental linear atelectasis and mild peribronchial thickening in the RIGHT upper lobe  Suspicious for subsegmental region of airway inflammation  No significant airspace component  There is mild subsegmental bibasilar atelectasis, RIGHT greater than LEFT  Mild central pulmonary venous distention suggesting pulmonary venous hypertension  PLEURA:  Unremarkable  HEART/GREAT VESSELS:  Unremarkable for patient's age  Coronary artery calcification noted  MEDIASTINUM AND MOON:  Within the superior mediastinum, RIGHT paratracheal there are 2  Prominent lymph nodes, one borderline in size at 9 mm and the other mildly enlarged at 15 mm short axis dimension  CHEST WALL AND LOWER NECK:       Normal  ABDOMEN LIVER/BILIARY TREE:  Unremarkable  GALLBLADDER:  A few small dependent calculi within the gallbladder  Gallbladder caliber and appearance otherwise within normal limits  Stable compared to prior  SPLEEN:  Unremarkable  PANCREAS:  Unremarkable  ADRENAL GLANDS:  Unremarkable  KIDNEYS/URETERS:  Unremarkable  No hydronephrosis  STOMACH AND BOWEL:  Unremarkable  APPENDIX:  No findings to suggest appendicitis  ABDOMINOPELVIC CAVITY:  No ascites or free intraperitoneal air  No lymphadenopathy  VESSELS:  Unremarkable for patient's age  PELVIS REPRODUCTIVE ORGANS:  Unremarkable for patient's age  URINARY BLADDER:  Unremarkable  ABDOMINAL WALL/INGUINAL REGIONS:  Unremarkable  OSSEOUS STRUCTURES:  No acute fracture or destructive osseous lesion  Impression: No acute intra-abdominal pelvic abnormality identified  Cholelithiasis without evidence for cholecystitis  Evaluation of the lungs limited due to obese body habitus and motion artifact  Subsegmental linear and nodular opacities in the RIGHT upper lung, primarily with mild peribronchial thickening centrally suggesting possible focus of acute airway inflammation  Otherwise, appearance suggests dependent subsegmental atelectasis in both lung bases  There is moderate central pulmonary venous distention bilaterally suggesting component of fluid overload/pulmonary venous hypertension  No significant groundglass or airspace infiltrates  No pleural effusion  Borderline and mildly enlarged adenopathy in the RIGHT superior mediastinum  Workstation performed: [id number]     Xr Chest Portable    Result Date: 11/23/2017  Narrative: CHEST INDICATION:  cough, hypoxia  History taken directly from the electronic ordering system  COMPARISON:  8/24/2017 VIEWS:   AP frontal IMAGES:  1 FINDINGS:     Heart shadow is enlarged but unchanged from prior exam  Mild central vessel prominence  No consolidation  No pneumothorax or pleural effusion  Visualized osseous structures appear within normal limits for the patient's age  Impression: Mild central vessel prominence  No consolidation  Workstation performed: [id number]           [employer] / [employer] Records Reviewed: No     ** Please Note: This note has been constructed using a voice recognition system   **

## 2017-11-24 ENCOUNTER — APPOINTMENT (INPATIENT)
Dept: RADIOLOGY | Facility: HOSPITAL | Age: 72
DRG: 177 | End: 2017-11-24
Attending: RADIOLOGY
Payer: MEDICARE

## 2017-11-24 ENCOUNTER — APPOINTMENT (INPATIENT)
Dept: RADIOLOGY | Facility: HOSPITAL | Age: 72
DRG: 177 | End: 2017-11-24
Attending: INTERNAL MEDICINE
Payer: MEDICARE

## 2017-11-24 ENCOUNTER — APPOINTMENT (INPATIENT)
Dept: DIALYSIS | Facility: HOSPITAL | Age: 72
DRG: 177 | End: 2017-11-24
Payer: MEDICARE

## 2017-11-24 LAB
ALBUMIN SERPL BCP-MCNC: 1.8 G/DL (ref 3.5–5)
ALP SERPL-CCNC: 57 U/L (ref 46–116)
ALT SERPL W P-5'-P-CCNC: 21 U/L (ref 12–78)
ANION GAP SERPL CALCULATED.3IONS-SCNC: 9 MMOL/L (ref 4–13)
AST SERPL W P-5'-P-CCNC: 14 U/L (ref 5–45)
ATRIAL RATE: 86 BPM
ATRIAL RATE: 88 BPM
ATRIAL RATE: 91 BPM
BILIRUB SERPL-MCNC: 0.36 MG/DL (ref 0.2–1)
BUN SERPL-MCNC: 42 MG/DL (ref 5–25)
CALCIUM SERPL-MCNC: 8.1 MG/DL (ref 8.3–10.1)
CHLORIDE SERPL-SCNC: 95 MMOL/L (ref 100–108)
CO2 SERPL-SCNC: 31 MMOL/L (ref 21–32)
CREAT SERPL-MCNC: 2.99 MG/DL (ref 0.6–1.3)
GFR SERPL CREATININE-BSD FRML MDRD: 15 ML/MIN/1.73SQ M
GLUCOSE SERPL-MCNC: 164 MG/DL (ref 65–140)
GLUCOSE SERPL-MCNC: 204 MG/DL (ref 65–140)
GLUCOSE SERPL-MCNC: 229 MG/DL (ref 65–140)
GLUCOSE SERPL-MCNC: 235 MG/DL (ref 65–140)
GLUCOSE SERPL-MCNC: 345 MG/DL (ref 65–140)
GLUCOSE SERPL-MCNC: 365 MG/DL (ref 65–140)
MAGNESIUM SERPL-MCNC: 2.2 MG/DL (ref 1.6–2.6)
MRSA NOSE QL CULT: ABNORMAL
MRSA NOSE QL CULT: ABNORMAL
P AXIS: 39 DEGREES
P AXIS: 39 DEGREES
P AXIS: 45 DEGREES
PHOSPHATE SERPL-MCNC: 6.1 MG/DL (ref 2.3–4.1)
POTASSIUM SERPL-SCNC: 4.2 MMOL/L (ref 3.5–5.3)
PR INTERVAL: 170 MS
PR INTERVAL: 172 MS
PR INTERVAL: 176 MS
PROT SERPL-MCNC: 5.4 G/DL (ref 6.4–8.2)
QRS AXIS: 23 DEGREES
QRS AXIS: 44 DEGREES
QRS AXIS: 47 DEGREES
QRSD INTERVAL: 104 MS
QRSD INTERVAL: 96 MS
QRSD INTERVAL: 98 MS
QT INTERVAL: 326 MS
QT INTERVAL: 332 MS
QT INTERVAL: 338 MS
QTC INTERVAL: 400 MS
QTC INTERVAL: 401 MS
QTC INTERVAL: 404 MS
SODIUM SERPL-SCNC: 135 MMOL/L (ref 136–145)
T WAVE AXIS: 127 DEGREES
T WAVE AXIS: 159 DEGREES
T WAVE AXIS: 168 DEGREES
VENTRICULAR RATE: 86 BPM
VENTRICULAR RATE: 88 BPM
VENTRICULAR RATE: 91 BPM

## 2017-11-24 PROCEDURE — 02H633Z INSERTION OF INFUSION DEVICE INTO RIGHT ATRIUM, PERCUTANEOUS APPROACH: ICD-10-PCS | Performed by: RADIOLOGY

## 2017-11-24 PROCEDURE — 77001 FLUOROGUIDE FOR VEIN DEVICE: CPT

## 2017-11-24 PROCEDURE — 94640 AIRWAY INHALATION TREATMENT: CPT

## 2017-11-24 PROCEDURE — 94760 N-INVAS EAR/PLS OXIMETRY 1: CPT

## 2017-11-24 PROCEDURE — 94660 CPAP INITIATION&MGMT: CPT

## 2017-11-24 PROCEDURE — C1752 CATH,HEMODIALYSIS,SHORT-TERM: HCPCS

## 2017-11-24 PROCEDURE — 76937 US GUIDE VASCULAR ACCESS: CPT

## 2017-11-24 PROCEDURE — 71010 HB CHEST X-RAY 1 VIEW FRONTAL (PORTABLE): CPT

## 2017-11-24 PROCEDURE — 83735 ASSAY OF MAGNESIUM: CPT | Performed by: INTERNAL MEDICINE

## 2017-11-24 PROCEDURE — 80053 COMPREHEN METABOLIC PANEL: CPT | Performed by: INTERNAL MEDICINE

## 2017-11-24 PROCEDURE — 36556 INSERT NON-TUNNEL CV CATH: CPT

## 2017-11-24 PROCEDURE — 82948 REAGENT STRIP/BLOOD GLUCOSE: CPT

## 2017-11-24 PROCEDURE — 84100 ASSAY OF PHOSPHORUS: CPT | Performed by: INTERNAL MEDICINE

## 2017-11-24 PROCEDURE — 92610 EVALUATE SWALLOWING FUNCTION: CPT

## 2017-11-24 RX ORDER — HYDROMORPHONE HYDROCHLORIDE 2 MG/1
1 TABLET ORAL EVERY 4 HOURS PRN
Status: DISCONTINUED | OUTPATIENT
Start: 2017-11-24 | End: 2017-12-03 | Stop reason: HOSPADM

## 2017-11-24 RX ADMIN — PANTOPRAZOLE SODIUM 40 MG: 40 TABLET, DELAYED RELEASE ORAL at 15:59

## 2017-11-24 RX ADMIN — PIPERACILLIN SODIUM,TAZOBACTAM SODIUM 3.38 G: 3; .375 INJECTION, POWDER, FOR SOLUTION INTRAVENOUS at 18:31

## 2017-11-24 RX ADMIN — PRAVASTATIN SODIUM 80 MG: 80 TABLET ORAL at 11:57

## 2017-11-24 RX ADMIN — ALBUTEROL SULFATE 2.5 MG: 2.5 SOLUTION RESPIRATORY (INHALATION) at 11:20

## 2017-11-24 RX ADMIN — MICONAZOLE NITRATE: 20 CREAM TOPICAL at 12:29

## 2017-11-24 RX ADMIN — PREGABALIN 75 MG: 75 CAPSULE ORAL at 15:55

## 2017-11-24 RX ADMIN — VANCOMYCIN HYDROCHLORIDE 1500 MG: 1 INJECTION, POWDER, LYOPHILIZED, FOR SOLUTION INTRAVENOUS at 15:03

## 2017-11-24 RX ADMIN — INSULIN LISPRO 8 UNITS: 100 INJECTION, SOLUTION INTRAVENOUS; SUBCUTANEOUS at 16:11

## 2017-11-24 RX ADMIN — DOCUSATE SODIUM 100 MG: 100 CAPSULE, LIQUID FILLED ORAL at 11:56

## 2017-11-24 RX ADMIN — NYSTATIN: 100000 POWDER TOPICAL at 15:59

## 2017-11-24 RX ADMIN — MICONAZOLE NITRATE: 20 CREAM TOPICAL at 15:59

## 2017-11-24 RX ADMIN — DOCUSATE SODIUM 100 MG: 100 CAPSULE, LIQUID FILLED ORAL at 16:05

## 2017-11-24 RX ADMIN — LEVOTHYROXINE SODIUM 25 MCG: 25 TABLET ORAL at 05:25

## 2017-11-24 RX ADMIN — PREDNISONE 40 MG: 20 TABLET ORAL at 15:55

## 2017-11-24 RX ADMIN — SENNOSIDES 17.2 MG: 8.6 TABLET, FILM COATED ORAL at 12:30

## 2017-11-24 RX ADMIN — LATANOPROST 1 DROP: 50 SOLUTION OPHTHALMIC at 21:39

## 2017-11-24 RX ADMIN — APIXABAN 2.5 MG: 2.5 TABLET, FILM COATED ORAL at 11:56

## 2017-11-24 RX ADMIN — FAMOTIDINE 20 MG: 20 TABLET, FILM COATED ORAL at 11:56

## 2017-11-24 RX ADMIN — NYSTATIN: 100000 POWDER TOPICAL at 12:26

## 2017-11-24 RX ADMIN — BISACODYL 5 MG: 5 TABLET, COATED ORAL at 12:26

## 2017-11-24 RX ADMIN — INSULIN GLARGINE 40 UNITS: 100 INJECTION, SOLUTION SUBCUTANEOUS at 21:38

## 2017-11-24 RX ADMIN — APIXABAN 2.5 MG: 2.5 TABLET, FILM COATED ORAL at 15:58

## 2017-11-24 RX ADMIN — INSULIN LISPRO 8 UNITS: 100 INJECTION, SOLUTION INTRAVENOUS; SUBCUTANEOUS at 12:28

## 2017-11-24 RX ADMIN — ALLOPURINOL 200 MG: 100 TABLET ORAL at 11:56

## 2017-11-24 RX ADMIN — GUAIFENESIN 200 MG: 100 SOLUTION ORAL at 15:55

## 2017-11-24 RX ADMIN — PANTOPRAZOLE SODIUM 40 MG: 40 TABLET, DELAYED RELEASE ORAL at 06:19

## 2017-11-24 RX ADMIN — INSULIN LISPRO 5 UNITS: 100 INJECTION, SOLUTION INTRAVENOUS; SUBCUTANEOUS at 22:30

## 2017-11-24 RX ADMIN — ACETAMINOPHEN 650 MG: 325 TABLET, FILM COATED ORAL at 11:55

## 2017-11-24 RX ADMIN — HYDROMORPHONE HYDROCHLORIDE 1 MG: 2 TABLET ORAL at 21:53

## 2017-11-24 RX ADMIN — Medication 3.38 G: at 05:25

## 2017-11-24 RX ADMIN — ALBUTEROL SULFATE 2.5 MG: 2.5 SOLUTION RESPIRATORY (INHALATION) at 20:12

## 2017-11-24 RX ADMIN — Medication 325 MG: at 15:55

## 2017-11-24 RX ADMIN — PIPERACILLIN SODIUM,TAZOBACTAM SODIUM 3.38 G: 3; .375 INJECTION, POWDER, FOR SOLUTION INTRAVENOUS at 23:45

## 2017-11-24 RX ADMIN — CYANOCOBALAMIN TAB 500 MCG 1000 MCG: 500 TAB at 11:55

## 2017-11-24 NOTE — SPEECH THERAPY NOTE
Speech Language/Pathology  Speech/Language Pathology  Assessment    Patient Name: Marek QUISPEN Date: 11/24/2017     Problem List  Patient Active Problem List   Diagnosis    Chronic diastolic CHF (congestive heart failure) ([facility])    IDDM [facility] dependent diabetes mellitus) [facility]    Morbid obesity ([facility]    Pulmonary embolism ([facility]    End-stage renal disease on hemodialysis ([facility]    Essential hypertension    Chronic respiratory failure with hypoxia and hypercapnia ([facility])    History of pulmonary embolism    Chronic pain of right upper extremity    Acute pain of right shoulder    Acquired coagulopathy - Coumadin    Pneumonia     Past Medical History  Past Medical History:   Diagnosis Date    Adrenal insufficiency (HCC)     Adrenocortical insufficiency (HCC)     Anemia     Bradycardia     Cardiac disease     Cataract     CHF (congestive heart failure) ([facility])     Constipation     CPAP (continuous positive airway pressure) dependence     Dependence on supplemental oxygen     Diabetes mellitus (HCC)     Difficulty walking     Disease of thyroid gland     Dysphagia     Encephalopathy     GERD (gastroesophageal reflux disease)     Glaucoma     Gout     History of transfusion     Hyperlipidemia     Hypertension     Insomnia     Insomnia     Obesity     Osteoarthritis     PONV (postoperative nausea and vomiting)     Renal disorder     renal failure    Sleep apnea      Past Surgical History  Past Surgical History:   Procedure Laterality Date    ABDOMINAL SURGERY      APPENDECTOMY      BODY LIFT LOWER Right 4/18/2017    Procedure: UPPER EXTREMITY EXCISION PANNUS ;  Surgeon: Eleazar Rojas MD;  [facility];  Service:     CARPAL TUNNEL RELEASE Bilateral     COLONOSCOPY N/A 8/15/2017    Procedure: COLONOSCOPY;  Surgeon: Carlos Herndon MD;  [facility];   Service: Colorectal    COLONOSCOPY N/A 8/29/2017    Procedure: COLONOSCOPY;  Surgeon: Shakeel Aranda MD;  [facility]; Service: Colorectal    COLONOSCOPY N/A 9/1/2017    Procedure: COLONOSCOPY;  Surgeon: Jevon Gutierrez MD;  [facility]; Service: Colorectal    HYSTERECTOMY      MN ANASTOMOSIS,AV,ANY SITE Right 8/18/2016    Procedure: CREATION OF RIGHT BRACHIOCEPHALIC FISTULA;  Surgeon: Celeste Cole MD;  [facility];  Service: Vascular    MN REVISE AV FISTULA,W/O THROMBECTOMY Right 4/18/2017    Procedure: UPPER ARM SUPERFICIALIZATION FISTULA ;  Surgeon: Wiley Flores MD;  [facility];  Service: Vascular        11/24/17 1010   Patient Information   Current Medical Jordan Vazquez is a 67 y o  female who presents with presentation of acute contusion febrile illness and cough  The patient was sent in from the nursing home for in minute symptoms of fever after failing a problem Zithromax for productive cough  Patient presents to the hospital for further workup and evaluation  She has known history chronic hypoxia associated with the obesity hypoventilation syndrome  it is  on 3 liters nasal cannula and BiPAP at night  Nursing home staff reports that she typically is alert where oriented and quite conversant with no deficits at baseline  However she was sent in today for worsening confusion after field outpatient treatment with the Zithromax for presumed upper respiratory infection     Special Studies CT chest-Imaging through the lungs degraded due to obese body habitus as well as motion artifact  There is subsegmental linear atelectasis and mild peribronchial thickening in the RIGHT upper lobe  Suspicious for subsegmental region of airway   inflammation  No significant airspace component  There is mild subsegmental bibasilar atelectasis, RIGHT greater than LEFT    Mild central pulmonary venous distention suggesting pulmonary venous hypertension    CXR-mild central vessel prominence   Past Medical History CHF, IDDM, morbid obesity, PE, HTN, hypoxia, Social/Educational/Vocational Hx Lives at [facility], [facility] Information   Current Risks for Dysphagia & Aspiration Positionong Issues; General debilitation   Current Symptoms/Concerns Current Pneumonia; Moist cough   Current Diet NPO   Baseline Diet Regular; Thin liquids  (Renal, low fat)   Baseline Assessment   Behavior/Cognition Alert; Cooperative; Interactive   Speech/Language Status WFL in conversation   Patient Positioning Upright in bed   Swallow Mechanism Exam   Labial Symmetry WFL   Labial Strength WFL   Labial ROM WFL   Labial Sensation WFL   Facial Symmetry WFL   Facial Strength WFL   Facial ROM WFL   Facial Sensation WFL   Lingual Symmetry WFL   Lingual Strength WFL   Lingual ROM WFL   Lingual Sensation WFL   Mandible WFL   Dentition Adequate;Poor dental/oral hygiene  (A couple loose teeth, pt chews on R side to compensate)   Volitional Cough Strong;Congested   Tracheostomy No   Consistencies Assessed and Performance   Materials Admnistered Puree/Level 1;Mechanical Soft/Level 2;Regular/Solid; Thin liquid   Materials Adminstered Comment 4 oz applesauce, piece of toast with butter, cornflakes softened in milk, vinod crackers, milk and water by straw   Oral Stage Mild impaired   Oral Stage Comment Adequate bolus retrieval, lip seal, mildly slow mastication/bolus formation, bolus transfer appears adequate, no oral residue noted  Phargngeal Stage (Difficult to assess due to very large neck)   Pharyngeal Stage Comment Difficulty to view and palpate laryngeal elevation, as pt is obese and has a very large neck  Unsure if swallows are timely, also secondary to large neck  Esophageal Concerns No s/s reported   Summary   Swallow Summary Pt presents with mild oral dysphagia, with mildly prolonged mastication/bolus formation due to loose teeth, but pt compensates for this by chewing on R side  She appears cognitively intact and able to choose items that she can chew    Pharyngeal swallow is difficult to assess at bedside, as pt's neck is very large, making it difficult to view and palpate laryngeal elevation and timeliness of the swallow  There was an occasional strong cough noted during the bedside swallow eval, but not after a certain food or consistency  Pt was also noted to have strong coughing prior to eating/drinking  Pt appears to be tolerating regular diet/thin liquids  Pt denies any dysphagia  During dialysis, there were issues accessing her lines; she is staying NPO for now as she may go to IR for a procedure  Recommendations   Risk for Aspiration (average)   Recommendations Consider oral diet; Dysphagia treatment;Consider Video/Barium Swallow Study  (Consider VBS if physician wants to confirm pt is not aspirating)   Diet Solid Recommendation Regular consistency   Diet Liquid Recommendation Thin liquid   Recommended Form of Meds Whole; With thin liquid; As tolerated; As desired   General Precautions Aspiration precautions;Upright as possible for all oral intake;Remain upright for 45 mins after meals   Results Reviewed with RN;PT/Family/Caregiver  (Paged physician twice, have not received call back  RN will speak to Dr Esteban Dela Cruz about results if she speaks to her  Also left a note in chart )   Treatment Recommendations   Duration of treatment Follow up 1-2 times, possible VBS if physician desires   Follow up treatments Assure diet tolerance; Patient/family education   Dysphagia Goals Patient will tolerate recommended diet without observed clinical signs of oral/pharngeal dysphagia; Patient will utilize appropriate strategies for swallowing safety   Speech Therapy Prognosis   Prognosis Good   Prognosis Considerations Co-Morbidities; Medical Diagnosis; Medical Prognosis; Patient Participation Level;Previous Level of Function;Severity of Impairments

## 2017-11-24 NOTE — PROGRESS NOTES
[facility] Progress Note  Patient: Rola Lee 67 y o  female   MRN: [id number]  PCP: Aracelis Early MD  Unit/Bed#: [facility] 521-01 Encounter: [id number]  Date Of Visit: 17    Assessment:    Principal Problem:    Pneumonia  Active Problems:    Chronic diastolic CHF (congestive heart failure) [facility])    IDDM (insulin dependent diabetes mellitus) ([facility] )    Morbid obesity ([facility] )    Essential hypertension    History of pulmonary embolism    Acquired coagulopathy - Coumadin      Plan:    · Possible right-sided pneumonia versus tracheobronchitis  ? Continue with intravenous antibiotic therapy  ? Bronchodilator therapy  ? Pulmonary toilet  ? BiPAP      · End-stage renal disease-Unable to access fistula  Pending IR Fistulogram  · H/o PE-eliquis  · Morbid obesity  · Chronic diastolic heart failure-- Continue home meds  · IDDM--Continue current regimen   · Hyperlipidemia  · History of adrenal insufficiency-inc prednisone dose      VTE Pharmacologic Prophylaxis:   Pharmacologic: Apixaban (Eliquis)  Mechanical VTE Prophylaxis in Place:     Patient Centered Rounds: I have performed bedside rounds with nursing staff today  Discussions with Specialists or Other Care Team Provider:     Education and Discussions with Family / Patient:     Time Spent for Care: 20 minutes  More than 50% of total time spent on counseling and coordination of care as described above  Current Length of Stay: 1 day(s)    Current Patient Status: Inpatient   Certification Statement: The patient will continue to require additional inpatient hospital stay due to above    Discharge Plan / Estimated Discharge Date:     Code Status: Level 1 - Full Code      Subjective:   Pt seen and examined   Dry cough    Objective:     Vitals:   Temp (24hrs), Av 5 °F (36 9 °C), Min:98 °F (36 7 °C), Max:100 1 °F (37 8 °C)    HR:  [75-84] 83  Resp:  [20-22] 20  BP: (114-133)/(47-65) 114/53  SpO2:  [95 %-100 %] 98 %  Body mass index is 53 13 kg/m²  Input and Output Summary (last 24 hours): Intake/Output Summary (Last 24 hours) at 11/24/17 1416  Last data filed at 11/24/17 0950   Gross per 24 hour   Intake             1350 ml   Output              146 ml   Net             1204 ml       Physical Exam:     Physical Exam   Constitutional: She is oriented to person, place, and time  No distress  Eyes: Conjunctivae and EOM are normal  Pupils are equal, round, and reactive to light  Neck: Normal range of motion  Neck supple  No JVD present  Cardiovascular: Normal rate  Pulmonary/Chest: Effort normal  No respiratory distress  She has wheezes  Abdominal: Soft  Bowel sounds are normal  She exhibits no distension  There is no tenderness  Musculoskeletal: Normal range of motion  She exhibits no edema  Neurological: She is alert and oriented to person, place, and time  Skin: Skin is warm and dry  She is not diaphoretic  No erythema  Psychiatric: She has a normal mood and affect  (  Additional Data:     Labs:      Results from last 7 days  Lab Units 11/23/17  0927   WBC Thousand/uL 9 13   HEMOGLOBIN g/dL 9 9*   HEMATOCRIT % 32 1*   PLATELETS Thousands/uL 141*   NEUTROS PCT % 80*   LYMPHS PCT % 8*   MONOS PCT % 11   EOS PCT % 1       Results from last 7 days  Lab Units 11/24/17  0453   SODIUM mmol/L 135*   POTASSIUM mmol/L 4 2   CHLORIDE mmol/L 95*   CO2 mmol/L 31   BUN mg/dL 42*   CREATININE mg/dL 2 99*   CALCIUM mg/dL 8 1*   TOTAL PROTEIN g/dL 5 4*   BILIRUBIN TOTAL mg/dL 0 36   ALK PHOS U/L 57   ALT U/L 21   AST U/L 14   GLUCOSE RANDOM mg/dL 235*       Results from last 7 days  Lab Units 11/23/17  0927   INR  1 09           Imaging:      Recent Cultures (last 7 days):       Results from last 7 days  Lab Units 11/23/17  0927   BLOOD CULTURE  No Growth at 24 hrs  No Growth at 24 hrs         Last 24 Hours Medication List:     allopurinol 200 mg Oral Once per day on Sun Tue Wed Fri Sat   [START ON 11/27/2017] allopurinol 300 mg Oral Once per day on Mon Thu   apixaban 2 5 mg Oral BID   cyanocobalamin 1,000 mcg Oral Daily   docusate sodium 100 mg Oral BID   famotidine 20 mg Oral Daily   ferrous sulfate 325 mg Oral TID With Meals   insulin glargine 40 Units Subcutaneous HS   insulin lispro 8 Units Subcutaneous TID With Meals   latanoprost 1 drop Both Eyes HS   levothyroxine 25 mcg Oral Early Morning   [START ON 11/25/2017] metoprolol tartrate 12 5 mg Oral Once per day on Sun Tue Thu Sat   miconazole  Topical BID   nystatin  Topical BID   pantoprazole 40 mg Oral BID AC   piperacillin-tazobactam 3 375 g Intravenous Q6H   polyethylene glycol 17 g Oral Daily   pravastatin 80 mg Oral Daily   predniSONE 40 mg Oral BID With Meals   pregabalin 75 mg Oral BID   vancomycin 10 mg/kg Intravenous Q24H        Today, Patient Was Seen By: Ant Strauss MD    ** Please Note: This note has been constructed using a voice recognition system   **

## 2017-11-24 NOTE — SEDATION DOCUMENTATION
Double lumen temp HD cath placed in left IJ by Dr Mike Bustillo, placement verified by xray - OK to use, no complications, VSS, transported back to [facility]

## 2017-11-24 NOTE — PLAN OF CARE
DISCHARGE PLANNING     Discharge to home or other facility with appropriate resources Progressing        INFECTION - ADULT     Absence or prevention of progression during hospitalization Progressing        Nutrition/Hydration-ADULT     Nutrient/Hydration intake appropriate for improving, restoring or maintaining nutritional needs Progressing        PAIN - ADULT     Verbalizes/displays adequate comfort level or baseline comfort level Progressing        Potential for Falls     Patient will remain free of falls Progressing        Prexisting or High Potential for Compromised Skin Integrity     Skin integrity is maintained or improved Progressing        SAFETY ADULT     Maintain or return to baseline ADL function Progressing     Maintain or return mobility status to optimal level Progressing     Patient will remain free of falls Progressing        SKIN/TISSUE INTEGRITY - ADULT     Skin integrity remains intact Progressing     Incision(s), wounds(s) or drain site(s) healing without S/S of infection Progressing     Oral mucous membranes remain intact Progressing

## 2017-11-24 NOTE — OCCUPATIONAL THERAPY NOTE
Occupational Therapy Cancellation Notice     OT orders received and chart review completed-Attempted to see pt for OT evaluation today however pt is off the floor at dialysis   Will continue to follow and attempt to see pt at another time    Jovita Barron MS, OTR/L

## 2017-11-24 NOTE — PLAN OF CARE
Problem: Nutrition/Hydration-ADULT  Goal: Nutrient/Hydration intake appropriate for improving, restoring or maintaining nutritional needs  Monitor and assess patient's nutrition/hydration status for malnutrition (ex- brittle hair, bruises, dry skin, pale skin and conjunctiva, muscle wasting, smooth red tongue, and disorientation)  Collaborate with interdisciplinary team and initiate plan and interventions as ordered  Monitor patient's weight and dietary intake as ordered or per policy  Utilize nutrition screening tool and intervene per policy  Determine patient's food preferences and provide high-protein, high-caloric foods as appropriate       INTERVENTIONS:  - Monitor oral intake, urinary output, labs, and treatment plans  - Assess nutrition and hydration status and recommend course of action  - Evaluate amount of meals eaten  - Assist patient with eating if necessary   - Allow adequate time for meals  - Recommend/ encourage appropriate diets, oral nutritional supplements, and vitamin/mineral supplements  - Order, calculate, and assess calorie counts as needed  - Recommend, monitor, and adjust tube feedings and TPN/PPN based on assessed needs  - Assess need for intravenous fluids  - Provide specific nutrition/hydration education as appropriate  - Include patient/family/caregiver in decisions related to nutrition   Outcome: Progressing      Problem: Prexisting or High Potential for Compromised Skin Integrity  Goal: Skin integrity is maintained or improved  INTERVENTIONS:  - Identify patients at risk for skin breakdown  - Assess and monitor skin integrity  - Assess and monitor nutrition and hydration status  - Monitor labs (i e  albumin)  - Assess for incontinence   - Turn and reposition patient  - Assist with mobility/ambulation  - Relieve pressure over bony prominences  - Avoid friction and shearing  - Provide appropriate hygiene as needed including keeping skin clean and dry  - Evaluate need for skin moisturizer/barrier cream  - Collaborate with interdisciplinary team (i e  Nutrition, Rehabilitation, etc )   - Patient/family teaching   Outcome: Progressing      Problem: Potential for Falls  Goal: Patient will remain free of falls  INTERVENTIONS:  - Assess patient frequently for physical needs  -  Identify cognitive and physical deficits and behaviors that affect risk of falls    -  [place] fall precautions as indicated by assessment   - Educate patient/family on patient safety including physical limitations  - Instruct patient to call for assistance with activity based on assessment  - Modify environment to reduce risk of injury  - Consider OT/PT consult to assist with strengthening/mobility   Outcome: Progressing      Problem: PAIN - ADULT  Goal: Verbalizes/displays adequate comfort level or baseline comfort level  Interventions:  - Encourage patient to monitor pain and request assistance  - Assess pain using appropriate pain scale  - Administer analgesics based on type and severity of pain and evaluate response  - Implement non-pharmacological measures as appropriate and evaluate response  - Consider cultural and social influences on pain and pain management  - Notify physician/advanced practitioner if interventions unsuccessful or patient reports new pain  Outcome: Progressing      Problem: INFECTION - ADULT  Goal: Absence or prevention of progression during hospitalization  INTERVENTIONS:  - Assess and monitor for signs and symptoms of infection  - Monitor lab/diagnostic results  - Monitor all insertion sites, i e  indwelling lines, tubes, and drains  - Monitor endotracheal (as able) and nasal secretions for changes in amount and color  - [place] appropriate cooling/warming therapies per order  - Administer medications as ordered  - Instruct and encourage patient and family to use good hand hygiene technique  - Identify and instruct in appropriate isolation precautions for identified infection/condition  Outcome: Progressing      Problem: SAFETY ADULT  Goal: Patient will remain free of falls  INTERVENTIONS:  - Assess patient frequently for physical needs  -  Identify cognitive and physical deficits and behaviors that affect risk of falls    -  [place] fall precautions as indicated by assessment   - Educate patient/family on patient safety including physical limitations  - Instruct patient to call for assistance with activity based on assessment  - Modify environment to reduce risk of injury  - Consider OT/PT consult to assist with strengthening/mobility   Outcome: Progressing    Goal: Maintain or return to baseline ADL function  INTERVENTIONS:  -  Assess patient's ability to carry out ADLs; assess patient's baseline for ADL function and identify physical deficits which impact ability to perform ADLs (bathing, care of mouth/teeth, toileting, grooming, dressing, etc )  - Assess/evaluate cause of self-care deficits   - Assess range of motion  - Assess patient's mobility; develop plan if impaired  - Assess patient's need for assistive devices and provide as appropriate  - Encourage maximum independence but intervene and supervise when necessary  ¯ Involve family in performance of ADLs  ¯ Assess for home care needs following discharge   ¯ Request OT consult to assist with ADL evaluation and planning for discharge  ¯ Provide patient education as appropriate  Outcome: Progressing    Goal: Maintain or return mobility status to optimal level  INTERVENTIONS:  - Assess patient's baseline mobility status (ambulation, transfers, stairs, etc )    - Identify cognitive and physical deficits and behaviors that affect mobility  - Identify mobility aids required to assist with transfers and/or ambulation (gait belt, sit-to-stand, lift, walker, cane, etc )  - [place] fall precautions as indicated by assessment  - Record patient progress and toleration of activity level on Mobility SBAR; progress patient to next Phase/Stage  - Instruct patient to call for assistance with activity based on assessment  - Request Rehabilitation consult to assist with strengthening/weightbearing, etc   Outcome: Progressing      Problem: DISCHARGE PLANNING  Goal: Discharge to home or other facility with appropriate resources  INTERVENTIONS:  - Identify barriers to discharge w/patient and caregiver  - Arrange for needed discharge resources and transportation as appropriate  - Identify discharge learning needs (meds, wound care, etc )  - Arrange for interpretive services to assist at discharge as needed  - Refer to Case Management Department for coordinating discharge planning if the patient needs post-hospital services based on physician/advanced practitioner order or complex needs related to functional status, cognitive ability, or social support system  Outcome: Progressing      Problem: SKIN/TISSUE INTEGRITY - ADULT  Goal: Skin integrity remains intact  INTERVENTIONS  - Identify patients at risk for skin breakdown  - Assess and monitor skin integrity  - Assess and monitor nutrition and hydration status  - Monitor labs (i e  albumin)  - Assess for incontinence   - Turn and reposition patient  - Assist with mobility/ambulation  - Relieve pressure over bony prominences  - Avoid friction and shearing  - Provide appropriate hygiene as needed including keeping skin clean and dry  - Evaluate need for skin moisturizer/barrier cream  - Collaborate with interdisciplinary team (i e  Nutrition, Rehabilitation, etc )   - Patient/family teaching  Outcome: Progressing    Goal: Incision(s), wounds(s) or drain site(s) healing without S/S of infection  INTERVENTIONS  - Assess and document risk factors for skin impairment   - Assess and document dressing, incision, wound bed, drain sites and surrounding tissue  - Initiate Nutrition services consult and/or wound management as needed  Outcome: Progressing    Goal: Oral mucous membranes remain intact  INTERVENTIONS  - Assess oral mucosa and hygiene practices  - Implement preventative oral hygiene regimen  - Implement oral medicated treatments as ordered  - Initiate Nutrition services referral as needed  Outcome: Progressing

## 2017-11-24 NOTE — HEMODIALYSIS
Patient cannulated X 3  Clots were pulled from venous needle  Patient stated arm was infiltrated at outpatient dialysis the beginning of this week  The upper arm is ecchymotic and feels hard at the proximal portion of the AVF  AVF is positive for bruit/thrill, but the thrill is faint at the top of the fistula  No issues with arterial cannulation  Unable to do dialysis today  Dr Durga Norris notified and IR was consulted  Primary RN, Raudel Samaniego, also made aware        Everyone aware patient will be NPO for fistulagram

## 2017-11-24 NOTE — WOUND OSTOMY CARE
Progress Note - Wound   Greg Body 67 y o  female MRN: [id number]  Unit/Bed#: [facility] 521-01 Encounter: [id number]      Assessment:   Patient is a 75year old female admitted with altered mental status, febrile illness and cough  Patient was seen by wound care today for evaluation of a possible pressure ulcer on her left buttocks as well as a head to toe skin assessment  On assessment, patient was found to have 2 stage II pressure ulcers that were both POA on her left buttocks  One wound measured 0 7x0 5x0 1cm and the other measured 0 2x0 2x0 1cm  Both wound beds were beefy red with intact periwounds  The patient also had an intact scab on her right buttocks from a previous pressure ulcer  Per the primary RN, patient has frequent episodes of urinary and fecal incontinence  Calazime cream is to be applied to the patient's left buttocks BID and PRN  The patient's skin folds and heels were also assessed today  All skin folds are intact with no erythema present  Her bilateral heels are also intact  Continue applying a light dusting of Nystatin powder as ordered in her skin folds  Hydroguard cream is to be applied to her bilateral heels TID and PRN     Plan:   1  Cleanse wounds with soap and water, pat dry, apply calazime cream to left buttocks BID and PRN  2  Apply hydroguard cream to sacrum, right buttocks and heels TID and PRN  3  Turn and reposition Q2H  4  Elevate heels with pillows daily  5  Apply skin nourishing cream to moisturize skin daily  6  Use softcare cushion if patient is OOB in chair     Objective:    Vitals: Blood pressure 114/53, pulse 81, temperature 98 1 °F (36 7 °C), temperature source Tympanic, resp  rate 20, height 5' 4" (1 626 m), weight (!) 140 kg (309 lb 8 4 oz), SpO2 99 %, not currently breastfeeding  ,Body mass index is 53 13 kg/m²        Pressure Ulcer 08/14/17 Buttocks Left (Active)   Staging Stage II 11/24/2017 12:49 PM   Wound Description Beefy red;Fragile 11/24/2017 12:49 PM Wound Length (cm) 0 2 cm 11/24/2017 12:49 PM   Wound Width (cm) 0 2 cm 11/24/2017 12:49 PM   Wound Depth (cm) 0 1 11/24/2017 12:49 PM   Calculated Wound Area (cm^2) 0 04 cm^2 11/24/2017 12:49 PM   Calculated Wound Volume (cm^3) 0 cm^3 11/24/2017 12:49 PM   Change in Wound Size % 100 11/24/2017 12:49 PM   Drainage Amount Scant 11/24/2017 12:49 PM   Drainage Description Sanguineous 11/24/2017 12:49 PM   Dressing Moisture barrier 11/24/2017 12:49 PM   Patient Tolerance Tolerated well 11/24/2017 12:49 PM       Pressure Ulcer 11/23/17 Buttocks Left deep, beefy red  (Active)   Staging Stage II 11/24/2017 12:49 PM   Wound Description Beefy red;Fragile 11/24/2017 12:49 PM   Graciela-wound Assessment Dry; Intact;Fragile 11/24/2017 12:49 PM   Wound Length (cm) 0 7 cm 11/24/2017 12:49 PM   Wound Width (cm) 0 5 cm 11/24/2017 12:49 PM   Wound Depth (cm) 0 1 11/24/2017 12:49 PM   Calculated Wound Area (cm^2) 0 35 cm^2 11/24/2017 12:49 PM   Calculated Wound Volume (cm^3) 0 04 cm^3 11/24/2017 12:49 PM   Change in Wound Size % 84 11/24/2017 12:49 PM   Drainage Amount Scant 11/24/2017 12:49 PM   Drainage Description Sanguineous 11/24/2017 12:49 PM   Treatment Cleansed;Elevated with pillow(s); Offload; Turn & reposition 11/23/2017  8:00 PM   Dressing Moisture barrier 11/24/2017 12:49 PM   Wound packed? No 11/24/2017  4:00 AM   Patient Tolerance Tolerated well 11/24/2017 12:49 PM   Dressing Status Open to air 11/24/2017  4:00 AM         Will continue to follow up with patient weekly  Please call ex   [phone] with any further questions or concerns    Doron Arnett RN MSN [facility] [employer]

## 2017-11-24 NOTE — CONSULTS
2           Consultation - Nephrology   Sulma Chavis 67 y o  female MRN: [id number]  Unit/Bed#: [facility] 521-01 Encounter: [id number]      Assessment/Plan     Assessment / Plan:  1  Renal     The patient is end-stage renal disease hemodialysis is Monday Wednesday Friday  She has been admitted for possible pulmonary infection and will perform dialysis on normal schedule  Unfortunately patient is in dialysis and they were unable to access her fistula due to a large hematoma and bruise next to it  They aspirated clot  Labs were reviewed and potassium is acceptable she has no significant pulmonary edema or volume overload  I will consult interventional Radiology for fistulogram and if it is not usable they will place dialysis catheter and likely do dialysis tomorrow  2   Pulmonary  Patient was referred in for cough presumptive pneumonia  She seems relatively stable now she is on empiric antibiotics  History of Present Illness   Physician Requesting Consult: Sabra Ruiz MD  Reason for Consult / Principal Problem:  End-stage renal disease  Hx and PE limited by:   HPI: Sulma Chavis is a 67y o  year old female who presents with history of end-stage renal disease who was referred into the hospital from her care facility due to cough and low-grade fever  Patient is in no distress but was admitted for possible pulmonary infection  We were asked to see her regarding her hemodialysis management  History obtained from chart review and the patient  Inpatient consult to Nephrology  Consult performed by: Usman Biswas ordered by: Gt Vale          Review of Systems   Constitutional: Negative for chills  HENT: Negative  Eyes: Negative  Respiratory: Positive for cough  Negative for chest tightness and shortness of breath  Cardiovascular: Negative for chest pain  Gastrointestinal: Negative for abdominal distention, abdominal pain, diarrhea, nausea and vomiting  Musculoskeletal: Positive for back pain  Skin: Negative  Neurological: Negative for dizziness and seizures  Historical Information   Patient Active Problem List   Diagnosis    Chronic diastolic [facility]congestive heart failure) ([facility])    IDDM (insulin dependent diabetes mellitus) ([facility] )    Morbid obesity ([place] [address] )    Pulmonary embolism ([facility])    End-stage renal disease on hemodialysis ([place] [address] )    Essential hypertension    Chronic respiratory failure with hypoxia and hypercapnia ([facility])    History of pulmonary embolism    Chronic pain of right upper extremity    Acute pain of right shoulder    Acquired coagulopathy - Coumadin    Pneumonia     Past Medical History:   Diagnosis Date    Adrenal insufficiency ([facility])     Adrenocortical insufficiency (HCC)     Anemia     Bradycardia     Cardiac disease     Cataract     CHF (congestive heart failure) ([facility])     Constipation     CPAP (continuous positive airway pressure) dependence     Dependence on supplemental oxygen     Diabetes mellitus (HCC)     Difficulty walking     Disease of thyroid gland     Dysphagia     Encephalopathy     GERD (gastroesophageal reflux disease)     Glaucoma     Gout     History of transfusion     Hyperlipidemia     Hypertension     Insomnia     Insomnia     Obesity     Osteoarthritis     PONV (postoperative nausea and vomiting)     Renal disorder     renal failure    Sleep apnea      Past Surgical History:   Procedure Laterality Date    ABDOMINAL SURGERY      APPENDECTOMY      BODY LIFT LOWER Right 4/18/2017    Procedure: UPPER EXTREMITY EXCISION PANNUS ;  Surgeon: Kassy Anderson MD;  [facility];  Service:     CARPAL TUNNEL RELEASE Bilateral     COLONOSCOPY N/A 8/15/2017    Procedure: COLONOSCOPY;  Surgeon: Manjinder Ford MD;  [facility]; Service: Colorectal    COLONOSCOPY N/A 8/29/2017    Procedure: COLONOSCOPY;  Surgeon: Shaggy Santos MD;  [facility];   Service: Colorectal  COLONOSCOPY N/A 9/1/2017    Procedure: COLONOSCOPY;  Surgeon: Beatriz Gutierrez MD;  [facility];   Service: Colorectal    HYSTERECTOMY      GA ANASTOMOSIS,AV,ANY SITE Right 8/18/2016    Procedure: CREATION OF RIGHT BRACHIOCEPHALIC FISTULA;  Surgeon: Jaimie Flores MD;  [facility];  Service: Vascular    GA REVISE AV FISTULA,W/O THROMBECTOMY Right 4/18/2017    Procedure: UPPER ARM SUPERFICIALIZATION FISTULA ;  Surgeon: Jaimie Flores MD;  [facility];  Service: Vascular     Social History   History   Alcohol Use No     History   Drug Use No     History   Smoking Status    Former Smoker    Quit date: 1967   Smokeless Tobacco    Never Used     Family History   Problem Relation Age of Onset    Diabetes Mother     Heart disease Father        Meds/Allergies   current meds:   Current Facility-Administered Medications   Medication Dose Route Frequency    acetaminophen (TYLENOL) tablet 650 mg  650 mg Oral Q6H PRN    albuterol inhalation solution 2 5 mg  2 5 mg Nebulization Q4H PRN    allopurinol (ZYLOPRIM) tablet 200 mg  200 mg Oral Once per day on Sun Tue Wed Fri Sat    [START ON 11/27/2017] allopurinol (ZYLOPRIM) tablet 300 mg  300 mg Oral Once per day on Mon Thu    apixaban (ELIQUIS) tablet 2 5 mg  2 5 mg Oral BID    bisacodyl (DULCOLAX) EC tablet 5 mg  5 mg Oral Daily PRN    cyanocobalamin (VITAMIN B-12) tablet 1,000 mcg  1,000 mcg Oral Daily    docusate sodium (COLACE) capsule 100 mg  100 mg Oral BID    doxercalciferol (HECTOROL) injection 0 5 mcg  0 5 mcg Intravenous After Dialysis    famotidine (PEPCID) tablet 20 mg  20 mg Oral Daily    ferrous sulfate tablet 325 mg  325 mg Oral TID With Meals    guaiFENesin (ROBITUSSIN) oral solution 200 mg  200 mg Oral Q4H PRN    insulin glargine (LANTUS) subcutaneous injection 40 Units  40 Units Subcutaneous HS    insulin lispro (HumaLOG) 100 units/mL subcutaneous injection 8 Units  8 Units Subcutaneous TID With Meals    latanoprost (XALATAN) 0 005 % ophthalmic solution 1 drop  1 drop Both Eyes HS    levothyroxine tablet 25 mcg  25 mcg Oral Early Morning    [START ON 11/25/2017] metoprolol tartrate (LOPRESSOR) partial tablet 12 5 mg  12 5 mg Oral Once per day on Sun Tue Thu Sat    miconazole 2 % cream   Topical BID    nystatin (MYCOSTATIN) powder   Topical BID    pantoprazole (PROTONIX) EC tablet 40 mg  40 mg Oral BID AC    piperacillin-tazobactam (ZOSYN) IVPB 3 375 g  3 375 g Intravenous Q6H    pneumococcal 13-valent conjugate vaccine (PREVNAR-13) IM injection 0 5 mL  0 5 mL Intramuscular Prior to discharge    polyethylene glycol (MIRALAX) packet 17 g  17 g Oral Daily    pravastatin (PRAVACHOL) tablet 80 mg  80 mg Oral Daily    predniSONE tablet 40 mg  40 mg Oral BID With Meals    pregabalin (LYRICA) capsule 75 mg  75 mg Oral BID    senna (SENOKOT) tablet 17 2 mg  2 tablet Oral Daily PRN    vancomycin (VANCOCIN) 1,500 mg in sodium chloride 0 9 % 250 mL IVPB  10 mg/kg Intravenous Q24H     Allergies   Allergen Reactions    Codeine     Darvon [Propoxyphene]     Iodine     Keflex [Cephalexin]     Morphine And Related     Nsaids     Other      IV DYE and SEAFOOD       Objective     Intake/Output Summary (Last 24 hours) at 11/24/17 0914  Last data filed at 11/24/17 0454   Gross per 24 hour   Intake              650 ml   Output                0 ml   Net              650 ml       Invasive Devices:        PHYSICAL EXAM:  /61   Pulse 80   Temp 98 °F (36 7 °C) (Oral)   Resp 20   Ht 5' 4" (1 626 m)   Wt (!) 140 kg (309 lb 8 4 oz)   LMP  (LMP Unknown)   SpO2 99%   BMI 53 13 kg/m²     Physical Exam   Constitutional: She is oriented to person, place, and time  No distress  HENT:   Mouth/Throat: No oropharyngeal exudate  Eyes: No scleral icterus  Neck: Neck supple  No JVD present  Cardiovascular: Normal rate  Exam reveals no gallop and no friction rub      Pulmonary/Chest: Effort normal  No respiratory distress  She has no rales  Decreased breath sounds   Abdominal: Soft  Bowel sounds are normal  She exhibits no distension  There is no tenderness  Musculoskeletal: She exhibits edema  Neurological: She is alert and oriented to person, place, and time           Current Weight: Weight - Scale: (!) 140 kg (309 lb 8 4 oz)  First Weight: Weight - Scale: (!) 149 kg (328 lb)    Lab Results:      Results from last 7 days  Lab Units 11/23/17  0927   WBC Thousand/uL 9 13   HEMOGLOBIN g/dL 9 9*   HEMATOCRIT % 32 1*   PLATELETS Thousands/uL 141*       Results from last 7 days  Lab Units 11/24/17  0453   SODIUM mmol/L 135*   POTASSIUM mmol/L 4 2   CHLORIDE mmol/L 95*   CO2 mmol/L 31   BUN mg/dL 42*   CREATININE mg/dL 2 99*   GLUCOSE RANDOM mg/dL 235*   CALCIUM mg/dL 8 1*       Results from last 7 days  Lab Units 11/24/17  0453   SODIUM mmol/L 135*   POTASSIUM mmol/L 4 2   CHLORIDE mmol/L 95*   CO2 mmol/L 31   BUN mg/dL 42*   CREATININE mg/dL 2 99*   CALCIUM mg/dL 8 1*   TOTAL PROTEIN g/dL 5 4*   BILIRUBIN TOTAL mg/dL 0 36   ALK PHOS U/L 57   ALT U/L 21   AST U/L 14   GLUCOSE RANDOM mg/dL 235*

## 2017-11-24 NOTE — SOCIAL WORK
CM met with patient at bedside  Pt requested to referred to as London Khan  Pt is a resident at [facility] for approximately 5 years  Pt's primary contact is friend Gary Corral [phone]  Pt states that she has been bed-bound and does not ambulate independently  Pt uses O2 at baseline  No history of MH or D/A issues reported  Pt would like to return to [facility]  Referral for resumption of care in [place]

## 2017-11-24 NOTE — CASE MANAGEMENT
Initial Clinical Review    Admission: Date/Time/Statement: 11/23/17 @ 1201     Orders Placed This Encounter   Procedures    Inpatient Admission (expected length of stay for this patient is greater than two midnights)     Standing Status:   Standing     Number of Occurrences:   1     Order Specific Question:   Admitting Physician     Answer:   Luis Enrique Slaughter [[id number]]     Order Specific Question:   Level of Care     Answer:   Med Surg [[id number]]     Order Specific Question:   Estimated length of stay     Answer:   More than 2 Midnights     Order Specific Question:   Certification     Answer:   I certify that inpatient services are medically necessary for this patient for a duration of greater than two midnights  See H&P and MD Progress Notes for additional information about the patient's course of treatment  ED: Date/Time/Mode of Arrival:   ED Arrival Information     Expected Arrival Acuity Means of Arrival Escorted By Service Admission Type    11/23/2017 08:52 11/23/2017 08:51 Emergent Ambulance [facility] Emergency    Arrival Complaint    altered mental status          Chief Complaint:   Chief Complaint   Patient presents with    Altered Mental Status     [facility] for change in mental status and fevers  Patient currently taking z-pack  History of Illness:   Rafael Lopez is a 67 y o  female who presents with presentation of acute contusion febrile illness and cough  The patient was sent in from the nursing home for in minute symptoms of fever after failing a problem Zithromax for productive cough  Patient presents to the hospital for further workup and evaluation  She has known history chronic hypoxia associated with the obesity hypoventilation syndrome  it is  on 3 liters nasal cannula and BiPAP at night  Nursing home staff reports that she typically is alert where oriented and quite conversant with no deficits at baseline    However she was sent in today for worsening confusion after field outpatient treatment with the Zithromax for presumed upper respiratory infection  ED Vital Signs:   ED Triage Vitals   Temperature Pulse Respirations Blood Pressure SpO2   11/23/17 0907 11/23/17 0855 11/23/17 0855 11/23/17 0855 11/23/17 0855   99 °F (37 2 °C) 87 18 160/70 (!) 80 %      Temp Source Heart Rate Source Patient Position - Orthostatic VS BP Location FiO2 (%)   11/23/17 0907 11/23/17 1030 11/23/17 0855 11/23/17 0855 --   Rectal Monitor Lying Left arm       Pain Score       11/23/17 0855       4        Wt Readings from Last 1 Encounters:   11/24/17 (!) 140 kg (309 lb 8 4 oz)     Abnormal Labs/Diagnostic Test Results:     ED Treatment:   Medication Administration from 11/23/2017 0852 to 11/23/2017 1439       Date/Time Order Dose Route Action Action by Comments     11/23/2017 1142 aspirin chewable tablet 324 mg 324 mg Oral Given Phillip Bashir RN      11/23/2017 1235 piperacillin-tazobactam (ZOSYN) 4 5 g in dextrose 5 % 100 mL IVPB 4 5 g Intravenous Given Marco Live RN      11/23/2017 1312 vancomycin (VANCOCIN) 2,000 mg in sodium chloride 0 9 % 500 mL IVPB 2,000 mg Intravenous New Bag Phillip Bashir RN           Past Medical/Surgical History:    Active Ambulatory Problems     Diagnosis Date Noted    Chronic diastolic CHF (congestive heart failure) (HCC) 01/21/2016    IDDM (insulin dependent diabetes mellitus) ([facility]) 01/21/2016    Morbid obesity ([facility]) 01/21/2016    Pulmonary embolism ([facility]) 01/21/2016    End-stage renal disease on hemodialysis ([facility]) 11/01/2016    Essential hypertension 11/02/2016    Chronic respiratory failure with hypoxia and hypercapnia ([facility]) 11/02/2016    History of pulmonary embolism 08/27/2017    Chronic pain of right upper extremity 08/27/2017    Acute pain of right shoulder 08/29/2017    Acquired coagulopathy - Coumadin 08/29/2017     Resolved Ambulatory Problems     Diagnosis Date Noted    Hyperkalemia 01/21/2016    NICHO (acute kidney injury) ([facility] ) 01/21/2016    Sinus bradycardia 01/21/2016    T wave inversion in EKG 01/21/2016    Acute kidney injury superimposed on CKD ([facility] ) 05/06/2016    Encephalopathy 05/06/2016    DVT [facility]deep venous thrombosis) ([facility] 05/06/2016    Acute hypercapnic respiratory failure ([facility]) 11/01/2016    Altered mental status 11/01/2016    GI bleed 08/15/2017    Acute blood loss anemia 08/18/2017    Rectal bleeding 08/18/2017     Past Medical History:   Diagnosis Date    Adrenal insufficiency ([facility])     Adrenocortical insufficiency ([facility])     Anemia     Bradycardia     Cardiac disease     Cataract     CHF (congestive heart failure) ([facility])     Constipation     CPAP (continuous positive airway pressure) dependence     Dependence on supplemental oxygen     Diabetes mellitus ([facility]     Difficulty walking     Disease of thyroid gland     Dysphagia     Encephalopathy     GERD (gastroesophageal reflux disease)     Glaucoma     Gout     History of transfusion     Hyperlipidemia     Hypertension     Insomnia     Insomnia     Obesity     Osteoarthritis     PONV (postoperative nausea and vomiting)     Renal disorder     Sleep apnea        Admitting Diagnosis: Altered mental status [R41 82]  Encephalopathy [G93 40]  CKD (chronic kidney disease) [N18 9]  NSTEMI (non-ST elevated myocardial infarction) ([facility]) [I21 4]  End-stage renal disease on hemodialysis ([facility]) [N18 6, Z99 2]  HCAP (healthcare-associated pneumonia) [J18 9]    Age/Sex: 67 y o  female    Assessment/Plan:   Pneumonia  Active Problems:    Chronic diastolic CHF (congestive heart failure) ([facility])    IDDM (insulin dependent diabetes mellitus) ([facility] )    Morbid obesity ([facility] )    Essential hypertension    History of pulmonary embolism    Acquired coagulopathy - Coumadin        Plan for the Primary Problem(s):  · Possible right-sided pneumonia versus tracheobronchitis  ? Note CT imaging  ?  Continue with intravenous antibiotic therapy  ? Bronchodilator therapy  ? Pulmonary toilet  ? BiPAP   ? Level 2 step down     Plan for Additional Problems:   · Bradycardia by history  · End-stage renal disease-dialysis  · H/o PE-eliquis  · Morbid obesity  · Chronic diastolic heart failure  · Diabetes insulin  · Hyperlipidemia  · History of adrenal insufficiency-inc prednisone dose     VTE Prophylaxis: Apixaban (Eliquis)  / sequential compression device   Code Status:  Full code  POLST: There is no POLST form on file for this patient (pre-hospital)     Anticipated Length of Stay:  Patient will be admitted on an Inpatient basis with an anticipated length of stay of  greater than 2 midnights     Justification for Hospital Stay:  Needs further evaluation       Admission Orders:  Scheduled Meds:   allopurinol 200 mg Oral Once per day on Sun Tue Wed Fri Sat   [START ON 11/27/2017] allopurinol 300 mg Oral Once per day on Mon Thu   apixaban 2 5 mg Oral BID   cyanocobalamin 1,000 mcg Oral Daily   docusate sodium 100 mg Oral BID   famotidine 20 mg Oral Daily   ferrous sulfate 325 mg Oral TID With Meals   insulin glargine 40 Units Subcutaneous HS   insulin lispro 8 Units Subcutaneous TID With Meals   latanoprost 1 drop Both Eyes HS   levothyroxine 25 mcg Oral Early Morning   [START ON 11/25/2017] metoprolol tartrate 12 5 mg Oral Once per day on Sun Tue Thu Sat   miconazole  Topical BID   nystatin  Topical BID   pantoprazole 40 mg Oral BID AC   piperacillin-tazobactam 3 375 g Intravenous Q6H   polyethylene glycol 17 g Oral Daily   pravastatin 80 mg Oral Daily   predniSONE 40 mg Oral BID With Meals   pregabalin 75 mg Oral BID   vancomycin 10 mg/kg Intravenous Q24H     Continuous Infusions:    PRN Meds:   acetaminophen    albuterol    bisacodyl    doxercalciferol    guaiFENesin    pneumococcal 13-valent conjugate vaccine    senna

## 2017-11-24 NOTE — PLAN OF CARE
Problem: SLP ADULT - SWALLOWING, IMPAIRED  Goal: Initial SLP swallow eval performed  Outcome: Completed Date Met: 11/24/17

## 2017-11-25 ENCOUNTER — APPOINTMENT (INPATIENT)
Dept: DIALYSIS | Facility: HOSPITAL | Age: 72
DRG: 177 | End: 2017-11-25
Attending: INTERNAL MEDICINE
Payer: MEDICARE

## 2017-11-25 LAB
GLUCOSE SERPL-MCNC: 134 MG/DL (ref 65–140)
GLUCOSE SERPL-MCNC: 199 MG/DL (ref 65–140)
GLUCOSE SERPL-MCNC: 210 MG/DL (ref 65–140)
GLUCOSE SERPL-MCNC: 303 MG/DL (ref 65–140)

## 2017-11-25 PROCEDURE — 94760 N-INVAS EAR/PLS OXIMETRY 1: CPT

## 2017-11-25 PROCEDURE — 94640 AIRWAY INHALATION TREATMENT: CPT

## 2017-11-25 PROCEDURE — 94660 CPAP INITIATION&MGMT: CPT

## 2017-11-25 PROCEDURE — 82948 REAGENT STRIP/BLOOD GLUCOSE: CPT

## 2017-11-25 PROCEDURE — 5A1D70Z PERFORMANCE OF URINARY FILTRATION, INTERMITTENT, LESS THAN 6 HOURS PER DAY: ICD-10-PCS | Performed by: INTERNAL MEDICINE

## 2017-11-25 RX ORDER — VANCOMYCIN HYDROCHLORIDE 1 G/200ML
1000 INJECTION, SOLUTION INTRAVENOUS
Status: DISCONTINUED | OUTPATIENT
Start: 2017-11-25 | End: 2017-11-26

## 2017-11-25 RX ORDER — GUAIFENESIN 600 MG
1200 TABLET, EXTENDED RELEASE 12 HR ORAL EVERY 12 HOURS SCHEDULED
Status: DISCONTINUED | OUTPATIENT
Start: 2017-11-25 | End: 2017-12-03 | Stop reason: HOSPADM

## 2017-11-25 RX ORDER — INSULIN GLARGINE 100 [IU]/ML
45 INJECTION, SOLUTION SUBCUTANEOUS
Status: DISCONTINUED | OUTPATIENT
Start: 2017-11-25 | End: 2017-12-03 | Stop reason: HOSPADM

## 2017-11-25 RX ADMIN — Medication 325 MG: at 13:41

## 2017-11-25 RX ADMIN — INSULIN LISPRO 4 UNITS: 100 INJECTION, SOLUTION INTRAVENOUS; SUBCUTANEOUS at 07:13

## 2017-11-25 RX ADMIN — INSULIN LISPRO 2 UNITS: 100 INJECTION, SOLUTION INTRAVENOUS; SUBCUTANEOUS at 21:28

## 2017-11-25 RX ADMIN — INSULIN GLARGINE 45 UNITS: 100 INJECTION, SOLUTION SUBCUTANEOUS at 21:20

## 2017-11-25 RX ADMIN — INSULIN LISPRO 8 UNITS: 100 INJECTION, SOLUTION INTRAVENOUS; SUBCUTANEOUS at 16:51

## 2017-11-25 RX ADMIN — APIXABAN 2.5 MG: 2.5 TABLET, FILM COATED ORAL at 13:31

## 2017-11-25 RX ADMIN — PANTOPRAZOLE SODIUM 40 MG: 40 TABLET, DELAYED RELEASE ORAL at 16:50

## 2017-11-25 RX ADMIN — Medication 325 MG: at 07:26

## 2017-11-25 RX ADMIN — INSULIN LISPRO 8 UNITS: 100 INJECTION, SOLUTION INTRAVENOUS; SUBCUTANEOUS at 07:26

## 2017-11-25 RX ADMIN — FAMOTIDINE 20 MG: 20 TABLET, FILM COATED ORAL at 13:32

## 2017-11-25 RX ADMIN — PIPERACILLIN SODIUM,TAZOBACTAM SODIUM 3.38 G: 3; .375 INJECTION, POWDER, FOR SOLUTION INTRAVENOUS at 05:29

## 2017-11-25 RX ADMIN — NYSTATIN: 100000 POWDER TOPICAL at 17:13

## 2017-11-25 RX ADMIN — METOPROLOL TARTRATE 12.5 MG: 25 TABLET ORAL at 13:41

## 2017-11-25 RX ADMIN — DOCUSATE SODIUM 100 MG: 100 CAPSULE, LIQUID FILLED ORAL at 17:12

## 2017-11-25 RX ADMIN — PANTOPRAZOLE SODIUM 40 MG: 40 TABLET, DELAYED RELEASE ORAL at 07:09

## 2017-11-25 RX ADMIN — HYDROMORPHONE HYDROCHLORIDE 1 MG: 2 TABLET ORAL at 08:40

## 2017-11-25 RX ADMIN — INSULIN LISPRO 8 UNITS: 100 INJECTION, SOLUTION INTRAVENOUS; SUBCUTANEOUS at 13:42

## 2017-11-25 RX ADMIN — GUAIFENESIN 1200 MG: 600 TABLET, EXTENDED RELEASE ORAL at 21:20

## 2017-11-25 RX ADMIN — ALBUTEROL SULFATE 2.5 MG: 2.5 SOLUTION RESPIRATORY (INHALATION) at 21:15

## 2017-11-25 RX ADMIN — MICONAZOLE NITRATE: 20 CREAM TOPICAL at 17:13

## 2017-11-25 RX ADMIN — LATANOPROST 1 DROP: 50 SOLUTION OPHTHALMIC at 21:28

## 2017-11-25 RX ADMIN — CYANOCOBALAMIN TAB 500 MCG 1000 MCG: 500 TAB at 13:31

## 2017-11-25 RX ADMIN — APIXABAN 2.5 MG: 2.5 TABLET, FILM COATED ORAL at 17:12

## 2017-11-25 RX ADMIN — MICONAZOLE NITRATE: 20 CREAM TOPICAL at 13:33

## 2017-11-25 RX ADMIN — PIPERACILLIN SODIUM,TAZOBACTAM SODIUM 3.38 G: 3; .375 INJECTION, POWDER, FOR SOLUTION INTRAVENOUS at 12:00

## 2017-11-25 RX ADMIN — PREDNISONE 40 MG: 20 TABLET ORAL at 07:26

## 2017-11-25 RX ADMIN — PREGABALIN 75 MG: 75 CAPSULE ORAL at 13:32

## 2017-11-25 RX ADMIN — NYSTATIN: 100000 POWDER TOPICAL at 13:33

## 2017-11-25 RX ADMIN — DOXERCALCIFEROL 0.5 MCG: 2 INJECTION, SOLUTION INTRAVENOUS at 08:35

## 2017-11-25 RX ADMIN — INSULIN LISPRO 2 UNITS: 100 INJECTION, SOLUTION INTRAVENOUS; SUBCUTANEOUS at 16:51

## 2017-11-25 RX ADMIN — ALBUTEROL SULFATE 2.5 MG: 2.5 SOLUTION RESPIRATORY (INHALATION) at 15:34

## 2017-11-25 RX ADMIN — PREDNISONE 40 MG: 20 TABLET ORAL at 16:50

## 2017-11-25 RX ADMIN — PRAVASTATIN SODIUM 80 MG: 80 TABLET ORAL at 13:31

## 2017-11-25 RX ADMIN — POLYETHYLENE GLYCOL 3350 17 G: 17 POWDER, FOR SOLUTION ORAL at 13:32

## 2017-11-25 RX ADMIN — ALLOPURINOL 200 MG: 100 TABLET ORAL at 13:31

## 2017-11-25 RX ADMIN — LEVOTHYROXINE SODIUM 25 MCG: 25 TABLET ORAL at 05:29

## 2017-11-25 RX ADMIN — Medication 325 MG: at 16:50

## 2017-11-25 RX ADMIN — PIPERACILLIN SODIUM,TAZOBACTAM SODIUM 2.25 G: 2; .25 INJECTION, POWDER, FOR SOLUTION INTRAVENOUS at 21:21

## 2017-11-25 RX ADMIN — DOCUSATE SODIUM 100 MG: 100 CAPSULE, LIQUID FILLED ORAL at 13:31

## 2017-11-25 RX ADMIN — GUAIFENESIN 1200 MG: 600 TABLET, EXTENDED RELEASE ORAL at 14:47

## 2017-11-25 RX ADMIN — PREGABALIN 75 MG: 75 CAPSULE ORAL at 17:12

## 2017-11-25 NOTE — PROGRESS NOTES
[address] Internal Medicine Progress Note  Patient: Lux Dash 67 y o  female   MRN: [id number]  PCP: Marie French MD  Unit/Bed#: [facility] 521-01 Encounter: [id number]  Date Of Visit: 11/25/17    Assessment:    Principal Problem:    Pneumonia  Active Problems:    Chronic diastolic CHF (congestive heart failure) ([facility])    IDDM (insulin dependent diabetes mellitus) ([facility] )    Morbid obesity ([facility] )    Essential hypertension    History of pulmonary embolism    Acquired coagulopathy - Coumadin      Plan:    · Possible right-sided pneumonia versus tracheobronchitis  ? Continue with intravenous antibiotic therapy  ? MRSA swab positive will continue vancomycin but change to renal dosing  Change Zosyn to renal dosing  May de-escalate if cultures remains neg or clinical improvement  ? Bronchodilator therapy  ? Pulmonary toilet  ? BiPAP   ·  End-stage renal disease-Unable to access fistula  Pending IR Fistulogram  S/P temp dialysis catheter  · End-stage renal disease-Unable to access fistula  Pending IR Fistulogram  · H/o PE-eliquis  · Morbid obesity  · Chronic diastolic heart failure-- Continue home meds  · IDDM--Continue current regimen   · Hyperlipidemia  · History of adrenal insufficiency-inc prednisone dose         VTE Pharmacologic Prophylaxis:   Pharmacologic: Apixaban (Eliquis)  Mechanical VTE Prophylaxis in Place:     Patient Centered Rounds: I have performed bedside rounds with nursing staff today  Discussions with Specialists or Other Care Team Provider:     Education and Discussions with Family / Patient:     Time Spent for Care: 20 minutes  More than 50% of total time spent on counseling and coordination of care as described above      Current Length of Stay: 2 day(s)    Current Patient Status: Inpatient   Certification Statement: The patient will continue to require additional inpatient hospital stay due to above    Discharge Plan / Estimated Discharge Date:     Code Status: Level 1 - Full Code      Subjective:   Pt seen and examined  Cough slightly better  Objective:     Vitals:   Temp (24hrs), Av 9 °F (36 6 °C), Min:96 8 °F (36 °C), Max:98 8 °F (37 1 °C)    HR:  [67-91] 70  Resp:  [16-22] 18  BP: (123-160)/(47-69) 136/56  SpO2:  [95 %-99 %] 98 %  Body mass index is 53 13 kg/m²  Input and Output Summary (last 24 hours): Intake/Output Summary (Last 24 hours) at 17 1333  Last data filed at 17 1230   Gross per 24 hour   Intake             1070 ml   Output             2484 ml   Net            -1414 ml       Physical Exam:     Physical Exam   Constitutional: No distress  Eyes: Conjunctivae and EOM are normal  Pupils are equal, round, and reactive to light  Neck: Normal range of motion  Neck supple  No JVD present  Cardiovascular: Normal rate and regular rhythm  Pulmonary/Chest: No respiratory distress  She has no wheezes  Abdominal: Soft  Bowel sounds are normal  She exhibits no distension  There is no tenderness  Musculoskeletal: Normal range of motion  She exhibits no edema  Neurological: She is alert  Skin: Skin is warm and dry  She is not diaphoretic  Psychiatric: She has a normal mood and affect  Additional Data:     Labs:      Results from last 7 days  Lab Units 17  0927   WBC Thousand/uL 9 13   HEMOGLOBIN g/dL 9 9*   HEMATOCRIT % 32 1*   PLATELETS Thousands/uL 141*   NEUTROS PCT % 80*   LYMPHS PCT % 8*   MONOS PCT % 11   EOS PCT % 1       Results from last 7 days  Lab Units 17  0453   SODIUM mmol/L 135*   POTASSIUM mmol/L 4 2   CHLORIDE mmol/L 95*   CO2 mmol/L 31   BUN mg/dL 42*   CREATININE mg/dL 2 99*   CALCIUM mg/dL 8 1*   TOTAL PROTEIN g/dL 5 4*   BILIRUBIN TOTAL mg/dL 0 36   ALK PHOS U/L 57   ALT U/L 21   AST U/L 14   GLUCOSE RANDOM mg/dL 235*       Results from last 7 days  Lab Units 17  0927   INR  1 09       * I Have Reviewed All Lab Data Listed Above        Imaging:      Recent Cultures (last 7 days):       Results from last 7 days  Lab Units 11/23/17  0927   BLOOD CULTURE  No Growth at 24 hrs  No Growth at 24 hrs  Last 24 Hours Medication List:     allopurinol 200 mg Oral Once per day on Sun Tue Wed Fri Sat   [START ON 11/27/2017] allopurinol 300 mg Oral Once per day on Mon Thu   apixaban 2 5 mg Oral BID   cyanocobalamin 1,000 mcg Oral Daily   docusate sodium 100 mg Oral BID   famotidine 20 mg Oral Daily   ferrous sulfate 325 mg Oral TID With Meals   insulin glargine 40 Units Subcutaneous HS   insulin lispro 1-6 Units Subcutaneous TID AC   insulin lispro 1-6 Units Subcutaneous HS   insulin lispro 8 Units Subcutaneous TID With Meals   latanoprost 1 drop Both Eyes HS   levothyroxine 25 mcg Oral Early Morning   metoprolol tartrate 12 5 mg Oral Once per day on Sun Tue Thu Sat   miconazole  Topical BID   nystatin  Topical BID   pantoprazole 40 mg Oral BID AC   piperacillin-tazobactam 3 375 g Intravenous Q6H   polyethylene glycol 17 g Oral Daily   pravastatin 80 mg Oral Daily   predniSONE 40 mg Oral BID With Meals   pregabalin 75 mg Oral BID   vancomycin 10 mg/kg Intravenous Q24H        Today, Patient Was Seen By: Matthew Luna MD    ** Please Note: This note has been constructed using a voice recognition system   **

## 2017-11-25 NOTE — PROGRESS NOTES
NEPHROLOGY PROGRESS NOTE   Shantel Sahu 67 y o  female MRN: [id number]  Unit/Bed#: [facility] 521-01 Encounter: [id number]  Reason for Consult: ESRD    ASSESSMENT/PLAN:  1  ESRD:  On maintenance HD every MWF at [address]  -Seen on HD currently without issues  BP stable 130/64, HR 69  -, bicarb 35  -UF 2 liters    2  Access: Left AVF ecchymotic with bruit and thrill noted, however infiltrated with ecchymosis and removal of clots  -Now with Temp cath on left-  -Will monitor for access to Left AVF on Monday    3  Anemia of CD:  Has history of GI bleed  -No EPO secondary to DVT history  -Transfuse PRN Hgb < 7 0  -On Venofer as outpatient- holding currently with pneumonia    4  Hyponatremia: Likely volume mediated  -Treat with UF    5  HTN: BP acceptable and will trend with UF    6  Pneumonia:  On IV ab per primary care       SUBJECTIVE:  patinet seen and examined on HD  Tolerated HD well via left temp cath    Offers no c/o except chest can hurst with coughing    OBJECTIVE:  Current Weight: Weight - Scale: (!) 140 kg (309 lb 8 4 oz)  Vitals:    11/25/17 0825 11/25/17 0830 11/25/17 0900 11/25/17 0930   BP: 149/63 160/69 (!) 147/47 141/64   Pulse: 74 76 71 72   Resp: 18      Temp: (!) 97 4 °F (36 3 °C)      TempSrc: Tympanic      SpO2:       Weight:       Height:           Intake/Output Summary (Last 24 hours) at 11/25/17 5892  Last data filed at 11/25/17 0825   Gross per 24 hour   Intake              770 ml   Output                0 ml   Net              770 ml     General: NAD  Skin: warm and dry, right Access ecchymotic  HEENT: MMM  Neck: supple  Chest: coarse, decreased  Heart: RRR  Abdomen: large, soft, + BS  Extremities: trace edema noted  Neuro: AAO  Psych: appropriate affect    Medications:    Current Facility-Administered Medications:     acetaminophen (TYLENOL) tablet 650 mg, 650 mg, Oral, Q6H PRN, Hetul Miles, DO, 650 mg at 11/24/17 1155    albuterol inhalation solution 2 5 mg, 2 5 mg, Nebulization, Q4H PRN, Hetul Miles, DO, 2 5 mg at 11/24/17 2012    allopurinol (ZYLOPRIM) tablet 200 mg, 200 mg, Oral, Once per day on Sun Tue Wed Fri Sat, Hetul Milse, DO, 200 mg at 11/24/17 1156    [START ON 11/27/2017] allopurinol (ZYLOPRIM) tablet 300 mg, 300 mg, Oral, Once per day on Mon Thu, Hetul Miles, DO    apixaban (ELIQUIS) tablet 2 5 mg, 2 5 mg, Oral, BID, Hetul Miles, DO, 2 5 mg at 11/24/17 1558    bisacodyl (DULCOLAX) EC tablet 5 mg, 5 mg, Oral, Daily PRN, Hetul Miles, DO, 5 mg at 11/24/17 1226    cyanocobalamin (VITAMIN B-12) tablet 1,000 mcg, 1,000 mcg, Oral, Daily, Hetul Miles, DO, 1,000 mcg at 11/24/17 1155    docusate sodium (COLACE) capsule 100 mg, 100 mg, Oral, BID, Hetul Miles, DO, 100 mg at 11/24/17 1605    doxercalciferol (HECTOROL) injection 0 5 mcg, 0 5 mcg, Intravenous, After Dialysis, Joby Waterman PA-C, 0 5 mcg at 11/25/17 0835    famotidine (PEPCID) tablet 20 mg, 20 mg, Oral, Daily, Adelia Stewart MD, 20 mg at 11/24/17 1156    ferrous sulfate tablet 325 mg, 325 mg, Oral, TID With Meals, Hetul Miles, DO, 325 mg at 11/25/17 0726    guaiFENesin (ROBITUSSIN) oral solution 200 mg, 200 mg, Oral, Q4H PRN, Hetul Miles, DO, 200 mg at 11/24/17 1555    HYDROmorphone (DILAUDID) tablet 1 mg, 1 mg, Oral, Q4H PRN, Akshat Mariscal MD, 1 mg at 11/25/17 0840    insulin glargine (LANTUS) subcutaneous injection 40 Units, 40 Units, Subcutaneous, HS, Hetul Miles, DO, 40 Units at 11/24/17 2138    insulin lispro (HumaLOG) 100 units/mL subcutaneous injection 1-6 Units, 1-6 Units, Subcutaneous, TID AC, 4 Units at 11/25/17 0713 **AND** Fingerstick Glucose (POCT), , , TID AC, Esperanza Natarajan PA-C    insulin lispro (HumaLOG) 100 units/mL subcutaneous injection 1-6 Units, 1-6 Units, Subcutaneous, HS, Esperanza Natarajan PA-C, 5 Units at 11/24/17 2230    insulin lispro (HumaLOG) 100 units/mL subcutaneous injection 8 Units, 8 Units, Subcutaneous, TID With Meals, Hetul DO Ginger, 8 Units at 11/25/17 0726    latanoprost (XALATAN) 0 005 % ophthalmic solution 1 drop, 1 drop, Both Eyes, HS, Hetul Miles, DO, 1 drop at 11/24/17 2139    levothyroxine tablet 25 mcg, 25 mcg, Oral, Early Morning, Hetul Miles, DO, 25 mcg at 11/25/17 0529    metoprolol tartrate (LOPRESSOR) partial tablet 12 5 mg, 12 5 mg, Oral, Once per day on Sun Tue Thu Sat, Hetul Miles, DO    miconazole 2 % cream, , Topical, BID, Hetul Miles, DO    nystatin (MYCOSTATIN) powder, , Topical, BID, Hetul Miles, DO    pantoprazole (PROTONIX) EC tablet 40 mg, 40 mg, Oral, BID AC, Hetul Miles, DO, 40 mg at 11/25/17 0709    piperacillin-tazobactam (ZOSYN) 3 375 g in dextrose 5 % 50 mL IVPB, 3 375 g, Intravenous, Q6H, Hetul Miles, DO, 3 375 g at 11/25/17 0529    pneumococcal 13-valent conjugate vaccine (PREVNAR-13) IM injection 0 5 mL, 0 5 mL, Intramuscular, Prior to discharge, Hetul Miles, DO    polyethylene glycol (MIRALAX) packet 17 g, 17 g, Oral, Daily, Hetul Miles, DO, Stopped at 11/24/17 1229    pravastatin (PRAVACHOL) tablet 80 mg, 80 mg, Oral, Daily, Hetul Miles, DO, 80 mg at 11/24/17 1157    predniSONE tablet 40 mg, 40 mg, Oral, BID With Meals, Hetul Miles, DO, 40 mg at 11/25/17 0726    pregabalin (LYRICA) capsule 75 mg, 75 mg, Oral, BID, Hetul Miles, DO, 75 mg at 11/24/17 1555    senna (SENOKOT) tablet 17 2 mg, 2 tablet, Oral, Daily PRN, Hetul Miles, DO, 17 2 mg at 11/24/17 1230    vancomycin (VANCOCIN) 1,500 mg in sodium chloride 0 9 % 250 mL IVPB, 10 mg/kg, Intravenous, Q24H, Hetul Miles, DO, Last Rate: 166 7 mL/hr at 11/24/17 1503, 1,500 mg at 11/24/17 1503    Laboratory Results:    Results from last 7 days  Lab Units 11/24/17  0453 11/23/17 0927 11/23/17  0923   WBC Thousand/uL  --  9 13  --    HEMOGLOBIN g/dL  --  9 9*  --    I STAT HEMOGLOBIN g/dl  --   --  9 9*   HEMATOCRIT %  --  32 1*  --    PLATELETS Thousands/uL  --  141*  --    SODIUM mmol/L 135* 136  --    POTASSIUM mmol/L 4 2 3 8  --    CHLORIDE mmol/L 95* 95*  --    CO2 mmol/L 31 34*  --    BUN mg/dL 42* 33*  --    CREATININE mg/dL 2 99* 2 12*  --    CALCIUM mg/dL 8 1* 9 0  --    MAGNESIUM mg/dL 2 2  --   --    PHOSPHORUS mg/dL 6 1*  --   --    ALBUMIN g/dL 1 8* 2 4*  --    TOTAL PROTEIN g/dL 5 4* 6 8  --    GLUCOSE RANDOM mg/dL 235* 151*  --    GLUCOSE, ISTAT mg/dl  --   --  157*

## 2017-11-26 LAB
GLUCOSE SERPL-MCNC: 232 MG/DL (ref 65–140)
GLUCOSE SERPL-MCNC: 240 MG/DL (ref 65–140)
GLUCOSE SERPL-MCNC: 252 MG/DL (ref 65–140)
GLUCOSE SERPL-MCNC: 286 MG/DL (ref 65–140)
VANCOMYCIN TROUGH SERPL-MCNC: 17.9 UG/ML (ref 10–20)

## 2017-11-26 PROCEDURE — 82948 REAGENT STRIP/BLOOD GLUCOSE: CPT

## 2017-11-26 PROCEDURE — 94640 AIRWAY INHALATION TREATMENT: CPT

## 2017-11-26 PROCEDURE — 94760 N-INVAS EAR/PLS OXIMETRY 1: CPT

## 2017-11-26 PROCEDURE — 80202 ASSAY OF VANCOMYCIN: CPT | Performed by: INTERNAL MEDICINE

## 2017-11-26 RX ORDER — LEVOFLOXACIN 500 MG/1
500 TABLET, FILM COATED ORAL EVERY 24 HOURS
Status: DISCONTINUED | OUTPATIENT
Start: 2017-11-26 | End: 2017-11-29

## 2017-11-26 RX ADMIN — Medication 325 MG: at 16:17

## 2017-11-26 RX ADMIN — ACETAMINOPHEN 650 MG: 325 TABLET, FILM COATED ORAL at 11:32

## 2017-11-26 RX ADMIN — SENNOSIDES 17.2 MG: 8.6 TABLET, FILM COATED ORAL at 08:55

## 2017-11-26 RX ADMIN — PANTOPRAZOLE SODIUM 40 MG: 40 TABLET, DELAYED RELEASE ORAL at 06:13

## 2017-11-26 RX ADMIN — PREDNISONE 40 MG: 20 TABLET ORAL at 08:30

## 2017-11-26 RX ADMIN — NYSTATIN: 100000 POWDER TOPICAL at 09:01

## 2017-11-26 RX ADMIN — INSULIN LISPRO 8 UNITS: 100 INJECTION, SOLUTION INTRAVENOUS; SUBCUTANEOUS at 16:17

## 2017-11-26 RX ADMIN — Medication 325 MG: at 11:32

## 2017-11-26 RX ADMIN — LATANOPROST 1 DROP: 50 SOLUTION OPHTHALMIC at 21:33

## 2017-11-26 RX ADMIN — PIPERACILLIN SODIUM,TAZOBACTAM SODIUM 2.25 G: 2; .25 INJECTION, POWDER, FOR SOLUTION INTRAVENOUS at 03:09

## 2017-11-26 RX ADMIN — POLYETHYLENE GLYCOL 3350 17 G: 17 POWDER, FOR SOLUTION ORAL at 09:00

## 2017-11-26 RX ADMIN — INSULIN GLARGINE 45 UNITS: 100 INJECTION, SOLUTION SUBCUTANEOUS at 21:25

## 2017-11-26 RX ADMIN — ALBUTEROL SULFATE 2.5 MG: 2.5 SOLUTION RESPIRATORY (INHALATION) at 13:57

## 2017-11-26 RX ADMIN — APIXABAN 2.5 MG: 2.5 TABLET, FILM COATED ORAL at 17:49

## 2017-11-26 RX ADMIN — PREDNISONE 40 MG: 20 TABLET ORAL at 16:17

## 2017-11-26 RX ADMIN — INSULIN LISPRO 8 UNITS: 100 INJECTION, SOLUTION INTRAVENOUS; SUBCUTANEOUS at 11:32

## 2017-11-26 RX ADMIN — NYSTATIN: 100000 POWDER TOPICAL at 17:50

## 2017-11-26 RX ADMIN — PREGABALIN 75 MG: 75 CAPSULE ORAL at 09:22

## 2017-11-26 RX ADMIN — Medication 325 MG: at 08:30

## 2017-11-26 RX ADMIN — PRAVASTATIN SODIUM 80 MG: 80 TABLET ORAL at 08:56

## 2017-11-26 RX ADMIN — FAMOTIDINE 20 MG: 20 TABLET, FILM COATED ORAL at 08:56

## 2017-11-26 RX ADMIN — CYANOCOBALAMIN TAB 500 MCG 1000 MCG: 500 TAB at 08:55

## 2017-11-26 RX ADMIN — GUAIFENESIN 1200 MG: 600 TABLET, EXTENDED RELEASE ORAL at 08:56

## 2017-11-26 RX ADMIN — ALBUTEROL SULFATE 2.5 MG: 2.5 SOLUTION RESPIRATORY (INHALATION) at 21:43

## 2017-11-26 RX ADMIN — ALLOPURINOL 200 MG: 100 TABLET ORAL at 08:56

## 2017-11-26 RX ADMIN — INSULIN LISPRO 4 UNITS: 100 INJECTION, SOLUTION INTRAVENOUS; SUBCUTANEOUS at 21:25

## 2017-11-26 RX ADMIN — INSULIN LISPRO 3 UNITS: 100 INJECTION, SOLUTION INTRAVENOUS; SUBCUTANEOUS at 11:33

## 2017-11-26 RX ADMIN — INSULIN LISPRO 3 UNITS: 100 INJECTION, SOLUTION INTRAVENOUS; SUBCUTANEOUS at 08:00

## 2017-11-26 RX ADMIN — MICONAZOLE NITRATE: 20 CREAM TOPICAL at 09:01

## 2017-11-26 RX ADMIN — PIPERACILLIN SODIUM,TAZOBACTAM SODIUM 2.25 G: 2; .25 INJECTION, POWDER, FOR SOLUTION INTRAVENOUS at 11:59

## 2017-11-26 RX ADMIN — GUAIFENESIN 1200 MG: 600 TABLET, EXTENDED RELEASE ORAL at 21:26

## 2017-11-26 RX ADMIN — METOPROLOL TARTRATE 12.5 MG: 25 TABLET ORAL at 08:55

## 2017-11-26 RX ADMIN — PANTOPRAZOLE SODIUM 40 MG: 40 TABLET, DELAYED RELEASE ORAL at 16:17

## 2017-11-26 RX ADMIN — APIXABAN 2.5 MG: 2.5 TABLET, FILM COATED ORAL at 08:56

## 2017-11-26 RX ADMIN — PREGABALIN 75 MG: 75 CAPSULE ORAL at 17:49

## 2017-11-26 RX ADMIN — MICONAZOLE NITRATE: 20 CREAM TOPICAL at 17:50

## 2017-11-26 RX ADMIN — INSULIN LISPRO 8 UNITS: 100 INJECTION, SOLUTION INTRAVENOUS; SUBCUTANEOUS at 08:56

## 2017-11-26 RX ADMIN — DOCUSATE SODIUM 100 MG: 100 CAPSULE, LIQUID FILLED ORAL at 08:56

## 2017-11-26 RX ADMIN — INSULIN LISPRO 3 UNITS: 100 INJECTION, SOLUTION INTRAVENOUS; SUBCUTANEOUS at 16:17

## 2017-11-26 RX ADMIN — LEVOTHYROXINE SODIUM 25 MCG: 25 TABLET ORAL at 05:47

## 2017-11-26 RX ADMIN — LEVOFLOXACIN 500 MG: 500 TABLET, FILM COATED ORAL at 18:10

## 2017-11-26 RX ADMIN — DOCUSATE SODIUM 100 MG: 100 CAPSULE, LIQUID FILLED ORAL at 17:49

## 2017-11-26 NOTE — PROGRESS NOTES
[address] Internal Medicine Progress Note  Patient: Lux Dash 67 y o  female   MRN: [id number]  PCP: Marie French MD  Unit/Bed#: [facility] 521-01 Encounter: [id number]  Date Of Visit: 11/26/17    Assessment:    Principal Problem:    Pneumonia  Active Problems:    Chronic diastolic CHF (congestive heart failure) ([facility])    IDDM (insulin dependent diabetes mellitus) ([facility] )    Morbid obesity ([facility] )    Essential hypertension    History of pulmonary embolism    Acquired coagulopathy - Coumadin      Plan:    · Right-sided pneumonia/HCAP  ? Cultures neg  Will change abx to levaquin Day4/7  ? Repeat CT chest in the 4 to 6 weeks to ensure resolution  ? Bronchodilator therapy  ? Pulmonary toilet  ·  End-stage renal disease-Unable to access fistula  Pending IR Fistulogram  S/P temp dialysis catheter  · Acute respiratory failure with hypoxia-- likely due to pneumonia +/-pulmonary congestion/volume overload  Receiving antibiotics  Volume removal per HD  Respiratory status improved  Titrate oxygen as tolerated  · Metabolic encephalopathy in setting of pneumonia --mental status appears baseline  Monitor mental status with Levaquin  · H/o PE-Eliquis  · Morbid obesity  · Chronic diastolic heart failure-- Continue home meds  · IDDM--Continue current regimen   · Hyperlipidemia  · History of adrenal insufficiency-On prednisone       VTE Pharmacologic Prophylaxis:   Pharmacologic: Eliquis  Mechanical VTE Prophylaxis in Place:    Patient Centered Rounds: I have performed bedside rounds with nursing staff today  Discussions with Specialists or Other Care Team Provider:    Education and Discussions with Family / Patient:     Time Spent for Care: 20 minutes  More than 50% of total time spent on counseling and coordination of care as described above      Current Length of Stay: 3 day(s)    Current Patient Status: Inpatient   Certification Statement: The patient will continue to require additional inpatient hospital stay due to above    Discharge Plan / Estimated Discharge Date:     Code Status: Level 1 - Full Code      Subjective:   Pt seen and examined  Dry cough  Feeling better    Objective:     Vitals:   Temp (24hrs), Av 9 °F (36 6 °C), Min:97 6 °F (36 4 °C), Max:98 3 °F (36 8 °C)    HR:  [71-79] 75  Resp:  [17-20] 20  BP: (106-148)/(60-68) 106/60  SpO2:  [98 %-100 %] 100 %  Body mass index is 53 13 kg/m²  Input and Output Summary (last 24 hours): Intake/Output Summary (Last 24 hours) at 17 1605  Last data filed at 17 1402   Gross per 24 hour   Intake              360 ml   Output                0 ml   Net              360 ml       Physical Exam:     Physical Exam   Constitutional: She is oriented to person, place, and time  No distress  Eyes: Conjunctivae and EOM are normal  Pupils are equal, round, and reactive to light  Neck: Normal range of motion  Neck supple  No JVD present  Cardiovascular: Normal rate  Pulmonary/Chest: Effort normal and breath sounds normal  No respiratory distress  She has no wheezes  Abdominal: Soft  Bowel sounds are normal  She exhibits no distension  There is no tenderness  Musculoskeletal: She exhibits no edema  Neurological: She is alert and oriented to person, place, and time  Skin: Skin is warm and dry  She is not diaphoretic  Psychiatric: She has a normal mood and affect             Additional Data:     Labs:      Results from last 7 days  Lab Units 17  0927   WBC Thousand/uL 9 13   HEMOGLOBIN g/dL 9 9*   HEMATOCRIT % 32 1*   PLATELETS Thousands/uL 141*   NEUTROS PCT % 80*   LYMPHS PCT % 8*   MONOS PCT % 11   EOS PCT % 1       Results from last 7 days  Lab Units 17  0453   SODIUM mmol/L 135*   POTASSIUM mmol/L 4 2   CHLORIDE mmol/L 95*   CO2 mmol/L 31   BUN mg/dL 42*   CREATININE mg/dL 2 99*   CALCIUM mg/dL 8 1*   TOTAL PROTEIN g/dL 5 4*   BILIRUBIN TOTAL mg/dL 0 36   ALK PHOS U/L 57   ALT U/L 21   AST U/L 14   GLUCOSE RANDOM mg/dL 235* Results from last 7 days  Lab Units 11/23/17 0927   INR  1 09         Recent Cultures (last 7 days):       Results from last 7 days  Lab Units 11/23/17 0927   BLOOD CULTURE  No Growth at 72 hrs  No Growth at 72 hrs  Last 24 Hours Medication List:     allopurinol 200 mg Oral Once per day on Sun Tue Wed Fri Sat   [START ON 11/27/2017] allopurinol 300 mg Oral Once per day on Mon Thu   apixaban 2 5 mg Oral BID   cyanocobalamin 1,000 mcg Oral Daily   docusate sodium 100 mg Oral BID   famotidine 20 mg Oral Daily   ferrous sulfate 325 mg Oral TID With Meals   guaiFENesin 1,200 mg Oral Q12H [address]   insulin glargine 45 Units Subcutaneous HS   insulin lispro 1-6 Units Subcutaneous TID AC   insulin lispro 1-6 Units Subcutaneous HS   insulin lispro 8 Units Subcutaneous TID With Meals   latanoprost 1 drop Both Eyes HS   levothyroxine 25 mcg Oral Early Morning   metoprolol tartrate 12 5 mg Oral Once per day on Sun Tue Thu Sat   miconazole  Topical BID   nystatin  Topical BID   pantoprazole 40 mg Oral BID AC   piperacillin-tazobactam 2 25 g Intravenous Q8H   polyethylene glycol 17 g Oral Daily   pravastatin 80 mg Oral Daily   predniSONE 40 mg Oral BID With Meals   pregabalin 75 mg Oral BID        Today, Patient Was Seen By: Alfredito Silver MD    ** Please Note: This note has been constructed using a voice recognition system   **

## 2017-11-26 NOTE — PROGRESS NOTES
NEPHROLOGY PROGRESS NOTE    Greg Body 67 y o  female MRN: [id number]  Unit/Bed#: [facility] 521-01 Encounter: [id number]  Reason for Consult:  End-stage renal disease    ASSESSMENT/PLAN:  1  Renal     The patient has end-stage renal disease normal dialysis is   She was done yesterday for treatment due to inability to access her fistula on Friday  She has a hematoma near her fistula from an infiltration but the fistula itself has a good bruit and thrill  At this point she was dialyzed through a temporary dialysis catheter  I would try to cannulate her arm fistula as it has had some time to rest   If this is unsuccessful would consult interventional Radiology  In the meantime you do have the presence of a temporary catheter present to perform dialysis if unable to access the fistula  The nurses in the dialysis unit are aware of this plan  2   Pulmonary  Being treated with antibiotics for potential bronchitis versus pneumonia  Clinically improvement with less cough  SUBJECTIVE:  Review of Systems   Constitution: Negative  HENT: Negative  Eyes: Negative  Cardiovascular: Negative  Negative for chest pain and orthopnea  Respiratory: Negative  Negative for cough, shortness of breath and wheezing  Gastrointestinal: Negative  Negative for bloating, abdominal pain, diarrhea, nausea and vomiting  OBJECTIVE:  Current Weight: Weight - Scale: (!) 140 kg (309 lb 8 4 oz)  Vitals:Temp (24hrs), Av 8 °F (36 6 °C), Min:96 8 °F (36 °C), Max:98 4 °F (36 9 °C)  Current: Temperature: 98 3 °F (36 8 °C)   Blood pressure 142/68, pulse 75, temperature 98 3 °F (36 8 °C), temperature source Oral, resp  rate 20, height 5' 4" (1 626 m), weight (!) 140 kg (309 lb 8 4 oz), SpO2 100 %, not currently breastfeeding  ,Body mass index is 53 13 kg/m²        Intake/Output Summary (Last 24 hours) at 17 1204  Last data filed at 17 0800   Gross per 24 hour   Intake 540 ml   Output             2484 ml   Net            -1944 ml       Physical Exam: /68   Pulse 75   Temp 98 3 °F (36 8 °C) (Oral)   Resp 20   Ht 5' 4" (1 626 m)   Wt (!) 140 kg (309 lb 8 4 oz)   LMP  (LMP Unknown)   SpO2 100%   BMI 53 13 kg/m²   Physical Exam   Constitutional: No distress  Eyes: No scleral icterus  Neck: Neck supple  No JVD present  Cardiovascular: Normal rate  Exam reveals no friction rub  Pulmonary/Chest: Effort normal and breath sounds normal  No respiratory distress  She has no wheezes  Decreased breath sounds bases   Abdominal: Soft  Bowel sounds are normal  She exhibits no distension  There is no tenderness  There is no rebound  Musculoskeletal: She exhibits edema         Medications:    Current Facility-Administered Medications:     acetaminophen (TYLENOL) tablet 650 mg, 650 mg, Oral, Q6H PRN, Hetul Miles, DO, 650 mg at 11/26/17 1132    albuterol inhalation solution 2 5 mg, 2 5 mg, Nebulization, Q4H PRN, Hetul Miles, DO, 2 5 mg at 11/25/17 2115    allopurinol (ZYLOPRIM) tablet 200 mg, 200 mg, Oral, Once per day on Sun Tue Wed Fri Sat, Hetul Miles, DO, 200 mg at 11/26/17 1105    [START ON 11/27/2017] allopurinol (ZYLOPRIM) tablet 300 mg, 300 mg, Oral, Once per day on Mon Thu, Hetul Miles, DO    apixaban (ELIQUIS) tablet 2 5 mg, 2 5 mg, Oral, BID, Hetul Miles, DO, 2 5 mg at 11/26/17 0856    bisacodyl (DULCOLAX) EC tablet 5 mg, 5 mg, Oral, Daily PRN, Hetul Miles, DO, 5 mg at 11/24/17 1226    cyanocobalamin (VITAMIN B-12) tablet 1,000 mcg, 1,000 mcg, Oral, Daily, Hetul Miles, DO, 1,000 mcg at 11/26/17 0855    docusate sodium (COLACE) capsule 100 mg, 100 mg, Oral, BID, Hetul Miles, DO, 100 mg at 11/26/17 0856    doxercalciferol (HECTOROL) injection 0 5 mcg, 0 5 mcg, Intravenous, After Dialysis, Angelina Wilson PA-C, 0 5 mcg at 11/25/17 0835    famotidine (PEPCID) tablet 20 mg, 20 mg, Oral, Daily, Kiley Johnson MD, 20 mg at 11/26/17 0856    ferrous sulfate tablet 325 mg, 325 mg, Oral, TID With Meals, Hetul Miles, DO, 325 mg at 11/26/17 1132    guaiFENesin (MUCINEX) 12 hr tablet 1,200 mg, 1,200 mg, Oral, Q12H [address], Mony Oquendo MD, 1,200 mg at 11/26/17 0856    guaiFENesin (ROBITUSSIN) oral solution 200 mg, 200 mg, Oral, Q4H PRN, Hetul Miles, DO, 200 mg at 11/24/17 1555    HYDROmorphone (DILAUDID) tablet 1 mg, 1 mg, Oral, Q4H PRN, Lauro Osler, MD, 1 mg at 11/25/17 0840    insulin glargine (LANTUS) subcutaneous injection 45 Units, 45 Units, Subcutaneous, HS, Mony Oquendo MD, 45 Units at 11/25/17 2120    insulin lispro (HumaLOG) 100 units/mL subcutaneous injection 1-6 Units, 1-6 Units, Subcutaneous, TID AC, 3 Units at 11/26/17 1133 **AND** Fingerstick Glucose (POCT), , , TID AC, Esperanza Natarajan PA-C    insulin lispro (HumaLOG) 100 units/mL subcutaneous injection 1-6 Units, 1-6 Units, Subcutaneous, HS, Esperanza Natarajan PA-C, 2 Units at 11/25/17 2128    insulin lispro (HumaLOG) 100 units/mL subcutaneous injection 8 Units, 8 Units, Subcutaneous, TID With Meals, Hetul Miles, DO, 8 Units at 11/26/17 1132    latanoprost (XALATAN) 0 005 % ophthalmic solution 1 drop, 1 drop, Both Eyes, HS, Hetul Miles, DO, 1 drop at 11/25/17 2128    levothyroxine tablet 25 mcg, 25 mcg, Oral, Early Morning, Hetul Miles, DO, 25 mcg at 11/26/17 0547    metoprolol tartrate (LOPRESSOR) partial tablet 12 5 mg, 12 5 mg, Oral, Once per day on Sun Tue Thu Sat, Hetul Miles, DO, 12 5 mg at 11/26/17 9660    miconazole 2 % cream, , Topical, BID, Hetul Miles, DO    nystatin (MYCOSTATIN) powder, , Topical, BID, Hetul Miles, DO    pantoprazole (PROTONIX) EC tablet 40 mg, 40 mg, Oral, BID AC, Hetul Miles, DO, 40 mg at 11/26/17 0613    piperacillin-tazobactam (ZOSYN) 2 25 g in dextrose 5 % 50 mL IVPB, 2 25 g, Intravenous, Q8H, Mony Oquendo MD, 2 25 g at 11/26/17 1159    pneumococcal 13-valent conjugate vaccine (PREVNAR-13) IM injection 0 5 mL, 0 5 mL, Intramuscular, Prior to discharge, Hetul Miles, DO    polyethylene glycol (MIRALAX) packet 17 g, 17 g, Oral, Daily, Hetul Miles, DO, 17 g at 11/26/17 0900    pravastatin (PRAVACHOL) tablet 80 mg, 80 mg, Oral, Daily, Hetul Miles, DO, 80 mg at 11/26/17 0856    predniSONE tablet 40 mg, 40 mg, Oral, BID With Meals, Hetul Miles, DO, 40 mg at 11/26/17 0830    pregabalin (LYRICA) capsule 75 mg, 75 mg, Oral, BID, Hetul Miles, DO, 75 mg at 11/26/17 1186    senna (SENOKOT) tablet 17 2 mg, 2 tablet, Oral, Daily PRN, Hetul Miles, DO, 17 2 mg at 11/26/17 0855    vancomycin (VANCOCIN) IVPB (premix) 1,000 mg, 1,000 mg, Intravenous, After Dialysis, Marco Garcia MD    Laboratory Results:  Lab Results   Component Value Date    WBC 9 13 11/23/2017    HGB 9 9 (L) 11/23/2017    HCT 32 1 (L) 11/23/2017    MCV 97 11/23/2017     (L) 11/23/2017     Lab Results   Component Value Date    GLUCOSE 235 (H) 11/24/2017    CALCIUM 8 1 (L) 11/24/2017     (L) 11/24/2017    K 4 2 11/24/2017    CO2 31 11/24/2017    CL 95 (L) 11/24/2017    BUN 42 (H) 11/24/2017    CREATININE 2 99 (H) 11/24/2017     Lab Results   Component Value Date    CALCIUM 8 1 (L) 11/24/2017    PHOS 6 1 (H) 11/24/2017     No results found for: LABPROT

## 2017-11-26 NOTE — MALNUTRITION/BMI
This medical record reflects one or more clinical indicators suggestive of malnutrition and/or morbid obesity  Please indicate the one diagnosis below which you feel best reflects the clinical picture  Malnutrition Findings:         BMI Findings:  50-59 9    Body mass index is 53 13 kg/m²  See Nutrition note dated 11/26 for additional details  Completed nutrition assessment is viewable in the nutrition documentation

## 2017-11-26 NOTE — PLAN OF CARE
DISCHARGE PLANNING     Discharge to home or other facility with appropriate resources Progressing        DISCHARGE PLANNING - CARE MANAGEMENT     Discharge to post-acute care or home with appropriate resources Progressing        INFECTION - ADULT     Absence or prevention of progression during hospitalization Progressing        Nutrition/Hydration-ADULT     Nutrient/Hydration intake appropriate for improving, restoring or maintaining nutritional needs Progressing        PAIN - ADULT     Verbalizes/displays adequate comfort level or baseline comfort level Progressing        Potential for Falls     Patient will remain free of falls Progressing        Prexisting or High Potential for Compromised Skin Integrity     Skin integrity is maintained or improved Progressing        SAFETY ADULT     Maintain or return to baseline ADL function Progressing     Maintain or return mobility status to optimal level Progressing     Patient will remain free of falls Progressing        SKIN/TISSUE INTEGRITY - ADULT     Skin integrity remains intact Progressing     Incision(s), wounds(s) or drain site(s) healing without S/S of infection Progressing     Oral mucous membranes remain intact Progressing

## 2017-11-27 ENCOUNTER — APPOINTMENT (INPATIENT)
Dept: DIALYSIS | Facility: HOSPITAL | Age: 72
DRG: 177 | End: 2017-11-27
Payer: MEDICARE

## 2017-11-27 LAB
GLUCOSE SERPL-MCNC: 180 MG/DL (ref 65–140)
GLUCOSE SERPL-MCNC: 243 MG/DL (ref 65–140)
GLUCOSE SERPL-MCNC: 303 MG/DL (ref 65–140)
GLUCOSE SERPL-MCNC: 319 MG/DL (ref 65–140)

## 2017-11-27 PROCEDURE — 94760 N-INVAS EAR/PLS OXIMETRY 1: CPT

## 2017-11-27 PROCEDURE — 92526 ORAL FUNCTION THERAPY: CPT

## 2017-11-27 PROCEDURE — 82948 REAGENT STRIP/BLOOD GLUCOSE: CPT

## 2017-11-27 PROCEDURE — 5A1D70Z PERFORMANCE OF URINARY FILTRATION, INTERMITTENT, LESS THAN 6 HOURS PER DAY: ICD-10-PCS | Performed by: INTERNAL MEDICINE

## 2017-11-27 PROCEDURE — 94640 AIRWAY INHALATION TREATMENT: CPT

## 2017-11-27 RX ORDER — BENZONATATE 100 MG/1
100 CAPSULE ORAL 3 TIMES DAILY PRN
Status: DISCONTINUED | OUTPATIENT
Start: 2017-11-27 | End: 2017-11-29

## 2017-11-27 RX ORDER — CALCIUM CARBONATE 200(500)MG
500 TABLET,CHEWABLE ORAL 2 TIMES DAILY WITH MEALS
Status: DISCONTINUED | OUTPATIENT
Start: 2017-11-27 | End: 2017-12-03 | Stop reason: HOSPADM

## 2017-11-27 RX ADMIN — PREDNISONE 40 MG: 20 TABLET ORAL at 15:34

## 2017-11-27 RX ADMIN — PANTOPRAZOLE SODIUM 40 MG: 40 TABLET, DELAYED RELEASE ORAL at 15:34

## 2017-11-27 RX ADMIN — NYSTATIN 1 APPLICATION: 100000 POWDER TOPICAL at 17:20

## 2017-11-27 RX ADMIN — ALBUTEROL SULFATE 2.5 MG: 2.5 SOLUTION RESPIRATORY (INHALATION) at 08:18

## 2017-11-27 RX ADMIN — DOXERCALCIFEROL 0.5 MCG: 2 INJECTION, SOLUTION INTRAVENOUS at 12:51

## 2017-11-27 RX ADMIN — INSULIN LISPRO 5 UNITS: 100 INJECTION, SOLUTION INTRAVENOUS; SUBCUTANEOUS at 21:22

## 2017-11-27 RX ADMIN — DOCUSATE SODIUM 100 MG: 100 CAPSULE, LIQUID FILLED ORAL at 17:20

## 2017-11-27 RX ADMIN — INSULIN LISPRO 5 UNITS: 100 INJECTION, SOLUTION INTRAVENOUS; SUBCUTANEOUS at 17:21

## 2017-11-27 RX ADMIN — PREDNISONE 40 MG: 20 TABLET ORAL at 07:46

## 2017-11-27 RX ADMIN — GUAIFENESIN 200 MG: 100 SOLUTION ORAL at 21:22

## 2017-11-27 RX ADMIN — PREGABALIN 75 MG: 75 CAPSULE ORAL at 17:20

## 2017-11-27 RX ADMIN — GUAIFENESIN 1200 MG: 600 TABLET, EXTENDED RELEASE ORAL at 21:21

## 2017-11-27 RX ADMIN — MICONAZOLE NITRATE 1 APPLICATION: 20 CREAM TOPICAL at 17:21

## 2017-11-27 RX ADMIN — LEVOFLOXACIN 500 MG: 500 TABLET, FILM COATED ORAL at 18:31

## 2017-11-27 RX ADMIN — LEVOTHYROXINE SODIUM 25 MCG: 25 TABLET ORAL at 05:09

## 2017-11-27 RX ADMIN — INSULIN LISPRO 3 UNITS: 100 INJECTION, SOLUTION INTRAVENOUS; SUBCUTANEOUS at 07:46

## 2017-11-27 RX ADMIN — APIXABAN 2.5 MG: 2.5 TABLET, FILM COATED ORAL at 17:20

## 2017-11-27 RX ADMIN — ALBUTEROL SULFATE 2.5 MG: 2.5 SOLUTION RESPIRATORY (INHALATION) at 21:04

## 2017-11-27 RX ADMIN — Medication 325 MG: at 15:34

## 2017-11-27 RX ADMIN — INSULIN GLARGINE 45 UNITS: 100 INJECTION, SOLUTION SUBCUTANEOUS at 21:22

## 2017-11-27 RX ADMIN — PANTOPRAZOLE SODIUM 40 MG: 40 TABLET, DELAYED RELEASE ORAL at 06:09

## 2017-11-27 RX ADMIN — Medication 500 MG: at 15:34

## 2017-11-27 RX ADMIN — INSULIN LISPRO 8 UNITS: 100 INJECTION, SOLUTION INTRAVENOUS; SUBCUTANEOUS at 07:46

## 2017-11-27 RX ADMIN — ALBUTEROL SULFATE 2.5 MG: 2.5 SOLUTION RESPIRATORY (INHALATION) at 14:47

## 2017-11-27 RX ADMIN — Medication 325 MG: at 07:46

## 2017-11-27 RX ADMIN — GUAIFENESIN 200 MG: 100 SOLUTION ORAL at 12:49

## 2017-11-27 RX ADMIN — INSULIN LISPRO 8 UNITS: 100 INJECTION, SOLUTION INTRAVENOUS; SUBCUTANEOUS at 17:23

## 2017-11-27 RX ADMIN — LATANOPROST 1 DROP: 50 SOLUTION OPHTHALMIC at 21:22

## 2017-11-27 NOTE — CASE MANAGEMENT
Continued Stay Review    Date:   11/27/17 Monday ACUTE MED SURG LEVEL OF CARE      Vital Signs: BP (!) 171/64   Pulse 64   Temp 98 5 °F (36 9 °C) (Tympanic)   Resp 22   Ht 5' 4" (1 626 m)   Wt (!) 142 kg (313 lb 15 oz)   LMP  (LMP Unknown)   SpO2 96%   BMI 53 89 kg/m²      Last data filed at 11/27/17 0900    Gross per 24 hour   Intake              690 ml   Output                0 ml   Net              690 ml      Diet Renal; Renal (Dialysis);  Consstent Carbohydrate Diet Level 3 (6 carb servings/90 grams CHO/meal)      IV ACCESS      Medications:   Scheduled Meds:  allopurinol 200 mg Oral Once per day on Sun Tue Wed Fri Sat   allopurinol 300 mg Oral Once per day on Mon Thu   apixaban 2 5 mg Oral BID   calcium carbonate 500 mg Oral BID With Meals   cyanocobalamin 1,000 mcg Oral Daily   docusate sodium 100 mg Oral BID   famotidine 20 mg Oral Daily   ferrous sulfate 325 mg Oral TID With Meals   guaiFENesin 1,200 mg Oral Q12H FREDI   insulin glargine 45 Units Subcutaneous HS   insulin lispro 1-6 Units Subcutaneous TID AC   insulin lispro 1-6 Units Subcutaneous HS   insulin lispro 8 Units Subcutaneous TID With Meals   latanoprost 1 drop Both Eyes HS   levofloxacin 500 mg Oral Q24H   levothyroxine 25 mcg Oral Early Morning   metoprolol tartrate 12 5 mg Oral Once per day on Sun Tue Thu Sat   miconazole  Topical BID   nystatin  Topical BID   pantoprazole 40 mg Oral BID AC   polyethylene glycol 17 g Oral Daily   pravastatin 80 mg Oral Daily   predniSONE 40 mg Oral BID With Meals   pregabalin 75 mg Oral BID     PRN Meds:     acetaminophen    NEB Tx with albuterol    bisacodyl    doxercalciferol    guaiFENesin 200 mg q4hrs prn given x 1/ 24 hrs    HYDROmorphone    pneumococcal 13-valent conjugate vaccine    senna      LABS/Diagnostic Results:   Results from last 7 days  Lab Units 11/24/17  0453 11/23/17  0927 11/23/17  0923   WBC Thousand/uL  --  9 13  --    HEMOGLOBIN g/dL  --  9 9*  --    I STAT HEMOGLOBIN g/dl  --   --  9 9*   HEMATOCRIT %  --  32 1*  --    PLATELETS Thousands/uL  --  141*  --    SODIUM mmol/L 135* 136  --    POTASSIUM mmol/L 4 2 3 8  --    CHLORIDE mmol/L 95* 95*  --    CO2 mmol/L 31 34*  --    BUN mg/dL 42* 33*  --    CREATININE mg/dL 2 99* 2 12*  --    CALCIUM mg/dL 8 1* 9 0  --    MAGNESIUM mg/dL 2 2  --   --    PHOSPHORUS mg/dL 6 1*  --   --    ALBUMIN g/dL 1 8* 2 4*  --    TOTAL PROTEIN g/dL 5 4* 6 8  --    GLUCOSE RANDOM mg/dL 235* 151*  --    GLUCOSE, ISTAT mg/dl  --   --  157*        Age/Sex: 67 y o  female     Assessment/Plan (per recent Int Med MD progress note):  Assessment:   Principal Problem:    Pneumonia  Active Problems:    Chronic diastolic CHF (congestive heart failure) (HCC)    IDDM (insulin dependent diabetes mellitus) (HCC)    Morbid obesity (HCC)    Essential hypertension    History of pulmonary embolism    Acquired coagulopathy - Coumadin      Plan:   · Right-sided pneumonia/HCAP  ? Cultures neg  Will change abx to levaquin Day4/7  ? Repeat CT chest in the 4 to 6 weeks to ensure resolution  ? Bronchodilator therapy  ? Pulmonary toilet  ·  End-stage renal disease-Unable to access fistula  Pending IR Fistulogram  S/P temp dialysis catheter  § Acute respiratory failure with hypoxia-- likely due to pneumonia +/-pulmonary congestion/volume overload  Receiving antibiotics  Volume removal per HD  Respiratory status improved  Titrate oxygen as tolerated  § Metabolic encephalopathy in setting of pneumonia --mental status appears baseline    Monitor mental status with Levaquin  · H/o PE-Eliquis  · Morbid obesity  · Chronic diastolic heart failure-- Continue home meds  · IDDM--Continue current regimen   · Hyperlipidemia  · History of adrenal insufficiency-On prednisone         VTE Pharmacologic Prophylaxis:   Pharmacologic: Eliquis  Mechanical VTE Prophylaxis in Place:      Current Length of Stay: 3 day(s)     Current Patient Status: Inpatient   Certification Statement: The patient will continue to require additional inpatient hospital stay due to above        Discharge Plan:   ANTICIPATE DISCHARGE BACK TO LONG TERM SKILLED FACILITY WHEN MEDICALLY CLEARED    PER PT TODAY 11/27/17  orders received and chart reviewed  Pt bed bound and does not ambulate I at baseline as per CM note  Pt is a 5 year resident of [facility] and would like to return at [place]  DC PT at this time due to pts prior functional mobility  Re-consult PT if needed      CASE MANAGEMENT FOLLOWING CLOSELY FOR ALL DISCHARGE NEEDS + CO-ORDINATION

## 2017-11-27 NOTE — OCCUPATIONAL THERAPY NOTE
Occupational Therapy         Patient Name: Marek Johnson  GPFFR'S Date: 11/27/2017    Occupational Therapy Cancellation Notice     OT orders received and chart review completed and Attempted to see pt for Ot evaluation today however pt is off the floor for dialysis  Will continue to follow and attempt to see pt at another time      Real Arteaga MS , OTR/L

## 2017-11-27 NOTE — SPEECH THERAPY NOTE
Speech Language/Pathology                             SLP  Note  Patient Name: Meka Terry  VSGNS'E Date: 11/27/2017     Subjective:  "I'm doing OK I think "  Objective:  Pt seen for f/u to swallowing evaluation  She was assessed c molasses cookies and thin liquid/water  Adequate mastication, bolus formation and transfer  No overt s/s reduced oral control  Prompt appearing swallow initiation  No coughing, throat clearing, change in vocal quality or BS c po intake this pm     Assessment:  No overt s/s oropharyngeal dysphagia  Plan/Recommendations:  D/C SLP f/u at this time

## 2017-11-27 NOTE — PROGRESS NOTES
[facility] Internal Medicine Progress Note  Patient: Job Danny 67 y o  female   MRN: [id number]  PCP: Arlet Martinez MD  Unit/Bed#: [facility] 521-01 Encounter: [id number]  Date Of Visit: 11/27/17    Assessment:    Principal Problem:    Pneumonia  Active Problems:    Chronic diastolic CHF (congestive heart failure) ([facility])    IDDM (insulin dependent diabetes mellitus) ([facility] [address] )    Morbid obesity ([facility] )    Essential hypertension    History of pulmonary embolism    Acquired coagulopathy - Coumadin      Plan:    · Right-sided pneumonia/HCAP Possible gram negative pneumonia   ? Cultures neg  On antibiotic 5/7  Abx Changed to  Levaquin  She appears tolerated  ? Repeat CT chest in the 4 to 6 weeks to ensure resolution  ? Bronchodilator therapy  ? Pulmonary toilet  ·  End-stage renal disease-Unable to access fistula   Unable to cannulate secondary to infiltration of the fistula  Will need PermCath  Follow renal recommendations  · Acute respiratory failure with hypoxia-- likely due to pneumonia +/-pulmonary congestion/volume overload  Receiving antibiotics  Volume removal per HD  Respiratory status improved  Titrate oxygen as tolerated  · Type II NSTEMI in setting of pneumonia   · Metabolic encephalopathy in setting of pneumonia --mental status appears baseline  Appears tolerating Levaquin without mental status change  · H/o PE-Eliquis  · Morbid obesity  · Chronic diastolic heart failure-- Continue home meds  · IDDM--Continue current regimen  Lantus recently increased  · Hyperlipidemia  · History of adrenal insufficiency-On prednisone   · Stage 2 pressure ulcer of left buttock, POA      VTE Pharmacologic Prophylaxis:   Pharmacologic: Apixaban (Eliquis)  Mechanical VTE Prophylaxis in Place:     Patient Centered Rounds: I have performed bedside rounds with nursing staff today      Discussions with Specialists or Other Care Team Provider:     Education and Discussions with Family / Patient:     Time Spent for Care: 20 minutes  More than 50% of total time spent on counseling and coordination of care as described above  Current Length of Stay: 4 day(s)    Current Patient Status: Inpatient   Certification Statement: The patient will continue to require additional inpatient hospital stay due to above    Discharge Plan / Estimated Discharge Date:  Back to [place] [id number] home when stable    Code Status: Level 1 - Full Code      Subjective:   Patient seen and examined  Overall feeling breathing improved  Still having dry cough  Objective:     Vitals:   Temp (24hrs), Av 1 °F (36 7 °C), Min:97 7 °F (36 5 °C), Max:98 5 °F (36 9 °C)    HR:  [61-93] 64  Resp:  [12-22] 22  BP: (142-171)/(45-80) 171/64  SpO2:  [95 %-100 %] 95 %  Body mass index is 53 89 kg/m²  Input and Output Summary (last 24 hours): Intake/Output Summary (Last 24 hours) at 17 1547  Last data filed at 17 1320   Gross per 24 hour   Intake              870 ml   Output             2995 ml   Net            -2125 ml       Physical Exam:     Physical Exam   Constitutional: She is oriented to person, place, and time  No distress  Eyes: Conjunctivae and EOM are normal  Pupils are equal, round, and reactive to light  Neck: Normal range of motion  Neck supple  No JVD present  Cardiovascular: Normal rate and regular rhythm  Pulmonary/Chest: Effort normal  No respiratory distress  She has no wheezes  Abdominal: Soft  Bowel sounds are normal    Musculoskeletal: She exhibits no edema  Neurological: She is alert and oriented to person, place, and time  Skin: Skin is warm  She is not diaphoretic  Psychiatric: She has a normal mood and affect         (    Additional Data:     Labs:      Results from last 7 days  Lab Units 17  0927   WBC Thousand/uL 9 13   HEMOGLOBIN g/dL 9 9*   HEMATOCRIT % 32 1*   PLATELETS Thousands/uL 141*   NEUTROS PCT % 80*   LYMPHS PCT % 8*   MONOS PCT % 11   EOS PCT % 1       Results from last 7 days  Lab Units 11/24/17  0453   SODIUM mmol/L 135*   POTASSIUM mmol/L 4 2   CHLORIDE mmol/L 95*   CO2 mmol/L 31   BUN mg/dL 42*   CREATININE mg/dL 2 99*   CALCIUM mg/dL 8 1*   TOTAL PROTEIN g/dL 5 4*   BILIRUBIN TOTAL mg/dL 0 36   ALK PHOS U/L 57   ALT U/L 21   AST U/L 14   GLUCOSE RANDOM mg/dL 235*       Results from last 7 days  Lab Units 11/23/17 0927   INR  1 09       * I Have Reviewed All Lab Data Listed Above  Imaging:        Recent Cultures (last 7 days):       Results from last 7 days  Lab Units 11/23/17 0927   BLOOD CULTURE  No Growth After 4 Days  No Growth After 4 Days  Last 24 Hours Medication List:     allopurinol 200 mg Oral Once per day on Sun Tue Wed Fri Sat   allopurinol 300 mg Oral Once per day on Mon Thu   apixaban 2 5 mg Oral BID   calcium carbonate 500 mg Oral BID With Meals   cyanocobalamin 1,000 mcg Oral Daily   docusate sodium 100 mg Oral BID   famotidine 20 mg Oral Daily   ferrous sulfate 325 mg Oral TID With Meals   guaiFENesin 1,200 mg Oral Q12H [facility]   insulin glargine 45 Units Subcutaneous HS   insulin lispro 1-6 Units Subcutaneous TID AC   insulin lispro 1-6 Units Subcutaneous HS   insulin lispro 8 Units Subcutaneous TID With Meals   latanoprost 1 drop Both Eyes HS   levofloxacin 500 mg Oral Q24H   levothyroxine 25 mcg Oral Early Morning   metoprolol tartrate 12 5 mg Oral Once per day on Sun Tue Thu Sat   miconazole  Topical BID   nystatin  Topical BID   pantoprazole 40 mg Oral BID AC   polyethylene glycol 17 g Oral Daily   pravastatin 80 mg Oral Daily   predniSONE 40 mg Oral BID With Meals   pregabalin 75 mg Oral BID        Today, Patient Was Seen By: Bernadine Schilling MD    ** Please Note: This note has been constructed using a voice recognition system   **

## 2017-11-27 NOTE — PROGRESS NOTES
NEPHROLOGY PROGRESS NOTE   Porsha Grady 67 y o  female MRN: [id number]  Unit/Bed#: [facility] 521-01 Encounter: [id number]  Reason for Consult:  End-stage renal disease    ASSESSMENT/PLAN:  1  End-stage renal disease, maintenance hemodialysis Monday Wednesday Friday, [facility] [address]  2  Infiltration of right upper arm AV fistula, unable to cannulate secondary to infiltration  3  Anemia of chronic disease, no JESSICA due to DVT history  4  Morbid obesity  5  Hypertension  6  Pneumonia    · Plan to place PermCath secondary to significant and persistent infiltration  · Allow access to rest for at least 2 weeks attempt to access as an outpatient  · Hemodialysis today, ultrafiltration approximately 2 L  · Resume Tums, 500 mg b i d  SUBJECTIVE:  Seen and examined  Patient access was only cannulated with 1 needle unfortunate due to infiltration 2nd needle was too shallow  Patient complained of cough, overall seems to be improving      OBJECTIVE:  Current Weight: Weight - Scale: (!) 142 kg (313 lb 15 oz)  Vitals:    11/27/17 0905 11/27/17 0930 11/27/17 1000 11/27/17 1030   BP: 154/76 148/62 143/61 148/61   Pulse: 68 70 76 64   Resp:       Temp:       TempSrc:       SpO2:       Weight:       Height:           Intake/Output Summary (Last 24 hours) at 11/27/17 1038  Last data filed at 11/27/17 0900   Gross per 24 hour   Intake              690 ml   Output                0 ml   Net              690 ml     GENERAL :  No distress, currently on hemodialysis  NECK:  Supple  CV:  Regular  RESP:  Decreased at bases  ABD:  Obese, soft  EXT:  Trace to 1+ edema  Psych:  Appropriate  SKIN:  No apparent rash     Medications:    Current Facility-Administered Medications:     acetaminophen (TYLENOL) tablet 650 mg, 650 mg, Oral, Q6H PRN, Hetul Miles, DO, 650 mg at 11/26/17 1132    albuterol inhalation solution 2 5 mg, 2 5 mg, Nebulization, Q4H PRN, Hetul Miles, DO, 2 5 mg at 11/27/17 0818    allopurinol (ZYLOPRIM) tablet 200 mg, 200 mg, Oral, Once per day on Sun Tue Wed Fri Sat, Hetul Miles, DO, 200 mg at 11/26/17 2258    allopurinol (ZYLOPRIM) tablet 300 mg, 300 mg, Oral, Once per day on Mon Thu, Hetul Miles, DO    apixaban (ELIQUIS) tablet 2 5 mg, 2 5 mg, Oral, BID, Hetul Miles, DO, 2 5 mg at 11/26/17 1749    bisacodyl (DULCOLAX) EC tablet 5 mg, 5 mg, Oral, Daily PRN, Hetul Miles, DO, 5 mg at 11/24/17 1226    cyanocobalamin (VITAMIN B-12) tablet 1,000 mcg, 1,000 mcg, Oral, Daily, Hetul Miles, DO, 1,000 mcg at 11/26/17 0855    docusate sodium (COLACE) capsule 100 mg, 100 mg, Oral, BID, Hetul Miles, DO, 100 mg at 11/26/17 1749    doxercalciferol (HECTOROL) injection 0 5 mcg, 0 5 mcg, Intravenous, After Dialysis, Tristian Fenton PA-C, 0 5 mcg at 11/25/17 0835    famotidine (PEPCID) tablet 20 mg, 20 mg, Oral, Daily, Neisha Ayon MD, 20 mg at 11/26/17 0856    ferrous sulfate tablet 325 mg, 325 mg, Oral, TID With Meals, Hetul Miles, DO, 325 mg at 11/27/17 0746    guaiFENesin (MUCINEX) 12 hr tablet 1,200 mg, 1,200 mg, Oral, Q12H [address], Alicia Andujar MD, 1,200 mg at 11/26/17 2126    guaiFENesin (ROBITUSSIN) oral solution 200 mg, 200 mg, Oral, Q4H PRN, Hetul Miles, DO, 200 mg at 11/24/17 1555    HYDROmorphone (DILAUDID) tablet 1 mg, 1 mg, Oral, Q4H PRN, Jenniffer Marie MD, 1 mg at 11/25/17 0840    insulin glargine (LANTUS) subcutaneous injection 45 Units, 45 Units, Subcutaneous, HS, Alicia Andujar MD, 45 Units at 11/26/17 2125    insulin lispro (HumaLOG) 100 units/mL subcutaneous injection 1-6 Units, 1-6 Units, Subcutaneous, TID AC, 3 Units at 11/27/17 0746 **AND** Fingerstick Glucose (POCT), , , TID AC, Esperanza Natarajan PA-C    insulin lispro (HumaLOG) 100 units/mL subcutaneous injection 1-6 Units, 1-6 Units, Subcutaneous, HS, Luis Olivares PA-C, 4 Units at 11/26/17 2125    insulin lispro (HumaLOG) 100 units/mL subcutaneous injection 8 Units, 8 Units, Subcutaneous, TID With Meals, Liam Miles DO, 8 Units at 11/27/17 0746   latanoprost (XALATAN) 0 005 % ophthalmic solution 1 drop, 1 drop, Both Eyes, HS, Hetul Miles, DO, 1 drop at 11/26/17 2133    levofloxacin (LEVAQUIN) tablet 500 mg, 500 mg, Oral, Q24H, Ant Strauss MD, 500 mg at 11/26/17 1810    levothyroxine tablet 25 mcg, 25 mcg, Oral, Early Morning, Hetul Miles, DO, 25 mcg at 11/27/17 0509    metoprolol tartrate (LOPRESSOR) partial tablet 12 5 mg, 12 5 mg, Oral, Once per day on Sun Tue Thu Sat, Hetul Miles, DO, 12 5 mg at 11/26/17 2644    miconazole 2 % cream, , Topical, BID, Hetul Miles, DO    nystatin (MYCOSTATIN) powder, , Topical, BID, Hetul Miles, DO    pantoprazole (PROTONIX) EC tablet 40 mg, 40 mg, Oral, BID AC, Hetul Miles, DO, 40 mg at 11/27/17 0609    pneumococcal 13-valent conjugate vaccine (PREVNAR-13) IM injection 0 5 mL, 0 5 mL, Intramuscular, Prior to discharge, Hetul Miles, DO    polyethylene glycol (MIRALAX) packet 17 g, 17 g, Oral, Daily, Hetul Miles, DO, 17 g at 11/26/17 0900    pravastatin (PRAVACHOL) tablet 80 mg, 80 mg, Oral, Daily, Hetul Miles, DO, 80 mg at 11/26/17 0856    predniSONE tablet 40 mg, 40 mg, Oral, BID With Meals, Hetul Miles, DO, 40 mg at 11/27/17 0746    pregabalin (LYRICA) capsule 75 mg, 75 mg, Oral, BID, Hetul Miles, DO, 75 mg at 11/26/17 1749    senna (SENOKOT) tablet 17 2 mg, 2 tablet, Oral, Daily PRN, Hetul Miles, DO, 17 2 mg at 11/26/17 0855    Laboratory Results:    Results from last 7 days  Lab Units 11/24/17  0453 11/23/17  0927 11/23/17  0923   WBC Thousand/uL  --  9 13  --    HEMOGLOBIN g/dL  --  9 9*  --    I STAT HEMOGLOBIN g/dl  --   --  9 9*   HEMATOCRIT %  --  32 1*  --    PLATELETS Thousands/uL  --  141*  --    SODIUM mmol/L 135* 136  --    POTASSIUM mmol/L 4 2 3 8  --    CHLORIDE mmol/L 95* 95*  --    CO2 mmol/L 31 34*  --    BUN mg/dL 42* 33*  --    CREATININE mg/dL 2 99* 2 12*  --    CALCIUM mg/dL 8 1* 9 0  --    MAGNESIUM mg/dL 2 2  --   --    PHOSPHORUS mg/dL 6 1*  --   --    ALBUMIN g/dL 1 8* 2 4*  -- TOTAL PROTEIN g/dL 5 4* 6 8  --    GLUCOSE RANDOM mg/dL 235* 151*  --    GLUCOSE, ISTAT mg/dl  --   --  157*

## 2017-11-27 NOTE — PHYSICAL THERAPY NOTE
PT orders received and chart reviewed  Pt bed bound and does not ambulate I at baseline as per CM note  Pt is a 5 year resident of [facility] and would like to return at [place]  DC PT at this time due to pts prior functional mobility  Re-consult PT if needed      Elex Heap, PT

## 2017-11-27 NOTE — PLAN OF CARE
Problem: SLP ADULT - SWALLOWING, IMPAIRED  Goal: Advance to least restrictive diet without signs or symptoms of aspiration for planned discharge setting  See evaluation for individualized goals    Outcome: Completed Date Met: 11/27/17

## 2017-11-27 NOTE — PROGRESS NOTES
Rounding completed with SLIM Dr Aida Blum  All questions and concerns addressed  Plan of care discussed

## 2017-11-28 LAB
BACTERIA BLD CULT: NORMAL
BACTERIA BLD CULT: NORMAL
GLUCOSE SERPL-MCNC: 168 MG/DL (ref 65–140)
GLUCOSE SERPL-MCNC: 175 MG/DL (ref 65–140)
GLUCOSE SERPL-MCNC: 219 MG/DL (ref 65–140)
GLUCOSE SERPL-MCNC: 263 MG/DL (ref 65–140)

## 2017-11-28 PROCEDURE — 82948 REAGENT STRIP/BLOOD GLUCOSE: CPT

## 2017-11-28 PROCEDURE — 94660 CPAP INITIATION&MGMT: CPT

## 2017-11-28 PROCEDURE — 94760 N-INVAS EAR/PLS OXIMETRY 1: CPT

## 2017-11-28 PROCEDURE — 94640 AIRWAY INHALATION TREATMENT: CPT

## 2017-11-28 RX ADMIN — PREDNISONE 40 MG: 20 TABLET ORAL at 16:11

## 2017-11-28 RX ADMIN — BENZONATATE 100 MG: 100 CAPSULE ORAL at 03:03

## 2017-11-28 RX ADMIN — Medication 325 MG: at 16:12

## 2017-11-28 RX ADMIN — INSULIN GLARGINE 45 UNITS: 100 INJECTION, SOLUTION SUBCUTANEOUS at 21:00

## 2017-11-28 RX ADMIN — DOCUSATE SODIUM 100 MG: 100 CAPSULE, LIQUID FILLED ORAL at 17:17

## 2017-11-28 RX ADMIN — PREDNISONE 40 MG: 20 TABLET ORAL at 08:30

## 2017-11-28 RX ADMIN — FAMOTIDINE 20 MG: 20 TABLET, FILM COATED ORAL at 08:36

## 2017-11-28 RX ADMIN — PREGABALIN 75 MG: 75 CAPSULE ORAL at 17:17

## 2017-11-28 RX ADMIN — Medication 325 MG: at 08:30

## 2017-11-28 RX ADMIN — INSULIN LISPRO 1 UNITS: 100 INJECTION, SOLUTION INTRAVENOUS; SUBCUTANEOUS at 08:30

## 2017-11-28 RX ADMIN — Medication 325 MG: at 12:01

## 2017-11-28 RX ADMIN — APIXABAN 2.5 MG: 2.5 TABLET, FILM COATED ORAL at 08:37

## 2017-11-28 RX ADMIN — PANTOPRAZOLE SODIUM 40 MG: 40 TABLET, DELAYED RELEASE ORAL at 06:05

## 2017-11-28 RX ADMIN — INSULIN LISPRO 8 UNITS: 100 INJECTION, SOLUTION INTRAVENOUS; SUBCUTANEOUS at 12:01

## 2017-11-28 RX ADMIN — GUAIFENESIN 1200 MG: 600 TABLET, EXTENDED RELEASE ORAL at 08:36

## 2017-11-28 RX ADMIN — BENZONATATE 100 MG: 100 CAPSULE ORAL at 12:08

## 2017-11-28 RX ADMIN — CYANOCOBALAMIN TAB 500 MCG 1000 MCG: 500 TAB at 08:37

## 2017-11-28 RX ADMIN — ALBUTEROL SULFATE 2.5 MG: 2.5 SOLUTION RESPIRATORY (INHALATION) at 10:00

## 2017-11-28 RX ADMIN — PRAVASTATIN SODIUM 80 MG: 80 TABLET ORAL at 08:38

## 2017-11-28 RX ADMIN — Medication 500 MG: at 08:30

## 2017-11-28 RX ADMIN — GUAIFENESIN 200 MG: 100 SOLUTION ORAL at 16:11

## 2017-11-28 RX ADMIN — ALLOPURINOL 200 MG: 100 TABLET ORAL at 08:35

## 2017-11-28 RX ADMIN — DOCUSATE SODIUM 100 MG: 100 CAPSULE, LIQUID FILLED ORAL at 08:37

## 2017-11-28 RX ADMIN — PANTOPRAZOLE SODIUM 40 MG: 40 TABLET, DELAYED RELEASE ORAL at 16:11

## 2017-11-28 RX ADMIN — INSULIN LISPRO 2 UNITS: 100 INJECTION, SOLUTION INTRAVENOUS; SUBCUTANEOUS at 16:12

## 2017-11-28 RX ADMIN — INSULIN LISPRO 3 UNITS: 100 INJECTION, SOLUTION INTRAVENOUS; SUBCUTANEOUS at 21:00

## 2017-11-28 RX ADMIN — INSULIN LISPRO 1 UNITS: 100 INJECTION, SOLUTION INTRAVENOUS; SUBCUTANEOUS at 12:01

## 2017-11-28 RX ADMIN — NYSTATIN 1 APPLICATION: 100000 POWDER TOPICAL at 08:38

## 2017-11-28 RX ADMIN — BENZONATATE 100 MG: 100 CAPSULE ORAL at 20:52

## 2017-11-28 RX ADMIN — NYSTATIN: 100000 POWDER TOPICAL at 17:17

## 2017-11-28 RX ADMIN — POLYETHYLENE GLYCOL 3350 17 G: 17 POWDER, FOR SOLUTION ORAL at 08:38

## 2017-11-28 RX ADMIN — INSULIN LISPRO 8 UNITS: 100 INJECTION, SOLUTION INTRAVENOUS; SUBCUTANEOUS at 08:30

## 2017-11-28 RX ADMIN — GUAIFENESIN 1200 MG: 600 TABLET, EXTENDED RELEASE ORAL at 20:52

## 2017-11-28 RX ADMIN — METOPROLOL TARTRATE 12.5 MG: 25 TABLET ORAL at 08:36

## 2017-11-28 RX ADMIN — MICONAZOLE NITRATE: 20 CREAM TOPICAL at 17:17

## 2017-11-28 RX ADMIN — MICONAZOLE NITRATE 1 APPLICATION: 20 CREAM TOPICAL at 08:38

## 2017-11-28 RX ADMIN — APIXABAN 2.5 MG: 2.5 TABLET, FILM COATED ORAL at 17:17

## 2017-11-28 RX ADMIN — LATANOPROST 1 DROP: 50 SOLUTION OPHTHALMIC at 21:00

## 2017-11-28 RX ADMIN — LEVOFLOXACIN 500 MG: 500 TABLET, FILM COATED ORAL at 18:43

## 2017-11-28 RX ADMIN — PREGABALIN 75 MG: 75 CAPSULE ORAL at 08:37

## 2017-11-28 RX ADMIN — LEVOTHYROXINE SODIUM 25 MCG: 25 TABLET ORAL at 06:05

## 2017-11-28 RX ADMIN — INSULIN LISPRO 8 UNITS: 100 INJECTION, SOLUTION INTRAVENOUS; SUBCUTANEOUS at 16:12

## 2017-11-28 NOTE — PROGRESS NOTES
[facility]  Patient: Gabriel Suarez 67 y o  female   MRN: [id number]  PCP: Priti Weeks MD  Unit/Bed#: [facility] 521-01 Encounter: [id number]  Date Of Visit: 17         Subjective:   No overnight events  Awaiting Perma-cath placement tomorrow as she currently utilizes a temporary catheter for hemodialysis  Denies any shortness of breath or chest pain at this time  Objective:     Vitals:   Temp (24hrs), Av 5 °F (36 9 °C), Min:98 3 °F (36 8 °C), Max:98 9 °F (37 2 °C)    HR:  [73-84] 74  Resp:  [18-20] 18  BP: (130-156)/(62-74) 130/62  SpO2:  [95 %-100 %] 95 %  Body mass index is 53 89 kg/m²  Input and Output Summary (last 24 hours):        Intake/Output Summary (Last 24 hours) at 17 1526  Last data filed at 17 1205   Gross per 24 hour   Intake              705 ml   Output                0 ml   Net              705 ml       Physical Exam:     GENERAL:  Obese - no current distress  HEAD:  Normocephalic - atraumatic  EYES: PERRL - EOMI   MOUTH:  Mucosa moist  NECK:  Supple - full range of motion  CARDIAC:  Regular rate/rhythm - S1/S2 positive  PULMONARY:  Clear but diminished breath sounds bilaterally - nonlabored respirations  ABDOMEN:  Soft - nontender/nondistended - active bowel sounds  MUSCULOSKELETAL:  Quite deconditioned motor strength/range of motion  NEUROLOGIC:  Alert/oriented x 3   SKIN:  Chronic wrinkles/blemishes   PSYCHIATRIC:  Mood/affect age-appropriate      Additional Data:     Labs & Recent Cultures:       Results from last 7 days  Lab Units 17  0927   WBC Thousand/uL 9 13   HEMOGLOBIN g/dL 9 9*   HEMATOCRIT % 32 1*   PLATELETS Thousands/uL 141*   NEUTROS PCT % 80*   LYMPHS PCT % 8*   MONOS PCT % 11   EOS PCT % 1       Results from last 7 days  Lab Units 17  0453   SODIUM mmol/L 135*   POTASSIUM mmol/L 4 2   CHLORIDE mmol/L 95*   CO2 mmol/L 31   BUN mg/dL 42*   CREATININE mg/dL 2 99*   CALCIUM mg/dL 8 1*   TOTAL PROTEIN g/dL 5 4*   BILIRUBIN TOTAL mg/dL 0 36   ALK PHOS U/L 57   ALT U/L 21   AST U/L 14   GLUCOSE RANDOM mg/dL 235*       Results from last 7 days  Lab Units 11/23/17 0927   INR  1 09           Results from last 7 days  Lab Units 11/23/17 0927   BLOOD CULTURE  No Growth After 5 Days  No Growth After 5 Days  Last 24 Hours Medication List:     allopurinol 200 mg Oral Once per day on Sun Tue Wed Fri Sat   allopurinol 300 mg Oral Once per day on Mon Thu   apixaban 2 5 mg Oral BID   calcium carbonate 500 mg Oral BID With Meals   cyanocobalamin 1,000 mcg Oral Daily   docusate sodium 100 mg Oral BID   famotidine 20 mg Oral Daily   ferrous sulfate 325 mg Oral TID With Meals   guaiFENesin 1,200 mg Oral Q12H [address]   insulin glargine 45 Units Subcutaneous HS   insulin lispro 1-6 Units Subcutaneous TID AC   insulin lispro 1-6 Units Subcutaneous HS   insulin lispro 8 Units Subcutaneous TID With Meals   latanoprost 1 drop Both Eyes HS   levofloxacin 500 mg Oral Q24H   levothyroxine 25 mcg Oral Early Morning   metoprolol tartrate 12 5 mg Oral Once per day on Sun Tue Thu Sat   miconazole  Topical BID   nystatin  Topical BID   pantoprazole 40 mg Oral BID AC   polyethylene glycol 17 g Oral Daily   pravastatin 80 mg Oral Daily   predniSONE 40 mg Oral BID With Meals   pregabalin 75 mg Oral BID         Assessments:    1  Healthcare-Associated Pneumonia possibly Gram Negative - Chronic Hypoxic Respiratory Failure   Plan:   · Empiric IV antibiotics de-escalated to PO Levaquin (day 6 of 7 today)   · Baseline home O2 requirement is 2-3 L - currently at baseline    · PRN Albuterol/Robitussin on board - continue to encourage incentive spirometry during awake hours      2  ESRD on HD  Plan:   · Nephrology managing   · Plan for PermCath placement tomorrow - temporary Shiley catheter currently in place     3    Insulin-Dependent Diabetes Mellitus - Diabetic Neuropathy   Plan:   · HgA1c of 7 0 in August 2017   · C/w basal insulin w/ additional PRN SSI coverage per accuchecks   · C/w Lyrica for neuropathy     4  Chronic Diastolic CHF    Plan:   · EF of 65%   · On HD for ESRD      5  Morbid Obesity   Plan:   · BMI of 53 89   · Lifestyle/diet modifications encouraged     6  History of Pulmonary Embolism   Plan:   · C/w Eliquis     7  HTN  Plan:   · C/w Lopressor    8  HLD  Plan:   · C/w Pravachol    9  Hypothyroidism  Plan:   · C/w Synthroid     10  Anemia of Chronic Disease - Iron Deficiency   Plan:   · C/w ferrous sulfate supplementation     11  Thrombocytopenia  Plan:   · Stable - monitor platelet count      VTE Prophylaxis:  Eliquis       Time Spent for Care: 34 minutes  More than 50% of total time spent on counseling and coordination of care as described above  Current Length of Stay: 5 day(s)      Code Status: Level 1 - Full Code       Today, Patient Was Seen By: Josette Whalen MD    ** Please Note: This note has been constructed using a voice recognition system   **

## 2017-11-28 NOTE — PROGRESS NOTES
Rounded with SLIM Dr Kenna Glaser at bedside  Day 6/7 for Levaquin  Will go to IR tomorrow for Perma Cath placement tomorrow  Patient is NPO at midnight  Patient status and plan discussed  Continue to monitor

## 2017-11-28 NOTE — OCCUPATIONAL THERAPY NOTE
Occupational Therapy Screen  Orders received  Chart review completed  Pt is a 5 year resident of [facility] and is bed bound per CM note  Also per CM note, pt would like to return to [facility] upon D/C  No acute care OT needs at this time  Anticipate pt D/C to [facility] w/ prior level of assistance when medically cleared  D/C OT         Carson Holman MS, OTR/L

## 2017-11-28 NOTE — PROGRESS NOTES
NEPHROLOGY PROGRESS NOTE   Jace Guerra 67 y o  female MRN: [id number]  Unit/Bed#: [facility] 521-01 Encounter: [id number]      ASSESSMENT and PLAN:  1  ESRD: on HD MWF at [facility] [address] [place]    -currently stable from a dialysis standpoint and next treatment will be tomorrow   2  RUE AVF infiltration: unable to cannulate so patient is using temporary HD catheter    -convert to permacath tomorrow    -rest AVF for at least 2 weeks and attempt to access as outpatient   3  Anemia: no JESSICA due to DVT  4  Secondary hyperparathyroid: on hectorol with HD   5  Hypertension: BP acceptable   6  Pneumonia: on levaquin       SUBJECTIVE:  Complains of coughing but is unable to bring anything up  No chest pain or shortness of breath       OBJECTIVE:  Current Weight: Weight - Scale: (!) 142 kg (313 lb 15 oz)  Vitals:    11/28/17 1046   BP: 156/74   Pulse: 78   Resp: 18   Temp: 98 5 °F (36 9 °C)   SpO2: 96%       Intake/Output Summary (Last 24 hours) at 11/28/17 1119  Last data filed at 11/28/17 8730   Gross per 24 hour   Intake              780 ml   Output             2995 ml   Net            -2215 ml     General: no acute distress   Skin: no rash   Eyes: sclera anicteric   ENT: moist mucous membranes   Neck: supple, symmetric   Chest: decreased breath sounds    CVS: regular rate and rhythm   Abdomen: soft, non-tender, obese  Extremities: chronic edema   Neuro: awake and alert   Psych: appropriate affect     Medications:  Scheduled Meds:  allopurinol 200 mg Oral Once per day on Sun Tue Wed Fri Sat   allopurinol 300 mg Oral Once per day on Mon Thu   apixaban 2 5 mg Oral BID   calcium carbonate 500 mg Oral BID With Meals   cyanocobalamin 1,000 mcg Oral Daily   docusate sodium 100 mg Oral BID   famotidine 20 mg Oral Daily   ferrous sulfate 325 mg Oral TID With Meals   guaiFENesin 1,200 mg Oral Q12H FREDI   insulin glargine 45 Units Subcutaneous HS   insulin lispro 1-6 Units Subcutaneous TID AC   insulin lispro 1-6 Units Subcutaneous HS insulin lispro 8 Units Subcutaneous TID With Meals   latanoprost 1 drop Both Eyes HS   levofloxacin 500 mg Oral Q24H   levothyroxine 25 mcg Oral Early Morning   metoprolol tartrate 12 5 mg Oral Once per day on Sun Tue Thu Sat   miconazole  Topical BID   nystatin  Topical BID   pantoprazole 40 mg Oral BID AC   polyethylene glycol 17 g Oral Daily   pravastatin 80 mg Oral Daily   predniSONE 40 mg Oral BID With Meals   pregabalin 75 mg Oral BID       PRN Meds:   acetaminophen    albuterol    benzonatate    bisacodyl    doxercalciferol    guaiFENesin    HYDROmorphone    pneumococcal 13-valent conjugate vaccine    senna    Laboratory Results:  Lab Results   Component Value Date    WBC 9 13 11/23/2017    HGB 9 9 (L) 11/23/2017    HCT 32 1 (L) 11/23/2017    MCV 97 11/23/2017     (L) 11/23/2017     Lab Results   Component Value Date    GLUCOSE 235 (H) 11/24/2017    CALCIUM 8 1 (L) 11/24/2017     (L) 11/24/2017    K 4 2 11/24/2017    CO2 31 11/24/2017    CL 95 (L) 11/24/2017    BUN 42 (H) 11/24/2017    CREATININE 2 99 (H) 11/24/2017     Lab Results   Component Value Date    CALCIUM 8 1 (L) 11/24/2017    PHOS 6 1 (H) 11/24/2017

## 2017-11-29 ENCOUNTER — APPOINTMENT (INPATIENT)
Dept: DIALYSIS | Facility: HOSPITAL | Age: 72
DRG: 177 | End: 2017-11-29
Attending: INTERNAL MEDICINE
Payer: MEDICARE

## 2017-11-29 LAB
ANION GAP SERPL CALCULATED.3IONS-SCNC: 7 MMOL/L (ref 4–13)
BUN SERPL-MCNC: 60 MG/DL (ref 5–25)
CALCIUM SERPL-MCNC: 7.9 MG/DL (ref 8.3–10.1)
CHLORIDE SERPL-SCNC: 93 MMOL/L (ref 100–108)
CO2 SERPL-SCNC: 29 MMOL/L (ref 21–32)
CREAT SERPL-MCNC: 3.8 MG/DL (ref 0.6–1.3)
GFR SERPL CREATININE-BSD FRML MDRD: 11 ML/MIN/1.73SQ M
GLUCOSE SERPL-MCNC: 118 MG/DL (ref 65–140)
GLUCOSE SERPL-MCNC: 133 MG/DL (ref 65–140)
GLUCOSE SERPL-MCNC: 166 MG/DL (ref 65–140)
GLUCOSE SERPL-MCNC: 173 MG/DL (ref 65–140)
GLUCOSE SERPL-MCNC: 64 MG/DL (ref 65–140)
GLUCOSE SERPL-MCNC: 84 MG/DL (ref 65–140)
GLUCOSE SERPL-MCNC: 88 MG/DL (ref 65–140)
POTASSIUM SERPL-SCNC: 4.2 MMOL/L (ref 3.5–5.3)
SODIUM SERPL-SCNC: 129 MMOL/L (ref 136–145)

## 2017-11-29 PROCEDURE — 94660 CPAP INITIATION&MGMT: CPT

## 2017-11-29 PROCEDURE — 94760 N-INVAS EAR/PLS OXIMETRY 1: CPT

## 2017-11-29 PROCEDURE — 82948 REAGENT STRIP/BLOOD GLUCOSE: CPT

## 2017-11-29 PROCEDURE — 80048 BASIC METABOLIC PNL TOTAL CA: CPT | Performed by: INTERNAL MEDICINE

## 2017-11-29 RX ORDER — DEXTROSE MONOHYDRATE 25 G/50ML
INJECTION, SOLUTION INTRAVENOUS
Status: COMPLETED
Start: 2017-11-29 | End: 2017-11-29

## 2017-11-29 RX ORDER — GUAIFENESIN/DEXTROMETHORPHAN 100-10MG/5
5 SYRUP ORAL EVERY 4 HOURS PRN
Status: DISCONTINUED | OUTPATIENT
Start: 2017-11-29 | End: 2017-12-03 | Stop reason: HOSPADM

## 2017-11-29 RX ORDER — DEXTROSE MONOHYDRATE 25 G/50ML
INJECTION, SOLUTION INTRAVENOUS
Status: DISPENSED
Start: 2017-11-29 | End: 2017-11-30

## 2017-11-29 RX ADMIN — PANTOPRAZOLE SODIUM 40 MG: 40 TABLET, DELAYED RELEASE ORAL at 17:57

## 2017-11-29 RX ADMIN — LATANOPROST 1 DROP: 50 SOLUTION OPHTHALMIC at 22:09

## 2017-11-29 RX ADMIN — NYSTATIN 1 APPLICATION: 100000 POWDER TOPICAL at 08:17

## 2017-11-29 RX ADMIN — BENZONATATE 100 MG: 100 CAPSULE ORAL at 06:11

## 2017-11-29 RX ADMIN — DOCUSATE SODIUM 100 MG: 100 CAPSULE, LIQUID FILLED ORAL at 17:57

## 2017-11-29 RX ADMIN — APIXABAN 2.5 MG: 2.5 TABLET, FILM COATED ORAL at 17:58

## 2017-11-29 RX ADMIN — INSULIN LISPRO 1 UNITS: 100 INJECTION, SOLUTION INTRAVENOUS; SUBCUTANEOUS at 22:10

## 2017-11-29 RX ADMIN — SENNOSIDES 17.2 MG: 8.6 TABLET, FILM COATED ORAL at 17:57

## 2017-11-29 RX ADMIN — PREGABALIN 75 MG: 75 CAPSULE ORAL at 17:57

## 2017-11-29 RX ADMIN — DOXERCALCIFEROL 0.5 MCG: 2 INJECTION, SOLUTION INTRAVENOUS at 10:29

## 2017-11-29 RX ADMIN — GUAIFENESIN AND DEXTROMETHORPHAN 5 ML: 100; 10 SYRUP ORAL at 22:09

## 2017-11-29 RX ADMIN — Medication 325 MG: at 17:57

## 2017-11-29 RX ADMIN — NYSTATIN: 100000 POWDER TOPICAL at 17:58

## 2017-11-29 RX ADMIN — INSULIN GLARGINE 45 UNITS: 100 INJECTION, SOLUTION SUBCUTANEOUS at 22:08

## 2017-11-29 RX ADMIN — GUAIFENESIN AND DEXTROMETHORPHAN 5 ML: 100; 10 SYRUP ORAL at 17:55

## 2017-11-29 RX ADMIN — PREDNISONE 40 MG: 20 TABLET ORAL at 17:57

## 2017-11-29 RX ADMIN — INSULIN LISPRO 1 UNITS: 100 INJECTION, SOLUTION INTRAVENOUS; SUBCUTANEOUS at 08:09

## 2017-11-29 RX ADMIN — LEVOTHYROXINE SODIUM 25 MCG: 25 TABLET ORAL at 06:11

## 2017-11-29 RX ADMIN — MICONAZOLE NITRATE: 20 CREAM TOPICAL at 17:58

## 2017-11-29 RX ADMIN — GUAIFENESIN 1200 MG: 600 TABLET, EXTENDED RELEASE ORAL at 22:09

## 2017-11-29 RX ADMIN — MICONAZOLE NITRATE 1 APPLICATION: 20 CREAM TOPICAL at 08:17

## 2017-11-29 RX ADMIN — DEXTROSE MONOHYDRATE 25 ML: 500 INJECTION PARENTERAL at 13:21

## 2017-11-29 RX ADMIN — PANTOPRAZOLE SODIUM 40 MG: 40 TABLET, DELAYED RELEASE ORAL at 06:11

## 2017-11-29 NOTE — PROGRESS NOTES
[facility] Service - Internal Medicine Progress Note  Patient: Soledad Sams 67 y o  female   MRN: [id number]  PCP: Lianet Kelly MD  Unit/Bed#: [facility] 521-01 Encounter: [id number]  Date Of Visit: 17         Subjective:   Seen and examined earlier today  No new complaints other than a residual cough  Will undergo PermCath placement after hemodialysis today  Objective:     Vitals:   Temp (24hrs), Av 6 °F (36 4 °C), Min:96 7 °F (35 9 °C), Max:98 °F (36 7 °C)    HR:  [61-82] 66  Resp:  [18-20] 20  BP: (133-171)/(59-80) 139/67  SpO2:  [97 %-100 %] 100 %  Body mass index is 53 89 kg/m²  Input and Output Summary (last 24 hours):        Intake/Output Summary (Last 24 hours) at 17 1703  Last data filed at 17 1358   Gross per 24 hour   Intake              670 ml   Output             2884 ml   Net            -2214 ml       Physical Exam:     GENERAL:  Obese - no acute distress  HEAD:  Normocephalic - atraumatic  EYES: PERRL - EOMI   MOUTH:  Mucosa moist  NECK:  Supple - full range of motion  CARDIAC:  Regular rate/rhythm - S1/S2 positive  PULMONARY:  Clear but diminished/course breath sounds bilaterally - nonlabored respirations  ABDOMEN:  Soft - nontender/nondistended - active bowel sounds  MUSCULOSKELETAL:  deconditioned motor strength/range of motion  NEUROLOGIC:  Alert/oriented x 3   SKIN:  Age-appropriate wrinkles/blemishes   PSYCHIATRIC:  Mood/affect pleasant      Additional Data:     Labs & Recent Cultures:       Results from last 7 days  Lab Units 17  0927   WBC Thousand/uL 9 13   HEMOGLOBIN g/dL 9 9*   HEMATOCRIT % 32 1*   PLATELETS Thousands/uL 141*   NEUTROS PCT % 80*   LYMPHS PCT % 8*   MONOS PCT % 11   EOS PCT % 1       Results from last 7 days  Lab Units 17  0828 17  0453   SODIUM mmol/L 129* 135*   POTASSIUM mmol/L 4 2 4 2   CHLORIDE mmol/L 93* 95*   CO2 mmol/L 29 31   BUN mg/dL 60* 42*   CREATININE mg/dL 3 80* 2 99*   CALCIUM mg/dL 7 9* 8 1*   TOTAL PROTEIN g/dL  --  5 4*   BILIRUBIN TOTAL mg/dL  --  0 36   ALK PHOS U/L  --  57   ALT U/L  --  21   AST U/L  --  14   GLUCOSE RANDOM mg/dL 118 235*       Results from last 7 days  Lab Units 11/23/17 0927   INR  1 09           Results from last 7 days  Lab Units 11/23/17 0927   BLOOD CULTURE  No Growth After 5 Days  No Growth After 5 Days  Last 24 Hours Medication List:     allopurinol 200 mg Oral Once per day on Sun Tue Wed Fri Sat   allopurinol 300 mg Oral Once per day on Mon Thu   apixaban 2 5 mg Oral BID   calcium carbonate 500 mg Oral BID With Meals   cyanocobalamin 1,000 mcg Oral Daily   dextrose      docusate sodium 100 mg Oral BID   famotidine 20 mg Oral Daily   ferrous sulfate 325 mg Oral TID With Meals   guaiFENesin 1,200 mg Oral Q12H [address]   insulin glargine 45 Units Subcutaneous HS   insulin lispro 1-6 Units Subcutaneous TID AC   insulin lispro 1-6 Units Subcutaneous HS   insulin lispro 8 Units Subcutaneous TID With Meals   latanoprost 1 drop Both Eyes HS   levofloxacin 500 mg Oral Q24H   metoprolol tartrate 12 5 mg Oral Once per day on Sun Tue Thu Sat   miconazole  Topical BID   nystatin  Topical BID   pantoprazole 40 mg Oral BID AC   polyethylene glycol 17 g Oral Daily   pravastatin 80 mg Oral Daily   predniSONE 40 mg Oral BID With Meals   pregabalin 75 mg Oral BID         Assessments:    1  Healthcare-Associated Pneumonia possibly Gram Negative - Chronic Hypoxic Respiratory Failure   Plan:   · Empiric IV antibiotics de-escalated to PO Levaquin (day 7 of 7 today - will stop after today's dose)   · Baseline home O2 requirement is 2-3 L - currently at baseline    · PRN Albuterol/Robitussin on board - continue to encourage incentive spirometry during awake hours   · Check portable CXR tomorrow     2  ESRD on HD  Plan:   · Nephrology managing   · Plan for PermCath placement later today - temporary Shiley catheter currently in place     3    Insulin-Dependent Diabetes Mellitus - Diabetic Neuropathy   Plan:   · HgA1c of 7 0 in August 2017   · C/w basal insulin w/ additional PRN SSI coverage per accuchecks   · C/w Lyrica for neuropathy     4  Chronic Diastolic CHF    Plan:   · EF of 65%   · On HD for ESRD      5  Morbid Obesity   Plan:   · BMI of 53 89   · Lifestyle/diet modifications encouraged     6  History of Pulmonary Embolism   Plan:   · C/w Eliquis     7  HTN  Plan:   · C/w Lopressor    8  HLD  Plan:   · C/w Pravachol    9  Hypothyroidism  Plan:   · C/w Synthroid     10  Anemia of Chronic Disease - Iron Deficiency   Plan:   · C/w ferrous sulfate supplementation     11  Thrombocytopenia  Plan:   · Stable - monitor platelet count      VTE Prophylaxis:  Eliquis       Time Spent for Care: 36 minutes  More than 50% of total time spent on counseling and coordination of care as described above  Current Length of Stay: 6 day(s)      Code Status: Level 1 - Full Code       Today, Patient Was Seen By: Renny Wright MD    ** Please Note: This note has been constructed using a voice recognition system   **

## 2017-11-29 NOTE — PROGRESS NOTES
NEPHROLOGY PROGRESS NOTE   Marek Johnson 67 y o  female MRN: [id number]  Unit/Bed#: [facility] 521-01 Encounter: [id number]  Reason for Consult: ESRD    ASSESSMENT/PLAN:  1  End-stage renal disease, maintenance hemodialysis Monday Wednesday Friday, [facility] [address]  2  Pneumonia  3  Recent right upper extremity infiltration, allow for S, PermCath placement today  4  Anemia of chronic disease, no JESSICA due to history of DVT  5  Morbid obesity    · Hemodialysis today  · No JESSICA  · 3 L ultrafiltration  · PermCath placement post hemodialysis today       SUBJECTIVE:  Seen and examined  P patient is still with a cough  No chest pain  No abdominal pain      OBJECTIVE:  Current Weight: Weight - Scale: (!) 142 kg (313 lb 15 oz)  Vitals:    11/29/17 0823 11/29/17 0831 11/29/17 0900 11/29/17 0930   BP: 165/72 (!) 171/64 170/71 (!) 171/72   Pulse: 64 61 61 64   Resp:       Temp:  97 5 °F (36 4 °C)     TempSrc:  Tympanic     SpO2:       Weight:       Height:           Intake/Output Summary (Last 24 hours) at 11/29/17 1000  Last data filed at 11/29/17 0831   Gross per 24 hour   Intake              595 ml   Output                0 ml   Net              595 ml     GENERAL :  No distress, currently on hemodialysis  NECK:  Supple  CV:  Regular  RESP:  Coarse, scattered chronic  ABD:  Obese, soft  EXT:  Trace edema  Psych:  Appropriate  SKIN:  No rash    Medications:    Current Facility-Administered Medications:     acetaminophen (TYLENOL) tablet 650 mg, 650 mg, Oral, Q6H PRN, Hetul Miles, DO, 650 mg at 11/26/17 1132    albuterol inhalation solution 2 5 mg, 2 5 mg, Nebulization, Q4H PRN, Hetul Miles, DO, 2 5 mg at 11/28/17 1000    allopurinol (ZYLOPRIM) tablet 200 mg, 200 mg, Oral, Once per day on Sun Tue Wed Fri Sat, Hetul Miles, DO, 200 mg at 11/28/17 0748    allopurinol (ZYLOPRIM) tablet 300 mg, 300 mg, Oral, Once per day on Mon Thu, Hetul Miles, DO    apixaban (ELIQUIS) tablet 2 5 mg, 2 5 mg, Oral, BID, Hetul Miles, DO, 2 5 mg at 11/28/17 1717    benzonatate (TESSALON PERLES) capsule 100 mg, 100 mg, Oral, TID PRN, Francisco Javier Simms MD, 100 mg at 11/29/17 9542    bisacodyl (DULCOLAX) EC tablet 5 mg, 5 mg, Oral, Daily PRN, Hetul Miles, DO, 5 mg at 11/24/17 1226    calcium carbonate (TUMS) chewable tablet 500 mg, 500 mg, Oral, BID With Meals, Augustine Coleman, DO, 500 mg at 11/28/17 0830    cyanocobalamin (VITAMIN B-12) tablet 1,000 mcg, 1,000 mcg, Oral, Daily, Hetul Miles, DO, 1,000 mcg at 11/28/17 0837    docusate sodium (COLACE) capsule 100 mg, 100 mg, Oral, BID, Hetul Miles, DO, 100 mg at 11/28/17 1717    doxercalciferol (HECTOROL) injection 0 5 mcg, 0 5 mcg, Intravenous, After Dialysis, Wing Jeffery PA-C, 0 5 mcg at 11/27/17 1251    famotidine (PEPCID) tablet 20 mg, 20 mg, Oral, Daily, Joshua Harper MD, 20 mg at 11/28/17 0836    ferrous sulfate tablet 325 mg, 325 mg, Oral, TID With Meals, Hetul Miles, DO, 325 mg at 11/28/17 1612    guaiFENesin (MUCINEX) 12 hr tablet 1,200 mg, 1,200 mg, Oral, Q12H [facility], Francisco Javier Simms MD, 1,200 mg at 11/28/17 2052    guaiFENesin (ROBITUSSIN) oral solution 200 mg, 200 mg, Oral, Q4H PRN, Hetul Miles, DO, 200 mg at 11/28/17 1611    HYDROmorphone (DILAUDID) tablet 1 mg, 1 mg, Oral, Q4H PRN, Hira Go MD, 1 mg at 11/25/17 0840    insulin glargine (LANTUS) subcutaneous injection 45 Units, 45 Units, Subcutaneous, HS, Francisco Javier Simms MD, 45 Units at 11/28/17 2100    insulin lispro (HumaLOG) 100 units/mL subcutaneous injection 1-6 Units, 1-6 Units, Subcutaneous, TID AC, 1 Units at 11/29/17 0809 **AND** Fingerstick Glucose (POCT), , , TID AC, Esperanza Natarajan PA-C    insulin lispro (HumaLOG) 100 units/mL subcutaneous injection 1-6 Units, 1-6 Units, Subcutaneous, HS, Esperanza Natarajan PA-C, 3 Units at 11/28/17 2100    insulin lispro (HumaLOG) 100 units/mL subcutaneous injection 8 Units, 8 Units, Subcutaneous, TID With Meals, Liam iMles DO, 8 Units at 11/28/17 1612    latanoprost (XALATAN) 0 005 % ophthalmic solution 1 drop, 1 drop, Both Eyes, HS, Hetul Miles, DO, 1 drop at 11/28/17 2100    levofloxacin (LEVAQUIN) tablet 500 mg, 500 mg, Oral, Q24H, Jonelle Dey MD, 500 mg at 11/28/17 1843    levothyroxine tablet 25 mcg, 25 mcg, Oral, Early Morning, Hetul Miles, DO, 25 mcg at 11/29/17 0611    metoprolol tartrate (LOPRESSOR) partial tablet 12 5 mg, 12 5 mg, Oral, Once per day on Sun Tue Thu Sat, Hetul Miles, DO, 12 5 mg at 11/28/17 3303    miconazole 2 % cream, , Topical, BID, Hetul Miles, DO, 1 application at 86/46/04 0817    nystatin (MYCOSTATIN) powder, , Topical, BID, Hetul Miles, DO, 1 application at 34/22/19 0817    pantoprazole (PROTONIX) EC tablet 40 mg, 40 mg, Oral, BID AC, Hetul Miles, DO, 40 mg at 11/29/17 0611    pneumococcal 13-valent conjugate vaccine (PREVNAR-13) IM injection 0 5 mL, 0 5 mL, Intramuscular, Prior to discharge, Hetul Miles, DO    polyethylene glycol (MIRALAX) packet 17 g, 17 g, Oral, Daily, Hetul Miles, DO, 17 g at 11/28/17 0838    pravastatin (PRAVACHOL) tablet 80 mg, 80 mg, Oral, Daily, Hetul Miles, DO, 80 mg at 11/28/17 8733    predniSONE tablet 40 mg, 40 mg, Oral, BID With Meals, Hetul Miles, DO, 40 mg at 11/28/17 1611    pregabalin (LYRICA) capsule 75 mg, 75 mg, Oral, BID, Hetul Miles, DO, 75 mg at 11/28/17 1717    senna (SENOKOT) tablet 17 2 mg, 2 tablet, Oral, Daily PRN, Hetul Miles, DO, 17 2 mg at 11/26/17 0855    Laboratory Results:    Results from last 7 days  Lab Units 11/29/17  0828 11/24/17  0453 11/23/17 0927 11/23/17 0923   WBC Thousand/uL  --   --  9 13  --    HEMOGLOBIN g/dL  --   --  9 9*  --    I STAT HEMOGLOBIN g/dl  --   --   --  9 9*   HEMATOCRIT %  --   --  32 1*  --    PLATELETS Thousands/uL  --   --  141*  --    SODIUM mmol/L 129* 135* 136  --    POTASSIUM mmol/L 4 2 4 2 3 8  --    CHLORIDE mmol/L 93* 95* 95*  --    CO2 mmol/L 29 31 34*  --    BUN mg/dL 60* 42* 33*  --    CREATININE mg/dL 3 80* 2 99* 2 12*  --    CALCIUM mg/dL 7 9* 8 1* 9 0  --    MAGNESIUM mg/dL  --  2 2  --   --    PHOSPHORUS mg/dL  --  6 1*  --   --    ALBUMIN g/dL  --  1 8* 2 4*  --    TOTAL PROTEIN g/dL  --  5 4* 6 8  --    GLUCOSE RANDOM mg/dL 118 235* 151*  --    GLUCOSE, ISTAT mg/dl  --   --   --  157*

## 2017-11-30 ENCOUNTER — APPOINTMENT (INPATIENT)
Dept: RADIOLOGY | Facility: HOSPITAL | Age: 72
DRG: 177 | End: 2017-11-30
Attending: RADIOLOGY
Payer: MEDICARE

## 2017-11-30 ENCOUNTER — APPOINTMENT (INPATIENT)
Dept: RADIOLOGY | Facility: HOSPITAL | Age: 72
DRG: 177 | End: 2017-11-30
Payer: MEDICARE

## 2017-11-30 ENCOUNTER — APPOINTMENT (INPATIENT)
Dept: RADIOLOGY | Facility: HOSPITAL | Age: 72
DRG: 177 | End: 2017-11-30
Attending: INTERNAL MEDICINE
Payer: MEDICARE

## 2017-11-30 LAB
ANION GAP SERPL CALCULATED.3IONS-SCNC: 7 MMOL/L (ref 4–13)
BASOPHILS # BLD AUTO: 0.02 THOUSANDS/ΜL (ref 0–0.1)
BASOPHILS NFR BLD AUTO: 0 % (ref 0–1)
BUN SERPL-MCNC: 39 MG/DL (ref 5–25)
CALCIUM SERPL-MCNC: 8.5 MG/DL (ref 8.3–10.1)
CHLORIDE SERPL-SCNC: 94 MMOL/L (ref 100–108)
CO2 SERPL-SCNC: 30 MMOL/L (ref 21–32)
CREAT SERPL-MCNC: 3.07 MG/DL (ref 0.6–1.3)
EOSINOPHIL # BLD AUTO: 0.02 THOUSAND/ΜL (ref 0–0.61)
EOSINOPHIL NFR BLD AUTO: 0 % (ref 0–6)
ERYTHROCYTE [DISTWIDTH] IN BLOOD BY AUTOMATED COUNT: 15.6 % (ref 11.6–15.1)
GFR SERPL CREATININE-BSD FRML MDRD: 14 ML/MIN/1.73SQ M
GLUCOSE SERPL-MCNC: 136 MG/DL (ref 65–140)
GLUCOSE SERPL-MCNC: 153 MG/DL (ref 65–140)
GLUCOSE SERPL-MCNC: 188 MG/DL (ref 65–140)
GLUCOSE SERPL-MCNC: 226 MG/DL (ref 65–140)
GLUCOSE SERPL-MCNC: 284 MG/DL (ref 65–140)
HCT VFR BLD AUTO: 28.6 % (ref 34.8–46.1)
HGB BLD-MCNC: 9.3 G/DL (ref 11.5–15.4)
LYMPHOCYTES # BLD AUTO: 0.8 THOUSANDS/ΜL (ref 0.6–4.47)
LYMPHOCYTES NFR BLD AUTO: 7 % (ref 14–44)
MCH RBC QN AUTO: 29.7 PG (ref 26.8–34.3)
MCHC RBC AUTO-ENTMCNC: 32.5 G/DL (ref 31.4–37.4)
MCV RBC AUTO: 91 FL (ref 82–98)
MONOCYTES # BLD AUTO: 0.75 THOUSAND/ΜL (ref 0.17–1.22)
MONOCYTES NFR BLD AUTO: 7 % (ref 4–12)
NEUTROPHILS # BLD AUTO: 9.01 THOUSANDS/ΜL (ref 1.85–7.62)
NEUTS SEG NFR BLD AUTO: 86 % (ref 43–75)
NRBC BLD AUTO-RTO: 0 /100 WBCS
PLATELET # BLD AUTO: 72 THOUSANDS/UL (ref 149–390)
POTASSIUM SERPL-SCNC: 4.4 MMOL/L (ref 3.5–5.3)
RBC # BLD AUTO: 3.13 MILLION/UL (ref 3.81–5.12)
SODIUM SERPL-SCNC: 131 MMOL/L (ref 136–145)
WBC # BLD AUTO: 11.11 THOUSAND/UL (ref 4.31–10.16)

## 2017-11-30 PROCEDURE — 80048 BASIC METABOLIC PNL TOTAL CA: CPT | Performed by: INTERNAL MEDICINE

## 2017-11-30 PROCEDURE — 85025 COMPLETE CBC W/AUTO DIFF WBC: CPT | Performed by: INTERNAL MEDICINE

## 2017-11-30 PROCEDURE — 82948 REAGENT STRIP/BLOOD GLUCOSE: CPT

## 2017-11-30 PROCEDURE — 99152 MOD SED SAME PHYS/QHP 5/>YRS: CPT

## 2017-11-30 PROCEDURE — 94640 AIRWAY INHALATION TREATMENT: CPT

## 2017-11-30 PROCEDURE — 94760 N-INVAS EAR/PLS OXIMETRY 1: CPT

## 2017-11-30 PROCEDURE — 99153 MOD SED SAME PHYS/QHP EA: CPT

## 2017-11-30 PROCEDURE — 71010 HB CHEST X-RAY 1 VIEW FRONTAL (PORTABLE): CPT

## 2017-11-30 PROCEDURE — 36558 INSERT TUNNELED CV CATH: CPT

## 2017-11-30 RX ORDER — MIDAZOLAM HYDROCHLORIDE 1 MG/ML
INJECTION INTRAMUSCULAR; INTRAVENOUS CODE/TRAUMA/SEDATION MEDICATION
Status: COMPLETED | OUTPATIENT
Start: 2017-11-30 | End: 2017-11-30

## 2017-11-30 RX ORDER — FENTANYL CITRATE 50 UG/ML
INJECTION, SOLUTION INTRAMUSCULAR; INTRAVENOUS CODE/TRAUMA/SEDATION MEDICATION
Status: COMPLETED | OUTPATIENT
Start: 2017-11-30 | End: 2017-11-30

## 2017-11-30 RX ADMIN — GUAIFENESIN 1200 MG: 600 TABLET, EXTENDED RELEASE ORAL at 21:44

## 2017-11-30 RX ADMIN — FENTANYL CITRATE 50 MCG: 50 INJECTION, SOLUTION INTRAMUSCULAR; INTRAVENOUS at 08:42

## 2017-11-30 RX ADMIN — ALLOPURINOL 300 MG: 100 TABLET ORAL at 10:02

## 2017-11-30 RX ADMIN — FAMOTIDINE 20 MG: 20 TABLET, FILM COATED ORAL at 10:03

## 2017-11-30 RX ADMIN — PREGABALIN 75 MG: 75 CAPSULE ORAL at 21:44

## 2017-11-30 RX ADMIN — NYSTATIN: 100000 POWDER TOPICAL at 10:16

## 2017-11-30 RX ADMIN — Medication 325 MG: at 10:04

## 2017-11-30 RX ADMIN — PREDNISONE 40 MG: 20 TABLET ORAL at 17:43

## 2017-11-30 RX ADMIN — INSULIN LISPRO 1 UNITS: 100 INJECTION, SOLUTION INTRAVENOUS; SUBCUTANEOUS at 13:17

## 2017-11-30 RX ADMIN — CYANOCOBALAMIN TAB 500 MCG 1000 MCG: 500 TAB at 10:03

## 2017-11-30 RX ADMIN — INSULIN LISPRO 3 UNITS: 100 INJECTION, SOLUTION INTRAVENOUS; SUBCUTANEOUS at 21:45

## 2017-11-30 RX ADMIN — Medication 325 MG: at 13:19

## 2017-11-30 RX ADMIN — PRAVASTATIN SODIUM 80 MG: 80 TABLET ORAL at 10:01

## 2017-11-30 RX ADMIN — DOCUSATE SODIUM 100 MG: 100 CAPSULE, LIQUID FILLED ORAL at 10:03

## 2017-11-30 RX ADMIN — PREGABALIN 75 MG: 75 CAPSULE ORAL at 10:22

## 2017-11-30 RX ADMIN — MIDAZOLAM 0.5 MG: 1 INJECTION INTRAMUSCULAR; INTRAVENOUS at 08:42

## 2017-11-30 RX ADMIN — INSULIN GLARGINE 45 UNITS: 100 INJECTION, SOLUTION SUBCUTANEOUS at 21:45

## 2017-11-30 RX ADMIN — INSULIN LISPRO 2 UNITS: 100 INJECTION, SOLUTION INTRAVENOUS; SUBCUTANEOUS at 17:45

## 2017-11-30 RX ADMIN — INSULIN LISPRO 8 UNITS: 100 INJECTION, SOLUTION INTRAVENOUS; SUBCUTANEOUS at 17:45

## 2017-11-30 RX ADMIN — GUAIFENESIN AND DEXTROMETHORPHAN 5 ML: 100; 10 SYRUP ORAL at 10:21

## 2017-11-30 RX ADMIN — INSULIN LISPRO 8 UNITS: 100 INJECTION, SOLUTION INTRAVENOUS; SUBCUTANEOUS at 13:18

## 2017-11-30 RX ADMIN — LATANOPROST 1 DROP: 50 SOLUTION OPHTHALMIC at 21:44

## 2017-11-30 RX ADMIN — Medication 500 MG: at 17:43

## 2017-11-30 RX ADMIN — PREDNISONE 40 MG: 20 TABLET ORAL at 10:22

## 2017-11-30 RX ADMIN — METOPROLOL TARTRATE 12.5 MG: 25 TABLET ORAL at 10:02

## 2017-11-30 RX ADMIN — Medication 325 MG: at 17:43

## 2017-11-30 RX ADMIN — MICONAZOLE NITRATE: 20 CREAM TOPICAL at 10:16

## 2017-11-30 RX ADMIN — APIXABAN 2.5 MG: 2.5 TABLET, FILM COATED ORAL at 10:01

## 2017-11-30 RX ADMIN — NYSTATIN 1 APPLICATION: 100000 POWDER TOPICAL at 17:44

## 2017-11-30 RX ADMIN — GUAIFENESIN AND DEXTROMETHORPHAN 5 ML: 100; 10 SYRUP ORAL at 21:45

## 2017-11-30 RX ADMIN — GUAIFENESIN AND DEXTROMETHORPHAN 5 ML: 100; 10 SYRUP ORAL at 13:19

## 2017-11-30 RX ADMIN — APIXABAN 2.5 MG: 2.5 TABLET, FILM COATED ORAL at 17:43

## 2017-11-30 RX ADMIN — PANTOPRAZOLE SODIUM 40 MG: 40 TABLET, DELAYED RELEASE ORAL at 17:43

## 2017-11-30 RX ADMIN — GUAIFENESIN 1200 MG: 600 TABLET, EXTENDED RELEASE ORAL at 10:02

## 2017-11-30 RX ADMIN — FENTANYL CITRATE 50 MCG: 50 INJECTION, SOLUTION INTRAMUSCULAR; INTRAVENOUS at 08:33

## 2017-11-30 RX ADMIN — PANTOPRAZOLE SODIUM 40 MG: 40 TABLET, DELAYED RELEASE ORAL at 06:08

## 2017-11-30 RX ADMIN — Medication 500 MG: at 10:03

## 2017-11-30 RX ADMIN — ALBUTEROL SULFATE 2.5 MG: 2.5 SOLUTION RESPIRATORY (INHALATION) at 10:24

## 2017-11-30 RX ADMIN — MIDAZOLAM 1 MG: 1 INJECTION INTRAMUSCULAR; INTRAVENOUS at 08:33

## 2017-11-30 RX ADMIN — MICONAZOLE NITRATE: 20 CREAM TOPICAL at 17:44

## 2017-11-30 NOTE — DISCHARGE INSTRUCTIONS
Perma-cath Placement   WHAT YOU NEED TO KNOW:   A perma-cath is a catheter placed through a vein into or near your right atrium  Your right atrium is the right upper chamber of your heart  A perma-cath is used for dialysis in an emergency or until a long-term device is ready to use  After your procedure, you will have some pain and swelling on your chest and neck  You may have some bruises on your chest and neck  You may also have 2 dressings, one on your chest and one on your neck  DISCHARGE INSTRUCTIONS:   Call 911 for any of the following:   · You feel lightheaded, short of breath, and have chest pain  · Your catheter comes out   Contact Interventional Radiology at [phone] [facility] PATIENTS: Contact Interventional Radiology at [phone]) [facility] PATIENTS: Contact Interventional Radiology at [phone]) if:  · Blood soaks through your bandage  · You have new swelling in your arm, neck, face, or chest on your right side  · Your catheter gets wet  · Your bruises or pain get worse  · You have a fever or chills  · Persistent nausea or vomiting  · Your incision is red, swollen, or draining pus  · You have questions or concerns about your condition or care  Self-care:       · Resume your normal diet  · Keep your dressings dry  Do not take a shower or swim  You may take a tub bath, but do not get your dressings wet  Water in your wound can cause bacteria to grow and cause an infection  If your dressing gets wet, dry it off and cover it with dry sterile gauze  Call your healthcare provider  Do not use soaps or ointments  · Do not change your dressings  Your healthcare provider or dialysis nurse will change your dressings  Your dressings should stay in place until your healthcare provider removes them  The dressing on your chest will stay as long as you have the catheter in place  The dressing prevents infection  · Do not remove the red and blue caps from the end of your catheter   The caps prevent air from getting into your catheter    Follow up with your healthcare provider as directed: Write down your questions so you remember to ask them during your visits

## 2017-11-30 NOTE — PROGRESS NOTES
Returned from IR at 1000 post permacath placement, tolerated procedure well p  Pt  Given neb tx for excessive coughing, no wheezing noted, and given robitussin with relief obtained  Now resting comfortably

## 2017-11-30 NOTE — PROGRESS NOTES
NEPHROLOGY PROGRESS NOTE   Meka Terry 67 y o  female MRN: [id number]  Unit/Bed#: [facility] 521-01 Encounter: [id number]  Reason for Consult: ESRD    ASSESSMENT/PLAN:  1  End-stage renal disease, maintenance hemodialysis Monday Wednesday Friday, [facility] [address]  2  Pneumonia  3  Recent right upper extremity infiltration, allow for S, PermCath placement today  4  Anemia of chronic disease, no JESSICA due to history of DVT  5  Morbid obesity    · Recent PermCath placement, continue to allow rest for her AV fistula since recent infiltration  · Antibiotics to stop today  · Plan for hemodialysis tomorrow  · Given chest x-ray findings, will try to challenge dry weight    SUBJECTIVE:  Seen and examined  Patient awake alert  Still with associated cough although dry  At times feels she is choking  Currently eating lunch      OBJECTIVE:  Current Weight: Weight - Scale: (!) 142 kg (313 lb 15 oz)  Vitals:    11/30/17 0900 11/30/17 0905 11/30/17 0952 11/30/17 1024   BP: 115/51 125/50 140/61    Pulse: 69 70 80    Resp: 18 18 18    Temp:   97 6 °F (36 4 °C)    TempSrc:   Oral    SpO2: 100% 100% 98% 97%   Weight:       Height:           Intake/Output Summary (Last 24 hours) at 11/30/17 1306  Last data filed at 11/30/17 1258   Gross per 24 hour   Intake              610 ml   Output                1 ml   Net              609 ml     GENERAL :  No acute distress, appears to fairly comfortable  NECK:  Supple  CV:  Regular  RESP:  Coarse, few expiratory wheezes  ABD:  Obese, soft  EXT:  Trace edema  Psych:  Appropriate  SKIN:  No rash    Medications:    Current Facility-Administered Medications:     acetaminophen (TYLENOL) tablet 650 mg, 650 mg, Oral, Q6H PRN, Hetul Miles, DO, 650 mg at 11/26/17 1132    albuterol inhalation solution 2 5 mg, 2 5 mg, Nebulization, Q4H PRN, Hetul Miles, DO, 2 5 mg at 11/30/17 1024    allopurinol (ZYLOPRIM) tablet 200 mg, 200 mg, Oral, Once per day on Sun Tue Wed Fri Sat, Hetul Miles, DO, 200 mg at 11/28/17 0835    allopurinol (ZYLOPRIM) tablet 300 mg, 300 mg, Oral, Once per day on Mon Thu, Hetul Miles, DO, 300 mg at 11/30/17 1002    apixaban (ELIQUIS) tablet 2 5 mg, 2 5 mg, Oral, BID, Hetul Miles, DO, 2 5 mg at 11/30/17 1001    bisacodyl (DULCOLAX) EC tablet 5 mg, 5 mg, Oral, Daily PRN, Hetul Miles, DO, 5 mg at 11/24/17 1226    calcium carbonate (TUMS) chewable tablet 500 mg, 500 mg, Oral, BID With Meals, Cari Dajavon Coleman, DO, 500 mg at 11/30/17 1003    cyanocobalamin (VITAMIN B-12) tablet 1,000 mcg, 1,000 mcg, Oral, Daily, Hetul Miles, DO, 1,000 mcg at 11/30/17 1003    dextromethorphan-guaiFENesin (ROBITUSSIN DM)  mg/5 mL oral syrup 5 mL, 5 mL, Oral, Q4H PRN, Jenn Case MD, 5 mL at 11/30/17 1021    docusate sodium (COLACE) capsule 100 mg, 100 mg, Oral, BID, Hetul Miles, DO, 100 mg at 11/30/17 1003    doxercalciferol (HECTOROL) injection 0 5 mcg, 0 5 mcg, Intravenous, After Dialysis, Claire Preston PA-C, 0 5 mcg at 11/29/17 1029    famotidine (PEPCID) tablet 20 mg, 20 mg, Oral, Daily, Sheri Whitlock MD, 20 mg at 11/30/17 1003    ferrous sulfate tablet 325 mg, 325 mg, Oral, TID With Meals, Hetul Miles, DO, 325 mg at 11/30/17 1004    guaiFENesin (MUCINEX) 12 hr tablet 1,200 mg, 1,200 mg, Oral, Q12H [facility], Samy Zavala MD, 1,200 mg at 11/30/17 1002    HYDROmorphone (DILAUDID) tablet 1 mg, 1 mg, Oral, Q4H PRN, Colleen Santillan MD, 1 mg at 11/25/17 0840    insulin glargine (LANTUS) subcutaneous injection 45 Units, 45 Units, Subcutaneous, HS, Samy Zavala MD, 45 Units at 11/29/17 2208    insulin lispro (HumaLOG) 100 units/mL subcutaneous injection 1-6 Units, 1-6 Units, Subcutaneous, TID AC, 1 Units at 11/29/17 0809 **AND** Fingerstick Glucose (POCT), , , TID AC, Esperanza Natarajan PA-C    insulin lispro (HumaLOG) 100 units/mL subcutaneous injection 1-6 Units, 1-6 Units, Subcutaneous, HS, Emily Guerrero PA-C, 1 Units at 11/29/17 2210    insulin lispro (HumaLOG) 100 units/mL subcutaneous injection 8 Units, 8 Units, Subcutaneous, TID With Meals, Hetul Miles, DO, 8 Units at 11/28/17 1612    latanoprost (XALATAN) 0 005 % ophthalmic solution 1 drop, 1 drop, Both Eyes, HS, Hetul Miles, DO, 1 drop at 11/29/17 2209    metoprolol tartrate (LOPRESSOR) partial tablet 12 5 mg, 12 5 mg, Oral, Once per day on Sun Tue Thu Sat, Hetul Miles, DO, 12 5 mg at 11/30/17 1002    miconazole 2 % cream, , Topical, BID, Hetul Miles, DO    nystatin (MYCOSTATIN) powder, , Topical, BID, Hetul Miles, DO    pantoprazole (PROTONIX) EC tablet 40 mg, 40 mg, Oral, BID AC, Hetul Miles, DO, 40 mg at 11/30/17 0608    pneumococcal 13-valent conjugate vaccine (PREVNAR-13) IM injection 0 5 mL, 0 5 mL, Intramuscular, Prior to discharge, Hetul Miles, DO    polyethylene glycol (MIRALAX) packet 17 g, 17 g, Oral, Daily, Hetul Miles, DO, 17 g at 11/28/17 0838    pravastatin (PRAVACHOL) tablet 80 mg, 80 mg, Oral, Daily, Hetul Miles, DO, 80 mg at 11/30/17 1001    predniSONE tablet 40 mg, 40 mg, Oral, BID With Meals, Hetul Miles, DO, 40 mg at 11/30/17 1022    pregabalin (LYRICA) capsule 75 mg, 75 mg, Oral, BID, Hetul Miles, DO, 75 mg at 11/30/17 1022    senna (SENOKOT) tablet 17 2 mg, 2 tablet, Oral, Daily PRN, Hetul Miles, DO, 17 2 mg at 11/29/17 1757    Laboratory Results:    Results from last 7 days  Lab Units 11/30/17  0613 11/29/17  0828 11/24/17  0453   WBC Thousand/uL 11 11*  --   --    HEMOGLOBIN g/dL 9 3*  --   --    HEMATOCRIT % 28 6*  --   --    PLATELETS Thousands/uL 72*  --   --    SODIUM mmol/L 131* 129* 135*   POTASSIUM mmol/L 4 4 4 2 4 2   CHLORIDE mmol/L 94* 93* 95*   CO2 mmol/L 30 29 31   BUN mg/dL 39* 60* 42*   CREATININE mg/dL 3 07* 3 80* 2 99*   CALCIUM mg/dL 8 5 7 9* 8 1*   MAGNESIUM mg/dL  --   --  2 2   PHOSPHORUS mg/dL  --   --  6 1*   ALBUMIN g/dL  --   --  1 8*   TOTAL PROTEIN g/dL  --   --  5 4*   GLUCOSE RANDOM mg/dL 136 118 235*

## 2017-11-30 NOTE — WOUND OSTOMY CARE
Progress Note - Wound   Frederic Lynch 67 y o  female MRN: [id number]  Unit/Bed#: [facility] 521-01 Encounter: [id number]      Assessment: Patient is seen for weekly wound care assessment   The patient is seen while in bed for the assessment   The left buttocks has a stage 2 area that is 100% pink fragile tissue   The right buttocks has a small stage 2 that was a scabbed are on the last assessment that has unroofed   The right gluteal area has a small partial thickness area that has opened related to chaffing and dryness of the skin   The patient has dry fragile skin that has old scarring related to several wounds that have opened and closed several times  The patient has incontinence and MASD noted on the right left buttocks and folds  Discussed plan of care with the RN       Plan:   1  Moisturize daily with skin nourishing cream   2  Soft care cushion when OOB   3  Apply hydraguard to sacrum , right gluteal and heels tid and prn   4  Dust with stomadhesive powder then apply calazime to open areas only tid and prn to left buttocks and right buttocks   5  Turn and reposition every 2 hours to off load   6  Elevate heels off of the bed with pillows           Objective:    Vitals: Blood pressure 119/57, pulse 75, temperature 97 6 °F (36 4 °C), temperature source Oral, resp  rate 20, height 5' 4" (1 626 m), weight (!) 142 kg (313 lb 15 oz), SpO2 98 %, not currently breastfeeding  ,Body mass index is 53 89 kg/m²            Pressure Ulcer 08/14/17 Buttocks Left (Active)   Staging Stage II 11/30/2017 10:35 AM   Wound Description Beefy red;Bleeding;Fragile 11/30/2017 10:35 AM   Graciela-wound Assessment Erythema 11/30/2017 10:35 AM   Wound Length (cm) 0 2 cm 11/24/2017 12:49 PM   Wound Width (cm) 0 2 cm 11/24/2017 12:49 PM   Wound Depth (cm) 0 1 11/24/2017 12:49 PM   Calculated Wound Area (cm^2) 0 04 cm^2 11/24/2017 12:49 PM   Calculated Wound Volume (cm^3) 0 cm^3 11/24/2017 12:49 PM   Change in Wound Size % 100 11/24/2017 12:49 PM   Drainage Amount Scant 11/30/2017 10:35 AM   Drainage Description Bloody 11/30/2017 10:35 AM   Treatment Cleansed;Elevated with pillow(s) 11/30/2017 10:35 AM   Dressing Other (Comment) 11/28/2017 12:20 PM   Patient Tolerance Tolerated well 11/27/2017  7:58 PM   Dressing Status Open to air 11/30/2017 10:35 AM       Pressure Ulcer 11/23/17 Buttocks Left deep, beefy red  (Active)   Staging Stage II 11/30/2017  3:00 PM   Wound Description Clean;Beefy red;Fragile;Pink 11/30/2017  3:00 PM   Graciela-wound Assessment Clean;Dry;Fragile 11/30/2017  3:00 PM   Wound Length (cm) 2 cm 11/30/2017  3:00 PM   Wound Width (cm) 2 cm 11/30/2017  3:00 PM   Wound Depth (cm) 0 1 11/30/2017  3:00 PM   Calculated Wound Area (cm^2) 4 cm^2 11/30/2017  3:00 PM   Calculated Wound Volume (cm^3) 0 4 cm^3 11/30/2017  3:00 PM   Change in Wound Size % -60 11/30/2017  3:00 PM   Drainage Amount Small 11/30/2017  3:00 PM   Drainage Description Bloody 11/30/2017  3:00 PM   Treatment Cleansed; Offload; Turn & reposition 11/30/2017  3:00 PM   Dressing Protective barrier 11/30/2017  3:00 PM   Wound packed? No 11/27/2017  4:00 AM   Patient Tolerance Tolerated well 11/30/2017  3:00 PM   Dressing Status Open to air 11/30/2017 10:35 AM       Pressure Ulcer 11/30/17 Buttocks Right stage 2 this area was scabbed and unroofed  (Active)   Staging Stage II 11/30/2017  3:00 PM   Wound Description Clean;Fragile;Pink 11/30/2017  3:00 PM   Graciela-wound Assessment Clean;Dry;Fragile 11/30/2017  3:00 PM   Wound Length (cm) 1 cm 11/30/2017  3:00 PM   Wound Width (cm) 1 cm 11/30/2017  3:00 PM   Wound Depth (cm) 0 1 11/30/2017  3:00 PM   Calculated Wound Area (cm^2) 1 cm^2 11/30/2017  3:00 PM   Calculated Wound Volume (cm^3) 0 1 cm^3 11/30/2017  3:00 PM   Drainage Amount Scant 11/30/2017  3:00 PM   Drainage Description Serosanguineous 11/30/2017  3:00 PM   Treatment Cleansed; Offload; Turn & reposition 11/30/2017  3:00 PM   Dressing Protective barrier 11/30/2017  3:00 PM   Patient Tolerance Tolerated well 11/30/2017  3:00 PM       Wound 11/30/17 Moisture associated skin damage Buttocks Distal;Right partial thickness chaffed area reopened and MASD  (Active)   Wound Description Dry;Fragile;Pink 11/30/2017  3:00 PM   Graciela-wound Assessment Dry;Fragile; Hyperpigmented 11/30/2017  3:00 PM   Wound Length (cm) 1 5 cm 11/30/2017  3:00 PM   Wound Width (cm) 1 cm 11/30/2017  3:00 PM   Wound Depth (cm) 0 1 11/30/2017  3:00 PM   Calculated Wound Volume (cm^3) 0 15 cm^3 11/30/2017  3:00 PM   Drainage Amount Scant 11/30/2017  3:00 PM   Drainage Description Bloody 11/30/2017  3:00 PM   Non-staged Wound Description Partial thickness 11/30/2017  3:00 PM   Treatments Cleansed 11/30/2017  3:00 PM   Dressing Protective barrier 11/30/2017  3:00 PM   Patient Tolerance Tolerated well 11/30/2017  3:00 PM           Will folow weekly for wound care     Ziyad Pabon RN BSN  Estr[facility] Mcwilliams

## 2017-11-30 NOTE — PROGRESS NOTES
[facility] - Internal Medicine Progress Note  Patient: Marek Johnson 67 y o  female   MRN: [id number]  PCP: Phil Flores MD  Unit/Bed#: [facility] 521-01 Encounter: [id number]  Date Of Visit: 17         Subjective:   No new complaints  Cough and congestion have improved today  Underwent PermCath placement earlier in the day  Objective:     Vitals:   Temp (24hrs), Av 7 °F (36 5 °C), Min:97 6 °F (36 4 °C), Max:97 8 °F (36 6 °C)    HR:  [66-80] 80  Resp:  [18-20] 18  BP: (103-140)/(49-67) 140/61  SpO2:  [96 %-100 %] 97 %  Body mass index is 53 89 kg/m²  Input and Output Summary (last 24 hours):        Intake/Output Summary (Last 24 hours) at 17 1412  Last data filed at 17 1258   Gross per 24 hour   Intake              610 ml   Output                1 ml   Net              609 ml       Physical Exam:     GENERAL:  Obese - no current distress  HEAD:  Normocephalic - atraumatic  EYES: PERRL - EOMI   MOUTH:  Mucosa moist  NECK:  Supple - full range of motion  CARDIAC:  Regular rate/rhythm - S1/S2 positive  PULMONARY:  Improving but diminished/course breath sounds bilaterally - nonlabored respirations  ABDOMEN:  Soft - nontender/nondistended - active bowel sounds  MUSCULOSKELETAL:  deconditioned motor strength/range of motion  NEUROLOGIC:  Alert/oriented x 3   SKIN:  Chronic wrinkles/blemishes   PSYCHIATRIC:  Mood/affect age-appropriate      Additional Data:     Labs & Recent Cultures:       Results from last 7 days  Lab Units 17  0613   WBC Thousand/uL 11 11*   HEMOGLOBIN g/dL 9 3*   HEMATOCRIT % 28 6*   PLATELETS Thousands/uL 72*   NEUTROS PCT % 86*   LYMPHS PCT % 7*   MONOS PCT % 7   EOS PCT % 0       Results from last 7 days  Lab Units 17  0613  17  0453   SODIUM mmol/L 131*  < > 135*   POTASSIUM mmol/L 4 4  < > 4 2   CHLORIDE mmol/L 94*  < > 95*   CO2 mmol/L 30  < > 31   BUN mg/dL 39*  < > 42*   CREATININE mg/dL 3 07*  < > 2 99*   CALCIUM mg/dL 8 5  < > 8 1*   TOTAL PROTEIN g/dL  --   --  5 4*   BILIRUBIN TOTAL mg/dL  --   --  0 36   ALK PHOS U/L  --   --  57   ALT U/L  --   --  21   AST U/L  --   --  14   GLUCOSE RANDOM mg/dL 136  < > 235*   < > = values in this interval not displayed  Last 24 Hours Medication List:     allopurinol 200 mg Oral Once per day on Sun Tue Wed Fri Sat   allopurinol 300 mg Oral Once per day on Mon Thu   apixaban 2 5 mg Oral BID   calcium carbonate 500 mg Oral BID With Meals   cyanocobalamin 1,000 mcg Oral Daily   docusate sodium 100 mg Oral BID   famotidine 20 mg Oral Daily   ferrous sulfate 325 mg Oral TID With Meals   guaiFENesin 1,200 mg Oral Q12H [address]   insulin glargine 45 Units Subcutaneous HS   insulin lispro 1-6 Units Subcutaneous TID AC   insulin lispro 1-6 Units Subcutaneous HS   insulin lispro 8 Units Subcutaneous TID With Meals   latanoprost 1 drop Both Eyes HS   metoprolol tartrate 12 5 mg Oral Once per day on Sun Tue Thu Sat   miconazole  Topical BID   nystatin  Topical BID   pantoprazole 40 mg Oral BID AC   polyethylene glycol 17 g Oral Daily   pravastatin 80 mg Oral Daily   predniSONE 40 mg Oral BID With Meals   pregabalin 75 mg Oral BID         Assessments:    1  Healthcare-Associated Pneumonia possibly Gram Negative - Chronic Hypoxic Respiratory Failure   Plan:   · Empiric IV antibiotics de-escalated to PO Levaquin (completed course yesterday)   · Baseline home O2 requirement is 2-3 L - currently remains at baseline    · PRN Albuterol/Robitussin on board - continue to encourage incentive spirometry during awake hours   · portable CXR today only reveals mild vascular congestion but no evidence of infiltrative process     2  ESRD on HD  Plan:   · Nephrology managing   · Status post PermCath placement earlier today     3    Insulin-Dependent Diabetes Mellitus - Diabetic Neuropathy   Plan:   · HgA1c of 7 0 in August 2017   · C/w basal insulin w/ additional PRN SSI coverage per accuchecks · C/w Lyrica for neuropathy     4  Chronic Diastolic CHF    Plan:   · EF of 65%   · On HD for ESRD      5  Morbid Obesity   Plan:   · BMI of 53 89   · Lifestyle/diet modifications encouraged     6  History of Pulmonary Embolism   Plan:   · C/w Eliquis (2 5 mg twice daily)     7  HTN  Plan:   · C/w Lopressor    8  HLD  Plan:   · C/w Pravachol    9  Hypothyroidism  Plan:   · C/w Synthroid     10  Anemia of Chronic Disease - Iron Deficiency   Plan:   · C/w ferrous sulfate supplementation     11  Thrombocytopenia  Plan:   · Stable - monitor platelet count      VTE Prophylaxis:  Eliquis       Time Spent for Care: 34 minutes  More than 50% of total time spent on counseling and coordination of care as described above  Current Length of Stay: 7 day(s)      Code Status: Level 1 - Full Code       Today, Patient Was Seen By: Talha Snow MD    ** Please Note: This note has been constructed using a voice recognition system   **

## 2017-12-01 ENCOUNTER — APPOINTMENT (INPATIENT)
Dept: DIALYSIS | Facility: HOSPITAL | Age: 72
DRG: 177 | End: 2017-12-01
Attending: INTERNAL MEDICINE
Payer: MEDICARE

## 2017-12-01 LAB
ANION GAP SERPL CALCULATED.3IONS-SCNC: 10 MMOL/L (ref 4–13)
ANISOCYTOSIS BLD QL SMEAR: PRESENT
BASOPHILS # BLD MANUAL: 0 THOUSAND/UL (ref 0–0.1)
BASOPHILS NFR MAR MANUAL: 0 % (ref 0–1)
BUN SERPL-MCNC: 57 MG/DL (ref 5–25)
CALCIUM SERPL-MCNC: 7.8 MG/DL (ref 8.3–10.1)
CHLORIDE SERPL-SCNC: 94 MMOL/L (ref 100–108)
CO2 SERPL-SCNC: 25 MMOL/L (ref 21–32)
CREAT SERPL-MCNC: 4.03 MG/DL (ref 0.6–1.3)
EOSINOPHIL # BLD MANUAL: 0 THOUSAND/UL (ref 0–0.4)
EOSINOPHIL NFR BLD MANUAL: 0 % (ref 0–6)
ERYTHROCYTE [DISTWIDTH] IN BLOOD BY AUTOMATED COUNT: 15.3 % (ref 11.6–15.1)
GFR SERPL CREATININE-BSD FRML MDRD: 10 ML/MIN/1.73SQ M
GLUCOSE SERPL-MCNC: 197 MG/DL (ref 65–140)
GLUCOSE SERPL-MCNC: 198 MG/DL (ref 65–140)
GLUCOSE SERPL-MCNC: 210 MG/DL (ref 65–140)
GLUCOSE SERPL-MCNC: 225 MG/DL (ref 65–140)
GLUCOSE SERPL-MCNC: 268 MG/DL (ref 65–140)
HCT VFR BLD AUTO: 24.4 % (ref 34.8–46.1)
HGB BLD-MCNC: 8 G/DL (ref 11.5–15.4)
LYMPHOCYTES # BLD AUTO: 0.46 THOUSAND/UL (ref 0.6–4.47)
LYMPHOCYTES # BLD AUTO: 5 % (ref 14–44)
MCH RBC QN AUTO: 29.6 PG (ref 26.8–34.3)
MCHC RBC AUTO-ENTMCNC: 32.8 G/DL (ref 31.4–37.4)
MCV RBC AUTO: 90 FL (ref 82–98)
MONOCYTES # BLD AUTO: 0 THOUSAND/UL (ref 0–1.22)
MONOCYTES NFR BLD: 0 % (ref 4–12)
MYELOCYTES NFR BLD MANUAL: 1 % (ref 0–1)
NEUTROPHILS # BLD MANUAL: 8.56 THOUSAND/UL (ref 1.85–7.62)
NEUTS SEG NFR BLD AUTO: 94 % (ref 43–75)
NRBC BLD AUTO-RTO: 0 /100 WBCS
PLATELET # BLD AUTO: 78 THOUSANDS/UL (ref 149–390)
PLATELET BLD QL SMEAR: ABNORMAL
PMV BLD AUTO: 12 FL (ref 8.9–12.7)
POTASSIUM SERPL-SCNC: 4.9 MMOL/L (ref 3.5–5.3)
RBC # BLD AUTO: 2.7 MILLION/UL (ref 3.81–5.12)
RBC MORPH BLD: PRESENT
SODIUM SERPL-SCNC: 129 MMOL/L (ref 136–145)
WBC # BLD AUTO: 9.11 THOUSAND/UL (ref 4.31–10.16)

## 2017-12-01 PROCEDURE — 82948 REAGENT STRIP/BLOOD GLUCOSE: CPT

## 2017-12-01 PROCEDURE — 94760 N-INVAS EAR/PLS OXIMETRY 1: CPT

## 2017-12-01 PROCEDURE — 80048 BASIC METABOLIC PNL TOTAL CA: CPT | Performed by: INTERNAL MEDICINE

## 2017-12-01 PROCEDURE — 85007 BL SMEAR W/DIFF WBC COUNT: CPT | Performed by: INTERNAL MEDICINE

## 2017-12-01 PROCEDURE — 85027 COMPLETE CBC AUTOMATED: CPT | Performed by: INTERNAL MEDICINE

## 2017-12-01 RX ADMIN — INSULIN LISPRO 4 UNITS: 100 INJECTION, SOLUTION INTRAVENOUS; SUBCUTANEOUS at 21:11

## 2017-12-01 RX ADMIN — INSULIN GLARGINE 45 UNITS: 100 INJECTION, SOLUTION SUBCUTANEOUS at 21:11

## 2017-12-01 RX ADMIN — INSULIN LISPRO 2 UNITS: 100 INJECTION, SOLUTION INTRAVENOUS; SUBCUTANEOUS at 07:59

## 2017-12-01 RX ADMIN — PANTOPRAZOLE SODIUM 40 MG: 40 TABLET, DELAYED RELEASE ORAL at 18:12

## 2017-12-01 RX ADMIN — GUAIFENESIN AND DEXTROMETHORPHAN 5 ML: 100; 10 SYRUP ORAL at 07:59

## 2017-12-01 RX ADMIN — GUAIFENESIN 1200 MG: 600 TABLET, EXTENDED RELEASE ORAL at 20:56

## 2017-12-01 RX ADMIN — INSULIN LISPRO 2 UNITS: 100 INJECTION, SOLUTION INTRAVENOUS; SUBCUTANEOUS at 18:14

## 2017-12-01 RX ADMIN — NYSTATIN: 100000 POWDER TOPICAL at 18:13

## 2017-12-01 RX ADMIN — GUAIFENESIN AND DEXTROMETHORPHAN 5 ML: 100; 10 SYRUP ORAL at 14:11

## 2017-12-01 RX ADMIN — CYANOCOBALAMIN TAB 500 MCG 1000 MCG: 500 TAB at 14:42

## 2017-12-01 RX ADMIN — GUAIFENESIN 1200 MG: 600 TABLET, EXTENDED RELEASE ORAL at 14:04

## 2017-12-01 RX ADMIN — DOXERCALCIFEROL 0.5 MCG: 2 INJECTION, SOLUTION INTRAVENOUS at 10:22

## 2017-12-01 RX ADMIN — Medication 500 MG: at 18:12

## 2017-12-01 RX ADMIN — PREGABALIN 75 MG: 75 CAPSULE ORAL at 14:47

## 2017-12-01 RX ADMIN — APIXABAN 2.5 MG: 2.5 TABLET, FILM COATED ORAL at 18:12

## 2017-12-01 RX ADMIN — PREDNISONE 40 MG: 20 TABLET ORAL at 18:12

## 2017-12-01 RX ADMIN — INSULIN LISPRO 8 UNITS: 100 INJECTION, SOLUTION INTRAVENOUS; SUBCUTANEOUS at 14:15

## 2017-12-01 RX ADMIN — DOCUSATE SODIUM 100 MG: 100 CAPSULE, LIQUID FILLED ORAL at 14:17

## 2017-12-01 RX ADMIN — Medication 325 MG: at 18:13

## 2017-12-01 RX ADMIN — PANTOPRAZOLE SODIUM 40 MG: 40 TABLET, DELAYED RELEASE ORAL at 06:28

## 2017-12-01 RX ADMIN — PREGABALIN 75 MG: 75 CAPSULE ORAL at 21:11

## 2017-12-01 RX ADMIN — PREDNISONE 40 MG: 20 TABLET ORAL at 14:42

## 2017-12-01 RX ADMIN — INSULIN LISPRO 8 UNITS: 100 INJECTION, SOLUTION INTRAVENOUS; SUBCUTANEOUS at 18:14

## 2017-12-01 RX ADMIN — LATANOPROST 1 DROP: 50 SOLUTION OPHTHALMIC at 21:11

## 2017-12-01 RX ADMIN — GUAIFENESIN AND DEXTROMETHORPHAN 5 ML: 100; 10 SYRUP ORAL at 23:41

## 2017-12-01 RX ADMIN — INSULIN LISPRO 8 UNITS: 100 INJECTION, SOLUTION INTRAVENOUS; SUBCUTANEOUS at 08:00

## 2017-12-01 RX ADMIN — INSULIN LISPRO 2 UNITS: 100 INJECTION, SOLUTION INTRAVENOUS; SUBCUTANEOUS at 14:16

## 2017-12-01 RX ADMIN — PRAVASTATIN SODIUM 80 MG: 80 TABLET ORAL at 14:04

## 2017-12-01 RX ADMIN — ALLOPURINOL 200 MG: 100 TABLET ORAL at 14:19

## 2017-12-01 RX ADMIN — MICONAZOLE NITRATE: 20 CREAM TOPICAL at 18:14

## 2017-12-01 NOTE — PROGRESS NOTES
[facility] Hospitalist Service - Internal Medicine Progress Note  Patient: Shantel Job 67 y o  female   MRN: [id number]  PCP: Sha Watkins MD  [facility] Encounter: [id number]  Date Of Visit: 17         Subjective:   Seen examined after dialysis today  She states her cough and congestion have improved  Nephrology is planning an additional short session of dialysis tomorrow due to her recent congestion for additional fluid removal   She denies any fever/chills or other complaints at this time  Objective:     Vitals:   Temp (24hrs), Av 3 °F (36 3 °C), Min:96 6 °F (35 9 °C), Max:97 9 °F (36 6 °C)    HR:  [69-83] 77  Resp:  [16-20] 20  BP: (120-181)/(55-80) 124/64  SpO2:  [94 %-100 %] 95 %  Body mass index is 53 89 kg/m²  Input and Output Summary (last 24 hours):        Intake/Output Summary (Last 24 hours) at 17 1806  Last data filed at 17 1225   Gross per 24 hour   Intake              980 ml   Output             5001 ml   Net            -4021 ml       Physical Exam:     GENERAL:  Obese - no acute distress  HEAD:  Normocephalic - atraumatic  EYES: PERRL - EOMI   MOUTH:  Mucosa moist  NECK:  Supple - full range of motion  CARDIAC:  Regular rate/rhythm - S1/S2 positive  PULMONARY:  Clear but diminished bibasilar breath sounds currently - nonlabored respirations  ABDOMEN:  Soft - nontender/nondistended - active bowel sounds  MUSCULOSKELETAL:  deconditioned motor strength/range of motion  NEUROLOGIC:  Alert/oriented x 3   SKIN:  Chronic wrinkles/blemishes   PSYCHIATRIC:  Mood/affect pleasant today      Additional Data:     Labs & Recent Cultures:       Results from last 7 days  Lab Units 17  0822 17  0613   WBC Thousand/uL 9 11 11 11*   HEMOGLOBIN g/dL 8 0* 9 3*   HEMATOCRIT % 24 4* 28 6*   PLATELETS Thousands/uL 78* 72*   NEUTROS PCT %  --  86*   LYMPHS PCT %  --  7*   LYMPHO PCT % 5*  --    MONOS PCT %  --  7   MONO PCT MAN % 0*  --    EOS PCT % --  0   EOSINO PCT MANUAL % 0  --        Results from last 7 days  Lab Units 12/01/17  0822   SODIUM mmol/L 129*   POTASSIUM mmol/L 4 9   CHLORIDE mmol/L 94*   CO2 mmol/L 25   BUN mg/dL 57*   CREATININE mg/dL 4 03*   CALCIUM mg/dL 7 8*   GLUCOSE RANDOM mg/dL 268*                     Last 24 Hours Medication List:     allopurinol 200 mg Oral Once per day on Sun Tue Wed Fri Sat   allopurinol 300 mg Oral Once per day on Mon Thu   apixaban 2 5 mg Oral BID   calcium carbonate 500 mg Oral BID With Meals   cyanocobalamin 1,000 mcg Oral Daily   docusate sodium 100 mg Oral BID   famotidine 20 mg Oral Daily   ferrous sulfate 325 mg Oral TID With Meals   guaiFENesin 1,200 mg Oral Q12H FREDI   insulin glargine 45 Units Subcutaneous HS   insulin lispro 1-6 Units Subcutaneous TID AC   insulin lispro 1-6 Units Subcutaneous HS   insulin lispro 8 Units Subcutaneous TID With Meals   latanoprost 1 drop Both Eyes HS   metoprolol tartrate 12 5 mg Oral Once per day on Sun Tue Thu Sat   miconazole  Topical BID   nystatin  Topical BID   pantoprazole 40 mg Oral BID AC   polyethylene glycol 17 g Oral Daily   pravastatin 80 mg Oral Daily   predniSONE 40 mg Oral BID With Meals   pregabalin 75 mg Oral BID         Assessments:    1  Healthcare-Associated Pneumonia possibly Gram Negative - Chronic Hypoxic Respiratory Failure   Plan:   · Empiric IV antibiotics de-escalated to PO Levaquin (completed course on 11/29)   · Baseline home O2 requirement is 2-3 L - currently remains at baseline    · PRN Albuterol/Robitussin on board - continue to encourage incentive spirometry during awake hours   · portable CXR yesterday revealed mild vascular congestion but no evidence of infiltrative process - nephrology planning additional short session of ultrafiltration tomorrow to remove residual fluid     2  ESRD on HD  Plan:   · Nephrology managing   · Status post PermCath placement yesterday     3    Insulin-Dependent Diabetes Mellitus - Diabetic Neuropathy Plan:   · HgA1c of 7 0 in August 2017   · C/w basal insulin w/ additional PRN SSI coverage per accuchecks    · C/w Lyrica for neuropathy     4  Chronic Diastolic CHF    Plan:   · EF of 65%   · On HD for ESRD      5  Morbid Obesity   Plan:   · BMI of 53 89   · Lifestyle/diet modifications encouraged     6  History of Pulmonary Embolism   Plan:   · C/w Eliquis (2 5 mg twice daily)     7  HTN  Plan:   · C/w Lopressor    8  HLD  Plan:   · C/w Pravachol    9  Hypothyroidism  Plan:   · C/w Synthroid     10  Anemia of Chronic Disease - Iron Deficiency   Plan:   · C/w ferrous sulfate supplementation     11  Thrombocytopenia  Plan:   · Stable - monitor platelet count      VTE Prophylaxis:  Eliquis       Time Spent for Care: 36 minutes  More than 50% of total time spent on counseling and coordination of care as described above  Current Length of Stay: 8 day(s)      Code Status: Level 1 - Full Code       Today, Patient Was Seen By: Shamir Mancuso MD    ** Please Note: This note has been constructed using a voice recognition system   **

## 2017-12-01 NOTE — CASE MANAGEMENT
Continued Stay Review    Date:  17 ACUTE MED SURG LEVEL OF CARE    Vital Signs: /61   Pulse 83   Temp 97 6 °F (36 4 °C) (Tympanic)   Resp 17   Ht 5' 4" (1 626 m)   Wt (!) 142 kg (313 lb 15 oz)   LMP  (LMP Unknown)   SpO2 94%   BMI 53 89 kg/m²     Vitals:   Temp (24hrs), Av 7 °F (36 5 °C), Min:97 6 °F (36 4 °C), Max:97 8 °F (36 6 °C)   HR:  [66-80] 80  Resp:  [18-20] 18  BP: (103-140)/(49-67) 140/61  SpO2:  [96 %-100 %] 97 %  Body mass index is 53 89 kg/m²       Input aIntake/Output Summary (Last 24 hours) at 17 1044     Gross per 24 hour   Intake              880 ml   Output                0 ml   Net              880 ml     Diet Renal; Renal (Dialysis);  Consstent Carbohydrate Diet Level 3 (6 carb servings/90 grams CHO/meal)       IV ACCESS     Medications:   Scheduled Meds:   allopurinol 200 mg Oral Once per day on Sun Tue Wed Fri Sat   allopurinol 300 mg Oral Once per day on    apixaban 2 5 mg Oral BID   calcium carbonate 500 mg Oral BID With Meals   cyanocobalamin 1,000 mcg Oral Daily   docusate sodium 100 mg Oral BID   famotidine 20 mg Oral Daily   ferrous sulfate 325 mg Oral TID With Meals   guaiFENesin 1,200 mg Oral Q12H FREDI   insulin glargine 45 Units Subcutaneous HS   insulin lispro 1-6 Units Subcutaneous TID AC   insulin lispro 1-6 Units Subcutaneous HS   insulin lispro 8 Units Subcutaneous TID With Meals   latanoprost 1 drop Both Eyes HS   metoprolol tartrate 12 5 mg Oral Once per day on Sun Tue Thu Sat   miconazole  Topical BID   nystatin  Topical BID   pantoprazole 40 mg Oral BID AC   polyethylene glycol 17 g Oral Daily   pravastatin 80 mg Oral Daily   predniSONE 40 mg Oral BID With Meals   pregabalin 75 mg Oral BID     PRN Meds:     acetaminophen    NEB Tx with albuterol 2 5 mg q4hrs prn given x 1/ 24 hrs    bisacodyl    dextromethorphan-guaiFENesin (ROBITUSSIN Dm) 5 cc q4hrs prn given x 3/ 24 hrs    doxercalciferol    HYDROmorphone    pneumococcal 13-valent conjugate vaccine    senna    LABS/Diagnostic Results:   Results from last 7 days  Lab Units 12/01/17  0822 11/30/17  0613 11/29/17  0828   WBC Thousand/uL 9 11 11 11*  --    HEMOGLOBIN g/dL 8 0* 9 3*  --    HEMATOCRIT % 24 4* 28 6*  --    PLATELETS Thousands/uL 78* 72*  --    SODIUM mmol/L 129* 131* 129*   POTASSIUM mmol/L 4 9 4 4 4 2   CHLORIDE mmol/L 94* 94* 93*   CO2 mmol/L 25 30 29   BUN mg/dL 57* 39* 60*   CREATININE mg/dL 4 03* 3 07* 3 80*   CALCIUM mg/dL 7 8* 8 5 7 9*   GLUCOSE RANDOM mg/dL 268* 136 118       Age/Sex: 67 y o  female     Assessment/Surjit (per recent Int Med MD progress note):    Assessments:   1   Healthcare-Associated Pneumonia possibly Gram Negative - Chronic Hypoxic Respiratory Failure   Plan:      · Empiric IV antibiotics de-escalated to PO Levaquin (completed course yesterday)   · Baseline home O2 requirement is 2-3 L - currently remains at baseline    · PRN Albuterol/Robitussin on board - continue to encourage incentive spirometry during awake hours   · portable CXR today only reveals mild vascular congestion but no evidence of infiltrative process     2  ESRD on HD  Plan:      § Nephrology managing   § Status post PermCath placement earlier today      3  Insulin-Dependent Diabetes Mellitus - Diabetic Neuropathy   Plan:      § HgA1c of 7 0 in August 2017   § C/w basal insulin w/ additional PRN SSI coverage per accuchecks   § C/w Lyrica for neuropathy      4  Chronic Diastolic CHF    Plan:      § EF of 65%   § On HD for ESRD       5  Morbid Obesity   Plan:      § BMI of 53 89   § Lifestyle/diet modifications encouraged      6  History of Pulmonary Embolism   Plan:      § C/w Eliquis (2 5 mg twice daily)      7  HTN  Plan:      § C/w Lopressor     8  HLD  Plan:      § C/w Pravachol     9  Hypothyroidism  Plan:      § C/w Synthroid      10  Anemia of Chronic Disease - Iron Deficiency   Plan:      § C/w ferrous sulfate supplementation      11    Thrombocytopenia  Plan: § Stable - monitor platelet count      VTE Prophylaxis:  Eliquis           Nephrology  Progress Notes Date of Service: 12/1/2017 10:44 AM     ASSESSMENT/PLAN:  1  End-stage renal disease, maintenance hemodialysis Monday Wednesday Friday, [address]  2  Hyponatremia, likely secondary to volume  3  Pneumonia  4  Recent right upper extremity infiltration, allow for S, PermCath placement today  5  Anemia of chronic disease, no JESSICA due to history of DVT  6  Morbid obesity     · Continued maintenance hemodialysis, Monday Wednesday Friday, patient still with persistent cough, chest x-ray findings mild congestion, challenge weight on hemodialysis today  · Status post antibiotic treatment, continue nebulizer treatments as needed  · Plan for additional ultrafiltration treatment tomorrow given patient's persistent cough chest x-ray findings           Discharge Plan:   ANTICIPATE DISCHARGE BACK TO LONG TERM SKILLED FACILITY WHEN MEDICALLY CLEARED     PER PT 11/27/17  orders received and chart reviewed  Pt bed bound and does not ambulate I at baseline as per CM note  Pt is a 5 year resident of [facility] and would like to return at [facility] PT at this time due to pts prior functional mobility   Re-consult PT if needed      CASE MANAGEMENT FOLLOWING CLOSELY FOR ALL DISCHARGE NEEDS + CO-ORDINATION

## 2017-12-01 NOTE — SOCIAL WORK
Per Dr Liu Husbands pt not cleared for d/c needs extra dialysis tx tomorrow  [facility] to [facility] spoke with Connor Keller and pt rescheduled for 1pm pick-up on Alphonso 12/3 [place] [facility] [place]

## 2017-12-01 NOTE — PROGRESS NOTES
NEPHROLOGY PROGRESS NOTE   Rola Lee 67 y o  female MRN: [id number]  Unit/Bed#: [facility] 521-01 Encounter: [id number]  Reason for Consult: ESRD    ASSESSMENT/PLAN:  1  End-stage renal disease, maintenance hemodialysis Monday Wednesday Friday, [facility] [address]  2  Hyponatremia, likely secondary to volume  3  Pneumonia  4  Recent right upper extremity infiltration, allow for S, PermCath placement today  5  Anemia of chronic disease, no JESSICA due to history of DVT  6  Morbid obesity    · Continued maintenance hemodialysis, Monday Wednesday Friday, patient still with persistent cough, chest x-ray findings mild congestion, challenge weight on hemodialysis today  · Status post antibiotic treatment, continue nebulizer treatments as needed  · Plan for additional ultrafiltration treatment tomorrow given patient's persistent cough chest x-ray findings       SUBJECTIVE:  Seen and examined  Patient still with persistent cough  No active chest pain  No abdominal pain      OBJECTIVE:  Current Weight: Weight - Scale: (!) 142 kg (313 lb 15 oz)  Vitals:    12/01/17 0900 12/01/17 0930 12/01/17 1000 12/01/17 1030   BP: 155/73 160/62 134/65 148/55   Pulse: 73 72 69 72   Resp:       Temp:       TempSrc:       SpO2:       Weight:       Height:           Intake/Output Summary (Last 24 hours) at 12/01/17 1044  Last data filed at 12/01/17 0528   Gross per 24 hour   Intake              880 ml   Output                0 ml   Net              880 ml     GENERAL :  No distress, comfortable, on hemodialysis  NECK:  Supple  CV:  Regular  RESP:  Coarse, few expiratory wheezes  ABD:  Obese, soft  EXT:  Trace to 1+ edema  Psych:  Appropriate  SKIN:  No rash    Medications:    Current Facility-Administered Medications:     acetaminophen (TYLENOL) tablet 650 mg, 650 mg, Oral, Q6H PRN, Hetul Miles, DO, 650 mg at 11/26/17 1132    albuterol inhalation solution 2 5 mg, 2 5 mg, Nebulization, Q4H PRN, Hetul Miles, DO, 2 5 mg at 11/30/17 1024   allopurinol (ZYLOPRIM) tablet 200 mg, 200 mg, Oral, Once per day on Sun Tue Wed Fri Sat, Hetul Miles, DO, 200 mg at 11/28/17 8314    allopurinol (ZYLOPRIM) tablet 300 mg, 300 mg, Oral, Once per day on Mon Thu, Hetul Miles, DO, 300 mg at 11/30/17 1002    apixaban (ELIQUIS) tablet 2 5 mg, 2 5 mg, Oral, BID, Hetul Miles, DO, 2 5 mg at 11/30/17 1743    bisacodyl (DULCOLAX) EC tablet 5 mg, 5 mg, Oral, Daily PRN, Hetul Miles, DO, 5 mg at 11/24/17 1226    calcium carbonate (TUMS) chewable tablet 500 mg, 500 mg, Oral, BID With Meals, Karl Coleman, DO, 500 mg at 11/30/17 1743    cyanocobalamin (VITAMIN B-12) tablet 1,000 mcg, 1,000 mcg, Oral, Daily, Hetul Miles, DO, 1,000 mcg at 11/30/17 1003    dextromethorphan-guaiFENesin (ROBITUSSIN DM)  mg/5 mL oral syrup 5 mL, 5 mL, Oral, Q4H PRN, Shelly Cormier MD, 5 mL at 12/01/17 0759    docusate sodium (COLACE) capsule 100 mg, 100 mg, Oral, BID, Hetul Miles, DO, 100 mg at 11/30/17 1003    doxercalciferol (HECTOROL) injection 0 5 mcg, 0 5 mcg, Intravenous, After Dialysis, Toribio Marques PA-C, 0 5 mcg at 12/01/17 1022    famotidine (PEPCID) tablet 20 mg, 20 mg, Oral, Daily, Allan Apple MD, 20 mg at 11/30/17 1003    ferrous sulfate tablet 325 mg, 325 mg, Oral, TID With Meals, Hetul Miles, DO, 325 mg at 11/30/17 1743    guaiFENesin (MUCINEX) 12 hr tablet 1,200 mg, 1,200 mg, Oral, Q12H [address], Jose A Lopez MD, 1,200 mg at 11/30/17 2144    HYDROmorphone (DILAUDID) tablet 1 mg, 1 mg, Oral, Q4H PRN, Andrea Card MD, 1 mg at 11/25/17 0840    insulin glargine (LANTUS) subcutaneous injection 45 Units, 45 Units, Subcutaneous, HS, Jose A Lopez MD, 45 Units at 11/30/17 2145    insulin lispro (HumaLOG) 100 units/mL subcutaneous injection 1-6 Units, 1-6 Units, Subcutaneous, TID AC, 2 Units at 12/01/17 0759 **AND** Fingerstick Glucose (POCT), , , TID AC, Esperanza Natarajan PA-C    insulin lispro (HumaLOG) 100 units/mL subcutaneous injection 1-6 Units, 1-6 Units, Subcutaneous, HS, Fercho Terry PA-C, 3 Units at 11/30/17 2145    insulin lispro (HumaLOG) 100 units/mL subcutaneous injection 8 Units, 8 Units, Subcutaneous, TID With Meals, Hetul Miles, DO, 8 Units at 12/01/17 0800    latanoprost (XALATAN) 0 005 % ophthalmic solution 1 drop, 1 drop, Both Eyes, HS, Hetul Miles, DO, 1 drop at 11/30/17 2144    metoprolol tartrate (LOPRESSOR) partial tablet 12 5 mg, 12 5 mg, Oral, Once per day on Sun Tue Thu Sat, Hetul Miles, DO, 12 5 mg at 11/30/17 1002    miconazole 2 % cream, , Topical, BID, Hetul Miles, DO    nystatin (MYCOSTATIN) powder, , Topical, BID, Hetul Miles, DO, 1 application at 63/67/54 1744    pantoprazole (PROTONIX) EC tablet 40 mg, 40 mg, Oral, BID AC, Hetul Miles, DO, 40 mg at 12/01/17 0628    pneumococcal 13-valent conjugate vaccine (PREVNAR-13) IM injection 0 5 mL, 0 5 mL, Intramuscular, Prior to discharge, Hetul Miles, DO    polyethylene glycol (MIRALAX) packet 17 g, 17 g, Oral, Daily, Hetul Miles, DO, 17 g at 11/28/17 5782    pravastatin (PRAVACHOL) tablet 80 mg, 80 mg, Oral, Daily, Hetul Milse, DO, 80 mg at 11/30/17 1001    predniSONE tablet 40 mg, 40 mg, Oral, BID With Meals, Hetul Miles, DO, 40 mg at 11/30/17 1743    pregabalin (LYRICA) capsule 75 mg, 75 mg, Oral, BID, Hetul Miles, DO, 75 mg at 11/30/17 2144    senna (SENOKOT) tablet 17 2 mg, 2 tablet, Oral, Daily PRN, Hetul Miles, DO, 17 2 mg at 11/29/17 1757    Laboratory Results:    Results from last 7 days  Lab Units 12/01/17  0822 11/30/17  0613 11/29/17  0828   WBC Thousand/uL 9 11 11 11*  --    HEMOGLOBIN g/dL 8 0* 9 3*  --    HEMATOCRIT % 24 4* 28 6*  --    PLATELETS Thousands/uL 78* 72*  --    SODIUM mmol/L 129* 131* 129*   POTASSIUM mmol/L 4 9 4 4 4 2   CHLORIDE mmol/L 94* 94* 93*   CO2 mmol/L 25 30 29   BUN mg/dL 57* 39* 60*   CREATININE mg/dL 4 03* 3 07* 3 80*   CALCIUM mg/dL 7 8* 8 5 7 9*   GLUCOSE RANDOM mg/dL 268* 136 118

## 2017-12-01 NOTE — SOCIAL WORK
[place] [facility] [place]  [facility] and [facility] bariatric transport scheduled for 19:45  FAcility and bedside nurse made aware

## 2017-12-01 NOTE — PROGRESS NOTES
Pt  Turned and re-positioned, incontinent of large amt stool and urine   Skin care done/ resting comfortably, eating lunch

## 2017-12-02 ENCOUNTER — APPOINTMENT (INPATIENT)
Dept: DIALYSIS | Facility: HOSPITAL | Age: 72
DRG: 177 | End: 2017-12-02
Attending: INTERNAL MEDICINE
Payer: MEDICARE

## 2017-12-02 LAB
ANION GAP SERPL CALCULATED.3IONS-SCNC: 8 MMOL/L (ref 4–13)
BUN SERPL-MCNC: 42 MG/DL (ref 5–25)
CALCIUM SERPL-MCNC: 7.9 MG/DL (ref 8.3–10.1)
CHLORIDE SERPL-SCNC: 95 MMOL/L (ref 100–108)
CO2 SERPL-SCNC: 28 MMOL/L (ref 21–32)
CREAT SERPL-MCNC: 3.07 MG/DL (ref 0.6–1.3)
GFR SERPL CREATININE-BSD FRML MDRD: 14 ML/MIN/1.73SQ M
GLUCOSE SERPL-MCNC: 203 MG/DL (ref 65–140)
GLUCOSE SERPL-MCNC: 207 MG/DL (ref 65–140)
GLUCOSE SERPL-MCNC: 277 MG/DL (ref 65–140)
GLUCOSE SERPL-MCNC: 312 MG/DL (ref 65–140)
GLUCOSE SERPL-MCNC: 320 MG/DL (ref 65–140)
HCT VFR BLD AUTO: 23.8 % (ref 34.8–46.1)
HGB BLD-MCNC: 7.9 G/DL (ref 11.5–15.4)
POTASSIUM SERPL-SCNC: 4.2 MMOL/L (ref 3.5–5.3)
SODIUM SERPL-SCNC: 131 MMOL/L (ref 136–145)

## 2017-12-02 PROCEDURE — 94760 N-INVAS EAR/PLS OXIMETRY 1: CPT

## 2017-12-02 PROCEDURE — 85018 HEMOGLOBIN: CPT | Performed by: INTERNAL MEDICINE

## 2017-12-02 PROCEDURE — 85014 HEMATOCRIT: CPT | Performed by: INTERNAL MEDICINE

## 2017-12-02 PROCEDURE — 82948 REAGENT STRIP/BLOOD GLUCOSE: CPT

## 2017-12-02 PROCEDURE — 80048 BASIC METABOLIC PNL TOTAL CA: CPT | Performed by: INTERNAL MEDICINE

## 2017-12-02 RX ORDER — MELATONIN
1000 DAILY
Status: DISCONTINUED | OUTPATIENT
Start: 2017-12-03 | End: 2017-12-03 | Stop reason: HOSPADM

## 2017-12-02 RX ADMIN — GUAIFENESIN 1200 MG: 600 TABLET, EXTENDED RELEASE ORAL at 08:14

## 2017-12-02 RX ADMIN — GUAIFENESIN 1200 MG: 600 TABLET, EXTENDED RELEASE ORAL at 22:38

## 2017-12-02 RX ADMIN — INSULIN LISPRO 8 UNITS: 100 INJECTION, SOLUTION INTRAVENOUS; SUBCUTANEOUS at 08:08

## 2017-12-02 RX ADMIN — PREGABALIN 75 MG: 75 CAPSULE ORAL at 17:57

## 2017-12-02 RX ADMIN — PREDNISONE 40 MG: 20 TABLET ORAL at 07:30

## 2017-12-02 RX ADMIN — LATANOPROST 1 DROP: 50 SOLUTION OPHTHALMIC at 22:39

## 2017-12-02 RX ADMIN — FAMOTIDINE 20 MG: 20 TABLET, FILM COATED ORAL at 08:15

## 2017-12-02 RX ADMIN — NYSTATIN: 100000 POWDER TOPICAL at 17:57

## 2017-12-02 RX ADMIN — INSULIN LISPRO 4 UNITS: 100 INJECTION, SOLUTION INTRAVENOUS; SUBCUTANEOUS at 22:39

## 2017-12-02 RX ADMIN — NYSTATIN 1 APPLICATION: 100000 POWDER TOPICAL at 08:17

## 2017-12-02 RX ADMIN — PANTOPRAZOLE SODIUM 40 MG: 40 TABLET, DELAYED RELEASE ORAL at 06:18

## 2017-12-02 RX ADMIN — APIXABAN 2.5 MG: 2.5 TABLET, FILM COATED ORAL at 17:57

## 2017-12-02 RX ADMIN — GUAIFENESIN AND DEXTROMETHORPHAN 5 ML: 100; 10 SYRUP ORAL at 18:49

## 2017-12-02 RX ADMIN — GUAIFENESIN AND DEXTROMETHORPHAN 5 ML: 100; 10 SYRUP ORAL at 23:13

## 2017-12-02 RX ADMIN — INSULIN LISPRO 8 UNITS: 100 INJECTION, SOLUTION INTRAVENOUS; SUBCUTANEOUS at 16:30

## 2017-12-02 RX ADMIN — METOPROLOL TARTRATE 12.5 MG: 25 TABLET ORAL at 08:14

## 2017-12-02 RX ADMIN — INSULIN LISPRO 4 UNITS: 100 INJECTION, SOLUTION INTRAVENOUS; SUBCUTANEOUS at 08:08

## 2017-12-02 RX ADMIN — Medication 325 MG: at 08:16

## 2017-12-02 RX ADMIN — INSULIN LISPRO 6 UNITS: 100 INJECTION, SOLUTION INTRAVENOUS; SUBCUTANEOUS at 13:51

## 2017-12-02 RX ADMIN — PREDNISONE 40 MG: 20 TABLET ORAL at 16:16

## 2017-12-02 RX ADMIN — INSULIN GLARGINE 45 UNITS: 100 INJECTION, SOLUTION SUBCUTANEOUS at 22:39

## 2017-12-02 RX ADMIN — INSULIN LISPRO 8 UNITS: 100 INJECTION, SOLUTION INTRAVENOUS; SUBCUTANEOUS at 13:51

## 2017-12-02 RX ADMIN — PRAVASTATIN SODIUM 80 MG: 80 TABLET ORAL at 08:14

## 2017-12-02 RX ADMIN — PANTOPRAZOLE SODIUM 40 MG: 40 TABLET, DELAYED RELEASE ORAL at 16:16

## 2017-12-02 RX ADMIN — CYANOCOBALAMIN TAB 500 MCG 1000 MCG: 500 TAB at 08:16

## 2017-12-02 RX ADMIN — Medication 325 MG: at 13:49

## 2017-12-02 RX ADMIN — Medication 325 MG: at 16:16

## 2017-12-02 RX ADMIN — APIXABAN 2.5 MG: 2.5 TABLET, FILM COATED ORAL at 08:15

## 2017-12-02 RX ADMIN — PREGABALIN 75 MG: 75 CAPSULE ORAL at 09:00

## 2017-12-02 RX ADMIN — ALLOPURINOL 200 MG: 100 TABLET ORAL at 08:15

## 2017-12-02 NOTE — PROGRESS NOTES
[facility] Hospitalist Service - Internal Medicine Progress Note  Patient: Jv Bird 67 y o  female   MRN: [id number]  PCP: Lianna Lopez MD  Unit/Bed#: [facility] 521-01 Encounter: [id number]  Date Of Visit: 17         Subjective:   No new complaints  Underwent an extra session of ultrafiltration today and states her cough and shortness of breath have improved  No new complaints  Anticipating discharge tomorrow  Objective:     Vitals:   Temp (24hrs), Av °F (36 7 °C), Min:97 5 °F (36 4 °C), Max:98 2 °F (36 8 °C)    HR:  [] 83  Resp:  [16-22] 16  BP: ()/(59-82) 141/73  SpO2:  [95 %-100 %] 100 %  Body mass index is 53 89 kg/m²  Input and Output Summary (last 24 hours):        Intake/Output Summary (Last 24 hours) at 17 1819  Last data filed at 17 1220   Gross per 24 hour   Intake             1420 ml   Output             3480 ml   Net            -2060 ml       Physical Exam:     GENERAL:  Obese - no current distress  HEAD:  Normocephalic - atraumatic  EYES: PERRL - EOMI   MOUTH:  Mucosa moist  NECK:  Supple - full range of motion  CARDIAC:  Regular rate/rhythm - S1/S2 positive  PULMONARY:  Clear/diminished but improved bibasilar breath sounds - nonlabored respirations  ABDOMEN:  Soft - nontender/nondistended - active bowel sounds  MUSCULOSKELETAL:  deconditioned motor strength/range of motion  NEUROLOGIC:  Alert/oriented x 3   SKIN:  Chronic wrinkles/blemishes   PSYCHIATRIC:  Mood/affect age-appropriate      Additional Data:     Labs & Recent Cultures:       Results from last 7 days  Lab Units 17  1022 17  0822 17  0613   WBC Thousand/uL  --  9 11 11 11*   HEMOGLOBIN g/dL 7 9* 8 0* 9 3*   HEMATOCRIT % 23 8* 24 4* 28 6*   PLATELETS Thousands/uL  --  78* 72*   NEUTROS PCT %  --   --  86*   LYMPHS PCT %  --   --  7*   LYMPHO PCT %  --  5*  --    MONOS PCT %  --   --  7   MONO PCT MAN %  --  0*  --    EOS PCT %  --   --  0   EOSINO PCT MANUAL %  -- 0  --        Results from last 7 days  Lab Units 12/02/17  1022   SODIUM mmol/L 131*   POTASSIUM mmol/L 4 2   CHLORIDE mmol/L 95*   CO2 mmol/L 28   BUN mg/dL 42*   CREATININE mg/dL 3 07*   CALCIUM mg/dL 7 9*   GLUCOSE RANDOM mg/dL 320*                     Last 24 Hours Medication List:     allopurinol 200 mg Oral Once per day on Sun Tue Wed Fri Sat   allopurinol 300 mg Oral Once per day on Mon Thu   apixaban 2 5 mg Oral BID   calcium carbonate 500 mg Oral BID With Meals   [START ON 12/3/2017] cholecalciferol 1,000 Units Oral Daily   cyanocobalamin 1,000 mcg Oral Daily   docusate sodium 100 mg Oral BID   famotidine 20 mg Oral Daily   ferrous sulfate 325 mg Oral TID With Meals   guaiFENesin 1,200 mg Oral Q12H [address]   insulin glargine 45 Units Subcutaneous HS   insulin lispro 2-12 Units Subcutaneous TID AC   insulin lispro 2-12 Units Subcutaneous HS   insulin lispro 8 Units Subcutaneous TID With Meals   latanoprost 1 drop Both Eyes HS   metoprolol tartrate 12 5 mg Oral Once per day on Sun Tue Thu Sat   miconazole  Topical BID   nystatin  Topical BID   pantoprazole 40 mg Oral BID AC   polyethylene glycol 17 g Oral Daily   pravastatin 80 mg Oral Daily   predniSONE 40 mg Oral BID With Meals   pregabalin 75 mg Oral BID         Assessments:    1  Healthcare-Associated Pneumonia possibly Gram Negative - Chronic Hypoxic Respiratory Failure   Plan:   · Empiric IV antibiotics de-escalated to PO Levaquin (completed course on 11/29)   · Baseline home O2 requirement is 2-3 L - currently remains at baseline    · PRN Albuterol/Robitussin on board - continue to encourage incentive spirometry during awake hours   · portable CXR on 11/30 revealed mild vascular congestion but no evidence of infiltrative process - underwent additional session of ultrafiltration today per nephrology two aid in shortness of breath     2  ESRD on HD  Plan:   · Nephrology managing   · Status post PermCath placement on 11/30     3    Insulin-Dependent Diabetes Mellitus - Diabetic Neuropathy   Plan:   · HgA1c of 7 0 in August 2017   · C/w basal insulin w/ additional PRN SSI coverage per accuchecks    · C/w Lyrica for neuropathy     4  Chronic Diastolic CHF    Plan:   · EF of 65%   · On HD for ESRD      5  Morbid Obesity   Plan:   · BMI of 53 89   · Lifestyle/diet modifications encouraged     6  History of Pulmonary Embolism   Plan:   · C/w Eliquis (2 5 mg twice daily)     7  HTN  Plan:   · C/w Lopressor    8  HLD  Plan:   · C/w Pravachol    9  Hypothyroidism  Plan:   · C/w Synthroid     10  Anemia of Chronic Disease - Iron Deficiency   Plan:   · C/w ferrous sulfate supplementation     11  Thrombocytopenia  Plan:   · Stable - monitor platelet count      VTE Prophylaxis:  Eliquis       Time Spent for Care: 34 minutes  More than 50% of total time spent on counseling and coordination of care as described above  Current Length of Stay: 9 day(s)      Code Status: Level 1 - Full Code       Today, Patient Was Seen By: Renny Wright MD    ** Please Note: This note has been constructed using a voice recognition system   **

## 2017-12-02 NOTE — PROGRESS NOTES
NEPHROLOGY PROGRESS NOTE   Soledad Sams 67 y o  female MRN: [id number]  Unit/Bed#: [facility] 521-01 Encounter: [id number]  Reason for Consult: ESRD    ASSESSMENT/PLAN:  1  End-stage renal disease, maintenance hemodialysis Monday Wednesday Friday, [facility] [address]  2  Hyponatremia, likely secondary to volume  3  Pneumonia  4  Recent right upper extremity infiltration, allow for S, PermCath placement today  5  Anemia of chronic disease, no JESSICA due to history of DVT  6  Morbid obesity    · Added ultrafiltration today, goal 3 L  · Hopeful improvement in cough, shortness of Breath  · Status post antibiotic therapy  · Blood pressure acceptable      SUBJECTIVE:  Seen and examined  Patient currently on hemodialysis for still with cough, may be slight improvement after dialysis yesterday  No chest pain  Denies abdominal pain      OBJECTIVE:  Current Weight: Weight - Scale: (!) 142 kg (313 lb 15 oz)  Vitals:    12/02/17 0700 12/02/17 0852 12/02/17 1010 12/02/17 1015   BP: 110/70  (!) 175/81 166/82   Pulse: 75 82 71 73   Resp: 20  18    Temp: 97 9 °F (36 6 °C)  97 5 °F (36 4 °C)    TempSrc: Oral  Tympanic    SpO2: 95% 99%     Weight:       Height:           Intake/Output Summary (Last 24 hours) at 12/02/17 1024  Last data filed at 12/02/17 1010   Gross per 24 hour   Intake             1900 ml   Output             5001 ml   Net            -3101 ml     GENERAL :  No distress, appears comfortable, currently on hemodialysis  NECK:  Supple  CV:  Regular  RESP:  Coarse, scattered rhonchi, few expiratory wheezes  ABD:  Soft  EXT:  No significant  Psych:  Appropriate  SKIN:  No rash    Medications:    Current Facility-Administered Medications:     acetaminophen (TYLENOL) tablet 650 mg, 650 mg, Oral, Q6H PRN, Hetul Miles, DO, 650 mg at 11/26/17 1132    albuterol inhalation solution 2 5 mg, 2 5 mg, Nebulization, Q4H PRN, Hetul Miles, DO, 2 5 mg at 11/30/17 1024    allopurinol (ZYLOPRIM) tablet 200 mg, 200 mg, Oral, Once per day on Sun Tue Wed Fri Sat, Hetul Miles, DO, 200 mg at 12/02/17 6147    allopurinol (ZYLOPRIM) tablet 300 mg, 300 mg, Oral, Once per day on Mon Thu, Hetul Miles, DO, 300 mg at 11/30/17 1002    apixaban (ELIQUIS) tablet 2 5 mg, 2 5 mg, Oral, BID, Hetul Miles, DO, 2 5 mg at 12/02/17 0815    bisacodyl (DULCOLAX) EC tablet 5 mg, 5 mg, Oral, Daily PRN, Hetul Miles, DO, 5 mg at 11/24/17 1226    calcium carbonate (TUMS) chewable tablet 500 mg, 500 mg, Oral, BID With Meals, Domenic Coleman, DO, 500 mg at 12/01/17 1812    cyanocobalamin (VITAMIN B-12) tablet 1,000 mcg, 1,000 mcg, Oral, Daily, Hetul Miles, DO, 1,000 mcg at 12/02/17 0816    dextromethorphan-guaiFENesin (ROBITUSSIN DM)  mg/5 mL oral syrup 5 mL, 5 mL, Oral, Q4H PRN, Viviane Bhatia MD, 5 mL at 12/01/17 2341    docusate sodium (COLACE) capsule 100 mg, 100 mg, Oral, BID, Hetul Miles, DO, 100 mg at 12/01/17 1417    doxercalciferol (HECTOROL) injection 0 5 mcg, 0 5 mcg, Intravenous, After Dialysis, Dhruv Mcallister PA-C, 0 5 mcg at 12/01/17 1022    famotidine (PEPCID) tablet 20 mg, 20 mg, Oral, Daily, Jose Noble MD, 20 mg at 12/02/17 0815    ferrous sulfate tablet 325 mg, 325 mg, Oral, TID With Meals, Hetul Miles, DO, 325 mg at 12/02/17 0816    guaiFENesin (MUCINEX) 12 hr tablet 1,200 mg, 1,200 mg, Oral, Q12H [address], Cheko Clinton MD, 1,200 mg at 12/02/17 0814    HYDROmorphone (DILAUDID) tablet 1 mg, 1 mg, Oral, Q4H PRN, Junior Beryl MD, 1 mg at 11/25/17 0840    insulin glargine (LANTUS) subcutaneous injection 45 Units, 45 Units, Subcutaneous, HS, Cheko Clinton MD, 45 Units at 12/01/17 2111    insulin lispro (HumaLOG) 100 units/mL subcutaneous injection 2-12 Units, 2-12 Units, Subcutaneous, TID AC, 4 Units at 12/02/17 0808 **AND** Fingerstick Glucose (POCT), , , TID AC, Viviane Bhatia MD    insulin lispro (HumaLOG) 100 units/mL subcutaneous injection 2-12 Units, 2-12 Units, Subcutaneous, HS, Viviane Bhatia MD, 4 Units at 12/01/17 2111  Nitza Dangelo insulin lispro (HumaLOG) 100 units/mL subcutaneous injection 8 Units, 8 Units, Subcutaneous, TID With Meals, Hetul Miles, DO, 8 Units at 12/02/17 0808    latanoprost (XALATAN) 0 005 % ophthalmic solution 1 drop, 1 drop, Both Eyes, HS, Hetul Miles, DO, 1 drop at 12/01/17 2111    metoprolol tartrate (LOPRESSOR) partial tablet 12 5 mg, 12 5 mg, Oral, Once per day on Sun Tue Thu Sat, Hetul Miles, DO, 12 5 mg at 12/02/17 5473    miconazole 2 % cream, , Topical, BID, Hetul Miles, DO    nystatin (MYCOSTATIN) powder, , Topical, BID, Hetul Miles, DO, 1 application at 90/81/87 0817    pantoprazole (PROTONIX) EC tablet 40 mg, 40 mg, Oral, BID AC, Hetul Miles, DO, 40 mg at 12/02/17 0618    pneumococcal 13-valent conjugate vaccine (PREVNAR-13) IM injection 0 5 mL, 0 5 mL, Intramuscular, Prior to discharge, Hetul Miles, DO    polyethylene glycol (MIRALAX) packet 17 g, 17 g, Oral, Daily, Hetul Miles, DO, 17 g at 11/28/17 0838    pravastatin (PRAVACHOL) tablet 80 mg, 80 mg, Oral, Daily, Hetul Miles, DO, 80 mg at 12/02/17 9218    predniSONE tablet 40 mg, 40 mg, Oral, BID With Meals, Hetul Miles, DO, 40 mg at 12/02/17 0730    pregabalin (LYRICA) capsule 75 mg, 75 mg, Oral, BID, Hetul Miles, DO, 75 mg at 12/02/17 0900    senna (SENOKOT) tablet 17 2 mg, 2 tablet, Oral, Daily PRN, Hetul Miles, DO, 17 2 mg at 11/29/17 1757    Laboratory Results:    Results from last 7 days  Lab Units 12/01/17  0822 11/30/17  0613 11/29/17  0828   WBC Thousand/uL 9 11 11 11*  --    HEMOGLOBIN g/dL 8 0* 9 3*  --    HEMATOCRIT % 24 4* 28 6*  --    PLATELETS Thousands/uL 78* 72*  --    SODIUM mmol/L 129* 131* 129*   POTASSIUM mmol/L 4 9 4 4 4 2   CHLORIDE mmol/L 94* 94* 93*   CO2 mmol/L 25 30 29   BUN mg/dL 57* 39* 60*   CREATININE mg/dL 4 03* 3 07* 3 80*   CALCIUM mg/dL 7 8* 8 5 7 9*   GLUCOSE RANDOM mg/dL 268* 136 118

## 2017-12-03 VITALS
SYSTOLIC BLOOD PRESSURE: 130 MMHG | TEMPERATURE: 98.7 F | HEART RATE: 75 BPM | WEIGHT: 293 LBS | HEIGHT: 64 IN | DIASTOLIC BLOOD PRESSURE: 70 MMHG | RESPIRATION RATE: 18 BRPM | BODY MASS INDEX: 50.02 KG/M2 | OXYGEN SATURATION: 95 %

## 2017-12-03 PROBLEM — J18.9 PNEUMONIA: Status: RESOLVED | Noted: 2017-11-23 | Resolved: 2017-12-03

## 2017-12-03 LAB
GLUCOSE SERPL-MCNC: 260 MG/DL (ref 65–140)
GLUCOSE SERPL-MCNC: 312 MG/DL (ref 65–140)
HCT VFR BLD AUTO: 24.6 % (ref 34.8–46.1)
HGB BLD-MCNC: 8.2 G/DL (ref 11.5–15.4)

## 2017-12-03 PROCEDURE — 94760 N-INVAS EAR/PLS OXIMETRY 1: CPT

## 2017-12-03 PROCEDURE — 94660 CPAP INITIATION&MGMT: CPT

## 2017-12-03 PROCEDURE — 85018 HEMOGLOBIN: CPT | Performed by: INTERNAL MEDICINE

## 2017-12-03 PROCEDURE — 85014 HEMATOCRIT: CPT | Performed by: INTERNAL MEDICINE

## 2017-12-03 PROCEDURE — 82948 REAGENT STRIP/BLOOD GLUCOSE: CPT

## 2017-12-03 RX ORDER — FAMOTIDINE 20 MG/1
20 TABLET, FILM COATED ORAL DAILY
Refills: 0
Start: 2017-12-03 | End: 2017-12-14 | Stop reason: HOSPADM

## 2017-12-03 RX ORDER — HYDROMORPHONE HYDROCHLORIDE 2 MG/1
2 TABLET ORAL EVERY 6 HOURS PRN
Qty: 10 TABLET | Refills: 0 | Status: ON HOLD | OUTPATIENT
Start: 2017-12-03 | End: 2017-12-14

## 2017-12-03 RX ORDER — ONDANSETRON 4 MG/1
4 TABLET, FILM COATED ORAL EVERY 4 HOURS PRN
Refills: 0
Start: 2017-12-03

## 2017-12-03 RX ORDER — GUAIFENESIN/DEXTROMETHORPHAN 100-10MG/5
5 SYRUP ORAL EVERY 4 HOURS PRN
Qty: 118 ML | Refills: 0 | Status: SHIPPED | OUTPATIENT
Start: 2017-12-03

## 2017-12-03 RX ADMIN — PREDNISONE 40 MG: 20 TABLET ORAL at 08:08

## 2017-12-03 RX ADMIN — GUAIFENESIN 1200 MG: 600 TABLET, EXTENDED RELEASE ORAL at 08:08

## 2017-12-03 RX ADMIN — PANTOPRAZOLE SODIUM 40 MG: 40 TABLET, DELAYED RELEASE ORAL at 07:04

## 2017-12-03 RX ADMIN — INSULIN LISPRO 8 UNITS: 100 INJECTION, SOLUTION INTRAVENOUS; SUBCUTANEOUS at 12:11

## 2017-12-03 RX ADMIN — METOPROLOL TARTRATE 12.5 MG: 25 TABLET ORAL at 08:09

## 2017-12-03 RX ADMIN — CYANOCOBALAMIN TAB 500 MCG 1000 MCG: 500 TAB at 08:08

## 2017-12-03 RX ADMIN — INSULIN LISPRO 6 UNITS: 100 INJECTION, SOLUTION INTRAVENOUS; SUBCUTANEOUS at 12:11

## 2017-12-03 RX ADMIN — GUAIFENESIN AND DEXTROMETHORPHAN 5 ML: 100; 10 SYRUP ORAL at 11:20

## 2017-12-03 RX ADMIN — PRAVASTATIN SODIUM 80 MG: 80 TABLET ORAL at 08:09

## 2017-12-03 RX ADMIN — ALLOPURINOL 200 MG: 100 TABLET ORAL at 08:06

## 2017-12-03 RX ADMIN — VITAMIN D, TAB 1000IU (100/BT) 1000 UNITS: 25 TAB at 08:08

## 2017-12-03 RX ADMIN — APIXABAN 2.5 MG: 2.5 TABLET, FILM COATED ORAL at 08:09

## 2017-12-03 RX ADMIN — Medication 325 MG: at 08:09

## 2017-12-03 RX ADMIN — NYSTATIN: 100000 POWDER TOPICAL at 08:12

## 2017-12-03 RX ADMIN — INSULIN LISPRO 8 UNITS: 100 INJECTION, SOLUTION INTRAVENOUS; SUBCUTANEOUS at 08:12

## 2017-12-03 RX ADMIN — Medication 325 MG: at 11:21

## 2017-12-03 RX ADMIN — GUAIFENESIN AND DEXTROMETHORPHAN 5 ML: 100; 10 SYRUP ORAL at 16:21

## 2017-12-03 RX ADMIN — FAMOTIDINE 20 MG: 20 TABLET, FILM COATED ORAL at 08:08

## 2017-12-03 RX ADMIN — PREGABALIN 75 MG: 75 CAPSULE ORAL at 08:09

## 2017-12-03 NOTE — DISCHARGE SUMMARY
Discharge Summary - [address] Hospitalist Service - Internal Medicine      Patient Information: Luis Alberto Delgado 67 y o  female MRN: [id number]  Unit/Bed#: [facility] 521-01 Encounter: [id number]    Discharging Physician / Practitioner: Sowmya Calle MD  PCP: Arlet Martinez MD  Admission Date: 11/23/2017  Discharge Date: 12/03/17      Reason for Admission:  Cough/congestion with fever       Discharge Diagnoses:     Principal Problem (Resolved):    Pneumonia possibly Gram Negative     Chronic Problems:    Chronic diastolic CHF (congestive heart failure)     IDDM (insulin dependent diabetes mellitus)     Morbid obesity     Essential hypertension    History of pulmonary embolism      Consultations During Hospital Stay:  · Nephrology      Hospital Course:     Luis Alberto Delgado is a 67 y o  female patient who originally presented to the hospital on 11/23/2017 due to coughing congestion associated with fevers from her nursing facility  She was started on an oral course of Zithromax which provided no relief prior to arrival in the ER  She is morbidly obese with obesity hypoventilation syndrome and has chronic hypoxic respiratory failure for which she wears 2-3 L of oxygen during the day and BiPAP at night  IV antibiotics were initiated on admission were de-escalated to oral Levaquin to complete a full course on 11/29  She continued routine hemodialysis for end-stage renal disease and underwent a PermCath placement on 11/30  She was continued on an insulin (basal/SSI) regimen for diabetes mellitus and her Lyrica was continued for neuropathy  She is also on Eliquis 2 5 mg twice daily for history of pulmonary embolism  After completion of antibiotics she still had some pulmonary vascular congestion and cough which was alleviated with further ultrafiltration and dialysis  Symptoms also improved with Robitussin  She will be discharged today back to [facility])    She will follow up with her PCP and Nephrology for routine hemodialysis as instructed  Condition at Discharge:  Good      Discharge Day Visit / Exam:     Vitals: Blood Pressure: 130/70 (12/03/17 0700)  Pulse: 75 (12/03/17 0700)  Temperature: 98 7 °F (37 1 °C) (12/03/17 0700)  Temp Source: Oral (12/03/17 0700)  Respirations: 18 (12/03/17 0700)  Height: 5' 4" (162 6 cm) (11/23/17 1454)  Weight - Scale: (!) 142 kg (313 lb 15 oz) (11/27/17 0514)  SpO2: 95 % (12/03/17 0700)      Physical exam - I had a face-to-face encounter with the patient on day of discharge  Discussion with Patient and/or Family:  The patient has been advised to return to the ER immediately if any symptoms recur or worsen  Discharge instructions/Information to Patient and/or Family:   See after visit summary for information provided to patient and/or family  Provisions for Follow-Up Care:  See after visit summary for information related to follow-up care and any pertinent home health orders  Disposition:   [facility] [place]        Discharge Statement:  I spent 37 minutes discharging the patient  This time was spent on the day of discharge  I had direct contact with the patient on the day of discharge  Greater than 50% of the total time was spent examining patient, answering all patient questions, arranging and discussing plan of care with patient as well as directly providing post-discharge instructions  Additional time then spent on discharge activities  Discharge Medications:  See after visit summary for reconciled discharge medications provided to patient and family        ** Please Note: This note has been constructed using a voice recognition system **

## 2017-12-03 NOTE — PLAN OF CARE
Problem: Nutrition/Hydration-ADULT  Goal: Nutrient/Hydration intake appropriate for improving, restoring or maintaining nutritional needs  Monitor and assess patient's nutrition/hydration status for malnutrition (ex- brittle hair, bruises, dry skin, pale skin and conjunctiva, muscle wasting, smooth red tongue, and disorientation)  Collaborate with interdisciplinary team and initiate plan and interventions as ordered  Monitor patient's weight and dietary intake as ordered or per policy  Utilize nutrition screening tool and intervene per policy  Determine patient's food preferences and provide high-protein, high-caloric foods as appropriate       INTERVENTIONS:  - Monitor oral intake, urinary output, labs, and treatment plans  - Assess nutrition and hydration status and recommend course of action  - Evaluate amount of meals eaten  - Assist patient with eating if necessary   - Allow adequate time for meals  - Recommend/ encourage appropriate diets, oral nutritional supplements, and vitamin/mineral supplements  - Order, calculate, and assess calorie counts as needed  - Recommend, monitor, and adjust tube feedings and TPN/PPN based on assessed needs  - Assess need for intravenous fluids  - Provide specific nutrition/hydration education as appropriate  - Include patient/family/caregiver in decisions related to nutrition   Outcome: Progressing      Problem: Prexisting or High Potential for Compromised Skin Integrity  Goal: Skin integrity is maintained or improved  INTERVENTIONS:  - Identify patients at risk for skin breakdown  - Assess and monitor skin integrity  - Assess and monitor nutrition and hydration status  - Monitor labs (i e  albumin)  - Assess for incontinence   - Turn and reposition patient  - Assist with mobility/ambulation  - Relieve pressure over bony prominences  - Avoid friction and shearing  - Provide appropriate hygiene as needed including keeping skin clean and dry  - Evaluate need for skin moisturizer/barrier cream  - Collaborate with interdisciplinary team (i e  Nutrition, Rehabilitation, etc )   - Patient/family teaching   Outcome: Progressing      Problem: Potential for Falls  Goal: Patient will remain free of falls  INTERVENTIONS:  - Assess patient frequently for physical needs  -  Identify cognitive and physical deficits and behaviors that affect risk of falls    -  [place] fall precautions as indicated by assessment   - Educate patient/family on patient safety including physical limitations  - Instruct patient to call for assistance with activity based on assessment  - Modify environment to reduce risk of injury  - Consider OT/PT consult to assist with strengthening/mobility    Outcome: Progressing      Problem: PAIN - ADULT  Goal: Verbalizes/displays adequate comfort level or baseline comfort level  Interventions:  - Encourage patient to monitor pain and request assistance  - Assess pain using appropriate pain scale  - Administer analgesics based on type and severity of pain and evaluate response  - Implement non-pharmacological measures as appropriate and evaluate response  - Consider cultural and social influences on pain and pain management  - Notify physician/advanced practitioner if interventions unsuccessful or patient reports new pain   Outcome: Progressing      Problem: INFECTION - ADULT  Goal: Absence or prevention of progression during hospitalization  INTERVENTIONS:  - Assess and monitor for signs and symptoms of infection  - Monitor lab/diagnostic results  - Monitor all insertion sites, i e  indwelling lines, tubes, and drains  - Monitor endotracheal (as able) and nasal secretions for changes in amount and color  - [place] appropriate cooling/warming therapies per order  - Administer medications as ordered  - Instruct and encourage patient and family to use good hand hygiene technique  - Identify and instruct in appropriate isolation precautions for identified infection/condition Outcome: Progressing      Problem: SAFETY ADULT  Goal: Maintain or return to baseline ADL function  INTERVENTIONS:  -  Assess patient's ability to carry out ADLs; assess patient's baseline for ADL function and identify physical deficits which impact ability to perform ADLs (bathing, care of mouth/teeth, toileting, grooming, dressing, etc )  - Assess/evaluate cause of self-care deficits   - Assess range of motion  - Assess patient's mobility; develop plan if impaired  - Assess patient's need for assistive devices and provide as appropriate  - Encourage maximum independence but intervene and supervise when necessary  ¯ Involve family in performance of ADLs  ¯ Assess for home care needs following discharge   ¯ Request OT consult to assist with ADL evaluation and planning for discharge  ¯ Provide patient education as appropriate   Outcome: Progressing    Goal: Maintain or return mobility status to optimal level  INTERVENTIONS:  - Assess patient's baseline mobility status (ambulation, transfers, stairs, etc )    - Identify cognitive and physical deficits and behaviors that affect mobility  - Identify mobility aids required to assist with transfers and/or ambulation (gait belt, sit-to-stand, lift, walker, cane, etc )  - [place] fall precautions as indicated by assessment  - Record patient progress and toleration of activity level on Mobility SBAR; progress patient to next Phase/Stage  - Instruct patient to call for assistance with activity based on assessment  - Request Rehabilitation consult to assist with strengthening/weightbearing, etc    Outcome: Progressing    Goal: Patient will remain free of falls  INTERVENTIONS:  - Assess patient frequently for physical needs  -  Identify cognitive and physical deficits and behaviors that affect risk of falls    -  [place] fall precautions as indicated by assessment   - Educate patient/family on patient safety including physical limitations  - Instruct patient to call for assistance with activity based on assessment  - Modify environment to reduce risk of injury  - Consider OT/PT consult to assist with strengthening/mobility    Outcome: Progressing      Problem: DISCHARGE PLANNING  Goal: Discharge to home or other facility with appropriate resources  INTERVENTIONS:  - Identify barriers to discharge w/patient and caregiver  - Arrange for needed discharge resources and transportation as appropriate  - Identify discharge learning needs (meds, wound care, etc )  - Arrange for interpretive services to assist at discharge as needed  - Refer to Case Management Department for coordinating discharge planning if the patient needs post-hospital services based on physician/advanced practitioner order or complex needs related to functional status, cognitive ability, or social support system   Outcome: Progressing      Problem: SKIN/TISSUE INTEGRITY - ADULT  Goal: Skin integrity remains intact  INTERVENTIONS  - Identify patients at risk for skin breakdown  - Assess and monitor skin integrity  - Assess and monitor nutrition and hydration status  - Monitor labs (i e  albumin)  - Assess for incontinence   - Turn and reposition patient  - Assist with mobility/ambulation  - Relieve pressure over bony prominences  - Avoid friction and shearing  - Provide appropriate hygiene as needed including keeping skin clean and dry  - Evaluate need for skin moisturizer/barrier cream  - Collaborate with interdisciplinary team (i e  Nutrition, Rehabilitation, etc )   - Patient/family teaching   Outcome: Progressing    Goal: Incision(s), wounds(s) or drain site(s) healing without S/S of infection  INTERVENTIONS  - Assess and document risk factors for skin impairment   - Assess and document dressing, incision, wound bed, drain sites and surrounding tissue  - Initiate Nutrition services consult and/or wound management as needed   Outcome: Progressing    Goal: Oral mucous membranes remain intact  INTERVENTIONS  - Assess oral mucosa and hygiene practices  - Implement preventative oral hygiene regimen  - Implement oral medicated treatments as ordered  - Initiate Nutrition services referral as needed   Outcome: Progressing      Problem: DISCHARGE PLANNING - CARE MANAGEMENT  Goal: Discharge to post-acute care or home with appropriate resources  INTERVENTIONS:  - Conduct assessment to determine patient/family and health care team treatment goals, and need for post-acute services based on payer coverage, community resources, and patient preferences, and barriers to discharge  - Address psychosocial, clinical, and financial barriers to discharge as identified in assessment in conjunction with the patient/family and health care team  - Arrange appropriate level of post-acute services according to patient's   needs and preference and payer coverage in collaboration with the physician and health care team  - Communicate with and update the patient/family, physician, and health care team regarding progress on the discharge plan  - Arrange appropriate transportation to post-acute venues   Outcome: Progressing

## 2017-12-03 NOTE — PROGRESS NOTES
NEPHROLOGY PROGRESS NOTE   Yamile Vaca 67 y o  female MRN: [id number]  Unit/Bed#: [facility] 521-01 Encounter: [id number]  Reason for Consult: ESRD    ASSESSMENT/PLAN:  1  End-stage renal disease, maintenance hemodialysis Monday Wednesday Friday, [facility] [address]  2  Hyponatremia, likely secondary to volume  3  Pneumonia  4  Recent right upper extremity infiltration, PermCath placement -evaluate as outpatient  5  Anemia of chronic disease, no JESSICA due to history of DVT  6  Morbid obesity    · Overall appears fairly stable, still with persistent cough although patient feels slightly better after ultrafiltration yesterday  · Blood pressure appears fairly stable  · Anticipated discharge later today  · Plan for hemodialysis tomorrow as an outpatient      SUBJECTIVE:  Seen and examined  Patient awake alert  Still with persistent cough  No abdominal pain    Appetite seems stable    OBJECTIVE:  Current Weight: Weight - Scale: (!) 142 kg (313 lb 15 oz)  Vitals:    12/02/17 1611 12/02/17 2312 12/03/17 0102 12/03/17 0700   BP: 141/73 (!) 176/74  130/70   Pulse: 83 71  75   Resp: 16 20  18   Temp: 98 °F (36 7 °C) 98 °F (36 7 °C)  98 7 °F (37 1 °C)   TempSrc: Oral   Oral   SpO2: 100% 100% 94% 95%   Weight:       Height:           Intake/Output Summary (Last 24 hours) at 12/03/17 1120  Last data filed at 12/03/17 0900   Gross per 24 hour   Intake              470 ml   Output             3480 ml   Net            -3010 ml     GENERAL :  No distress, appears comfortable  NECK:  Supple  CV:  Regular  RESP:  Coarse, few expiratory wheezes and rhonchi  ABD:  Soft  EXT:  No significant edema  Psych:  Appropriate  SKIN:  No rash    Medications:    Current Facility-Administered Medications:     acetaminophen (TYLENOL) tablet 650 mg, 650 mg, Oral, Q6H PRN, Hetul Miles, DO, 650 mg at 11/26/17 1132    albuterol inhalation solution 2 5 mg, 2 5 mg, Nebulization, Q4H PRN, Hetul Miles, DO, 2 5 mg at 11/30/17 1024    allopurinol (ZYLOPRIM) tablet 200 mg, 200 mg, Oral, Once per day on Sun Tue Wed Fri Sat, Hetul Miles, DO, 200 mg at 12/03/17 2775    allopurinol (ZYLOPRIM) tablet 300 mg, 300 mg, Oral, Once per day on Mon Thu, Hetul Miles, DO, 300 mg at 11/30/17 1002    apixaban (ELIQUIS) tablet 2 5 mg, 2 5 mg, Oral, BID, Hetul Miles, DO, 2 5 mg at 12/03/17 0809    bisacodyl (DULCOLAX) EC tablet 5 mg, 5 mg, Oral, Daily PRN, Hetul Miles, DO, 5 mg at 11/24/17 1226    calcium carbonate (TUMS) chewable tablet 500 mg, 500 mg, Oral, BID With Meals, Juwan Coleman, DO, 500 mg at 12/01/17 1812    cholecalciferol (VITAMIN D3) tablet 1,000 Units, 1,000 Units, Oral, Daily, Shy Glover MD, 1,000 Units at 12/03/17 0808    cyanocobalamin (VITAMIN B-12) tablet 1,000 mcg, 1,000 mcg, Oral, Daily, Hetul Miles, DO, 1,000 mcg at 12/03/17 0808    dextromethorphan-guaiFENesin (ROBITUSSIN DM)  mg/5 mL oral syrup 5 mL, 5 mL, Oral, Q4H PRN, Shy Glover MD, 5 mL at 12/02/17 2313    docusate sodium (COLACE) capsule 100 mg, 100 mg, Oral, BID, Hetul Miles, DO, 100 mg at 12/01/17 1417    doxercalciferol (HECTOROL) injection 0 5 mcg, 0 5 mcg, Intravenous, After Dialysis, Nicolle Pandey PA-C, 0 5 mcg at 12/01/17 1022    famotidine (PEPCID) tablet 20 mg, 20 mg, Oral, Daily, Thai Guevara MD, 20 mg at 12/03/17 0808    ferrous sulfate tablet 325 mg, 325 mg, Oral, TID With Meals, Hetul Miles, DO, 325 mg at 12/03/17 0809    guaiFENesin (MUCINEX) 12 hr tablet 1,200 mg, 1,200 mg, Oral, Q12H [address], Ghanshyam Small MD, 1,200 mg at 12/03/17 0808    HYDROmorphone (DILAUDID) tablet 1 mg, 1 mg, Oral, Q4H PRN, Kathi Mayers MD, 1 mg at 11/25/17 0840    insulin glargine (LANTUS) subcutaneous injection 45 Units, 45 Units, Subcutaneous, HS, Ghanshyam Small MD, 45 Units at 12/02/17 2239    insulin lispro (HumaLOG) 100 units/mL subcutaneous injection 2-12 Units, 2-12 Units, Subcutaneous, TID AC, 8 Units at 12/03/17 0812 **AND** Fingerstick Glucose (POCT), , , TID AC, Shelly Cormier MD    insulin lispro (HumaLOG) 100 units/mL subcutaneous injection 2-12 Units, 2-12 Units, Subcutaneous, HS, Shelly Cormier MD, 4 Units at 12/02/17 2239    insulin lispro (HumaLOG) 100 units/mL subcutaneous injection 8 Units, 8 Units, Subcutaneous, TID With Meals, Hetul Miles, DO, 8 Units at 12/03/17 0812    latanoprost (XALATAN) 0 005 % ophthalmic solution 1 drop, 1 drop, Both Eyes, HS, Hetul Miles, DO, 1 drop at 12/02/17 2239    metoprolol tartrate (LOPRESSOR) partial tablet 12 5 mg, 12 5 mg, Oral, Once per day on Sun Tue Thu Sat, Hetul Miles, DO, 12 5 mg at 12/03/17 2872    miconazole 2 % cream, , Topical, BID, Hetul Miles, DO    nystatin (MYCOSTATIN) powder, , Topical, BID, Hetul Miles, DO    pantoprazole (PROTONIX) EC tablet 40 mg, 40 mg, Oral, BID AC, Hetul Miles, DO, 40 mg at 12/03/17 0704    pneumococcal 13-valent conjugate vaccine (PREVNAR-13) IM injection 0 5 mL, 0 5 mL, Intramuscular, Prior to discharge, Hetul Miles, DO    polyethylene glycol (MIRALAX) packet 17 g, 17 g, Oral, Daily, Hetul Miles, DO, 17 g at 11/28/17 0838    pravastatin (PRAVACHOL) tablet 80 mg, 80 mg, Oral, Daily, Hetul Miles, DO, 80 mg at 12/03/17 0809    predniSONE tablet 40 mg, 40 mg, Oral, BID With Meals, Hetul Miles, DO, 40 mg at 12/03/17 0808    pregabalin (LYRICA) capsule 75 mg, 75 mg, Oral, BID, Hetul Miles, DO, 75 mg at 12/03/17 0809    senna (SENOKOT) tablet 17 2 mg, 2 tablet, Oral, Daily PRN, Hetul Miles, DO, 17 2 mg at 11/29/17 1757    Laboratory Results:    Results from last 7 days  Lab Units 12/03/17  0451 12/02/17  1022 12/01/17  0822 11/30/17  0613 11/29/17  0828   WBC Thousand/uL  --   --  9 11 11 11*  --    HEMOGLOBIN g/dL 8 2* 7 9* 8 0* 9 3*  --    HEMATOCRIT % 24 6* 23 8* 24 4* 28 6*  --    PLATELETS Thousands/uL  --   --  78* 72*  --    SODIUM mmol/L  --  131* 129* 131* 129*   POTASSIUM mmol/L  --  4 2 4 9 4 4 4 2   CHLORIDE mmol/L  --  95* 94* 94* 93*   CO2 mmol/L  --  28 25 30 29 BUN mg/dL  --  42* 57* 39* 60*   CREATININE mg/dL  --  3 07* 4 03* 3 07* 3 80*   CALCIUM mg/dL  --  7 9* 7 8* 8 5 7 9*   GLUCOSE RANDOM mg/dL  --  320* 268* 136 118

## 2017-12-08 ENCOUNTER — HOSPITAL ENCOUNTER (INPATIENT)
Facility: HOSPITAL | Age: 72
LOS: 4 days | Discharge: NURSING, REHAB OR LONG-TERM CARE | DRG: 813 | End: 2017-12-14
Attending: EMERGENCY MEDICINE | Admitting: INTERNAL MEDICINE
Payer: MEDICARE

## 2017-12-08 DIAGNOSIS — E66.01 MORBID OBESITY (HCC): ICD-10-CM

## 2017-12-08 DIAGNOSIS — D69.6 THROMBOCYTOPENIA (HCC): Primary | ICD-10-CM

## 2017-12-08 DIAGNOSIS — L98.429 SACRAL ULCER (HCC): ICD-10-CM

## 2017-12-08 DIAGNOSIS — N18.6 END-STAGE RENAL DISEASE ON HEMODIALYSIS (HCC): Chronic | ICD-10-CM

## 2017-12-08 DIAGNOSIS — Z99.2 END-STAGE RENAL DISEASE ON HEMODIALYSIS (HCC): Chronic | ICD-10-CM

## 2017-12-08 DIAGNOSIS — E87.5 HYPERKALEMIA: ICD-10-CM

## 2017-12-08 PROBLEM — R89.9 ABNORMAL LABORATORY TEST RESULT: Status: ACTIVE | Noted: 2017-12-08

## 2017-12-08 LAB
ABO GROUP BLD: NORMAL
ALBUMIN SERPL BCP-MCNC: 2.3 G/DL (ref 3.5–5)
ALP SERPL-CCNC: 65 U/L (ref 46–116)
ALT SERPL W P-5'-P-CCNC: 25 U/L (ref 12–78)
ANION GAP SERPL CALCULATED.3IONS-SCNC: 7 MMOL/L (ref 4–13)
AST SERPL W P-5'-P-CCNC: 14 U/L (ref 5–45)
BASO STIPL BLD QL SMEAR: PRESENT
BASOPHILS # BLD MANUAL: 0 THOUSAND/UL (ref 0–0.1)
BASOPHILS NFR MAR MANUAL: 0 % (ref 0–1)
BILIRUB SERPL-MCNC: 0.25 MG/DL (ref 0.2–1)
BLD GP AB SCN SERPL QL: NEGATIVE
BUN SERPL-MCNC: 59 MG/DL (ref 5–25)
CALCIUM SERPL-MCNC: 8.6 MG/DL (ref 8.3–10.1)
CHLORIDE SERPL-SCNC: 96 MMOL/L (ref 100–108)
CO2 SERPL-SCNC: 30 MMOL/L (ref 21–32)
CREAT SERPL-MCNC: 2.1 MG/DL (ref 0.6–1.3)
EOSINOPHIL # BLD MANUAL: 0 THOUSAND/UL (ref 0–0.4)
EOSINOPHIL NFR BLD MANUAL: 0 % (ref 0–6)
ERYTHROCYTE [DISTWIDTH] IN BLOOD BY AUTOMATED COUNT: 15.4 % (ref 11.6–15.1)
GFR SERPL CREATININE-BSD FRML MDRD: 23 ML/MIN/1.73SQ M
GIANT PLATELETS BLD QL SMEAR: PRESENT
GLUCOSE SERPL-MCNC: 445 MG/DL (ref 65–140)
HCT VFR BLD AUTO: 27.8 % (ref 34.8–46.1)
HGB BLD-MCNC: 9 G/DL (ref 11.5–15.4)
HYPERCHROMIA BLD QL SMEAR: PRESENT
LYMPHOCYTES # BLD AUTO: 0.39 THOUSAND/UL (ref 0.6–4.47)
LYMPHOCYTES # BLD AUTO: 4 % (ref 14–44)
MCH RBC QN AUTO: 30.5 PG (ref 26.8–34.3)
MCHC RBC AUTO-ENTMCNC: 32.4 G/DL (ref 31.4–37.4)
MCV RBC AUTO: 94 FL (ref 82–98)
METAMYELOCYTES NFR BLD MANUAL: 1 % (ref 0–1)
MONOCYTES # BLD AUTO: 0.2 THOUSAND/UL (ref 0–1.22)
MONOCYTES NFR BLD: 2 % (ref 4–12)
NEUTROPHILS # BLD MANUAL: 9.15 THOUSAND/UL (ref 1.85–7.62)
NEUTS BAND NFR BLD MANUAL: 5 % (ref 0–8)
NEUTS SEG NFR BLD AUTO: 88 % (ref 43–75)
NRBC BLD AUTO-RTO: 0 /100 WBCS
PLATELET # BLD AUTO: 35 THOUSANDS/UL (ref 149–390)
PLATELET BLD QL SMEAR: ABNORMAL
POLYCHROMASIA BLD QL SMEAR: PRESENT
POTASSIUM SERPL-SCNC: 5.6 MMOL/L (ref 3.5–5.3)
PROT SERPL-MCNC: 6.4 G/DL (ref 6.4–8.2)
RBC # BLD AUTO: 2.95 MILLION/UL (ref 3.81–5.12)
RBC MORPH BLD: PRESENT
RH BLD: POSITIVE
SODIUM SERPL-SCNC: 133 MMOL/L (ref 136–145)
SPECIMEN EXPIRATION DATE: NORMAL
TOTAL CELLS COUNTED SPEC: 100
WBC # BLD AUTO: 9.84 THOUSAND/UL (ref 4.31–10.16)

## 2017-12-08 PROCEDURE — 86900 BLOOD TYPING SEROLOGIC ABO: CPT | Performed by: EMERGENCY MEDICINE

## 2017-12-08 PROCEDURE — 36415 COLL VENOUS BLD VENIPUNCTURE: CPT | Performed by: EMERGENCY MEDICINE

## 2017-12-08 PROCEDURE — 5A1D70Z PERFORMANCE OF URINARY FILTRATION, INTERMITTENT, LESS THAN 6 HOURS PER DAY: ICD-10-PCS | Performed by: INTERNAL MEDICINE

## 2017-12-08 PROCEDURE — 86901 BLOOD TYPING SEROLOGIC RH(D): CPT | Performed by: EMERGENCY MEDICINE

## 2017-12-08 PROCEDURE — 85027 COMPLETE CBC AUTOMATED: CPT | Performed by: EMERGENCY MEDICINE

## 2017-12-08 PROCEDURE — 93005 ELECTROCARDIOGRAM TRACING: CPT | Performed by: EMERGENCY MEDICINE

## 2017-12-08 PROCEDURE — 86850 RBC ANTIBODY SCREEN: CPT | Performed by: EMERGENCY MEDICINE

## 2017-12-08 PROCEDURE — 85007 BL SMEAR W/DIFF WBC COUNT: CPT | Performed by: EMERGENCY MEDICINE

## 2017-12-08 PROCEDURE — 80053 COMPREHEN METABOLIC PANEL: CPT | Performed by: EMERGENCY MEDICINE

## 2017-12-09 PROBLEM — M25.511 ACUTE PAIN OF RIGHT SHOULDER: Status: RESOLVED | Noted: 2017-08-29 | Resolved: 2017-12-09

## 2017-12-09 PROBLEM — D63.8 ANEMIA OF CHRONIC DISEASE: Status: ACTIVE | Noted: 2017-12-09

## 2017-12-09 LAB
ANION GAP SERPL CALCULATED.3IONS-SCNC: 4 MMOL/L (ref 4–13)
APTT PPP: 25 SECONDS (ref 23–35)
BUN SERPL-MCNC: 66 MG/DL (ref 5–25)
CALCIUM SERPL-MCNC: 8.9 MG/DL (ref 8.3–10.1)
CHLORIDE SERPL-SCNC: 98 MMOL/L (ref 100–108)
CO2 SERPL-SCNC: 33 MMOL/L (ref 21–32)
CREAT SERPL-MCNC: 2.31 MG/DL (ref 0.6–1.3)
ERYTHROCYTE [DISTWIDTH] IN BLOOD BY AUTOMATED COUNT: 15.2 % (ref 11.6–15.1)
EST. AVERAGE GLUCOSE BLD GHB EST-MCNC: 200 MG/DL
FDP BLD QL AGGL: <10
GFR SERPL CREATININE-BSD FRML MDRD: 20 ML/MIN/1.73SQ M
GLUCOSE P FAST SERPL-MCNC: 235 MG/DL (ref 65–99)
GLUCOSE SERPL-MCNC: 211 MG/DL (ref 65–140)
GLUCOSE SERPL-MCNC: 235 MG/DL (ref 65–140)
GLUCOSE SERPL-MCNC: 235 MG/DL (ref 65–140)
GLUCOSE SERPL-MCNC: 288 MG/DL (ref 65–140)
GLUCOSE SERPL-MCNC: 308 MG/DL (ref 65–140)
GLUCOSE SERPL-MCNC: 386 MG/DL (ref 65–140)
HBA1C MFR BLD: 8.6 % (ref 4.2–6.3)
HCT VFR BLD AUTO: 26.8 % (ref 34.8–46.1)
HGB BLD-MCNC: 8.5 G/DL (ref 11.5–15.4)
INR PPP: 1.05 (ref 0.86–1.16)
MCH RBC QN AUTO: 29.8 PG (ref 26.8–34.3)
MCHC RBC AUTO-ENTMCNC: 31.7 G/DL (ref 31.4–37.4)
MCV RBC AUTO: 94 FL (ref 82–98)
PLATELET # BLD AUTO: 37 THOUSANDS/UL (ref 149–390)
POTASSIUM SERPL-SCNC: 5.3 MMOL/L (ref 3.5–5.3)
PROTHROMBIN TIME: 13.7 SECONDS (ref 12.1–14.4)
RBC # BLD AUTO: 2.85 MILLION/UL (ref 3.81–5.12)
SODIUM SERPL-SCNC: 135 MMOL/L (ref 136–145)
WBC # BLD AUTO: 10.5 THOUSAND/UL (ref 4.31–10.16)

## 2017-12-09 PROCEDURE — 82948 REAGENT STRIP/BLOOD GLUCOSE: CPT

## 2017-12-09 PROCEDURE — 80048 BASIC METABOLIC PNL TOTAL CA: CPT | Performed by: INTERNAL MEDICINE

## 2017-12-09 PROCEDURE — 87081 CULTURE SCREEN ONLY: CPT | Performed by: INTERNAL MEDICINE

## 2017-12-09 PROCEDURE — 85610 PROTHROMBIN TIME: CPT | Performed by: INTERNAL MEDICINE

## 2017-12-09 PROCEDURE — 85362 FIBRIN DEGRADATION PRODUCTS: CPT | Performed by: INTERNAL MEDICINE

## 2017-12-09 PROCEDURE — 94760 N-INVAS EAR/PLS OXIMETRY 1: CPT

## 2017-12-09 PROCEDURE — 83036 HEMOGLOBIN GLYCOSYLATED A1C: CPT | Performed by: INTERNAL MEDICINE

## 2017-12-09 PROCEDURE — 85027 COMPLETE CBC AUTOMATED: CPT | Performed by: INTERNAL MEDICINE

## 2017-12-09 PROCEDURE — 85730 THROMBOPLASTIN TIME PARTIAL: CPT | Performed by: INTERNAL MEDICINE

## 2017-12-09 PROCEDURE — 99285 EMERGENCY DEPT VISIT HI MDM: CPT

## 2017-12-09 PROCEDURE — 86022 PLATELET ANTIBODIES: CPT | Performed by: INTERNAL MEDICINE

## 2017-12-09 PROCEDURE — 36415 COLL VENOUS BLD VENIPUNCTURE: CPT | Performed by: INTERNAL MEDICINE

## 2017-12-09 RX ORDER — ONDANSETRON HYDROCHLORIDE 4 MG/5ML
4 SOLUTION ORAL EVERY 4 HOURS PRN
Status: DISCONTINUED | OUTPATIENT
Start: 2017-12-09 | End: 2017-12-09 | Stop reason: CLARIF

## 2017-12-09 RX ORDER — INSULIN GLARGINE 100 [IU]/ML
40 INJECTION, SOLUTION SUBCUTANEOUS
Status: DISCONTINUED | OUTPATIENT
Start: 2017-12-09 | End: 2017-12-14 | Stop reason: HOSPADM

## 2017-12-09 RX ORDER — PREDNISONE 20 MG/1
40 TABLET ORAL DAILY
Status: DISCONTINUED | OUTPATIENT
Start: 2017-12-09 | End: 2017-12-10

## 2017-12-09 RX ORDER — NYSTATIN 100000 [USP'U]/G
POWDER TOPICAL 2 TIMES DAILY
Status: DISCONTINUED | OUTPATIENT
Start: 2017-12-09 | End: 2017-12-14 | Stop reason: HOSPADM

## 2017-12-09 RX ORDER — LATANOPROST 50 UG/ML
1 SOLUTION/ DROPS OPHTHALMIC
Status: DISCONTINUED | OUTPATIENT
Start: 2017-12-09 | End: 2017-12-14 | Stop reason: HOSPADM

## 2017-12-09 RX ORDER — ALLOPURINOL 100 MG/1
200 TABLET ORAL
Status: DISCONTINUED | OUTPATIENT
Start: 2017-12-09 | End: 2017-12-14 | Stop reason: HOSPADM

## 2017-12-09 RX ORDER — DOCUSATE SODIUM 100 MG/1
100 CAPSULE, LIQUID FILLED ORAL 2 TIMES DAILY PRN
Status: DISCONTINUED | OUTPATIENT
Start: 2017-12-09 | End: 2017-12-14 | Stop reason: HOSPADM

## 2017-12-09 RX ORDER — GUAIFENESIN/DEXTROMETHORPHAN 100-10MG/5
5 SYRUP ORAL EVERY 4 HOURS PRN
Status: DISCONTINUED | OUTPATIENT
Start: 2017-12-09 | End: 2017-12-14 | Stop reason: HOSPADM

## 2017-12-09 RX ORDER — FAMOTIDINE 20 MG/1
20 TABLET, FILM COATED ORAL DAILY
Status: DISCONTINUED | OUTPATIENT
Start: 2017-12-09 | End: 2017-12-10

## 2017-12-09 RX ORDER — LEVOTHYROXINE SODIUM 0.03 MG/1
25 TABLET ORAL
Status: DISCONTINUED | OUTPATIENT
Start: 2017-12-09 | End: 2017-12-14 | Stop reason: HOSPADM

## 2017-12-09 RX ORDER — POLYETHYLENE GLYCOL 3350 17 G/17G
17 POWDER, FOR SOLUTION ORAL DAILY
Status: DISCONTINUED | OUTPATIENT
Start: 2017-12-09 | End: 2017-12-14 | Stop reason: HOSPADM

## 2017-12-09 RX ORDER — SENNOSIDES 8.6 MG
2 TABLET ORAL DAILY PRN
Status: DISCONTINUED | OUTPATIENT
Start: 2017-12-09 | End: 2017-12-14 | Stop reason: HOSPADM

## 2017-12-09 RX ORDER — CHOLECALCIFEROL (VITAMIN D3) 125 MCG
1000 CAPSULE ORAL DAILY
Status: DISCONTINUED | OUTPATIENT
Start: 2017-12-09 | End: 2017-12-14 | Stop reason: HOSPADM

## 2017-12-09 RX ORDER — PRAVASTATIN SODIUM 80 MG/1
80 TABLET ORAL DAILY
Status: DISCONTINUED | OUTPATIENT
Start: 2017-12-09 | End: 2017-12-14 | Stop reason: HOSPADM

## 2017-12-09 RX ORDER — ONDANSETRON 2 MG/ML
4 INJECTION INTRAMUSCULAR; INTRAVENOUS EVERY 6 HOURS PRN
Status: DISCONTINUED | OUTPATIENT
Start: 2017-12-09 | End: 2017-12-14 | Stop reason: HOSPADM

## 2017-12-09 RX ORDER — ALBUTEROL SULFATE 2.5 MG/3ML
2.5 SOLUTION RESPIRATORY (INHALATION) EVERY 6 HOURS PRN
Status: DISCONTINUED | OUTPATIENT
Start: 2017-12-09 | End: 2017-12-09

## 2017-12-09 RX ORDER — CALCIUM CARBONATE 500(1250)
2 TABLET ORAL
Status: DISCONTINUED | OUTPATIENT
Start: 2017-12-09 | End: 2017-12-14 | Stop reason: HOSPADM

## 2017-12-09 RX ORDER — MELATONIN
1000 DAILY
Status: DISCONTINUED | OUTPATIENT
Start: 2017-12-09 | End: 2017-12-14 | Stop reason: HOSPADM

## 2017-12-09 RX ORDER — FERROUS SULFATE 325(65) MG
325 TABLET ORAL
Status: DISCONTINUED | OUTPATIENT
Start: 2017-12-09 | End: 2017-12-11

## 2017-12-09 RX ORDER — ALLOPURINOL 300 MG/1
300 TABLET ORAL
Status: DISCONTINUED | OUTPATIENT
Start: 2017-12-11 | End: 2017-12-14 | Stop reason: HOSPADM

## 2017-12-09 RX ORDER — HYDROMORPHONE HYDROCHLORIDE 2 MG/1
2 TABLET ORAL EVERY 6 HOURS PRN
Status: DISCONTINUED | OUTPATIENT
Start: 2017-12-09 | End: 2017-12-14 | Stop reason: HOSPADM

## 2017-12-09 RX ORDER — DOCUSATE SODIUM 100 MG/1
100 CAPSULE, LIQUID FILLED ORAL 2 TIMES DAILY
Status: DISCONTINUED | OUTPATIENT
Start: 2017-12-09 | End: 2017-12-14 | Stop reason: HOSPADM

## 2017-12-09 RX ORDER — ACETAMINOPHEN 325 MG/1
650 TABLET ORAL EVERY 6 HOURS PRN
Status: DISCONTINUED | OUTPATIENT
Start: 2017-12-09 | End: 2017-12-14 | Stop reason: HOSPADM

## 2017-12-09 RX ORDER — PANTOPRAZOLE SODIUM 40 MG/1
40 TABLET, DELAYED RELEASE ORAL
Status: DISCONTINUED | OUTPATIENT
Start: 2017-12-09 | End: 2017-12-14 | Stop reason: HOSPADM

## 2017-12-09 RX ORDER — ONDANSETRON 4 MG/1
4 TABLET, ORALLY DISINTEGRATING ORAL EVERY 4 HOURS PRN
Status: DISCONTINUED | OUTPATIENT
Start: 2017-12-09 | End: 2017-12-14 | Stop reason: HOSPADM

## 2017-12-09 RX ORDER — ZINC OXIDE
OINTMENT (GRAM) TOPICAL AS NEEDED
Status: DISCONTINUED | OUTPATIENT
Start: 2017-12-09 | End: 2017-12-09 | Stop reason: RX

## 2017-12-09 RX ORDER — PREGABALIN 75 MG/1
75 CAPSULE ORAL 2 TIMES DAILY
Status: DISCONTINUED | OUTPATIENT
Start: 2017-12-09 | End: 2017-12-14 | Stop reason: HOSPADM

## 2017-12-09 RX ORDER — MUSCLE RUB CREAM 100; 150 MG/G; MG/G
CREAM TOPICAL 4 TIMES DAILY PRN
Status: DISCONTINUED | OUTPATIENT
Start: 2017-12-09 | End: 2017-12-14 | Stop reason: HOSPADM

## 2017-12-09 RX ORDER — ACETAMINOPHEN 325 MG/1
650 TABLET ORAL EVERY 6 HOURS PRN
Status: DISCONTINUED | OUTPATIENT
Start: 2017-12-09 | End: 2017-12-09 | Stop reason: SDUPTHER

## 2017-12-09 RX ADMIN — LATANOPROST 1 DROP: 50 SOLUTION OPHTHALMIC at 22:24

## 2017-12-09 RX ADMIN — INSULIN LISPRO 6 UNITS: 100 INJECTION, SOLUTION INTRAVENOUS; SUBCUTANEOUS at 17:16

## 2017-12-09 RX ADMIN — PREGABALIN 75 MG: 75 CAPSULE ORAL at 08:39

## 2017-12-09 RX ADMIN — METOPROLOL TARTRATE 12.5 MG: 25 TABLET ORAL at 08:39

## 2017-12-09 RX ADMIN — INSULIN LISPRO 4 UNITS: 100 INJECTION, SOLUTION INTRAVENOUS; SUBCUTANEOUS at 12:47

## 2017-12-09 RX ADMIN — PREGABALIN 75 MG: 75 CAPSULE ORAL at 17:05

## 2017-12-09 RX ADMIN — ALLOPURINOL 200 MG: 100 TABLET ORAL at 08:34

## 2017-12-09 RX ADMIN — INSULIN HUMAN 8 UNITS: 100 INJECTION, SUSPENSION SUBCUTANEOUS at 12:51

## 2017-12-09 RX ADMIN — Medication 2 TABLET: at 06:59

## 2017-12-09 RX ADMIN — INSULIN LISPRO 4 UNITS: 100 INJECTION, SOLUTION INTRAVENOUS; SUBCUTANEOUS at 09:31

## 2017-12-09 RX ADMIN — Medication 2 TABLET: at 17:05

## 2017-12-09 RX ADMIN — METOPROLOL TARTRATE 12.5 MG: 25 TABLET ORAL at 20:52

## 2017-12-09 RX ADMIN — APIXABAN 2.5 MG: 2.5 TABLET, FILM COATED ORAL at 08:34

## 2017-12-09 RX ADMIN — NYSTATIN: 100000 POWDER TOPICAL at 15:49

## 2017-12-09 RX ADMIN — LEVOTHYROXINE SODIUM 25 MCG: 25 TABLET ORAL at 06:54

## 2017-12-09 RX ADMIN — INSULIN LISPRO 3 UNITS: 100 INJECTION, SOLUTION INTRAVENOUS; SUBCUTANEOUS at 02:31

## 2017-12-09 RX ADMIN — Medication 325 MG: at 17:05

## 2017-12-09 RX ADMIN — INSULIN HUMAN 8 UNITS: 100 INJECTION, SUSPENSION SUBCUTANEOUS at 09:46

## 2017-12-09 RX ADMIN — CYANOCOBALAMIN TAB 500 MCG 1000 MCG: 500 TAB at 08:35

## 2017-12-09 RX ADMIN — POLYETHYLENE GLYCOL 3350 17 G: 17 POWDER, FOR SOLUTION ORAL at 08:40

## 2017-12-09 RX ADMIN — APIXABAN 2.5 MG: 2.5 TABLET, FILM COATED ORAL at 17:05

## 2017-12-09 RX ADMIN — INSULIN LISPRO 4 UNITS: 100 INJECTION, SOLUTION INTRAVENOUS; SUBCUTANEOUS at 21:58

## 2017-12-09 RX ADMIN — PANTOPRAZOLE SODIUM 40 MG: 40 TABLET, DELAYED RELEASE ORAL at 06:54

## 2017-12-09 RX ADMIN — BISACODYL 5 MG: 5 TABLET, COATED ORAL at 08:35

## 2017-12-09 RX ADMIN — FAMOTIDINE 20 MG: 20 TABLET, FILM COATED ORAL at 08:39

## 2017-12-09 RX ADMIN — PREDNISONE 40 MG: 20 TABLET ORAL at 08:39

## 2017-12-09 RX ADMIN — VITAMIN D, TAB 1000IU (100/BT) 1000 UNITS: 25 TAB at 08:35

## 2017-12-09 RX ADMIN — DOCUSATE SODIUM 100 MG: 100 CAPSULE, LIQUID FILLED ORAL at 17:04

## 2017-12-09 RX ADMIN — DOCUSATE SODIUM 100 MG: 100 CAPSULE, LIQUID FILLED ORAL at 08:39

## 2017-12-09 RX ADMIN — PRAVASTATIN SODIUM 80 MG: 80 TABLET ORAL at 08:35

## 2017-12-09 RX ADMIN — SENNOSIDES 17.2 MG: 8.6 TABLET, FILM COATED ORAL at 08:35

## 2017-12-09 RX ADMIN — Medication 325 MG: at 06:59

## 2017-12-09 RX ADMIN — INSULIN GLARGINE 40 UNITS: 100 INJECTION, SOLUTION SUBCUTANEOUS at 21:59

## 2017-12-09 NOTE — ASSESSMENT & PLAN NOTE
· Patient has had thrombocytopenia in the past with no known etiology  · Her current episode of thrombocytopenia may have been provoked by initiation of Eliquis  She was started on this on 09/03/2017 after she experienced multiple episodes of GIB while on Coumadin  · Hematology consult is pending and appreciate their input  · Continue Eliquis for now as she has no evidence of active bleeding

## 2017-12-09 NOTE — ED ATTENDING ATTESTATION
ICindy DO, saw and evaluated the patient  I have discussed the patient with the resident/non-physician practitioner and agree with the resident's/non-physician practitioner's findings, Plan of Care, and MDM as documented in the resident's/non-physician practitioner's note, except where noted  All available labs and Radiology studies were reviewed  At this point I agree with the current assessment done in the Emergency Department  I have conducted an independent evaluation of this patient a history and physical is as follows:      Critical Care Time  CritCare Time      Pt to the ED for low platelets  Pt with recent change coinciding with change from warfarin to Eliquis  No bleeding prob or headaches  Exm: morbidly obese  Vitals stable  No significant bruising  Pln: suspect Eliquis as cause but allupurinal and Lyrica are also possibilities

## 2017-12-09 NOTE — ASSESSMENT & PLAN NOTE
· A1c: 8 6  · Blood sugars:  211, 235, 308  · Continue Lantus 40 units daily and NPH 8 units with meals

## 2017-12-09 NOTE — H&P
History and Physical - [facility]    Patient Information: Britta Dickson 67 y o  female MRN: [id number]  Unit/Bed#: ED 12 Encounter: [id number]  Admitting Physician: Yessy Corado MD  PCP: Roberth Hollins MD  Date of Admission:  12/09/17  [facility] Problem List:     Active Problems:    IDDM (insulin dependent diabetes mellitus) ([facility] [address] )    Pulmonary embolism (HCC)    Thrombocytopenia ([facility] )    Abnormal laboratory test result    Morbid obesity ([facility] )      Plan for the Primary Problem(s):  · Observation, medical surgical floor  · Thrombocytopenia  At this point, we are not so sure what is causing this  Hematology consult  · End-stage renal disease on hemodialysis  Patient usually gets hemodialyzed every Mondays, Wednesdays and Fridays  As essentially the patient is here for opinion from Hematology, most likely the patient will be discharged soon  Will leave to the team whether they would like to get Nephrology consult in order to proceed with hemodialysis should the patient need continued stay  But get BMP and CBC tomorrow  Plan for Additional Problem(s):   · Pulmonary embolism  In this regard, the patient still needs her apixaban  Patient does not show any signs of bleeding and will continue anticoagulant for now  VTE Prophylaxis: Apixaban (Eliquis)  / sequential compression device   Code Status: Prior do not resuscitate, level 3  POLST: There is no POLST form on file for this patient (pre-hospital)    Anticipated Length of Stay:  Patient will be admitted on an Observation basis with an anticipated length of stay of  less than 2 midnights  Justification for Hospital Stay: Please see detailed plans noted above  Chief Complaint:     Abnormal laboratory  of Present Illness:  Britta Dickson is a 67 y o  female who was recently admitted from November 23rd and discharged on December 3rd for gram-negative pneumonia    Likewise, the patient has morbid obesity with insulin-dependent diabetes mellitus, chronic congestive heart failure (diastolic), and pulmonary embolism  Because of morbid obesity, essentially the patient is very much in active and according to her she stays on bed all day  The patient had recently a new hemodialysis catheter placed on the left arm which has been there since 2 weeks ago  The patient denies any active bleeding  She also denies any melena or hematochezia  She claims not to have any vomiting of blood  The patient denies any active source of bleeding at all  In any case, the patient then was seen to have adequate platelets even amounting to 252 on September 7, with the time that the patient was seen and admitted on November 23, her platelets were 839  For some unknown reason it continues to drop  The patient was sent here because her CBC showed a platelet of 27  The patient again denies any bleeding  Upon repeat laboratories done here in the emergency room, her platelet count was 35  That said, the patient is placed in order to be seen by Hematology  Otherwise, the patient denies any new onset bleeding as noted  She also denies any shortness of breath  She denies any active new complaints      Review of Systems:    Constitutional:  Denies fever or chills   Eyes:  Denies change in visual acuity   HENT:  Denies nasal congestion or sore throat   Respiratory:  Denies cough or shortness of breath   Cardiovascular:  Denies chest pain or edema   GI:  Denies abdominal pain, nausea, vomiting, bloody stools or diarrhea   :  Denies dysuria   Musculoskeletal:  Denies back pain or joint pain   Integument:  Denies rash   Neurologic:  Denies headache, focal weakness or sensory changes   Endocrine:  Denies polyuria or polydipsia   Lymphatic:  Denies swollen glands   Psychiatric:  Denies depression or anxiety     Past Medical and Surgical History:   Past Medical History:   Diagnosis Date    Adrenal insufficiency ([facility] [address] )     Adrenocortical insufficiency (HCC)     Anemia     Bradycardia     Cardiac disease     Cataract     CHF (congestive heart failure) (HCC)     Constipation     CPAP (continuous positive airway pressure) dependence     Dependence on supplemental oxygen     Diabetes mellitus (HCC)     Difficulty walking     Disease of thyroid gland     Dysphagia     Encephalopathy     GERD (gastroesophageal reflux disease)     Glaucoma     Gout     History of transfusion     Hyperlipidemia     Hypertension     Insomnia     Insomnia     Obesity     Osteoarthritis     PONV (postoperative nausea and vomiting)     Renal disorder     renal failure    Sleep apnea      Past Surgical History:   Procedure Laterality Date    ABDOMINAL SURGERY      APPENDECTOMY      BODY LIFT LOWER Right 4/18/2017    Procedure: UPPER EXTREMITY EXCISION PANNUS ;  Surgeon: Eleazar Rojas MD;  [facility];  Service:     CARPAL TUNNEL RELEASE Bilateral     COLONOSCOPY N/A 8/15/2017    Procedure: COLONOSCOPY;  Surgeon: Carlos Herndon MD;  Location: [facility]; Service: Colorectal    COLONOSCOPY N/A 8/29/2017    Procedure: COLONOSCOPY;  Surgeon: Shakeel Aranda MD;  [facility]; Service: Colorectal    COLONOSCOPY N/A 9/1/2017    Procedure: COLONOSCOPY;  Surgeon: Shakeel Aranda MD;  [facility];   Service: Colorectal    HYSTERECTOMY      AL ANASTOMOSIS,AV,ANY SITE Right 8/18/2016    Procedure: CREATION OF RIGHT BRACHIOCEPHALIC FISTULA;  Surgeon: Arjun Flores MD;  [facility];  Service: Vascular    AL REVISE AV FISTULA,W/O THROMBECTOMY Right 4/18/2017    Procedure: UPPER ARM SUPERFICIALIZATION FISTULA ;  Surgeon: Arjun Flores MD;  [facility];  Service: Vascular       Meds/Allergies:  acetaminophen (TYLENOL) 325 mg tablet Take 2 tablets by mouth every 6 (six) hours as needed for mild pain, headaches or fever Rony Kate MD Reordered   Ordered as: acetaminophen (TYLENOL) tablet 650 mg - 650 mg, Oral, Every 6 hours PRN, mild pain, headaches, fever, Starting Fri 12/8/17 at 2314   albuterol (2 5 mg/3 mL) 0 083 % nebulizer solution Take 2 5 mg by nebulization every 6 (six) hours as needed for wheezing  Rody Lynch MD Reordered   Ordered as: albuterol inhalation solution 2 5 mg - 2 5 mg, Nebulization, Every 6 hours PRN, wheezing, Starting Fri 12/8/17 at 2314   allopurinol (ZYLOPRIM) 100 mg tablet Take 200 mg by mouth Indications: sun, tue, we, fri, sat  Nicole Rasher, Wed, Fri, Sat  Rody Lynch MD Reordered   Ordered as: allopurinol [facility]) tablet 200 mg - 200 mg, Oral, User Specified (Once per day on Sun Tue Thu Sat), First dose on Sat 12/9/17 at 0800   allopurinol (ZYLOPRIM) 300 mg tablet Take 300 mg by mouth Mon, and Thurs  Rody Lynch MD Reordered   Ordered as: allopurinol (ZYLOPRIM) tablet 300 mg - 300 mg, Oral, User Specified (Once per day on Mon Thu), First dose on Mon 12/11/17 at 0800   apixaban (ELIQUIS) 2 5 mg Take 1 tablet by mouth 2 (two) times a day Rody Lynch MD Reordered   Ordered as: apixaban Virginia Kins) tablet 2 5 mg - 2 5 mg, Oral, 2 times daily, First dose on Sat 12/9/17 at 0900 High alert medication    bisacodyl (DULCOLAX) 5 mg EC tablet Take 5 mg by mouth daily as needed for constipation Rody yLnch MD Reordered   Ordered as: bisacodyl (DULCOLAX) EC tablet 5 mg - 5 mg, Oral, Daily PRN, constipation, Starting Fri 12/8/17 at 2315 Do not crush or chew;  Do not administer within 1 hour of milk, any dairy product or antacid    calcium carbonate (OYSTER SHELL,OSCAL) 500 mg Take 2 tablets by mouth 3 (three) times a day with meals Rody Lynch MD Reordered   Ordered as: calcium carbonate (OYSTER SHELL,OSCAL) 500 mg tablet 2 tablet - 2 tablet, Oral, 3 times daily with meals, First dose on Sat 12/9/17 at 0730   Camphor-Menthol-Methyl Sal (Anthony Carrero Ultra Strength) 4-10-30 % CREA Apply topically 2 (two) times a day B/l feet/knees and left shoulder Eulictravis Lynch MD Reordered Ordered as: menthol-methyl salicylate (BENGAY) 90-41 % cream - Apply externally, 4 times daily PRN, muscle soreness, Starting Fri 12/8/17 at 2323 For external use only  **DISPOSE IN 8 GALLON BLACK CONTAINER**     cholecalciferol 1000 units tablet Take 1 tablet by mouth daily Tuyet Troncoso MD Reordered   Ordered as: cholecalciferol (VITAMIN D3) tablet 1,000 Units - 1,000 Units, Oral, Daily, First dose on Sat 12/9/17 at 0900   cyanocobalamin (VITAMIN B-12) 1,000 mcg tablet Take 1,000 mcg by mouth daily  Tuyet Troncoso MD Reordered   Ordered as: cyanocobalamin (VITAMIN B-12) tablet 1,000 mcg - 1,000 mcg, Oral, Daily, First dose on Sat 12/9/17 at 0900   dextromethorphan-guaiFENesin (ROBITUSSIN DM)  mg/5 mL syrup Take 5 mL by mouth every 4 (four) hours as needed for cough or congestion Tuyet Troncoso MD Reordered   Ordered as: dextromethorphan-guaiFENesin (ROBITUSSIN DM)  mg/5 mL oral syrup 5 mL - 5 mL, Oral, Every 4 hours PRN, cough, congestion, Starting Fri 12/8/17 at 2315   docusate sodium (COLACE) 100 mg capsule Take 100 mg by mouth 2 (two) times a day   Tuyet Troncoso MD Reordered   Ordered as: docusate sodium (COLACE) capsule 100 mg - 100 mg, Oral, 2 times daily, First dose on Sat 12/9/17 at 0900   famotidine (PEPCID) 20 mg tablet Take 1 tablet by mouth daily Tuyet Troncoso MD Reordered   Ordered as: famotidine (PEPCID) tablet 20 mg - 20 mg, Oral, Daily, First dose on Sat 12/9/17 at 0900   ferrous sulfate 325 (65 Fe) mg tablet Take 325 mg by mouth 3 (three) times a day with meals Tuyet Troncoso MD Reordered   Ordered as: ferrous sulfate tablet 325 mg - 325 mg, Oral, 3 times daily with meals, First dose on Sat 12/9/17 at [facility] and drug interaction, teaching and documentation must be done; Administer 2 hours before or 4 hours after antacids; Avoid administration w/ cereals,dietary fiber,tea,coffee,eggs,or milk      HYDROmorphone (DILAUDID) 2 mg tablet Take 1 tablet by mouth every 6 (six) hours as needed for moderate pain Max Daily Amount: 8 mg Rony Kate MD Reordered   Ordered as: HYDROmorphone (DILAUDID) tablet 2 mg - 2 mg, Oral, Every 6 hours PRN, moderate pain, Starting Fri 12/8/17 at 2315 High Alert Medication  LOOK ALIKE SOUND ALIKE MED    Insulin Glargine (BASAGLAR KWIKPEN) 100 UNIT/ML SOPN Inject 40 Units under the skin daily at bedtime Rony Kate MD Reordered   Ordered as: insulin glargine (LANTUS) subcutaneous injection 40 Units - 40 Units, Subcutaneous, Daily at bedtime, First dose on Fri 12/8/17 at [facility]  Do not dilute/mix insulin glargine with any other insulin formulation/solution  **DISPOSE IN 8 GALLON BLACK CONTAINER** Do not hold medication without a physician order  insulin lispro protamine-insulin lispro (HumaLOG 50-50) 100 units/mL Inject 8 Units under the skin 3 (three) times a day before meals Rony Kate MD Reordered   Ordered as: insulin NPH (HumuLIN N,NovoLIN N) 100 Units/mL subcutaneous injection 8 Units - 8 Units, Subcutaneous, 3 times daily before meals, First dose on Sat 12/9/17 at [address]  Gently roll dose in the palms of the hands to resuspend before use  **DISPOSE IN 8 GALLON BLACK CONTAINER**  LOOK ALIKE SOUND ALIKE MED    latanoprost (XALATAN) 0 005 % ophthalmic solution Administer 1 drop to both eyes daily at bedtime  Rony Kate MD Reordered   Ordered as: latanoprost (XALATAN) 0 005 % ophthalmic solution 1 drop - 1 drop, Both Eyes, Daily at bedtime, First dose on Fri 12/8/17 at 2315   levothyroxine 25 mcg tablet Take 25 mcg by mouth daily  Rony Kate MD Reordered   Ordered as: levothyroxine tablet 25 mcg - 25 mcg, Oral, Daily, First dose on Sat 12/9/17 at 0900 Administer in the morning on an empty stomach, at least 30 to 60 minutes before food   LOOK ALIKE SOUND ALIKE MED    liver oil-zinc oxide (DESITIN) 40 % ointment Apply topically as needed for irritation To buttock every day and evening shift   To b/l thigh folds every day and evening shift  Tj Pierre MD Reordered   Ordered as: liver oil-zinc oxide (DESITIN) 40 % ointment - Topical, As needed, irritation, Starting Fri 12/8/17 at 2314   metoprolol tartrate (LOPRESSOR) 12 5 mg tablet Take 12 5 mg by mouth  Sam Mann, Sat   Hold am dose before dialysis Mon, Wed, and Fri Tj Pierre MD Reordered   Ordered as: metoprolol tartrate (LOPRESSOR) partial tablet 12 5 mg - 12 5 mg, Oral, Every 12 hours scheduled, First dose on Fri 12/8/17 at [phone] for heart rate less than 50 beats per minute  L[employer] ALIKE SOUND ALIKE MED Hold for systolic blood pressure less than (mmHg): 110   miconazole (MICOTIN) 2 % powder Apply topically as needed for itching Apply to b/l breast and panus twice a day Tj Pierre MD Reordered   Ordered as: miconazole 2 % cream - Topical, 2 times daily, First dose on Sat 12/9/17 at 0900 For external use only    nystatin (MYCOSTATIN) powder Apply topically 2 (two) times a day Tj Pierre MD Reordered   Ordered as: nystatin (MYCOSTATIN) powder - Topical, 2 times daily, First dose on Sat 12/9/17 at 0900 LOOK ALIKE SOUND ALIKE MED    omeprazole (PriLOSEC) 20 mg delayed release capsule Take 20 mg by mouth daily   Tj Pierre MD Reordered   Ordered as: pantoprazole (PROTONIX) EC tablet 40 mg - 40 mg, Oral, Daily (early morning), First dose on Sat 12/9/17 at 0600 Swallow whole; do not crush, chew or split   LOOK ALIKE SOUND ALIKE MED    ondansetron (ZOFRAN) 4 mg tablet Take 1 tablet by mouth every 4 (four) hours as needed for nausea or vomiting Tj Pierre MD Reordered   Ordered as: ondansetron [facility]) oral solution 4 mg - 4 mg, Oral, Every 4 hours PRN, nausea, Starting Fri 12/8/17 at 2316   polyethylene glycol (MIRALAX) 17 g packet Take 17 g by mouth daily Tj Pierre MD Reordered   Ordered as: polyethylene glycol (MIRALAX) packet 17 g - 17 g, Oral, Daily, First dose on Sat 12/9/17 at 0900 Stir powder in 4-8 ounces of water, juice, soda, coffee or tea until dissolved and drink  LOOK ALIKE SOUND ALIKE MED    pravastatin (PRAVACHOL) 80 mg tablet Take 80 mg by mouth daily  Ryann Santana MD Reordered   Ordered as: pravastatin (PRAVACHOL) tablet 80 mg - 80 mg, Oral, Daily, First dose on Sat 12/9/17 at 0900   predniSONE 20 mg tablet Take 2 tablets by mouth daily Ryann Santana MD Reordered   Ordered as: predniSONE tablet 40 mg - 40 mg, Oral, Daily, First dose on Sat 12/9/17 at 0900 Administer with food or milk  LOOK ALIKE SOUND ALIKE MED    pregabalin (LYRICA) 75 mg capsule Take 1 capsule by mouth 2 (two) times a day for 10 days Ryann Santana MD Reordered    Patient taking differently: Take 50 mg by mouth 3 (three) times a day       Ordered as: pregabalin (LYRICA) capsule 75 mg - 75 mg, Oral, 2 times daily, First dose on Sat 12/9/17 at 0900 High Alert Medication  LOOK ALIKE SOUND ALIKE MED    senna (SENOKOT) 8 6 MG tablet Take 2 tablets by mouth daily as needed for constipation  Ryann Santana MD Reordered   Ordered as: senna [facility]) tablet 17 2 mg - 17 2 mg (2 tablet), Oral, Daily PRN, constipation, Starting Fri 12/8/17 at 2314         Allergies:    Allergies   Allergen Reactions    Codeine     Darvon [Propoxyphene]     Iodine     Keflex [Cephalexin]     Morphine And Related     Nsaids     Other      IV DYE and SEAFOOD     History:  Marital Status: Single   Occupation: retired  Patient Pre-hospital Living Situation: SNF  Patient Pre-hospital Level of Mobility: non mobile and dependent on SNF (as per patient)  Patient Pre-hospital Diet Restrictions: diabetic, renal cardiac  Substance Use History:   History   Alcohol Use No     History   Smoking Status    Former Smoker    Quit date: 1967   Smokeless Tobacco    Never Used     History   Drug Use No       Family History:  Family History   Problem Relation Age of Onset    Diabetes Mother     Heart disease Father        Physical Exam:     Vitals: Blood Pressure: 154/67 (12/08/17 2330)  Pulse: 74 (12/08/17 2330)  Temperature: 98 6 °F (37 °C) (12/08/17 2054)  Temp Source: Oral (12/08/17 2054)  Respirations: 18 (12/08/17 2330)  Weight - Scale: 135 kg (298 lb) (12/08/17 2054)  SpO2: 98 % (12/08/17 2330)    Constitutional:  Well developed,obese, no acute distress, non-toxic appearance and speaks while flat on bed  Eyes:  PERRL, conjunctiva normal   HENT:  Atraumatic, external ears normal, nose normal, oropharynx moist, no pharyngeal exudates  Neck- normal range of motion, no tenderness, supple   Respiratory:  No respiratory distress, normal breath sounds, no rales, no wheezing   Cardiovascular:  Normal rate, normal rhythm, no murmurs, no gallops, no rubs   GI:  Soft, nondistended, normal bowel sounds, nontender, no organomegaly, no mass, no rebound, no guarding   :  No costovertebral angle tenderness   Musculoskeletal:  No edema, no tenderness, no deformities  Back- no tenderness  Integument:  Well hydrated, no rash,  Lymphatic:  No lymphadenopathy noted   Neurologic:  Alert &awake, communicative, CN 2-12 normal, normal motor function, normal sensory function, no focal deficits noted   Psychiatric:  Speech and behavior appropriate but irritable       Lab Results: I have personally reviewed pertinent reports  Results from last 7 days  Lab Units 12/08/17 2129   WBC Thousand/uL 9 84   HEMOGLOBIN g/dL 9 0*   HEMATOCRIT % 27 8*   PLATELETS Thousands/uL 35*   LYMPHO PCT % 4*   MONO PCT MAN % 2*   EOSINO PCT MANUAL % 0       Results from last 7 days  Lab Units 12/08/17 2129   SODIUM mmol/L 133*   POTASSIUM mmol/L 5 6*   CHLORIDE mmol/L 96*   CO2 mmol/L 30   BUN mg/dL 59*   CREATININE mg/dL 2 10*   CALCIUM mg/dL 8 6   TOTAL PROTEIN g/dL 6 4   BILIRUBIN TOTAL mg/dL 0 25   ALK PHOS U/L 65   ALT U/L 25   AST U/L 14   GLUCOSE RANDOM mg/dL 445*           EKG: sinus rhythm no st t wave abn  Imaging: I have personally reviewed pertinent reports        Ct Chest Abdomen Pelvis Wo Contrast    Result Date: 11/23/2017  Narrative: CT CHEST, ABDOMEN AND PELVIS WITHOUT IV CONTRAST INDICATION:  Cough  Hypoxia  Abdominal pain  End-stage renal disease on dialysis  Diabetes  COMPARISON: 8/14/2017: TECHNIQUE: CT examination of the chest, abdomen and pelvis was performed without intravenous contrast   Reformatted images were created in axial, sagittal, and coronal planes  Radiation dose length product (DLP) for this visit:  3281 29 mGy-cm   This examination, like all CT scans performed in the [facility], was performed utilizing techniques to minimize radiation dose exposure, including the use of iterative reconstruction and automated exposure control  Enteric contrast was administered  FINDINGS: CHEST LUNGS:  Imaging through the lungs degraded due to obese body habitus as well as motion artifact  There is subsegmental linear atelectasis and mild peribronchial thickening in the RIGHT upper lobe  Suspicious for subsegmental region of airway inflammation  No significant airspace component  There is mild subsegmental bibasilar atelectasis, RIGHT greater than LEFT  Mild central pulmonary venous distention suggesting pulmonary venous hypertension  PLEURA:  Unremarkable  HEART/GREAT VESSELS:  Unremarkable for patient's age  Coronary artery calcification noted  MEDIASTINUM AND MOON:  Within the superior mediastinum, RIGHT paratracheal there are 2  Prominent lymph nodes, one borderline in size at 9 mm and the other mildly enlarged at 15 mm short axis dimension  CHEST WALL AND LOWER NECK:       Normal  ABDOMEN LIVER/BILIARY TREE:  Unremarkable  GALLBLADDER:  A few small dependent calculi within the gallbladder  Gallbladder caliber and appearance otherwise within normal limits  Stable compared to prior  SPLEEN:  Unremarkable  PANCREAS:  Unremarkable  ADRENAL GLANDS:  Unremarkable  KIDNEYS/URETERS:  Unremarkable  No hydronephrosis  STOMACH AND BOWEL:  Unremarkable  APPENDIX:  No findings to suggest appendicitis  ABDOMINOPELVIC CAVITY:  No ascites or free intraperitoneal air  No lymphadenopathy  VESSELS:  Unremarkable for patient's age  PELVIS REPRODUCTIVE ORGANS:  Unremarkable for patient's age  URINARY BLADDER:  Unremarkable  ABDOMINAL WALL/INGUINAL REGIONS:  Unremarkable  OSSEOUS STRUCTURES:  No acute fracture or destructive osseous lesion  Impression: No acute intra-abdominal pelvic abnormality identified  Cholelithiasis without evidence for cholecystitis  Evaluation of the lungs limited due to obese body habitus and motion artifact  Subsegmental linear and nodular opacities in the RIGHT upper lung, primarily with mild peribronchial thickening centrally suggesting possible focus of acute airway inflammation  Otherwise, appearance suggests dependent subsegmental atelectasis in both lung bases  There is moderate central pulmonary venous distention bilaterally suggesting component of fluid overload/pulmonary venous hypertension  No significant groundglass or airspace infiltrates  No pleural effusion  Borderline and mildly enlarged adenopathy in the RIGHT superior mediastinum  Workstation performed: [id number]     Xr Chest Portable    Result Date: 11/30/2017  Narrative: CHEST INDICATION:  Permacath Placement COMPARISON:  November 30, earlier in the morning as well as November 24, 2017  VIEWS:   AP portable semierect view in the conventional and line placement techniques  IMAGES:  2 FINDINGS:     The heart is enlarged  Atherosclerotic changes in the aorta  Left internal jugular dialysis catheter is present  The catheter has become retracted by approximately 5 cm  The tip is now in the mid superior vena cava  Previously, the tip projecting into the right atrium  Lung volumes diminished  Halle and pulmonary vessels are prominent  Visualized osseous structures appear within normal limits for the patient's age  Impression: 1    Retraction of the indwelling left internal jugular dialysis catheter by approximately 5 cm  Tip now in the mid superior vena cava  2   Mild vascular congestion  A verbal report will be called by the reading room liaison following this dictation  Workstation performed: [id number]     Xr Chest Portable    Result Date: 11/30/2017  Narrative: CHEST INDICATION:  History of cough with chronic hypoxic respiratory failure COMPARISON:  None VIEWS:   AP frontal IMAGES:  1 FINDINGS:  Dialysis catheter is again seen  Heart shadow is enlarged but unchanged from prior exam  The lungs are clear  No pneumothorax or pleural effusion  Visualized osseous structures appear within normal limits for the patient's age  Impression: No active pulmonary disease  Cardiomegaly  Workstation performed: [id number]     Xr Chest Portable    Result Date: 11/24/2017  Narrative: CHEST  INDICATION: Dialysis catheter placement  COMPARISON:  11/23/2017  VIEWS:   AP frontal; 1 image FINDINGS: Left jugular dialysis catheter tip overlies the right atrium  The cardiomediastinal silhouette is stable  The lungs are clear  No pleural effusion  Osseous structures are age appropriate  Impression: Line placement as described  Workstation performed: [id number]     Xr Chest Portable    Result Date: 11/23/2017  Narrative: CHEST INDICATION:  cough, hypoxia  History taken directly from the electronic ordering system  COMPARISON:  8/24/2017 VIEWS:   AP frontal IMAGES:  1 FINDINGS:     Heart shadow is enlarged but unchanged from prior exam  Mild central vessel prominence  No consolidation  No pneumothorax or pleural effusion  Visualized osseous structures appear within normal limits for the patient's age  Impression: Mild central vessel prominence  No consolidation  Workstation performed: [id number]     Ir Temp Hd Cath    Result Date: 11/24/2017  Narrative: Procedure: Temporary dialysis catheter placement   Indications:Fistula not functioning, contrast allergy prevents fistulogram, dialysis access required  Technique/findings: Risks, benefits and alternatives were explained to the patient  Questions were answered  Written consent was documented  Once patient was in the radiology area a timeout was performed identifying patient and procedure  Patient was kept in hospital bed  Limited ultrasound imaging of the left neck was performed, the internal jugular vein was found to be patent and compressible  The left neck was prepped and draped in standard fashion  One percent lidocaine was infiltrated along the anticipated needle tract for local anesthesia  Under ultrasound visualization a micropuncture needle was advanced into the internal jugular vein with permanent recording and reporting  An 018 wire was advanced  Needle was removed and transition sheath was placed over the wire  Estimating length a 24 cm temporary dialysis catheter was selected  Through the transition sheath a heavy-duty wire was advanced into the right atrium  The transition sheath was removed over the wire  Following serial dilation the temporary dialysis catheter was placed  Catheter was advanced completely  Subsequent x-ray confirmed appropriate positioning of the catheter within the right atrium  Both lumens of the catheter flushed and aspirated well  The catheter was secured to the skin using 2-0 Prolene sutures  Dressing was applied  Patient tolerated the procedure without any immediate postprocedural complications  Fluoroscopy time:Only ultrasound guidance was used  Impression: Impression: Technically successful temporary dialysis catheter placement via the left internal jugular vein  Subsequent x-ray confirmed appropriate positioning within the right atrium, The catheter is ready for use  Workstation performed: [id number]         ** Please Note: Dragon 360 Dictation voice to text software was used in the creation of this document   **

## 2017-12-09 NOTE — SOCIAL WORK
Pt is a resident of [facility], bedbound, receiving HD at [facility] on M-W-F at 9:30am by Alondra  CM reviewed d/c planning process including the following: identifying help at home, patient preference for d/c planning needs, Discharge Lounge, [facility] Med[facility] to Bed program, availability of treatment team to discuss questions or concerns patient and/or family may have regarding understanding medications and recognizing signs and symptoms once discharged  CM also encouraged patient to follow up with all recommended appointments after discharge  Patient advised of importance for patient and family to participate in managing patients medical well being

## 2017-12-09 NOTE — ASSESSMENT & PLAN NOTE
· Isolated event in 2004  · However, given patient's morbid obesity, bed-bound status and other comorbidities, the patient remains at risk for recurrence DVT/PE  · Patient had been converted to Eliquis on 9/3/17 after she experiences multiple episodes of GIB on Coumadin

## 2017-12-09 NOTE — RESPIRATORY THERAPY NOTE
RT Protocol Note  Columba Gallegos 67 y o  female MRN: [id number]  Unit/Bed#: ED 12 Encounter: [id number]    Assessment    Active Problems:    IDDM (insulin dependent diabetes mellitus) ([facility] [address] )    [facility]    Pulmonary embolism (HCC)    Thrombocytopenia (HCC)    Abnormal laboratory test result      Home Pulmonary Medications:  See admission note    Past Medical History:   Diagnosis Date    Adrenal insufficiency (HCC)     Adrenocortical insufficiency (HCC)     Anemia     Bradycardia     Cardiac disease     Cataract     CHF (congestive heart failure) (HCC)     Constipation     CPAP (continuous positive airway pressure) dependence     Dependence on supplemental oxygen     Diabetes mellitus (HCC)     Difficulty walking     Disease of thyroid gland     Dysphagia     Encephalopathy     GERD (gastroesophageal reflux disease)     Glaucoma     Gout     History of transfusion     Hyperlipidemia     Hypertension     Insomnia     Insomnia     Obesity     Osteoarthritis     PONV (postoperative nausea and vomiting)     Renal disorder     renal failure    Sleep apnea      Social History     Social History    Marital status: Single     Spouse name: N/A    Number of children: N/A    Years of education: N/A     Social History Main Topics    Smoking status: Former Smoker     Quit date: 1967    Smokeless tobacco: Never Used    Alcohol use No    Drug use: No    Sexual activity: Not Currently     Other Topics Concern    None     Social History Narrative    None       Subjective    Subjective Data: awake and alert    Objective    Physical Exam:   Assessment Type: Assess only  General Appearance: Alert, Awake  Respiratory Pattern: Normal  Chest Assessment: Chest expansion symmetrical  Bilateral Breath Sounds: Diminished  Cough: Non-productive  O2 Device: N/C    Vitals:  Blood pressure 158/68, pulse 74, temperature 98 6 °F (37 °C), temperature source Oral, resp   rate 16, weight 135 kg (298 lb), SpO2 100 %, not currently breastfeeding  Imaging and other studies: diminished lung volumes     O2 Device: N/C     Plan    Respiratory Plan: Discontinue Protocol        Resp Comments: admit with thrombocytopenia, no resp history, CXR shows lung volumes diminished, no resp distress  States she took a couple tx's with prior admission for pneumonia and they did nothing for her, she does not want tx at this time, nor are they needed at this time   no resp issues, will d/c protocol

## 2017-12-09 NOTE — ED PROVIDER NOTES
History  Chief Complaint   Patient presents with    Abnormal Lab     low platelets 29     This is a morbidly obese 50-year-old female with a history of PE on Eliquis, diabetes, end-stage renal disease on dialysis MWF, hypertension, CHF who presents with an abnormal lab revealing low platelets  Patient received dialysis today and had labs drawn  Currently, denies any complaints  Just states that she was sent in for low platelets  Looking at previous records, it appears that her platelets seem to have trended down since the end of November  She was switched from warfarin to Eliquis a few weeks ago which may coincide with her downward trend in platelets  Currently, denies any bleeding, Denies fever/chills, nausea/vomiting, lightheadedness/dizziness, numbness/weakness, headache, change in vision, URI symptoms, neck pain, chest pain, palpitations, shortness of breath, cough, back pain, flank pain, abdominal pain, diarrhea, hematochezia, melena, dysuria, hematuria, abnormal vaginal discharge/bleeding  Prior to Admission Medications   Prescriptions Last Dose Informant Patient Reported? Taking? Camphor-Menthol-Methyl Sal (Anthony Carrero Ultra Strength) 4-10-30 % CREA   Yes No   Sig: Apply topically 2 (two) times a day B/l feet/knees and left shoulder   HYDROmorphone (DILAUDID) 2 mg tablet   No No   Sig: Take 1 tablet by mouth every 6 (six) hours as needed for moderate pain Max Daily Amount: 8 mg   Insulin Glargine (BASAGLAR KWIKPEN) 100 UNIT/ML SOPN   Yes No   Sig: Inject 40 Units under the skin daily at bedtime   acetaminophen (TYLENOL) 325 mg tablet   No No   Sig: Take 2 tablets by mouth every 6 (six) hours as needed for mild pain, headaches or fever   albuterol (2 5 mg/3 mL) 0 083 % nebulizer solution   Yes No   Sig: Take 2 5 mg by nebulization every 6 (six) hours as needed for wheezing  allopurinol (ZYLOPRIM) 100 mg tablet   Yes No   Sig: Take 200 mg by mouth Indications: sun, tue, we, fri, sat   Rosait Sears, Tues, Wed, Fri, Sat    allopurinol (ZYLOPRIM) 300 mg tablet   Yes No   Sig: Take 300 mg by mouth Mon, and Thurs    apixaban (ELIQUIS) 2 5 mg   No No   Sig: Take 1 tablet by mouth 2 (two) times a day   bisacodyl (DULCOLAX) 5 mg EC tablet   Yes No   Sig: Take 5 mg by mouth daily as needed for constipation   calcium carbonate (OYSTER SHELL,OSCAL) 500 mg   No No   Sig: Take 2 tablets by mouth 3 (three) times a day with meals   cholecalciferol 1000 units tablet   No No   Sig: Take 1 tablet by mouth daily   cyanocobalamin (VITAMIN B-12) 1,000 mcg tablet   Yes No   Sig: Take 1,000 mcg by mouth daily  dextromethorphan-guaiFENesin (ROBITUSSIN DM)  mg/5 mL syrup   No No   Sig: Take 5 mL by mouth every 4 (four) hours as needed for cough or congestion   docusate sodium (COLACE) 100 mg capsule   Yes No   Sig: Take 100 mg by mouth 2 (two) times a day     famotidine (PEPCID) 20 mg tablet   No No   Sig: Take 1 tablet by mouth daily   ferrous sulfate 325 (65 Fe) mg tablet   Yes No   Sig: Take 325 mg by mouth 3 (three) times a day with meals   insulin lispro protamine-insulin lispro (HumaLOG 50-50) 100 units/mL   No No   Sig: Inject 8 Units under the skin 3 (three) times a day before meals   latanoprost (XALATAN) 0 005 % ophthalmic solution   Yes No   Sig: Administer 1 drop to both eyes daily at bedtime  levothyroxine 25 mcg tablet   Yes No   Sig: Take 25 mcg by mouth daily  liver oil-zinc oxide (DESITIN) 40 % ointment   Yes No   Sig: Apply topically as needed for irritation To buttock every day and evening shift   To b/l thigh folds every day and evening shift    metoprolol tartrate (LOPRESSOR) 12 5 mg tablet   Yes No   Sig: Take 12 5 mg by mouth   AlmSam Ferrell, Sat  Hold am dose before dialysis Mon, Wed, and Fri   miconazole (MICOTIN) 2 % powder   Yes No   Sig: Apply topically as needed for itching Apply to b/l breast and panus twice a day   nystatin (MYCOSTATIN) powder   No No   Sig: Apply topically 2 (two) times a day   omeprazole (PriLOSEC) 20 mg delayed release capsule   Yes No   Sig: Take 20 mg by mouth daily     ondansetron (ZOFRAN) 4 mg tablet   No No   Sig: Take 1 tablet by mouth every 4 (four) hours as needed for nausea or vomiting   polyethylene glycol (MIRALAX) 17 g packet   No No   Sig: Take 17 g by mouth daily   pravastatin (PRAVACHOL) 80 mg tablet   Yes No   Sig: Take 80 mg by mouth daily  predniSONE 20 mg tablet   No No   Sig: Take 2 tablets by mouth daily   pregabalin (LYRICA) 75 mg capsule   No No   Sig: Take 1 capsule by mouth 2 (two) times a day for 10 days   Patient taking differently: Take 50 mg by mouth 3 (three) times a day     senna (SENOKOT) 8 6 MG tablet   Yes No   Sig: Take 2 tablets by mouth daily as needed for constipation  Facility-Administered Medications: None       Past Medical History:   Diagnosis Date    Adrenal insufficiency (HCC)     Adrenocortical insufficiency (HCC)     Anemia     Bradycardia     Cardiac disease     Cataract     CHF (congestive heart failure) (HCC)     Constipation     CPAP (continuous positive airway pressure) dependence     Dependence on supplemental oxygen     Diabetes mellitus (HCC)     Difficulty walking     Disease of thyroid gland     Dysphagia     Encephalopathy     GERD (gastroesophageal reflux disease)     Glaucoma     Gout     History of transfusion     Hyperlipidemia     Hypertension     Insomnia     Insomnia     Obesity     Osteoarthritis     PONV (postoperative nausea and vomiting)     Renal disorder     renal failure    Sleep apnea        Past Surgical History:   Procedure Laterality Date    ABDOMINAL SURGERY      APPENDECTOMY      BODY LIFT LOWER Right 4/18/2017    Procedure: UPPER EXTREMITY EXCISION PANNUS ;  Surgeon: Silvestre Ramirez MD;  [facility];  Service:     CARPAL TUNNEL RELEASE Bilateral     COLONOSCOPY N/A 8/15/2017    Procedure: COLONOSCOPY;  Surgeon: Leslie Garcias MD;  [facility];   Service: Colorectal    COLONOSCOPY N/A 8/29/2017    Procedure: COLONOSCOPY;  Surgeon: Zelda Iniguez MD;  [facility]; Service: Colorectal    COLONOSCOPY N/A 9/1/2017    Procedure: COLONOSCOPY;  Surgeon: Zelda Iniguez MD;  [facility]; Service: Colorectal    HYSTERECTOMY      VA ANASTOMOSIS,AV,ANY SITE Right 8/18/2016    Procedure: CREATION OF RIGHT BRACHIOCEPHALIC FISTULA;  Surgeon: Pasquale Herman MD;  [facility];  Service: Vascular    VA REVISE AV FISTULA,W/O THROMBECTOMY Right 4/18/2017    Procedure: UPPER ARM SUPERFICIALIZATION FISTULA ;  Surgeon: Josephine Flores MD;  [facility];  Service: Vascular       Family History   Problem Relation Age of Onset    Diabetes Mother     Heart disease Father      I have reviewed and agree with the history as documented  Social History   Substance Use Topics    Smoking status: Former Smoker     Quit date: 1967    Smokeless tobacco: Never Used    Alcohol use No        Review of Systems   Constitutional: Negative for chills and fever  HENT: Negative for rhinorrhea, sore throat and trouble swallowing  Eyes: Negative for photophobia and visual disturbance  Respiratory: Negative for cough, chest tightness and shortness of breath  Cardiovascular: Negative for chest pain, palpitations and leg swelling  Gastrointestinal: Negative for abdominal pain, blood in stool, diarrhea, nausea and vomiting  Endocrine: Negative for polyuria  Genitourinary: Negative for dysuria, flank pain, hematuria, vaginal bleeding and vaginal discharge  Musculoskeletal: Negative for back pain and neck pain  Skin: Negative for color change and rash  Allergic/Immunologic: Negative for immunocompromised state  Neurological: Negative for dizziness, weakness, light-headedness, numbness and headaches  All other systems reviewed and are negative        Physical Exam  ED Triage Vitals   Temperature Pulse Respirations Blood Pressure SpO2 12/08/17 2054 12/08/17 2054 12/08/17 2054 12/08/17 2057 12/08/17 2054   98 6 °F (37 °C) 79 18 148/68 100 %      Temp Source Heart Rate Source Patient Position - Orthostatic VS BP Location FiO2 (%)   12/08/17 2054 12/08/17 2153 12/08/17 2153 12/08/17 2230 --   Oral Monitor Lying Left arm       Pain Score       12/08/17 2054       No Pain           Orthostatic Vital Signs  Vitals:    12/10/17 0810 12/10/17 1500 12/10/17 2136 12/10/17 2300   BP: 136/64 140/59 157/66 140/63   Pulse: 76 75 83 78   Patient Position - Orthostatic VS: Lying Lying  Lying       Physical Exam   Constitutional: Vital signs are normal  She appears well-developed  She is cooperative  No distress  HENT:   Mouth/Throat: Uvula is midline, oropharynx is clear and moist and mucous membranes are normal    Eyes: Conjunctivae and EOM are normal  Pupils are equal, round, and reactive to light  Neck: Trachea normal  No thyroid mass and no thyromegaly present  Cardiovascular: Normal rate, regular rhythm, normal heart sounds, intact distal pulses and normal pulses  No murmur heard  Pulmonary/Chest: Effort normal and breath sounds normal    Abdominal: Soft  Normal appearance and bowel sounds are normal  There is no tenderness  There is no rebound, no guarding and no CVA tenderness  Morbidly obese abdomen  Neurological: She is alert  Skin: Skin is warm, dry and intact  Psychiatric: She has a normal mood and affect   Her speech is normal and behavior is normal  Thought content normal        ED Medications  Medications   allopurinol (ZYLOPRIM) tablet 200 mg (200 mg Oral Given 12/10/17 0856)   allopurinol (ZYLOPRIM) tablet 300 mg (not administered)   docusate sodium (COLACE) capsule 100 mg (100 mg Oral Given 12/10/17 1743)   latanoprost (XALATAN) 0 005 % ophthalmic solution 1 drop (1 drop Both Eyes Given 12/10/17 2136)   pravastatin (PRAVACHOL) tablet 80 mg (80 mg Oral Given 12/10/17 0855)   levothyroxine tablet 25 mcg (25 mcg Oral Given 12/11/17 0538)   cyanocobalamin (VITAMIN B-12) tablet 1,000 mcg (1,000 mcg Oral Given 12/10/17 0856)   metoprolol tartrate (LOPRESSOR) partial tablet 12 5 mg (12 5 mg Oral Given 12/10/17 2136)   senna (SENOKOT) tablet 17 2 mg (17 2 mg Oral Given 12/10/17 0714)   acetaminophen (TYLENOL) tablet 650 mg (650 mg Oral Given 12/11/17 0538)   pantoprazole (PROTONIX) EC tablet 40 mg (40 mg Oral Given 12/11/17 0538)   menthol-methyl salicylate (BENGAY) 26-48 % cream ( Apply externally Given 12/10/17 1736)   miconazole 2 % cream ( Topical Not Given 12/10/17 1742)   pregabalin (LYRICA) capsule 75 mg (75 mg Oral Given 12/10/17 1742)   apixaban (ELIQUIS) tablet 2 5 mg (2 5 mg Oral Given 12/10/17 1743)   calcium carbonate (OYSTER SHELL,OSCAL) 500 mg tablet 2 tablet (2 tablets Oral Given 12/10/17 1742)   nystatin (MYCOSTATIN) powder ( Topical Given 12/10/17 1744)   polyethylene glycol (MIRALAX) packet 17 g (17 g Oral Given 12/10/17 0848)   insulin glargine (LANTUS) subcutaneous injection 40 Units (40 Units Subcutaneous Given 12/10/17 2135)   bisacodyl (DULCOLAX) EC tablet 5 mg (5 mg Oral Given 12/10/17 0714)   ferrous sulfate tablet 325 mg (325 mg Oral Given 12/10/17 1743)   HYDROmorphone (DILAUDID) tablet 2 mg (not administered)   cholecalciferol (VITAMIN D3) tablet 1,000 Units (1,000 Units Oral Given 12/10/17 0855)   dextromethorphan-guaiFENesin (ROBITUSSIN DM)  mg/5 mL oral syrup 5 mL (5 mL Oral Given 12/10/17 0906)   docusate sodium (COLACE) capsule 100 mg (not administered)   ondansetron (ZOFRAN) injection 4 mg (not administered)   insulin lispro (HumaLOG) 100 units/mL subcutaneous injection 2-12 Units (10 Units Subcutaneous Given 12/10/17 0626)   insulin lispro (HumaLOG) 100 units/mL subcutaneous injection 1-5 Units (3 Units Subcutaneous Given 12/10/17 4025)   ondansetron (ZOFRAN-ODT) dispersible tablet 4 mg (not administered)   insulin NPH (HumuLIN N,NovoLIN N) 100 Units/mL subcutaneous injection 10 Units (10 Units Subcutaneous Given 12/10/17 1736)   predniSONE tablet 20 mg (not administered)     Followed by   predniSONE tablet 10 mg (not administered)   lactulose 20 g/30 mL oral solution 20 g (20 g Oral Given 12/10/17 1203)   bisacodyl (DULCOLAX) rectal suppository 10 mg (10 mg Rectal Given 12/10/17 1501)       Diagnostic Studies  Results Reviewed     Procedure Component Value Units Date/Time    MRSA culture [[id number]]  (Normal) Collected:  12/09/17 0702    Lab Status:  Final result Specimen:  Nares from Nose Updated:  12/10/17 1355     MRSA Culture Only No Methicillin Resistant Staphlyococcus aureus (MRSA) isolated    CBC and differential [[id number]]  (Abnormal) Collected:  12/10/17 0450    Lab Status:  Final result Specimen:  Blood from Arm, Left Updated:  12/10/17 0807     WBC 9 93 Thousand/uL      RBC 2 72 (L) Million/uL      Hemoglobin 8 3 (L) g/dL      Hematocrit 25 7 (L) %      MCV 95 fL      MCH 30 5 pg      MCHC 32 3 g/dL      RDW 15 2 (H) %      Platelets 33 (LL) Thousands/uL      nRBC 0 /100 WBCs     Narrative: This is an appended report  These results have been appended to a previously verified report  Fibrin split products [[id number]]  (Normal) Collected:  12/09/17 2046    Lab Status:  Final result Specimen:  Blood from Arm, Left Updated:  12/09/17 2132     FDP <10    Narrative:         Test performed by semi-quantitative latex procedure  Vinita Olivia [[id number]]  (Normal) Collected:  12/09/17 2046    Lab Status:  Final result Specimen:  Blood from Arm, Left Updated:  12/09/17 2104     Protime 13 7 seconds      INR 1 05    APTT [[id number]]  (Normal) Collected:  12/09/17 2046    Lab Status:  Final result Specimen:  Blood from Arm, Left Updated:  12/09/17 2104     PTT 25 seconds     Narrative: Therapeutic Heparin Range = 60-90 seconds    Heparin-induced platelet antibody [[id number]] Collected:  12/09/17 2046    Lab Status:   In process Specimen:  Blood from Arm, Right Updated:  12/09/17 2051 Fingerstick Glucose (POCT) [[id number]]  (Abnormal) Collected:  12/09/17 1242    Lab Status:  Final result Updated:  12/09/17 1243     POC Glucose 235 (H) mg/dl     Hemoglobin A1c (Orders if not completed within the last 90 days) [[id number]]  (Abnormal) Collected:  12/09/17 0615    Lab Status:  Final result Specimen:  Blood from Hand, Left Updated:  12/09/17 0829     Hemoglobin A1C 8 6 (H) %       mg/dl     CBC (With Platelets) [[id number]]  (Abnormal) Collected:  12/09/17 0615    Lab Status:  Final result Specimen:  Blood from Heel, Left Updated:  12/09/17 0814     WBC 10 50 (H) Thousand/uL      RBC 2 85 (L) Million/uL      Hemoglobin 8 5 (L) g/dL      Hematocrit 26 8 (L) %      MCV 94 fL      MCH 29 8 pg      MCHC 31 7 g/dL      RDW 15 2 (H) %      Platelets 37 (LL) Thousands/uL     Fingerstick Glucose (POCT) [[id number]]  (Abnormal) Collected:  12/09/17 0654    Lab Status:  Final result Updated:  12/09/17 0656     POC Glucose 211 (H) mg/dl     Basic metabolic panel [[id number]]  (Abnormal) Collected:  12/09/17 0615    Lab Status:  Final result Specimen:  Blood from Hand, Left Updated:  12/09/17 0644     Sodium 135 (L) mmol/L      Potassium 5 3 mmol/L      Chloride 98 (L) mmol/L      CO2 33 (H) mmol/L      Anion Gap 4 mmol/L      BUN 66 (H) mg/dL      Creatinine 2 31 (H) mg/dL      Glucose 235 (H) mg/dL      Glucose, Fasting 235 (H) mg/dL      Calcium 8 9 mg/dL      eGFR 20 ml/min/1 73sq m     Narrative:         National Kidney Disease Education Program recommendations are as follows:  GFR calculation is accurate only with a steady state creatinine  Chronic Kidney disease less than 60 ml/min/1 73 sq  meters  Kidney failure less than 15 ml/min/1 73 sq  meters      Fingerstick Glucose (POCT) [[id number]]  (Abnormal) Collected:  12/09/17 0230    Lab Status:  Final result Updated:  12/09/17 0234     POC Glucose 308 (H) mg/dl     CBC and differential [[id number]]  (Abnormal) Collected:  12/08/17 2129    Lab Status:  Final result Specimen:  Blood from Hand, Left Updated:  12/08/17 2305     WBC 9 84 Thousand/uL      RBC 2 95 (L) Million/uL      Hemoglobin 9 0 (L) g/dL      Hematocrit 27 8 (L) %      MCV 94 fL      MCH 30 5 pg      MCHC 32 4 g/dL      RDW 15 4 (H) %      Platelets 35 (LL) Thousands/uL      nRBC 0 /100 WBCs     Narrative: This is an appended report  These results have been appended to a previously verified report  Comprehensive metabolic panel [[id number]]  (Abnormal) Collected:  12/08/17 2129    Lab Status:  Final result Specimen:  Blood from Hand, Left Updated:  12/08/17 2157     Sodium 133 (L) mmol/L      Potassium 5 6 (H) mmol/L      Chloride 96 (L) mmol/L      CO2 30 mmol/L      Anion Gap 7 mmol/L      BUN 59 (H) mg/dL      Creatinine 2 10 (H) mg/dL      Glucose 445 (H) mg/dL      Calcium 8 6 mg/dL      AST 14 U/L      ALT 25 U/L      Alkaline Phosphatase 65 U/L      Total Protein 6 4 g/dL      Albumin 2 3 (L) g/dL      Total Bilirubin 0 25 mg/dL      eGFR 23 ml/min/1 73sq m     Narrative:         National Kidney Disease Education Program recommendations are as follows:  GFR calculation is accurate only with a steady state creatinine  Chronic Kidney disease less than 60 ml/min/1 73 sq  meters  Kidney failure less than 15 ml/min/1 73 sq  meters                   No orders to display         Procedures  ECG 12 Lead Documentation  Date/Time: 12/8/2017 10:27 PM  Performed by: Cornelia Mckee  Authorized by: Checo Arzate     ECG reviewed by me, the ED Provider: yes    Patient location:  ED  Previous ECG:     Previous ECG:  Compared to current    Similarity:  Changes noted  Interpretation:     Interpretation: abnormal    Rate:     ECG rate:  75    ECG rate assessment: normal    Rhythm:     Rhythm: sinus rhythm    Ectopy:     Ectopy: none    QRS:     QRS axis:  Normal    QRS intervals:  Normal  Conduction:     Conduction: normal    ST segments:     ST segments:  Normal  T waves:     T waves: normal Comments:      No prolonged qt, peaked T waves, other T wave changes  Phone Consults  ED Phone Contact    ED Course  ED Course as of Dec 11 0632   [facility] Dec 08, 2017   2200 diabetic Glucose: (!) 445   2200 Sodium: (!) 133   2200 Potassium: (!) 5 6   2200 Esrd dialysis Creatinine: (!) 2 10   2201 AST: 14   2201 ALT: 25   2201 Alkaline Phosphatase: 65   2201 Total Bilirubin: 0 25   2201 eGFR: 23   2303 WBC: 9 84   2304 Platelets: (!!) 35           Identification of Seniors at [facility] Most Recent Value   (ISAR) Identification of Seniors at Risk   Before the illness or injury that brought you to the Emergency, did you need someone to help you on a regular basis? 1 Filed at: 12/08/2017 2054   In the last 24 hours, have you needed more help than usual?  1 Filed at: 12/08/2017 2054   Have you been hospitalized for one or more nights during the past 6 months? 1 Filed at: 12/08/2017 2054   In general, do you see well? 1 Filed at: 12/08/2017 2054   In general, do you have serious problems with your memory? 0 Filed at: 12/08/2017 2054   Do you take more than three different medications every day? 1 Filed at: 12/08/2017 2054   ISAR Score  5 Filed at: 12/08/2017 2054                          MDM  Number of Diagnoses or Management Options  Diagnosis management comments: Platelets likely related to medications, eliquis vs  Allopurinol  Will recheck platelets with a CBC and CMP to evaluate liver function  Will admit the patient  CritCare Time    Disposition  Final diagnoses:    Thrombocytopenia ([facility] [address] )   Hyperkalemia   Morbid obesity ([facility] [address] )     Time reflects when diagnosis was documented in both MDM as applicable and the Disposition within this note     Time User Action Codes Description Comment    12/8/2017 11:07 PM Jerome Seen Add [D69 6] Thrombocytopenia ([facility] [address] )     12/8/2017 11:07 PM Marinus Bellis P Add [E87 5] Hyperkalemia     12/8/2017 11:07 PM Marinus Bellis P Add [E66 01] Morbid obesity ([place] [address] )     12/8/2017 11:23 PM Onita  Modify [D69 6] Thrombocytopenia ([place] [address] )     12/9/2017  2:58 PM Budmiladyse Jerry Hetul S Add [Y70 22] Chronic diastolic CHF (congestive heart failure) ([place] [address] )     12/9/2017  2:58 PM Buddie Calaalina Hetul S Modify [L32 37] Chronic diastolic CHF (congestive heart failure) ([place] [address] )     12/9/2017  2:58 PM Buddie Calalaina Hetul S Remove [L16 57] Chronic diastolic CHF (congestive heart failure) ([place] [address] )     12/9/2017  2:58 PM BudYazmin Pruittul S Add [N18 6,  Z99 2] End-stage renal disease on hemodialysis ([place] [address] )     12/9/2017  2:58 PM Nicoleaba Flartieamira S Modify [N18 6,  Z99 2] End-stage renal disease on hemodialysis ([place] [address] )     12/10/2017 10:02 AM Mariel Zafar Add [A90 354] Sacral ulcer [facility])       ED Disposition     ED Disposition Condition Comment    Admit  Case was discussed with Dr Marian Strong and the patient's admission status was agreed to be Admission Status: observation status to the service of SLIM  Follow-up Information    None       Current Discharge Medication List      CONTINUE these medications which have NOT CHANGED    Details   acetaminophen (TYLENOL) 325 mg tablet Take 2 tablets by mouth every 6 (six) hours as needed for mild pain, headaches or fever  Qty: 30 tablet, Refills: 0      albuterol (2 5 mg/3 mL) 0 083 % nebulizer solution Take 2 5 mg by nebulization every 6 (six) hours as needed for wheezing  !! allopurinol (ZYLOPRIM) 100 mg tablet Take 200 mg by mouth Indications: sun, tue, we, fri, sat   Sun, Tues, Wed, Fri, Sat       !! allopurinol (ZYLOPRIM) 300 mg tablet Take 300 mg by mouth Mon, and Thurs       apixaban (ELIQUIS) 2 5 mg Take 1 tablet by mouth 2 (two) times a day  Qty: 60 tablet, Refills: 0      bisacodyl (DULCOLAX) 5 mg EC tablet Take 5 mg by mouth daily as needed for constipation      calcium carbonate (OYSTER SHELL,OSCAL) 500 mg Take 2 tablets by mouth 3 (three) times a day with meals  Qty: 180 tablet, Refills: 0      Camphor-Menthol-Methyl Sal (Anthony Carrero Ultra Strength) 4-10-30 % CREA Apply topically 2 (two) times a day B/l feet/knees and left shoulder      cholecalciferol 1000 units tablet Take 1 tablet by mouth daily  Refills: 0      cyanocobalamin (VITAMIN B-12) 1,000 mcg tablet Take 1,000 mcg by mouth daily  dextromethorphan-guaiFENesin (ROBITUSSIN DM)  mg/5 mL syrup Take 5 mL by mouth every 4 (four) hours as needed for cough or congestion  Qty: 118 mL, Refills: 0      docusate sodium (COLACE) 100 mg capsule Take 100 mg by mouth 2 (two) times a day        famotidine (PEPCID) 20 mg tablet Take 1 tablet by mouth daily  Refills: 0      ferrous sulfate 325 (65 Fe) mg tablet Take 325 mg by mouth 3 (three) times a day with meals      HYDROmorphone (DILAUDID) 2 mg tablet Take 1 tablet by mouth every 6 (six) hours as needed for moderate pain Max Daily Amount: 8 mg  Qty: 10 tablet, Refills: 0      Insulin Glargine (BASAGLAR KWIKPEN) 100 UNIT/ML SOPN Inject 40 Units under the skin daily at bedtime      insulin lispro protamine-insulin lispro (HumaLOG 50-50) 100 units/mL Inject 8 Units under the skin 3 (three) times a day before meals  Qty: 10 mL, Refills: 0      latanoprost (XALATAN) 0 005 % ophthalmic solution Administer 1 drop to both eyes daily at bedtime  levothyroxine 25 mcg tablet Take 25 mcg by mouth daily  liver oil-zinc oxide (DESITIN) 40 % ointment Apply topically as needed for irritation To buttock every day and evening shift   To b/l thigh folds every day and evening shift       metoprolol tartrate (LOPRESSOR) 12 5 mg tablet Take 12 5 mg by mouth   Sun, Tues, Thurs, Sat  Hold am dose before dialysis Mon, Wed, and Fri      miconazole (MICOTIN) 2 % powder Apply topically as needed for itching Apply to b/l breast and panus twice a day      nystatin (MYCOSTATIN) powder Apply topically 2 (two) times a day  Qty: 15 g, Refills: 0      omeprazole (PriLOSEC) 20 mg delayed release capsule Take 20 mg by mouth daily        ondansetron (ZOFRAN) 4 mg tablet Take 1 tablet by mouth every 4 (four) hours as needed for nausea or vomiting  Refills: 0      polyethylene glycol (MIRALAX) 17 g packet Take 17 g by mouth daily  Qty: 14 each, Refills: 0      pravastatin (PRAVACHOL) 80 mg tablet Take 80 mg by mouth daily  predniSONE 20 mg tablet Take 2 tablets by mouth daily  Qty: 8 tablet, Refills: 0      pregabalin (LYRICA) 75 mg capsule Take 1 capsule by mouth 2 (two) times a day for 10 days  Qty: 20 capsule, Refills: 0      senna (SENOKOT) 8 6 MG tablet Take 2 tablets by mouth daily as needed for constipation  !! - Potential duplicate medications found  Please discuss with provider  No discharge procedures on file  ED Provider  Attending physically available and evaluated Gabriel Suarez I managed the patient along with the ED Attending      Electronically Signed by         Trudy Aparicio MD  Resident  12/11/17 [id number]

## 2017-12-09 NOTE — CONSULTS
Oncology Consult Note  Dana Pimentel 67 y o  female MRN: [id number]  Unit/Bed#: ED 12 Encounter: [id number]      Presenting Complaint: Thrombocytopenia    History of Presenting Illness:  39-year-old  female with history of chronic kidney disease, hemodialysis dependent on Monday, Wednesday, Friday, hypertension, diabetes mellitus type 2, possible neuropathy, chronic stasis, PE, adrenal insufficiency was admitted to the hospital because of progressive thrombocytopenia  Had been on apixaban 2 5 mg p o  b i d  for the past 2 months  She had hemodialysis yesterday and was found to have thrombocytopenia with [place] [address], hemoglobin 8 5, WBC 10 5  Reviewing the medical chart, patient had normal platelets on 37/50/7032 of 252,000 and the start coming down gradually thereafter, she reported ecchymosis throughout upper abdomen denies any epistaxis, hemoptysis, melena, hematochezia  Denied any upper respiratory tract infection, fever, chills  The only new medication that was added few weeks ago was apixaban she is on omeprazole, pravastatin, prednisone, pregabalin, metoprolol, Synthroid, insulin, hydromorphone, iron sulfate  She does not smoke or drink        Review of Systems - As stated in the HPI otherwise the fourteen point review of systems was negative      Past Medical History:   Diagnosis Date    Adrenal insufficiency (HCC)     Adrenocortical insufficiency (HCC)     Anemia     Bradycardia     Cardiac disease     Cataract     CHF (congestive heart failure) (HCC)     Constipation     CPAP (continuous positive airway pressure) dependence     Dependence on supplemental oxygen     Diabetes mellitus (HCC)     Difficulty walking     Disease of thyroid gland     Dysphagia     Encephalopathy     GERD (gastroesophageal reflux disease)     Glaucoma     Gout     History of transfusion     Hyperlipidemia     Hypertension     Insomnia     Insomnia     Obesity     Osteoarthritis  PONV (postoperative nausea and vomiting)     Renal disorder     renal failure    Sleep apnea        Social History     Social History    Marital status: Single     Spouse name: N/A    Number of children: N/A    Years of education: N/A     Social History Main Topics    Smoking status: Former Smoker     Quit date: 1967    Smokeless tobacco: Never Used    Alcohol use No    Drug use: No    Sexual activity: Not Currently     Other Topics Concern    None     Social History Narrative    None       Family History   Problem Relation Age of Onset    Diabetes Mother     Heart disease Father        Allergies   Allergen Reactions    Codeine     Darvon [Propoxyphene]     Iodine     Keflex [Cephalexin]     Morphine And Related     Nsaids     Other      IV DYE and SEAFOOD         Current Facility-Administered Medications:     acetaminophen (TYLENOL) tablet 650 mg, 650 mg, Oral, Q6H PRN, Kwasi Rausch MD    allopurinol (ZYLOPRIM) tablet 200 mg, 200 mg, Oral, Once per day on Sun Tue Thu Sat, Kwasi Rausch MD, 200 mg at 12/09/17 0834    [START ON 12/11/2017] allopurinol (ZYLOPRIM) tablet 300 mg, 300 mg, Oral, Once per day on Mon Thu, Kwasi Rausch MD    [facility]) tablet 2 5 mg, 2 5 mg, Oral, BID, Kwasi Rausch MD, 2 5 mg at 12/09/17 0834    bisacodyl (DULCOLAX) EC tablet 5 mg, 5 mg, Oral, Daily PRN, Kwasi Rausch MD, 5 mg at 12/09/17 0835    calcium carbonate (OYSTER SHELL,OSCAL) 500 mg tablet 2 tablet, 2 tablet, Oral, TID With Meals, Kwasi Rausch MD, 2 tablet at 12/09/17 0659    cholecalciferol (VITAMIN D3) tablet 1,000 Units, 1,000 Units, Oral, Daily, Kwasi Rausch MD, 1,000 Units at 12/09/17 0835    cyanocobalamin (VITAMIN B-12) tablet 1,000 mcg, 1,000 mcg, Oral, Daily, Kwasi Rausch MD, 1,000 mcg at 12/09/17 0835    dextromethorphan-guaiFENesin (ROBITUSSIN DM)  mg/5 mL oral syrup 5 mL, 5 mL, Oral, Q4H PRN, Kwasi Rausch MD    docusate sodium (COLACE) capsule 100 mg, 100 mg, Oral, BID, Ryann Santana MD, 100 mg at 12/09/17 9469    docusate sodium (COLACE) capsule 100 mg, 100 mg, Oral, BID PRN, Ryann Santana MD    famotidine (PEPCID) tablet 20 mg, 20 mg, Oral, Daily, Ryann Santana MD, 20 mg at 12/09/17 2932    ferrous sulfate tablet 325 mg, 325 mg, Oral, TID With Meals, Ryann Santana MD, 325 mg at 12/09/17 0659    HYDROmorphone (DILAUDID) tablet 2 mg, 2 mg, Oral, Q6H PRN, Ryann Santana MD    insulin glargine (LANTUS) subcutaneous injection 40 Units, 40 Units, Subcutaneous, HS, Ryann Santana MD    insulin lispro (HumaLOG) 100 units/mL subcutaneous injection 1-5 Units, 1-5 Units, Subcutaneous, HS, Ryann Santana MD, 3 Units at 12/09/17 0231    insulin lispro (HumaLOG) 100 units/mL subcutaneous injection 2-12 Units, 2-12 Units, Subcutaneous, TID AC, 4 Units at 12/09/17 1247 **AND** Fingerstick Glucose (POCT), , , TID AC, Ryann Santana MD    insulin NPH (HumuLIN N,NovoLIN N) 100 Units/mL subcutaneous injection 8 Units, 8 Units, Subcutaneous, TID AC, Ryann Santana MD, 8 Units at 12/09/17 1251    latanoprost (XALATAN) 0 005 % ophthalmic solution 1 drop, 1 drop, Both Eyes, HS, Ryann Santana MD    levothyroxine tablet 25 mcg, 25 mcg, Oral, Early Morning, Ryann Santana MD, 25 mcg at 12/09/17 0654    menthol-methyl salicylate (BENGAY) 88-89 % cream, , Apply externally, 4x Daily PRN, Ryann Santana MD    metoprolol tartrate (LOPRESSOR) partial tablet 12 5 mg, 12 5 mg, Oral, Q12H [facility], Ryann Santana MD, 12 5 mg at 12/09/17 0839    miconazole 2 % cream, , Topical, BID, Ryann Santana MD    nystatin (MYCOSTATIN) powder, , Topical, BID, Ryann Santana MD    ondansetron [facility]) injection 4 mg, 4 mg, Intravenous, Q6H PRN, Ryann Santana MD    ondansetron (ZOFRAN-ODT) dispersible tablet 4 mg, 4 mg, Oral, Q4H PRN, Ryann Santana MD    pantoprazole (PROTONIX) EC tablet 40 mg, 40 mg, Oral, Early Morning, Ryann Santana MD, 40 mg at 12/09/17 0654    polyethylene glycol (MIRALAX) packet 17 g, 17 g, Oral, Daily, Rajat Singleton MD, 17 g at 12/09/17 0840    pravastatin (PRAVACHOL) tablet 80 mg, 80 mg, Oral, Daily, Rajat Singleton MD, 80 mg at 12/09/17 0835    predniSONE tablet 40 mg, 40 mg, Oral, Daily, Rajat Singleton MD, 40 mg at 12/09/17 0839    pregabalin (LYRICA) capsule 75 mg, 75 mg, Oral, BID, Rajat Singleton MD, 75 mg at 12/09/17 4933    senna (SENOKOT) tablet 17 2 mg, 2 tablet, Oral, Daily PRN, Rajat Singleton MD, 17 2 mg at 12/09/17 1285    Current Outpatient Prescriptions:     acetaminophen (TYLENOL) 325 mg tablet, Take 2 tablets by mouth every 6 (six) hours as needed for mild pain, headaches or fever, Disp: 30 tablet, Rfl: 0    albuterol (2 5 mg/3 mL) 0 083 % nebulizer solution, Take 2 5 mg by nebulization every 6 (six) hours as needed for wheezing , Disp: , Rfl:     allopurinol (ZYLOPRIM) 100 mg tablet, Take 200 mg by mouth Indications: sun, tue, we, fri, sat  Sun, Tues, Wed, Fri, Sat , Disp: , Rfl:     allopurinol (ZYLOPRIM) 300 mg tablet, Take 300 mg by mouth Mon, and Thurs , Disp: , Rfl:     apixaban (ELIQUIS) 2 5 mg, Take 1 tablet by mouth 2 (two) times a day, Disp: 60 tablet, Rfl: 0    bisacodyl (DULCOLAX) 5 mg EC tablet, Take 5 mg by mouth daily as needed for constipation, Disp: , Rfl:     calcium carbonate (OYSTER SHELL,OSCAL) 500 mg, Take 2 tablets by mouth 3 (three) times a day with meals, Disp: 180 tablet, Rfl: 0    Camphor-Menthol-Methyl Sal (Anthony Carrero Ultra Strength) 4-10-30 % CREA, Apply topically 2 (two) times a day B/l feet/knees and left shoulder, Disp: , Rfl:     cholecalciferol 1000 units tablet, Take 1 tablet by mouth daily, Disp: , Rfl: 0    cyanocobalamin (VITAMIN B-12) 1,000 mcg tablet, Take 1,000 mcg by mouth daily  , Disp: , Rfl:     dextromethorphan-guaiFENesin (ROBITUSSIN DM)  mg/5 mL syrup, Take 5 mL by mouth every 4 (four) hours as needed for cough or congestion, Disp: 118 mL, Rfl: 0    docusate sodium (COLACE) 100 mg capsule, Take 100 mg by mouth 2 (two) times a day  , Disp: , Rfl:     famotidine (PEPCID) 20 mg tablet, Take 1 tablet by mouth daily, Disp: , Rfl: 0    ferrous sulfate 325 (65 Fe) mg tablet, Take 325 mg by mouth 3 (three) times a day with meals, Disp: , Rfl:     HYDROmorphone (DILAUDID) 2 mg tablet, Take 1 tablet by mouth every 6 (six) hours as needed for moderate pain Max Daily Amount: 8 mg, Disp: 10 tablet, Rfl: 0    Insulin Glargine (BASAGLAR KWIKPEN) 100 UNIT/ML SOPN, Inject 40 Units under the skin daily at bedtime, Disp: , Rfl:     insulin lispro protamine-insulin lispro (HumaLOG 50-50) 100 units/mL, Inject 8 Units under the skin 3 (three) times a day before meals, Disp: 10 mL, Rfl: 0    latanoprost (XALATAN) 0 005 % ophthalmic solution, Administer 1 drop to both eyes daily at bedtime  , Disp: , Rfl:     levothyroxine 25 mcg tablet, Take 25 mcg by mouth daily  , Disp: , Rfl:     liver oil-zinc oxide (DESITIN) 40 % ointment, Apply topically as needed for irritation To buttock every day and evening shift  To b/l thigh folds every day and evening shift , Disp: , Rfl:     metoprolol tartrate (LOPRESSOR) 12 5 mg tablet, Take 12 5 mg by mouth  Sam Mann, Sat Hold am dose before dialysis Mon, Wed, and Fri, Disp: , Rfl:     miconazole (MICOTIN) 2 % powder, Apply topically as needed for itching Apply to b/l breast and panus twice a day, Disp: , Rfl:     nystatin (MYCOSTATIN) powder, Apply topically 2 (two) times a day, Disp: 15 g, Rfl: 0    omeprazole (PriLOSEC) 20 mg delayed release capsule, Take 20 mg by mouth daily  , Disp: , Rfl:     ondansetron (ZOFRAN) 4 mg tablet, Take 1 tablet by mouth every 4 (four) hours as needed for nausea or vomiting, Disp: , Rfl: 0    polyethylene glycol (MIRALAX) 17 g packet, Take 17 g by mouth daily, Disp: 14 each, Rfl: 0    pravastatin (PRAVACHOL) 80 mg tablet, Take 80 mg by mouth daily  , Disp: , Rfl:     predniSONE 20 mg tablet, Take 2 tablets by mouth daily, Disp: 8 tablet, Rfl: 0    pregabalin (LYRICA) 75 mg capsule, Take 1 capsule by mouth 2 (two) times a day for 10 days (Patient taking differently: Take 50 mg by mouth 3 (three) times a day  ), Disp: 20 capsule, Rfl: 0    senna (SENOKOT) 8 6 MG tablet, Take 2 tablets by mouth daily as needed for constipation  , Disp: , Rfl:       /62   Pulse 78   Temp 98 6 °F (37 °C) (Oral)   Resp 18   Wt 135 kg (298 lb)   LMP  (LMP Unknown)   SpO2 100%   BMI 51 15 kg/m²     General Appearance:    Alert, oriented , Cushingoid face       Eyes:    PERRL   Ears:    Normal external ear canals, both ears   Nose:   Nares normal, septum midline   Throat:   Mucosa moist  Pharynx without injection  Neck:   Supple       Lungs:     Clear to auscultation bilaterally   Chest Wall:    No tenderness or deformity    Heart:    Regular rate and rhythm       Abdomen:     Soft, non-tender, bowel sounds +, no organomegaly, obese           Extremities:   Extremities no cyanosis or edema, peripheral pulses are +1, she looks like she has charjoanie  tooth Trini, ecchymosis on the skin especially in the upper extremities the largest 1 measuring about 10 x 5 cm on the right upper extremity       Skin:   no rash or icterus      Lymph nodes:   Cervical, supraclavicular, and axillary nodes normal   Neurologic:   CNII-XII intact, normal strength, sensation and reflexes     Throughout               Recent Results (from the past 48 hour(s))   CBC and differential    Collection Time: 12/08/17  9:29 PM   Result Value Ref Range    WBC 9 84 4 31 - 10 16 Thousand/uL    RBC 2 95 (L) 3 81 - 5 12 Million/uL    Hemoglobin 9 0 (L) 11 5 - 15 4 g/dL    Hematocrit 27 8 (L) 34 8 - 46 1 %    MCV 94 82 - 98 fL    MCH 30 5 26 8 - 34 3 pg    MCHC 32 4 31 4 - 37 4 g/dL    RDW 15 4 (H) 11 6 - 15 1 %    Platelets 35 (LL) 524 - 390 Thousands/uL    nRBC 0 /100 WBCs   Comprehensive metabolic panel    Collection Time: 12/08/17  9:29 PM Result Value Ref Range    Sodium 133 (L) 136 - 145 mmol/L    Potassium 5 6 (H) 3 5 - 5 3 mmol/L    Chloride 96 (L) 100 - 108 mmol/L    CO2 30 21 - 32 mmol/L    Anion Gap 7 4 - 13 mmol/L    BUN 59 (H) 5 - 25 mg/dL    Creatinine 2 10 (H) 0 60 - 1 30 mg/dL    Glucose 445 (H) 65 - 140 mg/dL    Calcium 8 6 8 3 - 10 1 mg/dL    AST 14 5 - 45 U/L    ALT 25 12 - 78 U/L    Alkaline Phosphatase 65 46 - 116 U/L    Total Protein 6 4 6 4 - 8 2 g/dL    Albumin 2 3 (L) 3 5 - 5 0 g/dL    Total Bilirubin 0 25 0 20 - 1 00 mg/dL    eGFR 23 ml/min/1 73sq m   Manual Differential(PHLEBS Do Not Order)    Collection Time: 12/08/17  9:29 PM   Result Value Ref Range    Segmented % 88 (H) 43 - 75 %    Bands % 5 0 - 8 %    Lymphocytes % 4 (L) 14 - 44 %    Monocytes % 2 (L) 4 - 12 %    Eosinophils % 0 0 - 6 %    Basophils % 0 0 - 1 %    Metamyelocytes% 1 0 - 1 %    Absolute Neutrophils 9 15 (H) 1 85 - 7 62 Thousand/uL    Lymphocytes Absolute 0 39 (L) 0 60 - 4 47 Thousand/uL    Monocytes Absolute 0 20 0 00 - 1 22 Thousand/uL    Eosinophils Absolute 0 00 0 00 - 0 40 Thousand/uL    Basophils Absolute 0 00 0 00 - 0 10 Thousand/uL    Total Counted 100     RBC Morphology Present     Basophilic Stippling Present     Hypochromia Present     Polychromasia Present     Platelet Estimate Decreased (A) Adequate    Giant PLTs Present    Type and screen    Collection Time: 12/08/17 10:20 PM   Result Value Ref Range    ABO Grouping A     Rh Factor Positive     Antibody Screen Negative     Specimen Expiration Date 20171211    Fingerstick Glucose (POCT)    Collection Time: 12/09/17  2:30 AM   Result Value Ref Range    POC Glucose 308 (H) 65 - 140 mg/dl   Hemoglobin A1c (Orders if not completed within the last 90 days)    Collection Time: 12/09/17  6:15 AM   Result Value Ref Range    Hemoglobin A1C 8 6 (H) 4 2 - 6 3 %     mg/dl   CBC (With Platelets)    Collection Time: 12/09/17  6:15 AM   Result Value Ref Range    WBC 10 50 (H) 4 31 - 10 16 Thousand/uL RBC 2 85 (L) 3 81 - 5 12 Million/uL    Hemoglobin 8 5 (L) 11 5 - 15 4 g/dL    Hematocrit 26 8 (L) 34 8 - 46 1 %    MCV 94 82 - 98 fL    MCH 29 8 26 8 - 34 3 pg    MCHC 31 7 31 4 - 37 4 g/dL    RDW 15 2 (H) 11 6 - 15 1 %    Platelets 37 (LL) 884 - 390 Thousands/uL   Basic metabolic panel    Collection Time: 12/09/17  6:15 AM   Result Value Ref Range    Sodium 135 (L) 136 - 145 mmol/L    Potassium 5 3 3 5 - 5 3 mmol/L    Chloride 98 (L) 100 - 108 mmol/L    CO2 33 (H) 21 - 32 mmol/L    Anion Gap 4 4 - 13 mmol/L    BUN 66 (H) 5 - 25 mg/dL    Creatinine 2 31 (H) 0 60 - 1 30 mg/dL    Glucose 235 (H) 65 - 140 mg/dL    Glucose, Fasting 235 (H) 65 - 99 mg/dL    Calcium 8 9 8 3 - 10 1 mg/dL    eGFR 20 ml/min/1 73sq m   Fingerstick Glucose (POCT)    Collection Time: 12/09/17  6:54 AM   Result Value Ref Range    POC Glucose 211 (H) 65 - 140 mg/dl   Fingerstick Glucose (POCT)    Collection Time: 12/09/17 12:42 PM   Result Value Ref Range    POC Glucose 235 (H) 65 - 140 mg/dl         Ct Chest Abdomen Pelvis Wo Contrast    Result Date: 11/23/2017  Narrative: CT CHEST, ABDOMEN AND PELVIS WITHOUT IV CONTRAST INDICATION:  Cough  Hypoxia  Abdominal pain  End-stage renal disease on dialysis  Diabetes  COMPARISON: 8/14/2017: TECHNIQUE: CT examination of the chest, abdomen and pelvis was performed without intravenous contrast   Reformatted images were created in axial, sagittal, and coronal planes  Radiation dose length product (DLP) for this visit:  3281 29 mGy-cm   This examination, like all CT scans performed in the [facility], was performed utilizing techniques to minimize radiation dose exposure, including the use of iterative reconstruction and automated exposure control  Enteric contrast was administered  FINDINGS: CHEST LUNGS:  Imaging through the lungs degraded due to obese body habitus as well as motion artifact    There is subsegmental linear atelectasis and mild peribronchial thickening in the RIGHT upper lobe  Suspicious for subsegmental region of airway inflammation  No significant airspace component  There is mild subsegmental bibasilar atelectasis, RIGHT greater than LEFT  Mild central pulmonary venous distention suggesting pulmonary venous hypertension  PLEURA:  Unremarkable  HEART/GREAT VESSELS:  Unremarkable for patient's age  Coronary artery calcification noted  MEDIASTINUM AND MOON:  Within the superior mediastinum, RIGHT paratracheal there are 2  Prominent lymph nodes, one borderline in size at 9 mm and the other mildly enlarged at 15 mm short axis dimension  CHEST WALL AND LOWER NECK:       Normal  ABDOMEN LIVER/BILIARY TREE:  Unremarkable  GALLBLADDER:  A few small dependent calculi within the gallbladder  Gallbladder caliber and appearance otherwise within normal limits  Stable compared to prior  SPLEEN:  Unremarkable  PANCREAS:  Unremarkable  ADRENAL GLANDS:  Unremarkable  KIDNEYS/URETERS:  Unremarkable  No hydronephrosis  STOMACH AND BOWEL:  Unremarkable  APPENDIX:  No findings to suggest appendicitis  ABDOMINOPELVIC CAVITY:  No ascites or free intraperitoneal air  No lymphadenopathy  VESSELS:  Unremarkable for patient's age  PELVIS REPRODUCTIVE ORGANS:  Unremarkable for patient's age  URINARY BLADDER:  Unremarkable  ABDOMINAL WALL/INGUINAL REGIONS:  Unremarkable  OSSEOUS STRUCTURES:  No acute fracture or destructive osseous lesion  Impression: No acute intra-abdominal pelvic abnormality identified  Cholelithiasis without evidence for cholecystitis  Evaluation of the lungs limited due to obese body habitus and motion artifact  Subsegmental linear and nodular opacities in the RIGHT upper lung, primarily with mild peribronchial thickening centrally suggesting possible focus of acute airway inflammation  Otherwise, appearance suggests dependent subsegmental atelectasis in both lung bases   There is moderate central pulmonary venous distention bilaterally suggesting component of fluid overload/pulmonary venous hypertension  No significant groundglass or airspace infiltrates  No pleural effusion  Borderline and mildly enlarged adenopathy in the RIGHT superior mediastinum  Workstation performed: [id number]     Xr Chest Portable    Result Date: 11/30/2017  Narrative: CHEST INDICATION:  Permacath Placement COMPARISON:  November 30, earlier in the morning as well as November 24, 2017  VIEWS:   AP portable semierect view in the conventional and line placement techniques  IMAGES:  2 FINDINGS:     The heart is enlarged  Atherosclerotic changes in the aorta  Left internal jugular dialysis catheter is present  The catheter has become retracted by approximately 5 cm  The tip is now in the mid superior vena cava  Previously, the tip projecting into the right atrium  Lung volumes diminished  Halle and pulmonary vessels are prominent  Visualized osseous structures appear within normal limits for the patient's age  Impression: 1  Retraction of the indwelling left internal jugular dialysis catheter by approximately 5 cm  Tip now in the mid superior vena cava  2   Mild vascular congestion  A verbal report will be called by the reading room liaison following this dictation  Workstation performed: [id number]     Xr Chest Portable    Result Date: 11/30/2017  Narrative: CHEST INDICATION:  History of cough with chronic hypoxic respiratory failure COMPARISON:  None VIEWS:   AP frontal IMAGES:  1 FINDINGS:  Dialysis catheter is again seen  Heart shadow is enlarged but unchanged from prior exam  The lungs are clear  No pneumothorax or pleural effusion  Visualized osseous structures appear within normal limits for the patient's age  Impression: No active pulmonary disease  Cardiomegaly  Workstation performed: [id number]     Xr Chest Portable    Result Date: 11/24/2017  Narrative: CHEST  INDICATION: Dialysis catheter placement  COMPARISON:  11/23/2017   VIEWS:   AP frontal; 1 image FINDINGS: Left jugular dialysis catheter tip overlies the right atrium  The cardiomediastinal silhouette is stable  The lungs are clear  No pleural effusion  Osseous structures are age appropriate  Impression: Line placement as described  Workstation performed: [id number]     Xr Chest Portable    Result Date: 11/23/2017  Narrative: CHEST INDICATION:  cough, hypoxia  History taken directly from the electronic ordering system  COMPARISON:  8/24/2017 VIEWS:   AP frontal IMAGES:  1 FINDINGS:     Heart shadow is enlarged but unchanged from prior exam  Mild central vessel prominence  No consolidation  No pneumothorax or pleural effusion  Visualized osseous structures appear within normal limits for the patient's age  Impression: Mild central vessel prominence  No consolidation  Workstation performed: [id number]     Ir Temp Hd Cath    Result Date: 11/24/2017  Narrative: Procedure: Temporary dialysis catheter placement  Indications:Fistula not functioning, contrast allergy prevents fistulogram, dialysis access required  Technique/findings: Risks, benefits and alternatives were explained to the patient  Questions were answered  Written consent was documented  Once patient was in the radiology area a timeout was performed identifying patient and procedure  Patient was kept in hospital bed  Limited ultrasound imaging of the left neck was performed, the internal jugular vein was found to be patent and compressible  The left neck was prepped and draped in standard fashion  One percent lidocaine was infiltrated along the anticipated needle tract for local anesthesia  Under ultrasound visualization a micropuncture needle was advanced into the internal jugular vein with permanent recording and reporting  An 018 wire was advanced  Needle was removed and transition sheath was placed over the wire  Estimating length a 24 cm temporary dialysis catheter was selected   Through the transition sheath a heavy-duty wire was advanced into the right atrium  The transition sheath was removed over the wire  Following serial dilation the temporary dialysis catheter was placed  Catheter was advanced completely  Subsequent x-ray confirmed appropriate positioning of the catheter within the right atrium  Both lumens of the catheter flushed and aspirated well  The catheter was secured to the skin using 2-0 Prolene sutures  Dressing was applied  Patient tolerated the procedure without any immediate postprocedural complications  Fluoroscopy time:Only ultrasound guidance was used  Impression: Impression: Technically successful temporary dialysis catheter placement via the left internal jugular vein  Subsequent x-ray confirmed appropriate positioning within the right atrium, The catheter is ready for use  Workstation performed: [id number]       Assessment and plan:  1  Progressive thrombocytopenia with ecchymosis in a patient who was initiated on apixaban 2 5 mg p o  b i d  few weeks ago, she is hemodialysis dependent, she gets heparin throughout the dialysis  2  I will order PT, PTT, hit panel  3  History of PE, she has sedentary lifestyle, she needs to be on anticoagulation however with hemodialysis dependence the I believe she might need to be on Coumadin to keep INR between 1 8-2 5  4    There was no other new medication added in the past few weeks be side apixaban, the package insert reported thrombocytopenia

## 2017-12-09 NOTE — ASSESSMENT & PLAN NOTE
· Secondary to end-stage renal disease as well as history of iron deficiency anemia  · Baseline hemoglobin: 8 0-9 0  · Check iron studies  · Continue to monitor

## 2017-12-09 NOTE — PLAN OF CARE
Problem: DISCHARGE PLANNING - CARE MANAGEMENT  Goal: Discharge to post-acute care or home with appropriate resources  INTERVENTIONS:  - Conduct assessment to determine patient/family and health care team treatment goals, and need for post-acute services based on payer coverage, community resources, and patient preferences, and barriers to discharge  - Address psychosocial, clinical, and financial barriers to discharge as identified in assessment in conjunction with the patient/family and health care team  - Arrange appropriate level of post-acute services according to patient's   needs and preference and payer coverage in collaboration with the physician and health care team  - Communicate with and update the patient/family, physician, and health care team regarding progress on the discharge plan  - Arrange appropriate transportation to post-acute venues  - CM will assist Pt with her return to [place] \[facility].\"" and resumption of HD upon d/c    Outcome: Progressing

## 2017-12-09 NOTE — CONSULTS
Consultation - Nephrology   Silver Bhakta 67 y o  female MRN: [id number]  Unit/Bed#: ED 12 Encounter: [id number]    ASSESSMENT and PLAN:  1  ESRD on HD (MWF):  Patient dialyzes at [address]  -patient compliant with treatment  Last treatment 12/8  -outpatient prescription:  Treatment time 240 minutes, estimated dry weight 136 kg, Qb 400, daily 800, sodium 137, 3 potassium, 2 5 calcium, bicarbonate 35  -borderline elevated potassium  Renal diet  2  Access:  PermCath left IJ  -AV fistula right arm  Recent infiltration/hematoma therefore arm is being rested at this time   -no issues with PermCath  3  Hypertension:  Blood pressure acceptable  4  Anemia:  Hemoglobin below goal  -on Venofer 50 mg weekly  -no Epogen  5  Mineral and bone disease:  -continue Hectorol 0 5 mcg with each treatment  6  IDDM:  Management per primary team  7  PE:  On Eliquis  8  Thrombocytopenia:  Hematology following    SUMMARY OF RECOMMENDATIONS:  Next hemodialysis treatment Monday    HISTORY OF PRESENT ILLNESS:  Requesting Physician: [address] [place] [address],   Reason for Consult: ESRD on HD    Silver Bhakta is a 67 y o  female with a past medical history of end-stage renal disease, insulin-dependent diabetes mellitus, chronic diastolic CHF and pulmonary embolism  She had a recent hospitalization for pneumonia from November 23rd to December 3rd  She also had a recent right arm AV fistula infiltration requiring insertion of PermCath  Right arm remains ecchymotic  She presents with thrombocytopenia noted on outpatient labs with a platelet count of 27  The patient relates that she was in her usual state of health  No issues except for those noted above  A renal consultation is requested today for assistance in the management of ESRD  Silver Bhakta is a known ESRD patient who undergoes maintenance hemodialysis at  on MWF at [facility]      PAST MEDICAL HISTORY:  Past Medical History:   Diagnosis Date    Adrenal insufficiency (HCC)     Adrenocortical insufficiency (HCC)     Anemia     Bradycardia     Cardiac disease     Cataract     CHF (congestive heart failure) (HCC)     Constipation     CPAP (continuous positive airway pressure) dependence     Dependence on supplemental oxygen     Diabetes mellitus (HCC)     Difficulty walking     Disease of thyroid gland     Dysphagia     Encephalopathy     GERD (gastroesophageal reflux disease)     Glaucoma     Gout     History of transfusion     Hyperlipidemia     Hypertension     Insomnia     Insomnia     Obesity     Osteoarthritis     PONV (postoperative nausea and vomiting)     Renal disorder     renal failure    Sleep apnea        PAST SURGICAL HISTORY:  Past Surgical History:   Procedure Laterality Date    ABDOMINAL SURGERY      APPENDECTOMY      BODY LIFT LOWER Right 4/18/2017    Procedure: UPPER EXTREMITY EXCISION PANNUS ;  Surgeon: Tres Sarabia MD;  [facility]: [facility];  Service:     CARPAL TUNNEL RELEASE Bilateral     COLONOSCOPY N/A 8/15/2017    Procedure: COLONOSCOPY;  Surgeon: Tara Arce MD;  Location: [facility]; Service: Colorectal    COLONOSCOPY N/A 8/29/2017    Procedure: COLONOSCOPY;  Surgeon: Claudeen Garbe, MD;  [facility]; Service: Colorectal    COLONOSCOPY N/A 9/1/2017    Procedure: COLONOSCOPY;  Surgeon: Claudeen Garbe, MD;  [facility];   Service: Colorectal    HYSTERECTOMY      NC ANASTOMOSIS,AV,ANY SITE Right 8/18/2016    Procedure: CREATION OF RIGHT BRACHIOCEPHALIC FISTULA;  Surgeon: Chase Duarte MD;  [facility];  Service: Vascular    NC REVISE AV FISTULA,W/O THROMBECTOMY Right 4/18/2017    Procedure: UPPER ARM SUPERFICIALIZATION FISTULA ;  Surgeon: Severiano Flores MD;  [facility];  Service: Vascular       ALLERGIES:  Allergies   Allergen Reactions    Codeine     Darvon [Propoxyphene]     Iodine     Keflex [Cephalexin]     Morphine And Related     Nsaids     Other IV DYE and SEAFOOD       SOCIAL HISTORY:  History   Alcohol Use No     History   Drug Use No     History   Smoking Status    Former Smoker    Quit date: 1967   Smokeless Tobacco    Never Used       FAMILY HISTORY:  Family History   Problem Relation Age of Onset    Diabetes Mother     Heart disease Father        MEDICATIONS:    Current Facility-Administered Medications:     acetaminophen (TYLENOL) tablet 650 mg, 650 mg, Oral, Q6H PRN, Elaine Ross MD    allopurinol (ZYLOPRIM) tablet 200 mg, 200 mg, Oral, Once per day on Sun Tue Thu Sat, Elaine Ross MD, 200 mg at 12/09/17 0834    [START ON 12/11/2017] allopurinol (ZYLOPRIM) tablet 300 mg, 300 mg, Oral, Once per day on Mon Thu, Elaine Ross MD    [facility]) tablet 2 5 mg, 2 5 mg, Oral, BID, Elaine Ross MD, 2 5 mg at 12/09/17 0834    bisacodyl (DULCOLAX) EC tablet 5 mg, 5 mg, Oral, Daily PRN, Elaine Ross MD, 5 mg at 12/09/17 0835    calcium carbonate (OYSTER SHELL,OSCAL) 500 mg tablet 2 tablet, 2 tablet, Oral, TID With Meals, Elaine Ross MD, 2 tablet at 12/09/17 0659    cholecalciferol (VITAMIN D3) tablet 1,000 Units, 1,000 Units, Oral, Daily, Elaine Ross MD, 1,000 Units at 12/09/17 0835    cyanocobalamin (VITAMIN B-12) tablet 1,000 mcg, 1,000 mcg, Oral, Daily, Elaine Ross MD, 1,000 mcg at 12/09/17 0835    dextromethorphan-guaiFENesin (ROBITUSSIN DM)  mg/5 mL oral syrup 5 mL, 5 mL, Oral, Q4H PRN, Elaine Ross MD    docusate sodium (COLACE) capsule 100 mg, 100 mg, Oral, BID, Elaine Ross MD, 100 mg at 12/09/17 9388    docusate sodium (COLACE) capsule 100 mg, 100 mg, Oral, BID PRN, Elaine Ross MD    famotidine (PEPCID) tablet 20 mg, 20 mg, Oral, Daily, Elaine Ross MD, 20 mg at 12/09/17 9563    ferrous sulfate tablet 325 mg, 325 mg, Oral, TID With Meals, Elaine Ross MD, 325 mg at 12/09/17 0659    HYDROmorphone (DILAUDID) tablet 2 mg, 2 mg, Oral, Q6H PRN, Elaine Ross MD  [facility] insulin glargine (LANTUS) subcutaneous injection 40 Units, 40 Units, Subcutaneous, HS, Tawanna Simpson MD    insulin lispro (HumaLOG) 100 units/mL subcutaneous injection 1-5 Units, 1-5 Units, Subcutaneous, HS, Tawanna Simpson MD, 3 Units at 12/09/17 0231    insulin lispro (HumaLOG) 100 units/mL subcutaneous injection 2-12 Units, 2-12 Units, Subcutaneous, TID AC, 4 Units at 12/09/17 1247 **AND** Fingerstick Glucose (POCT), , , TID AC, Tawanna Simpson MD    insulin NPH (HumuLIN N,NovoLIN N) 100 Units/mL subcutaneous injection 8 Units, 8 Units, Subcutaneous, TID AC, Tawanna Simpson MD, 8 Units at 12/09/17 1251    latanoprost (XALATAN) 0 005 % ophthalmic solution 1 drop, 1 drop, Both Eyes, HS, Tawanna Simpson MD    levothyroxine tablet 25 mcg, 25 mcg, Oral, Early Morning, Tawanna Simpson MD, 25 mcg at 12/09/17 0654    menthol-methyl salicylate (BENGAY) 03-10 % cream, , Apply externally, 4x Daily PRN, Tawanna Simpson MD    metoprolol tartrate (LOPRESSOR) partial tablet 12 5 mg, 12 5 mg, Oral, Q12H [address], Tawanna Simpson MD, 12 5 mg at 12/09/17 0839    miconazole 2 % cream, , Topical, BID, Tawanna Simpson MD    nystatin (MYCOSTATIN) powder, , Topical, BID, Tawanna Simpson MD    [facility]ron [facility]) injection 4 mg, 4 mg, Intravenous, Q6H PRN, Tawanna Simpson MD    ondansetron (ZOFRAN-ODT) dispersible tablet 4 mg, 4 mg, Oral, Q4H PRN, Tawanna Simpson MD    pantoprazole (PROTONIX) EC tablet 40 mg, 40 mg, Oral, Early Morning, Tawanna Simpson MD, 40 mg at 12/09/17 0654    polyethylene glycol (MIRALAX) packet 17 g, 17 g, Oral, Daily, Tawanna Simpson MD, 17 g at 12/09/17 0840    pravastatin (PRAVACHOL) tablet 80 mg, 80 mg, Oral, Daily, Tawanna Simpson MD, 80 mg at 12/09/17 0835    predniSONE tablet 40 mg, 40 mg, Oral, Daily, Tawanna Simpson MD, 40 mg at 12/09/17 0839    pregabalin (LYRICA) capsule 75 mg, 75 mg, Oral, BID, Tawanna Simpson MD, 75 mg at 12/09/17 0839    senna (SENOKOT) tablet 17 2 mg, 2 tablet, Oral, Daily PRN, Kadi Fritz MD, 17 2 mg at 12/09/17 7850    Current Outpatient Prescriptions:     acetaminophen (TYLENOL) 325 mg tablet, Take 2 tablets by mouth every 6 (six) hours as needed for mild pain, headaches or fever, Disp: 30 tablet, Rfl: 0    albuterol (2 5 mg/3 mL) 0 083 % nebulizer solution, Take 2 5 mg by nebulization every 6 (six) hours as needed for wheezing , Disp: , Rfl:     allopurinol (ZYLOPRIM) 100 mg tablet, Take 200 mg by mouth Indications: sun, tue, we, fri, sat  Sun, Tues, Wed, Fri, Sat , Disp: , Rfl:     allopurinol (ZYLOPRIM) 300 mg tablet, Take 300 mg by mouth Mon, and Thurs , Disp: , Rfl:     apixaban (ELIQUIS) 2 5 mg, Take 1 tablet by mouth 2 (two) times a day, Disp: 60 tablet, Rfl: 0    bisacodyl (DULCOLAX) 5 mg EC tablet, Take 5 mg by mouth daily as needed for constipation, Disp: , Rfl:     calcium carbonate (OYSTER SHELL,OSCAL) 500 mg, Take 2 tablets by mouth 3 (three) times a day with meals, Disp: 180 tablet, Rfl: 0    Camphor-Menthol-Methyl Sal (Anthony Carrero Ultra Strength) 4-10-30 % CREA, Apply topically 2 (two) times a day B/l feet/knees and left shoulder, Disp: , Rfl:     cholecalciferol 1000 units tablet, Take 1 tablet by mouth daily, Disp: , Rfl: 0    cyanocobalamin (VITAMIN B-12) 1,000 mcg tablet, Take 1,000 mcg by mouth daily  , Disp: , Rfl:     dextromethorphan-guaiFENesin (ROBITUSSIN DM)  mg/5 mL syrup, Take 5 mL by mouth every 4 (four) hours as needed for cough or congestion, Disp: 118 mL, Rfl: 0    docusate sodium (COLACE) 100 mg capsule, Take 100 mg by mouth 2 (two) times a day  , Disp: , Rfl:     famotidine (PEPCID) 20 mg tablet, Take 1 tablet by mouth daily, Disp: , Rfl: 0    ferrous sulfate 325 (65 Fe) mg tablet, Take 325 mg by mouth 3 (three) times a day with meals, Disp: , Rfl:     HYDROmorphone (DILAUDID) 2 mg tablet, Take 1 tablet by mouth every 6 (six) hours as needed for moderate pain Max Daily Amount: 8 mg, Disp: 10 tablet, Rfl: 0    Insulin Glargine (BASAGLAR KWIKPEN) 100 UNIT/ML SOPN, Inject 40 Units under the skin daily at bedtime, Disp: , Rfl:     insulin lispro protamine-insulin lispro (HumaLOG 50-50) 100 units/mL, Inject 8 Units under the skin 3 (three) times a day before meals, Disp: 10 mL, Rfl: 0    latanoprost (XALATAN) 0 005 % ophthalmic solution, Administer 1 drop to both eyes daily at bedtime  , Disp: , Rfl:     levothyroxine 25 mcg tablet, Take 25 mcg by mouth daily  , Disp: , Rfl:     liver oil-zinc oxide (DESITIN) 40 % ointment, Apply topically as needed for irritation To buttock every day and evening shift  To b/l thigh folds every day and evening shift , Disp: , Rfl:     metoprolol tartrate (LOPRESSOR) 12 5 mg tablet, Take 12 5 mg by mouth  Sam Pate, Sat Hold am dose before dialysis Mon, Wed, and Fri, Disp: , Rfl:     miconazole (MICOTIN) 2 % powder, Apply topically as needed for itching Apply to b/l breast and panus twice a day, Disp: , Rfl:     nystatin (MYCOSTATIN) powder, Apply topically 2 (two) times a day, Disp: 15 g, Rfl: 0    omeprazole (PriLOSEC) 20 mg delayed release capsule, Take 20 mg by mouth daily  , Disp: , Rfl:     ondansetron (ZOFRAN) 4 mg tablet, Take 1 tablet by mouth every 4 (four) hours as needed for nausea or vomiting, Disp: , Rfl: 0    polyethylene glycol (MIRALAX) 17 g packet, Take 17 g by mouth daily, Disp: 14 each, Rfl: 0    pravastatin (PRAVACHOL) 80 mg tablet, Take 80 mg by mouth daily  , Disp: , Rfl:     predniSONE 20 mg tablet, Take 2 tablets by mouth daily, Disp: 8 tablet, Rfl: 0    pregabalin (LYRICA) 75 mg capsule, Take 1 capsule by mouth 2 (two) times a day for 10 days (Patient taking differently: Take 50 mg by mouth 3 (three) times a day  ), Disp: 20 capsule, Rfl: 0    senna (SENOKOT) 8 6 MG tablet, Take 2 tablets by mouth daily as needed for constipation  , Disp: , Rfl:     REVIEW OF SYSTEMS:  General: No fevers, chills     Cardiovascular: No chest pain, shortness of breath, palpitations, leg edema  Respiratory: No cough, sputum production, shortness of breath  Gastrointestinal: No nausea, vomiting, abdominal pain, diarrhea  Genitourinary: No hematuria  PHYSICAL EXAM:  Current Weight: Weight - Scale: 135 kg (298 lb)  First Weight: Weight - Scale: 135 kg (298 lb)  Vitals:    12/09/17 0329 12/09/17 0330 12/09/17 0614 12/09/17 0615   BP:  158/68 133/62 133/62   Pulse: 74 74 78 78   Resp: (!) 11 16 21 18   Temp:       TempSrc:       SpO2: 100% 100%  100%   Weight:         No intake or output data in the 24 hours ending 12/09/17 1315  General:  No acute distress  Patient seen in the emergency room  Awaiting bed placement  Skin:  Warm and dry, no rash  Eyes:  Sclera clear, anicteric  ENT:  Oropharynx moist   External exam normal  Neck:  Supple, no JVD appreciated but difficult to assess due to body habitus  Chest:  Clear bilaterally, decreased breath sounds in the lower lobes  No wheezes or rhonchi noted  CVS:  Regular rhythm, no murmur, rub, gallop appreciated  Heart tones distant    Abdomen:  Obese, soft, nontender, bowel sounds present  Extremities:  No edema  Neuro:  Alert and oriented x3  Psych:  Appropriate, pleasant    Invasive Devices:      Lab Results:     Results from last 7 days  Lab Units 12/09/17  0615 12/08/17 2129 12/03/17  0451   WBC Thousand/uL 10 50* 9 84  --    HEMOGLOBIN g/dL 8 5* 9 0* 8 2*   HEMATOCRIT % 26 8* 27 8* 24 6*   PLATELETS Thousands/uL 37* 35*  --    SODIUM mmol/L 135* 133*  --    POTASSIUM mmol/L 5 3 5 6*  --    CHLORIDE mmol/L 98* 96*  --    CO2 mmol/L 33* 30  --    BUN mg/dL 66* 59*  --    CREATININE mg/dL 2 31* 2 10*  --    CALCIUM mg/dL 8 9 8 6  --    ALBUMIN g/dL  --  2 3*  --    TOTAL PROTEIN g/dL  --  6 4  --    BILIRUBIN TOTAL mg/dL  --  0 25  --    ALK PHOS U/L  --  65  --    ALT U/L  --  25  --    AST U/L  --  14  --    GLUCOSE RANDOM mg/dL 235* 445*  --      Lab Results   Component Value Date     5 (H) 09/01/2017 CALCIUM 8 9 12/09/2017    CAION 1 02 (L) 09/02/2017    PHOS 6 1 (H) 11/24/2017     Other Studies:

## 2017-12-09 NOTE — PROGRESS NOTES
Progress Note - Porsha Grady 67 y o  female MRN: [id number]  Unit/Bed#: ED 12 Encounter: [id number]    * Thrombocytopenia [facility])   Assessment & Plan    · Patient has had thrombocytopenia in the past with no known etiology  · Her current episode of thrombocytopenia may have been provoked by initiation of Eliquis  She was started on this on 09/03/2017 after she experienced multiple episodes of GIB while on Coumadin  · Hematology consult is pending and appreciate their input  · Continue Eliquis for now as she has no evidence of active bleeding  Anemia of chronic disease   Assessment & Plan    · Secondary to end-stage renal disease as well as history of iron deficiency anemia  · Baseline hemoglobin: 8 0-9 0  · Check iron studies  · Continue to monitor  History of pulmonary embolism   Assessment & Plan    · Isolated event in 2004  · However, given patient's morbid obesity, bed-bound status and other comorbidities, the patient remains at risk for recurrence DVT/PE  · Patient had been converted to Eliquis on 9/3/17 after she experiences multiple episodes of GIB on Coumadin  End-stage renal disease on hemodialysis [facility])   Assessment & Plan    · On HD every MWF        Morbid obesity ([address] )   Assessment & Plan    · Patient is currently bed-bound  IDDM (insulin dependent diabetes mellitus) ([facility])   Assessment & Plan    · A1c: 8 6  · Blood sugars:  211, 235, 308  · Continue Lantus 40 units daily and NPH 8 units with meals  VTE Pharmacologic Prophylaxis:   Pharmacologic: Apixaban (Eliquis)  Mechanical: Mechanical VTE prophylaxis in place  Patient Centered Rounds: I have performed bedside rounds with nursing staff today (ED nurse, Ralu Padilla)  Discussions with Specialists or Other Care Team Provider: None  Education and Discussions with Family / Patient: All patient questions answered to the best of my ability  Time Spent for Care: 20 minutes    More than 50% of total time spent on counseling and coordination of care as described above  Current Length of Stay: 0 day(s)  Current Patient Status: Observation   Certification Statement: Maintain observation status  Discharge Plan: Await hematology evaluation and recommendations  [place] [facility] and can return when able  Code Status: Level 3 - DNAR and DNI    Subjective:   Patient currently being held in ED  She feels well without any current complaints other than some discomfort as a result of pressure wounds on her buttocks (POA)  She denies any bleeding  Objective:   Vitals:   Temp (24hrs), Av 6 °F (37 °C), Min:98 6 °F (37 °C), Max:98 6 °F (37 °C)    HR:  [74-79] 78  Resp:  [11-21] 18  BP: (133-193)/(62-79) 133/62  SpO2:  [98 %-100 %] 100 %  Body mass index is 51 15 kg/m²  Input and Output Summary (last 24 hours):     No intake or output data in the 24 hours ending 17 1125    Physical Exam:     Physical Exam   Constitutional: Nasal cannula in place  HENT:   Head: Normocephalic and atraumatic  Mouth/Throat: Oropharynx is clear and moist and mucous membranes are normal    Eyes: No scleral icterus  Cardiovascular: Normal rate and regular rhythm  No murmur heard  Pulmonary/Chest: She has decreased breath sounds (Diffusely (anteriorly))  She has no wheezes  She has no rales  She exhibits no tenderness  Abdominal: Soft  Bowel sounds are normal  She exhibits no distension  There is no tenderness  Morbidly obese with massive pannus  Musculoskeletal: Normal range of motion  She exhibits no edema  Skin: Skin is warm and dry  No rash noted  Venous stasis discoloration of the bilateral lower legs  Psychiatric: She has a normal mood and affect  Vitals reviewed      Additional Data:   Labs:    Results from last 7 days  Lab Units 17  0615 17  2129   WBC Thousand/uL 10 50* 9 84   HEMOGLOBIN g/dL 8 5* 9 0*   HEMATOCRIT % 26 8* 27 8*   PLATELETS Thousands/uL 37* 35*   LYMPHO PCT %  -- 4*   MONO PCT MAN %  --  2*   EOSINO PCT MANUAL %  --  0       Results from last 7 days  Lab Units 12/09/17  0615 12/08/17  2129   SODIUM mmol/L 135* 133*   POTASSIUM mmol/L 5 3 5 6*   CHLORIDE mmol/L 98* 96*   CO2 mmol/L 33* 30   BUN mg/dL 66* 59*   CREATININE mg/dL 2 31* 2 10*   CALCIUM mg/dL 8 9 8 6   TOTAL PROTEIN g/dL  --  6 4   BILIRUBIN TOTAL mg/dL  --  0 25   ALK PHOS U/L  --  65   ALT U/L  --  25   AST U/L  --  14   GLUCOSE RANDOM mg/dL 235* 445*           * I Have Reviewed All Lab Data Listed Above  * Additional Pertinent Lab Tests Reviewed: All Labs Within Last 24 Hours Reviewed    Imaging:    Imaging Reports Reviewed Today Include: None new    Cultures:   Blood Culture:   Lab Results   Component Value Date    BLOODCX No Growth After 5 Days  11/23/2017    BLOODCX No Growth After 5 Days  11/23/2017    BLOODCX No Growth After 5 Days  10/31/2016    BLOODCX No Growth After 5 Days   10/31/2016     Urine Culture:   Lab Results   Component Value Date    URINECX >100,000 cfu/ml Escherichia coli ESBL (A) 11/01/2016    URINECX 50,000-59,000 cfu/ml Klebsiella pneumoniae ESBL (A) 11/01/2016    URINECX 50,000-59,000 cfu/ml Providencia stuartii 11/01/2016     Sputum Culture: No components found for: SPUTUMCX  Wound Culture: No results found for: WOUNDCULT    Last 24 Hours Medication List:     allopurinol 200 mg Oral Once per day on Sun Tue Thu Sat   [START ON 12/11/2017] allopurinol 300 mg Oral Once per day on Mon Thu   apixaban 2 5 mg Oral BID   calcium carbonate 2 tablet Oral TID With Meals   cholecalciferol 1,000 Units Oral Daily   cyanocobalamin 1,000 mcg Oral Daily   docusate sodium 100 mg Oral BID   famotidine 20 mg Oral Daily   ferrous sulfate 325 mg Oral TID With Meals   insulin glargine 40 Units Subcutaneous HS   insulin lispro 1-5 Units Subcutaneous HS   insulin lispro 2-12 Units Subcutaneous TID AC   insulin NPH 8 Units Subcutaneous TID AC   latanoprost 1 drop Both Eyes HS   levothyroxine 25 mcg Oral Early Morning   metoprolol tartrate 12 5 mg Oral Q12H FREDI   miconazole  Topical BID   nystatin  Topical BID   pantoprazole 40 mg Oral Early Morning   polyethylene glycol 17 g Oral Daily   pravastatin 80 mg Oral Daily   predniSONE 40 mg Oral Daily   pregabalin 75 mg Oral BID        Today, Patient Was Seen By: Reyes Melissa PA-C    ** Please Note: Dragon 360 Dictation voice to text software may have been used in the creation of this document   **

## 2017-12-10 LAB
ANISOCYTOSIS BLD QL SMEAR: PRESENT
BASOPHILS # BLD MANUAL: 0 THOUSAND/UL (ref 0–0.1)
BASOPHILS NFR MAR MANUAL: 0 % (ref 0–1)
EOSINOPHIL # BLD MANUAL: 0.1 THOUSAND/UL (ref 0–0.4)
EOSINOPHIL NFR BLD MANUAL: 1 % (ref 0–6)
ERYTHROCYTE [DISTWIDTH] IN BLOOD BY AUTOMATED COUNT: 15.2 % (ref 11.6–15.1)
GLUCOSE SERPL-MCNC: 197 MG/DL (ref 65–140)
GLUCOSE SERPL-MCNC: 317 MG/DL (ref 65–140)
GLUCOSE SERPL-MCNC: 372 MG/DL (ref 65–140)
GLUCOSE SERPL-MCNC: 399 MG/DL (ref 65–140)
HCT VFR BLD AUTO: 25.7 % (ref 34.8–46.1)
HGB BLD-MCNC: 8.3 G/DL (ref 11.5–15.4)
LYMPHOCYTES # BLD AUTO: 1.29 THOUSAND/UL (ref 0.6–4.47)
LYMPHOCYTES # BLD AUTO: 13 % (ref 14–44)
MCH RBC QN AUTO: 30.5 PG (ref 26.8–34.3)
MCHC RBC AUTO-ENTMCNC: 32.3 G/DL (ref 31.4–37.4)
MCV RBC AUTO: 95 FL (ref 82–98)
METAMYELOCYTES NFR BLD MANUAL: 1 % (ref 0–1)
MONOCYTES # BLD AUTO: 1.19 THOUSAND/UL (ref 0–1.22)
MONOCYTES NFR BLD: 12 % (ref 4–12)
MRSA NOSE QL CULT: NORMAL
NEUTROPHILS # BLD MANUAL: 7.25 THOUSAND/UL (ref 1.85–7.62)
NEUTS SEG NFR BLD AUTO: 73 % (ref 43–75)
NRBC BLD AUTO-RTO: 0 /100 WBCS
PLATELET # BLD AUTO: 33 THOUSANDS/UL (ref 149–390)
PLATELET BLD QL SMEAR: ABNORMAL
POIKILOCYTOSIS BLD QL SMEAR: PRESENT
RBC # BLD AUTO: 2.72 MILLION/UL (ref 3.81–5.12)
TOTAL CELLS COUNTED SPEC: 100
WBC # BLD AUTO: 9.93 THOUSAND/UL (ref 4.31–10.16)

## 2017-12-10 PROCEDURE — 94760 N-INVAS EAR/PLS OXIMETRY 1: CPT

## 2017-12-10 PROCEDURE — 82948 REAGENT STRIP/BLOOD GLUCOSE: CPT

## 2017-12-10 PROCEDURE — 85027 COMPLETE CBC AUTOMATED: CPT | Performed by: PHYSICIAN ASSISTANT

## 2017-12-10 PROCEDURE — 94660 CPAP INITIATION&MGMT: CPT

## 2017-12-10 PROCEDURE — 85007 BL SMEAR W/DIFF WBC COUNT: CPT | Performed by: PHYSICIAN ASSISTANT

## 2017-12-10 RX ORDER — BISACODYL 10 MG
10 SUPPOSITORY, RECTAL RECTAL DAILY PRN
Status: DISCONTINUED | OUTPATIENT
Start: 2017-12-10 | End: 2017-12-14 | Stop reason: HOSPADM

## 2017-12-10 RX ORDER — PREDNISONE 20 MG/1
20 TABLET ORAL DAILY
Status: COMPLETED | OUTPATIENT
Start: 2017-12-11 | End: 2017-12-12

## 2017-12-10 RX ORDER — PREDNISONE 10 MG/1
10 TABLET ORAL DAILY
Status: COMPLETED | OUTPATIENT
Start: 2017-12-13 | End: 2017-12-14

## 2017-12-10 RX ORDER — LACTULOSE 20 G/30ML
20 SOLUTION ORAL DAILY
Status: DISCONTINUED | OUTPATIENT
Start: 2017-12-10 | End: 2017-12-14 | Stop reason: HOSPADM

## 2017-12-10 RX ADMIN — LACTULOSE 20 G: 20 SOLUTION ORAL at 12:03

## 2017-12-10 RX ADMIN — PREGABALIN 75 MG: 75 CAPSULE ORAL at 08:56

## 2017-12-10 RX ADMIN — Medication 325 MG: at 08:56

## 2017-12-10 RX ADMIN — METOPROLOL TARTRATE 12.5 MG: 25 TABLET ORAL at 21:36

## 2017-12-10 RX ADMIN — INSULIN LISPRO 3 UNITS: 100 INJECTION, SOLUTION INTRAVENOUS; SUBCUTANEOUS at 21:35

## 2017-12-10 RX ADMIN — BISACODYL 5 MG: 5 TABLET, COATED ORAL at 07:14

## 2017-12-10 RX ADMIN — VITAMIN D, TAB 1000IU (100/BT) 1000 UNITS: 25 TAB at 08:55

## 2017-12-10 RX ADMIN — MENTHOL, METHYL SALICYLATE 1 APPLICATION: 10; 15 CREAM TOPICAL at 09:04

## 2017-12-10 RX ADMIN — ALLOPURINOL 200 MG: 100 TABLET ORAL at 08:56

## 2017-12-10 RX ADMIN — NYSTATIN: 100000 POWDER TOPICAL at 17:44

## 2017-12-10 RX ADMIN — ACETAMINOPHEN 650 MG: 325 TABLET, FILM COATED ORAL at 19:21

## 2017-12-10 RX ADMIN — SENNOSIDES 17.2 MG: 8.6 TABLET, FILM COATED ORAL at 07:14

## 2017-12-10 RX ADMIN — INSULIN LISPRO 10 UNITS: 100 INJECTION, SOLUTION INTRAVENOUS; SUBCUTANEOUS at 17:35

## 2017-12-10 RX ADMIN — METOPROLOL TARTRATE 12.5 MG: 25 TABLET ORAL at 08:55

## 2017-12-10 RX ADMIN — Medication 2 TABLET: at 12:05

## 2017-12-10 RX ADMIN — GUAIFENESIN AND DEXTROMETHORPHAN 5 ML: 100; 10 SYRUP ORAL at 09:06

## 2017-12-10 RX ADMIN — PREGABALIN 75 MG: 75 CAPSULE ORAL at 17:42

## 2017-12-10 RX ADMIN — CYANOCOBALAMIN TAB 500 MCG 1000 MCG: 500 TAB at 08:56

## 2017-12-10 RX ADMIN — PREDNISONE 40 MG: 20 TABLET ORAL at 08:56

## 2017-12-10 RX ADMIN — INSULIN HUMAN 10 UNITS: 100 INJECTION, SUSPENSION SUBCUTANEOUS at 17:36

## 2017-12-10 RX ADMIN — NYSTATIN: 100000 POWDER TOPICAL at 09:02

## 2017-12-10 RX ADMIN — PRAVASTATIN SODIUM 80 MG: 80 TABLET ORAL at 08:55

## 2017-12-10 RX ADMIN — DOCUSATE SODIUM 100 MG: 100 CAPSULE, LIQUID FILLED ORAL at 17:43

## 2017-12-10 RX ADMIN — INSULIN HUMAN 10 UNITS: 100 INJECTION, SUSPENSION SUBCUTANEOUS at 12:09

## 2017-12-10 RX ADMIN — PANTOPRAZOLE SODIUM 40 MG: 40 TABLET, DELAYED RELEASE ORAL at 05:39

## 2017-12-10 RX ADMIN — DOCUSATE SODIUM 100 MG: 100 CAPSULE, LIQUID FILLED ORAL at 08:56

## 2017-12-10 RX ADMIN — APIXABAN 2.5 MG: 2.5 TABLET, FILM COATED ORAL at 17:43

## 2017-12-10 RX ADMIN — INSULIN LISPRO 2 UNITS: 100 INJECTION, SOLUTION INTRAVENOUS; SUBCUTANEOUS at 08:51

## 2017-12-10 RX ADMIN — LEVOTHYROXINE SODIUM 25 MCG: 25 TABLET ORAL at 05:39

## 2017-12-10 RX ADMIN — MENTHOL, METHYL SALICYLATE: 10; 15 CREAM TOPICAL at 17:36

## 2017-12-10 RX ADMIN — LATANOPROST 1 DROP: 50 SOLUTION OPHTHALMIC at 21:36

## 2017-12-10 RX ADMIN — Medication 325 MG: at 12:05

## 2017-12-10 RX ADMIN — APIXABAN 2.5 MG: 2.5 TABLET, FILM COATED ORAL at 08:56

## 2017-12-10 RX ADMIN — Medication 325 MG: at 17:43

## 2017-12-10 RX ADMIN — BISACODYL 10 MG: 10 SUPPOSITORY RECTAL at 15:01

## 2017-12-10 RX ADMIN — INSULIN HUMAN 8 UNITS: 100 INJECTION, SUSPENSION SUBCUTANEOUS at 08:51

## 2017-12-10 RX ADMIN — FAMOTIDINE 20 MG: 20 TABLET, FILM COATED ORAL at 08:56

## 2017-12-10 RX ADMIN — POLYETHYLENE GLYCOL 3350 17 G: 17 POWDER, FOR SOLUTION ORAL at 08:48

## 2017-12-10 RX ADMIN — INSULIN LISPRO 10 UNITS: 100 INJECTION, SOLUTION INTRAVENOUS; SUBCUTANEOUS at 12:08

## 2017-12-10 RX ADMIN — Medication 2 TABLET: at 17:42

## 2017-12-10 RX ADMIN — ACETAMINOPHEN 650 MG: 325 TABLET, FILM COATED ORAL at 07:13

## 2017-12-10 RX ADMIN — INSULIN GLARGINE 40 UNITS: 100 INJECTION, SOLUTION SUBCUTANEOUS at 21:35

## 2017-12-10 RX ADMIN — Medication 2 TABLET: at 08:56

## 2017-12-10 NOTE — PHYSICIAN ADVISOR
This patient is a 67 y o  y/o female who is admitted to the hospital for Abnormal Lab (low platelets 29)   The patient presented to the ED on 12/8/17 at 2051 and was admitted to the hospital on 12/8/2017 2051  History of Present Illness includes: The patient had a prior admission from 11/23-12/3 for pneumonia  The patient was treated and discharged in stable condition  The patient presented with  A decreased platelet count of 27  The patient denied any bleeding or SOB  Vital signs in the ER are as follows   ED Triage Vitals   Temperature Pulse Respirations Blood Pressure SpO2   12/08/17 2054 12/08/17 2054 12/08/17 2054 12/08/17 2057 12/08/17 2054   98 6 °F (37 °C) 79 18 148/68 100 %      Temp Source Heart Rate Source Patient Position - Orthostatic VS BP Location FiO2 (%)   12/08/17 2054 12/08/17 2153 12/08/17 2153 12/08/17 2230 --   Oral Monitor Lying Left arm       Pain Score       12/08/17 2054       No Pain         On exam the lungs are clear and there is no edema  Hgb sis 9 0 and plt count is 35  K is 5 6 and is Cr is 2 1  The patient is being admitted for thrombocytopenia  The etiology of her plt count is not known  The plan of care includes hematology consultation, nephrology consultation for dialysis, repeat labs  This patient is appropriate for INPATIENT admission as her length of stay is expected to be at least 2 midnights  This admission is UNRELATED  To her prior admission a she was discharged in stable condition with resolution of her acute symptoms at the time of her prior discharge

## 2017-12-10 NOTE — PROGRESS NOTES
NEPHROLOGY PROGRESS NOTE   Shantel Sahu 67 y o  female MRN: [id number]  Unit/Bed#: [facility] 832-01 Encounter: [id number]      ASSESSMENT & PLAN:    1  ESRD on HD (MWF):  Patient dialyzes at [address]  -patient compliant with treatment  Last treatment 12/8  -outpatient prescription:  Treatment time 240 minutes, estimated dry weight 136 kg, Qb 400, daily 800, sodium 137, 3 potassium, 2 5 calcium, bicarbonate 35  -borderline elevated potassium  Renal diet  -plan on hemodialysis tomorrow, orders placed    2  Access:  PermCath left IJ  -AV fistula right arm  Recent infiltration/hematoma therefore arm is being rested at this time   -no issues with PermCath    3  Hypertension:  Blood pressure acceptable    4  Anemia:  Hemoglobin below goal  -on Venofer 50 mg weekly  -no Epogen    5  Mineral and bone disease:  -continue Hectorol 0 5 mcg with each treatment    6  IDDM:  Management per primary team    7  PE:  On Eliquis    8   Thrombocytopenia:  Hematology following      SUBJECTIVE:    Patient seen today complaining of some constipation otherwise stable    OBJECTIVE:  Current Weight: Weight - Scale: 135 kg (298 lb)  Vitals:    12/10/17 0810   BP: 136/64   Pulse: 76   Resp: 17   Temp: 98 °F (36 7 °C)   SpO2: 99%       Intake/Output Summary (Last 24 hours) at 12/10/17 1420  Last data filed at 12/10/17 1301   Gross per 24 hour   Intake             1220 ml   Output                0 ml   Net             1220 ml       General: conscious, cooperative, in not acute distress, morbidly obese  Eyes: conjunctivae pink, anicteric sclerae  ENT: lips and mucous membranes moist  Neck: supple, no JVD  Chest: clear breath sounds bilateral, no crackles, ronchus or wheezings  CVS: distinct S1 & S2, normal rate, regular rhythm  Abdomen: soft, non-tender, non-distended, normoactive bowel sounds  Extremities: no edema of both legs  Skin: no rash  Neuro: awake, alert, oriented        Medications:    Current Facility-Administered Medications:     acetaminophen (TYLENOL) tablet 650 mg, 650 mg, Oral, Q6H PRN, Bernadine Muñoz MD, 650 mg at 12/10/17 7761    allopurinol (ZYLOPRIM) tablet 200 mg, 200 mg, Oral, Once per day on Sun Tue Thu Sat, Bernadine Muñoz MD, 200 mg at 12/10/17 0856    [START ON 12/11/2017] allopurinol (ZYLOPRIM) tablet 300 mg, 300 mg, Oral, Once per day on Mon Thu, Bernadine Muñoz MD    [facility] tablet 2 5 mg, 2 5 mg, Oral, BID, Bernadine Muñoz MD, 2 5 mg at 12/10/17 3579    bisacodyl (DULCOLAX) EC tablet 5 mg, 5 mg, Oral, Daily PRN, Bernadine Muñoz MD, 5 mg at 12/10/17 0714    calcium carbonate (OYSTER SHELL,OSCAL) 500 mg tablet 2 tablet, 2 tablet, Oral, TID With Meals, Bernadine Muñoz MD, 2 tablet at 12/10/17 1205    cholecalciferol (VITAMIN D3) tablet 1,000 Units, 1,000 Units, Oral, Daily, Bernadine Muñoz MD, 1,000 Units at 12/10/17 0855    cyanocobalamin (VITAMIN B-12) tablet 1,000 mcg, 1,000 mcg, Oral, Daily, Bernadine Muñoz MD, 1,000 mcg at 12/10/17 0856    dextromethorphan-guaiFENesin (ROBITUSSIN DM)  mg/5 mL oral syrup 5 mL, 5 mL, Oral, Q4H PRN, Bernadine Muñoz MD, 5 mL at 12/10/17 0906    docusate sodium (COLACE) capsule 100 mg, 100 mg, Oral, BID, Bernadine Muñoz MD, 100 mg at 12/10/17 0856    docusate sodium (COLACE) capsule 100 mg, 100 mg, Oral, BID PRN, Bernadine Muñoz MD    ferrous sulfate tablet 325 mg, 325 mg, Oral, TID With Meals, Bernadine Muñoz MD, 325 mg at 12/10/17 1205    HYDROmorphone (DILAUDID) tablet 2 mg, 2 mg, Oral, Q6H PRN, Bernadine Muñoz MD    insulin glargine (LANTUS) subcutaneous injection 40 Units, 40 Units, Subcutaneous, HS, Bernadine Muñoz MD, 40 Units at 12/09/17 2159    insulin lispro (HumaLOG) 100 units/mL subcutaneous injection 1-5 Units, 1-5 Units, Subcutaneous, HS, Bernadine Muñoz MD, 4 Units at 12/09/17 2158    insulin lispro (HumaLOG) 100 units/mL subcutaneous injection 2-12 Units, 2-12 Units, Subcutaneous, TID AC, 10 Units at 12/10/17 1208 **AND** Fingerstick Glucose (POCT), , , TID AC, Sammi Huertas MD    insulin NPH (HumuLIN N,NovoLIN N) 100 Units/mL subcutaneous injection 10 Units, 10 Units, Subcutaneous, TID AC, Flower Antoine DO, 10 Units at 12/10/17 1209    lactulose 20 g/30 mL oral solution 20 g, 20 g, Oral, Daily, Flower Antoine DO, 20 g at 12/10/17 1203    latanoprost (XALATAN) 0 005 % ophthalmic solution 1 drop, 1 drop, Both Eyes, HS, Sammi Heurtas MD, 1 drop at 12/09/17 2224    levothyroxine tablet 25 mcg, 25 mcg, Oral, Early Morning, Sammi Huertas MD, 25 mcg at 12/10/17 0539    menthol-methyl salicylate (BENGAY) 85-84 % cream, , Apply externally, 4x Daily PRN, Sammi Huertas MD, 1 application at 97/68/79 0904    metoprolol tartrate (LOPRESSOR) partial tablet 12 5 mg, 12 5 mg, Oral, Q12H [address], Sammi Huertas MD, 12 5 mg at 12/10/17 0855    miconazole 2 % cream, , Topical, BID, Sammi Huertas MD    nystatin (MYCOSTATIN) powder, , Topical, BID, Sammi Huertas MD    ondansetron [facility]) injection 4 mg, 4 mg, Intravenous, Q6H PRN, Sammi Huertas MD    ondansetron (ZOFRAN-ODT) dispersible tablet 4 mg, 4 mg, Oral, Q4H PRN, Sammi Huertas MD    pantoprazole (PROTONIX) EC tablet 40 mg, 40 mg, Oral, Early Morning, Sammi Huertas MD, 40 mg at 12/10/17 0539    polyethylene glycol (MIRALAX) packet 17 g, 17 g, Oral, Daily, Sammi Huertas MD, 17 g at 12/10/17 0848    pravastatin (PRAVACHOL) tablet 80 mg, 80 mg, Oral, Daily, Sammi Huertas MD, 80 mg at 12/10/17 0855    [START ON 12/11/2017] predniSONE tablet 20 mg, 20 mg, Oral, Daily **FOLLOWED BY** [START ON 12/13/2017] predniSONE tablet 10 mg, 10 mg, Oral, Daily, Flower Antoine DO    pregabalin (LYRICA) capsule 75 mg, 75 mg, Oral, BID, Sammi Huertas MD, 75 mg at 12/10/17 0856    senna (SENOKOT) tablet 17 2 mg, 2 tablet, Oral, Daily PRN, Sammi Huertas MD, 17 2 mg at 12/10/17 8579    Invasive Devices:      Lab Results:     Results from last 7 days  Lab Units 12/10/17  0450 12/09/17  0615 12/08/17  2129   WBC Thousand/uL 9 93 10 50* 9 84   HEMOGLOBIN g/dL 8 3* 8 5* 9 0*   HEMATOCRIT % 25 7* 26 8* 27 8*   PLATELETS Thousands/uL 33* 37* 35*   SODIUM mmol/L  --  135* 133*   POTASSIUM mmol/L  --  5 3 5 6*   CHLORIDE mmol/L  --  98* 96*   CO2 mmol/L  --  33* 30   BUN mg/dL  --  66* 59*   CREATININE mg/dL  --  2 31* 2 10*   CALCIUM mg/dL  --  8 9 8 6   ALBUMIN g/dL  --   --  2 3*   TOTAL PROTEIN g/dL  --   --  6 4   BILIRUBIN TOTAL mg/dL  --   --  0 25   ALK PHOS U/L  --   --  65   ALT U/L  --   --  25   AST U/L  --   --  14   GLUCOSE RANDOM mg/dL  --  235* 445*       Previous work up:  Please see previous notes

## 2017-12-10 NOTE — PROGRESS NOTES
Went to administer ordered Insulin NPH but vial would not scan in appropriately  Upon closer inspection vial is of Humalog Insulin but labeled Insulin NPH  Given twice as Insulin NPH in the ED  Pharmacy notified and said they would send the correct vial up  Avila Keller with [facility] notified

## 2017-12-10 NOTE — PROGRESS NOTES
[facility] Internal Medicine Progress Note  Patient: Aubrey Humphreys 67 y o  female   MRN: [id number]  PCP: Nicholas Ochoa MD  Unit/Bed#: [facility] 832-01 Encounter: [id number]  Date Of Visit: 12/10/17    Assessment:    Principal Problem: Thrombocytopenia (HCC)  Active Problems:    IDDM (insulin dependent diabetes mellitus) ([facility] [place] [facility] )    Morbid obesity ([facility] [place] [facility] )    End-stage renal disease on hemodialysis ([facility] [place] [facility] )    History of pulmonary embolism    Anemia of chronic disease      Plan:    1  Progressive thrombocytopenia with ecchymosis, she has had thrombocytopenia in the past, hematology input appreciated, normal PT PTT, follow on HIT panel, DC Pepcid  2  ESRD on HD MWF, nephrology is following  3  Chronic Respiratory Failure - home oxygen  2 L/min  4  History of DVT/PE  On Eliquis  5  DM2 with hyperglycemia- A1c 8 6 continue Lantus at bedtime increase NPH dose, continue insulin sliding-scale  6  Chronic Diastolic Heart Failure - EF 65 %  Continue with hemodialysis  7  OHS / morbid obesity, CPAP at bedtime  8  Hypothyroidism, on Synthroid, check TSH  9  Anemia of chronic disease, stable  10  Constipation, continue with stool softener, add lactulose  11  Patient does not know why she is on a prednisone, I will start to taper it    12  Sacral wound, consult wound care    VTE Pharmacologic Prophylaxis:   Pharmacologic: Apixaban (Eliquis)  Mechanical VTE Prophylaxis in Place: Yes    Patient Centered Rounds: I have performed bedside rounds with nursing staff today  Discussions with Specialists or Other Care Team Provider:     Education and Discussions with Family / Patient:  Patient    Time Spent for Care: 30 minutes  More than 50% of total time spent on counseling and coordination of care as described above      Current Length of Stay: 0 day(s)    Current Patient Status: Observation   Certification Statement:  Observation, workup for thrombocytopenia    Discharge Plan / Estimated Discharge Date:  Not ready yet    Code Status: Level 3 - DNAR and DNI      Subjective:   Patient seen and examined  Comfortable in bed  Complaining of rectal pain  No nausea vomiting    Objective:     Vitals:   Temp (24hrs), Av 1 °F (36 7 °C), Min:98 °F (36 7 °C), Max:98 2 °F (36 8 °C)    HR:  [72-81] 76  Resp:  [16-20] 17  BP: (129-161)/(60-70) 136/64  SpO2:  [99 %-100 %] 99 %  Body mass index is 51 15 kg/m²  Input and Output Summary (last 24 hours): Intake/Output Summary (Last 24 hours) at 12/10/17 0931  Last data filed at 17 1700   Gross per 24 hour   Intake              480 ml   Output                0 ml   Net              480 ml       Physical Exam:     Physical Exam   Patient is awake alert in no acute distress  Morbid obesity   Lung clear to auscultation bilateral anteriorly  Heart positive S1-S2 no murmur  Abdomen soft obese nontender positive bowel sounds  Lower extremities no edema    Additional Data:     Labs:      Results from last 7 days  Lab Units 12/10/17  0450   WBC Thousand/uL 9 93   HEMOGLOBIN g/dL 8 3*   HEMATOCRIT % 25 7*   PLATELETS Thousands/uL 33*   LYMPHO PCT % 13*   MONO PCT MAN % 12   EOSINO PCT MANUAL % 1       Results from last 7 days  Lab Units 17  0615 17  2129   SODIUM mmol/L 135* 133*   POTASSIUM mmol/L 5 3 5 6*   CHLORIDE mmol/L 98* 96*   CO2 mmol/L 33* 30   BUN mg/dL 66* 59*   CREATININE mg/dL 2 31* 2 10*   CALCIUM mg/dL 8 9 8 6   TOTAL PROTEIN g/dL  --  6 4   BILIRUBIN TOTAL mg/dL  --  0 25   ALK PHOS U/L  --  65   ALT U/L  --  25   AST U/L  --  14   GLUCOSE RANDOM mg/dL 235* 445*       Results from last 7 days  Lab Units 17  2046   INR  1 05       * I Have Reviewed All Lab Data Listed Above  * Additional Pertinent Lab Tests Reviewed:  [facility] Admission Reviewed    Imaging:    Imaging Reports Reviewed Today Include:   Imaging Personally Reviewed by Myself Includes:      Recent Cultures (last 7 days):           Last 24 Hours Medication List:     allopurinol 200 mg Oral Once per day on Sun Tue Thu Sat   [START ON 12/11/2017] allopurinol 300 mg Oral Once per day on Mon Thu   apixaban 2 5 mg Oral BID   calcium carbonate 2 tablet Oral TID With Meals   cholecalciferol 1,000 Units Oral Daily   cyanocobalamin 1,000 mcg Oral Daily   docusate sodium 100 mg Oral BID   famotidine 20 mg Oral Daily   ferrous sulfate 325 mg Oral TID With Meals   insulin glargine 40 Units Subcutaneous HS   insulin lispro 1-5 Units Subcutaneous HS   insulin lispro 2-12 Units Subcutaneous TID AC   insulin NPH 8 Units Subcutaneous TID AC   latanoprost 1 drop Both Eyes HS   levothyroxine 25 mcg Oral Early Morning   metoprolol tartrate 12 5 mg Oral Q12H FREDI   miconazole  Topical BID   nystatin  Topical BID   pantoprazole 40 mg Oral Early Morning   polyethylene glycol 17 g Oral Daily   pravastatin 80 mg Oral Daily   predniSONE 40 mg Oral Daily   pregabalin 75 mg Oral BID        Today, Patient Was Seen By: Mariposa Barboza DO    ** Please Note: This note has been constructed using a voice recognition system   **

## 2017-12-10 NOTE — PLAN OF CARE
DISCHARGE PLANNING     Discharge to home or other facility with appropriate resources Progressing        DISCHARGE PLANNING - CARE MANAGEMENT     Discharge to post-acute care or home with appropriate resources Progressing        HEMATOLOGIC - ADULT     Maintains hematologic stability Progressing        INFECTION - ADULT     Absence or prevention of progression during hospitalization Progressing        Knowledge Deficit     Patient/family/caregiver demonstrates understanding of disease process, treatment plan, medications, and discharge instructions Progressing        METABOLIC, FLUID AND ELECTROLYTES - ADULT     Electrolytes maintained within normal limits Progressing     Fluid balance maintained Progressing     Glucose maintained within target range Progressing        MUSCULOSKELETAL - ADULT     Maintain or return mobility to safest level of function Progressing     Maintain proper alignment of affected body part Progressing        Nutrition/Hydration-ADULT     Nutrient/Hydration intake appropriate for improving, restoring or maintaining nutritional needs Progressing        PAIN - ADULT     Verbalizes/displays adequate comfort level or baseline comfort level Progressing        Potential for Falls     Patient will remain free of falls Progressing        SAFETY ADULT     Maintain or return to baseline ADL function Progressing     Maintain or return mobility status to optimal level Progressing     Patient will remain free of falls Progressing        SKIN/TISSUE INTEGRITY - ADULT     Skin integrity remains intact Progressing     Incision(s), wounds(s) or drain site(s) healing without S/S of infection Progressing     Oral mucous membranes remain intact Progressing

## 2017-12-10 NOTE — CASE MANAGEMENT
Initial Clinical Review    Admission: Date/Time/Statement: 12/8   2308    Orders Placed This Encounter   Procedures    Place in Observation (expected length of stay for this patient is less than two midnights)     Standing Status:   Standing     Number of Occurrences:   1     Order Specific Question:   Admitting Physician     Answer:   Ashlyn Ash [[id number]]     Order Specific Question:   Level of Care     Answer:   Med Surg [[id number]]         ED: Date/Time/Mode of Arrival:   ED Arrival Information     Expected Arrival Acuity Means of Arrival Escorted By Service Admission Type    - 12/8/2017 20:51 [facility] Complaint    Abnormal Labs          Chief Complaint:   Chief Complaint   Patient presents with    Abnormal Lab     low platelets 29       History of Illness: Jv Bird is a 67 y o  female who was recently admitted from November 23rd and discharged on December 3rd for gram-negative pneumonia  Likewise, the patient has morbid obesity with insulin-dependent diabetes mellitus, chronic congestive heart failure (diastolic), and pulmonary embolism  Because of morbid obesity, essentially the patient is very much in active and according to her she stays on bed all day  The patient had recently a new hemodialysis catheter placed on the left arm which has been there since 2 weeks ago  The patient denies any active bleeding  She also denies any melena or hematochezia  She claims not to have any vomiting of blood  The patient denies any active source of bleeding at all  In any case, the patient then was seen to have adequate platelets even amounting to 252 on September 7, with the time that the patient was seen and admitted on November 23, her platelets were 941  For some unknown reason it continues to drop  The patient was sent here because her CBC showed a platelet of 27  The patient again denies any bleeding    Upon repeat laboratories done here in the emergency room, her platelet count was 35  That said, the patient is placed in order to be seen by Hematology      Otherwise, the patient denies any new onset bleeding as noted  She also denies any shortness of breath    She denies any active new complaints        ED Vital Signs:   ED Triage Vitals   Temperature Pulse Respirations Blood Pressure SpO2   12/08/17 2054 12/08/17 2054 12/08/17 2054 12/08/17 2057 12/08/17 2054   98 6 °F (37 °C) 79 18 148/68 100 %      Temp Source Heart Rate Source Patient Position - Orthostatic VS BP Location FiO2 (%)   12/08/17 2054 12/08/17 2153 12/08/17 2153 12/08/17 2230 --   Oral Monitor Lying Left arm       Pain Score       12/08/17 2054       No Pain        Wt Readings from Last 1 Encounters:   12/08/17 135 kg (298 lb)       Vital Signs (abnormal):   12/08/17 2330 -- 74 18 154/67 98 % -- [facility]   12/08/17 2230 -- 76 18  193/79 100 % -- [facility]   12/08/17 2153 -- 79 18  171/72 100 % -- [facility]         Abnormal Labs/Diagnostic Test Results: H&H  9 0 27 8, plt ct  35,    Sodium 133 (L) 136 - 145 mmol/L     Potassium 5 6 (H) 3 5 - 5 3 mmol/L     Chloride 96 (L) 100 - 108 mmol/L     CO2 30 21 - 32 mmol/L     Anion Gap 7 4 - 13 mmol/L     BUN 59 (H) 5 - 25 mg/dL     Creatinine 2 10 (H) 0 60 - 1 30 mg/dL     Comment: Standardized to IDMS reference method       Glucose 445 (H) 65 - 140 mg/dL    Alb   2 3    ED Treatment:   Medication Administration from 12/08/2017 2051 to 12/09/2017 1425       Date/Time Order Dose Route Action Action by Comments     12/09/2017 0834 allopurinol (ZYLOPRIM) tablet 200 mg 200 mg Oral Given Cassie Rodriguez RN      12/09/2017 0839 docusate sodium (COLACE) capsule 100 mg 100 mg Oral Given Cassie Rodriguez RN      12/09/2017 0835 pravastatin (PRAVACHOL) tablet 80 mg 80 mg Oral Given Cassie Rodriguez RN      12/09/2017 0654 levothyroxine tablet 25 mcg 25 mcg Oral Given Cassie Rodriguez RN      12/09/2017 505 cyanocobalamin (VITAMIN B-12) tablet 1,000 mcg 1,000 mcg Oral Given Chay Cancer, RN      12/09/2017 6154 metoprolol tartrate (LOPRESSOR) partial tablet 12 5 mg 12 5 mg Oral Given Chay Cancer, RN      12/09/2017 9662 senna (SENOKOT) tablet 17 2 mg 17 2 mg Oral Given Chay Cancer, RN      12/09/2017 0654 pantoprazole (PROTONIX) EC tablet 40 mg 40 mg Oral Given Chay Cancer, RN      12/09/2017 0900 miconazole 2 % cream   Topical Not Given Clois Ferns, RN      12/09/2017 0839 pregabalin (LYRICA) capsule 75 mg 75 mg Oral Given Chay Cancer, RN      12/09/2017 8743 predniSONE tablet 40 mg 40 mg Oral Given Chay Cancer, RN      12/09/2017 5221 apixaban (ELIQUIS) tablet 2 5 mg 2 5 mg Oral Given Chay Cancer, RN      12/09/2017 1200 calcium carbonate (OYSTER SHELL,OSCAL) 500 mg tablet 2 tablet 2 tablet Oral Not Given Clois Ferns, RN      12/09/2017 2528 calcium carbonate (OYSTER SHELL,OSCAL) 500 mg tablet 2 tablet 2 tablet Oral Given Chay Cancer, RN      12/09/2017 0840 polyethylene glycol (MIRALAX) packet 17 g 17 g Oral Given Chay Cancer, RN      12/09/2017 0835 bisacodyl (DULCOLAX) EC tablet 5 mg 5 mg Oral Given Chay Cancer, RN      12/09/2017 1200 ferrous sulfate tablet 325 mg 325 mg Oral Not Given Clois Ferns, RN      12/09/2017 1848 ferrous sulfate tablet 325 mg 325 mg Oral Given Chay Cancer, RN      12/09/2017 0839 famotidine (PEPCID) tablet 20 mg 20 mg Oral Given Chay Cancer, RN      12/09/2017 1251 insulin NPH (HumuLIN N,NovoLIN N) 100 Units/mL subcutaneous injection 8 Units 8 Units Subcutaneous Given Chay Cancer, RN      12/09/2017 0946 insulin NPH (HumuLIN N,NovoLIN N) 100 Units/mL subcutaneous injection 8 Units 8 Units Subcutaneous Given Chay Cancer, RN      12/09/2017 9565 cholecalciferol (VITAMIN D3) tablet 1,000 Units 1,000 Units Oral Given Chay Cancer, RN      12/09/2017 1247 insulin lispro (HumaLOG) 100 units/mL subcutaneous injection 2-12 Units 4 Units Subcutaneous Given Carlos GODFREY Norman Doshi RN      12/09/2017 0931 insulin lispro (HumaLOG) 100 units/mL subcutaneous injection 2-12 Units 4 Units Subcutaneous Given Keeley Dunn RN      12/09/2017 0231 insulin lispro (HumaLOG) 100 units/mL subcutaneous injection 1-5 Units 3 Units Subcutaneous Given Fawn Tellez, RN           Past Medical/Surgical History:    Active Ambulatory Problems     Diagnosis Date Noted    Chronic diastolic CHF (congestive heart failure) ([facility]) 01/21/2016    IDDM (insulin dependent diabetes mellitus) ([place] [id number] ) 01/21/2016    Morbid obesity ([place] [address] ) 01/21/2016    End-stage renal disease on hemodialysis ([place] [address] ) 11/01/2016    Essential hypertension 11/02/2016    Chronic respiratory failure with hypoxia and hypercapnia ([place] [address] ) 11/02/2016    History of pulmonary embolism 08/27/2017    Chronic pain of right upper extremity 08/27/2017    Acquired coagulopathy - Coumadin 08/29/2017     Resolved Ambulatory Problems     Diagnosis Date Noted    Hyperkalemia 01/21/2016    NICHO (acute kidney injury) ([place] [address] ) 01/21/2016    Sinus bradycardia 01/21/2016    T wave inversion in EKG 01/21/2016    Acute kidney injury superimposed on CKD ([place] [address] ) 05/06/2016    Encephalopathy 05/06/2016    DVT (deep venous thrombosis) ([facility]) 05/06/2016    Acute hypercapnic respiratory failure ([facility]) 11/01/2016    Altered mental status 11/01/2016    GI bleed 08/15/2017    Acute blood loss anemia 08/18/2017    Rectal bleeding 08/18/2017    Acute pain of right shoulder 08/29/2017    Pneumonia 11/23/2017     Past Medical History:   Diagnosis Date    Adrenal insufficiency ([facility])     Adrenocortical insufficiency ([facility])     Anemia     Bradycardia     Cardiac disease     Cataract     CHF (congestive heart failure) ([facility])     Constipation     CPAP (continuous positive airway pressure) dependence     Dependence on supplemental oxygen     Diabetes mellitus ([facility])     Difficulty walking     Disease of thyroid gland     Dysphagia     Encephalopathy     GERD (gastroesophageal reflux disease)     Glaucoma     Gout     History of transfusion     Hyperlipidemia     Hypertension     Insomnia     Insomnia     Obesity     Osteoarthritis     PONV (postoperative nausea and vomiting)     Renal disorder     Sleep apnea        Admitting Diagnosis: Morbid obesity ([facility] ) [E66 01]  Hyperkalemia [E87 5]  Thrombocytopenia (HCC) [D69 6]  Abnormal laboratory test result [R89 9]    Age/Sex: 67 y o  female    Assessment/Plan: Hospital Problem List:      Active Problems:    IDDM (insulin dependent diabetes mellitus) ([facility] )    Pulmonary embolism (HCC)    Thrombocytopenia (HCC)    Abnormal laboratory test result    Morbid obesity ([facility] )        Plan for the Primary Problem(s):  · Observation, medical surgical floor  · Thrombocytopenia  At this point, we are not so sure what is causing this  Hematology consult  · End-stage renal disease on hemodialysis  Patient usually gets hemodialyzed every Mondays, Wednesdays and Fridays  As essentially the patient is here for opinion from Hematology, most likely the patient will be discharged soon  Will leave to the team whether they would like to get Nephrology consult in order to proceed with hemodialysis should the patient need continued stay  But get BMP and CBC tomorrow      Plan for Additional Problem(s):   · Pulmonary embolism  In this regard, the patient still needs her apixaban  Patient does not show any signs of bleeding and will continue anticoagulant for now      VTE Prophylaxis: Apixaban (Eliquis)  / sequential compression device   Code Status: Prior do not resuscitate, level 3  POLST: There is no POLST form on file for this patient (pre-hospital)     Anticipated Length of Stay:  Patient will be admitted on an Observation basis with an anticipated length of stay of  less than 2 midnights     Justification for Hospital Stay: Please see detailed plans noted above        Admission Orders:  Scheduled Meds:   allopurinol 200 mg Oral Once per day on Sun Tue Thu Sat   [START ON 12/11/2017] allopurinol 300 mg Oral Once per day on Mon Thu   apixaban 2 5 mg Oral BID   calcium carbonate 2 tablet Oral TID With Meals   cholecalciferol 1,000 Units Oral Daily   cyanocobalamin 1,000 mcg Oral Daily   docusate sodium 100 mg Oral BID   famotidine 20 mg Oral Daily   ferrous sulfate 325 mg Oral TID With Meals   insulin glargine 40 Units Subcutaneous HS   insulin lispro 1-5 Units Subcutaneous HS   insulin lispro 2-12 Units Subcutaneous TID AC   insulin NPH 8 Units Subcutaneous TID AC   latanoprost 1 drop Both Eyes HS   levothyroxine 25 mcg Oral Early Morning   metoprolol tartrate 12 5 mg Oral Q12H FREDI   miconazole  Topical BID   nystatin  Topical BID   pantoprazole 40 mg Oral Early Morning   polyethylene glycol 17 g Oral Daily   pravastatin 80 mg Oral Daily   predniSONE 40 mg Oral Daily   pregabalin 75 mg Oral BID     Continuous Infusions:    PRN Meds:   acetaminophen    bisacodyl    dextromethorphan-guaiFENesin    docusate sodium    HYDROmorphone    menthol-methyl salicylate    ondansetron    ondansetron    senna     Fingerstick ac and hd  Hematology , nephrology consult   SCD  Cons carb diet   Contact isolation   cpap at HS   12/10 cbc    Internal med progress note 12/9  Thrombocytopenia ([address] )   Assessment & Plan     · Patient has had thrombocytopenia in the past with no known etiology  · Her current episode of thrombocytopenia may have been provoked by initiation of Eliquis  She was started on this on 09/03/2017 after she experienced multiple episodes of GIB while on Coumadin  · Hematology consult is pending and appreciate their input  · Continue Eliquis for now as she has no evidence of active bleeding        Anemia of chronic disease   Assessment & Plan     · Secondary to end-stage renal disease as well as history of iron deficiency anemia  · Baseline hemoglobin: 8 0-9 0  · Check iron studies    · Continue to monitor        History of pulmonary embolism   Assessment & Plan     · Isolated event in 2004  · However, given patient's morbid obesity, bed-bound status and other comorbidities, the patient remains at risk for recurrence DVT/PE  · Patient had been converted to Eliquis on 9/3/17 after she experiences multiple episodes of GIB on Coumadin        End-stage renal disease on hemodialysis ([facility] )   Assessment & Plan     · On HD every MWF       Morbid obesity ([facility] )   Assessment & Plan     · Patient is currently bed-bound        IDDM (insulin dependent diabetes mellitus) ([facility] )   Assessment & Plan     · A1c: 8 6  · Blood sugars:  211, 235, 308  · Continue Lantus 40 units daily and NPH 8 units with meals           VTE Pharmacologic Prophylaxis:   Pharmacologic: Apixaban (Eliquis)  Mechanical: Mechanical VTE prophylaxis in place      Patient Centered Rounds: I have performed bedside rounds with nursing staff today (ED nurseCari)  Discussions with Specialists or Other Care Team Provider: None  Education and Discussions with Family / Patient: All patient questions answered to the best of my ability  Time Spent for Care: 20 minutes  More than 50% of total time spent on counseling and coordination of care as described above      Current Length of Stay: 0 day(s)  Current Patient Status: Observation   Certification Statement: Maintain observation status        Discharge Plan: Await hematology evaluation and recommendations  [place] [facility] [place] when able  Code Status: Level 3 - DNAR and DNI    Nephrology consult 12/9  ASSESSMENT and PLAN:  1  ESRD on HD (MWF):  Patient dialyzes at [address]  -patient compliant with treatment  Last treatment 12/8  -outpatient prescription:  Treatment time 240 minutes, estimated dry weight 136 kg, Qb 400, daily 800, sodium 137, 3 potassium, 2 5 calcium, bicarbonate 35  -borderline elevated potassium  Renal diet  2  Access:  PermCath left IJ  -AV fistula right arm    Recent infiltration/hematoma therefore arm is being rested at this time   -no issues with PermCath  3  Hypertension:  Blood pressure acceptable  4  Anemia:  Hemoglobin below goal  -on Venofer 50 mg weekly  -no Epogen  5  Mineral and bone disease:  -continue Hectorol 0 5 mcg with each treatment  6  IDDM:  Management per primary team  7  PE:  On Eliquis  8  Thrombocytopenia:  Hematology following    Oncology consult 12/9     Assessment and plan:  1  Progressive thrombocytopenia with ecchymosis in a patient who was initiated on apixaban 2 5 mg p o  b i d  few weeks ago, she is hemodialysis dependent, she gets heparin throughout the dialysis  2  I will order PT, PTT, hit panel  3  History of PE, she has sedentary lifestyle, she needs to be on anticoagulation however with hemodialysis dependence the I believe she might need to be on Coumadin to keep INR between 1 8-2 5  4    There was no other new medication added in the past few weeks be side apixaban, the package insert reported thrombocytopenia

## 2017-12-11 ENCOUNTER — APPOINTMENT (INPATIENT)
Dept: DIALYSIS | Facility: HOSPITAL | Age: 72
DRG: 813 | End: 2017-12-11
Payer: MEDICARE

## 2017-12-11 LAB
ANION GAP SERPL CALCULATED.3IONS-SCNC: 6 MMOL/L (ref 4–13)
ANISOCYTOSIS BLD QL SMEAR: PRESENT
ATRIAL RATE: 68 BPM
ATRIAL RATE: 70 BPM
ATRIAL RATE: 75 BPM
BASOPHILS # BLD MANUAL: 0 THOUSAND/UL (ref 0–0.1)
BASOPHILS NFR MAR MANUAL: 0 % (ref 0–1)
BUN SERPL-MCNC: 106 MG/DL (ref 5–25)
CALCIUM SERPL-MCNC: 8.6 MG/DL (ref 8.3–10.1)
CHLORIDE SERPL-SCNC: 97 MMOL/L (ref 100–108)
CO2 SERPL-SCNC: 30 MMOL/L (ref 21–32)
CREAT SERPL-MCNC: 4.59 MG/DL (ref 0.6–1.3)
EOSINOPHIL # BLD MANUAL: 0.28 THOUSAND/UL (ref 0–0.4)
EOSINOPHIL NFR BLD MANUAL: 3 % (ref 0–6)
ERYTHROCYTE [DISTWIDTH] IN BLOOD BY AUTOMATED COUNT: 15.2 % (ref 11.6–15.1)
GFR SERPL CREATININE-BSD FRML MDRD: 9 ML/MIN/1.73SQ M
GIANT PLATELETS BLD QL SMEAR: PRESENT
GLUCOSE SERPL-MCNC: 168 MG/DL (ref 65–140)
GLUCOSE SERPL-MCNC: 230 MG/DL (ref 65–140)
GLUCOSE SERPL-MCNC: 249 MG/DL (ref 65–140)
GLUCOSE SERPL-MCNC: 348 MG/DL (ref 65–140)
GLUCOSE SERPL-MCNC: 98 MG/DL (ref 65–140)
HCT VFR BLD AUTO: 25 % (ref 34.8–46.1)
HGB BLD-MCNC: 8.2 G/DL (ref 11.5–15.4)
HYPERCHROMIA BLD QL SMEAR: PRESENT
LYMPHOCYTES # BLD AUTO: 0.65 THOUSAND/UL (ref 0.6–4.47)
LYMPHOCYTES # BLD AUTO: 7 % (ref 14–44)
MCH RBC QN AUTO: 30.4 PG (ref 26.8–34.3)
MCHC RBC AUTO-ENTMCNC: 32.8 G/DL (ref 31.4–37.4)
MCV RBC AUTO: 93 FL (ref 82–98)
METAMYELOCYTES NFR BLD MANUAL: 1 % (ref 0–1)
MONOCYTES # BLD AUTO: 0.65 THOUSAND/UL (ref 0–1.22)
MONOCYTES NFR BLD: 7 % (ref 4–12)
MYELOCYTES NFR BLD MANUAL: 1 % (ref 0–1)
NEUTROPHILS # BLD MANUAL: 7.54 THOUSAND/UL (ref 1.85–7.62)
NEUTS BAND NFR BLD MANUAL: 4 % (ref 0–8)
NEUTS SEG NFR BLD AUTO: 77 % (ref 43–75)
NRBC BLD AUTO-RTO: 0 /100 WBCS
P AXIS: 16 DEGREES
P AXIS: 19 DEGREES
P AXIS: 41 DEGREES
PLATELET # BLD AUTO: 34 THOUSANDS/UL (ref 149–390)
PLATELET BLD QL SMEAR: ABNORMAL
POTASSIUM SERPL-SCNC: 6 MMOL/L (ref 3.5–5.3)
PR INTERVAL: 170 MS
PR INTERVAL: 170 MS
PR INTERVAL: 174 MS
QRS AXIS: 11 DEGREES
QRS AXIS: 12 DEGREES
QRS AXIS: 7 DEGREES
QRSD INTERVAL: 102 MS
QRSD INTERVAL: 106 MS
QRSD INTERVAL: 94 MS
QT INTERVAL: 352 MS
QT INTERVAL: 374 MS
QT INTERVAL: 384 MS
QTC INTERVAL: 393 MS
QTC INTERVAL: 397 MS
QTC INTERVAL: 414 MS
RBC # BLD AUTO: 2.7 MILLION/UL (ref 3.81–5.12)
RBC MORPH BLD: PRESENT
SODIUM SERPL-SCNC: 133 MMOL/L (ref 136–145)
T WAVE AXIS: 57 DEGREES
T WAVE AXIS: 62 DEGREES
T WAVE AXIS: 74 DEGREES
TSH SERPL DL<=0.05 MIU/L-ACNC: 1.51 UIU/ML (ref 0.36–3.74)
VENTRICULAR RATE: 68 BPM
VENTRICULAR RATE: 70 BPM
VENTRICULAR RATE: 75 BPM
WBC # BLD AUTO: 9.31 THOUSAND/UL (ref 4.31–10.16)

## 2017-12-11 PROCEDURE — 85027 COMPLETE CBC AUTOMATED: CPT | Performed by: INTERNAL MEDICINE

## 2017-12-11 PROCEDURE — 86705 HEP B CORE ANTIBODY IGM: CPT | Performed by: INTERNAL MEDICINE

## 2017-12-11 PROCEDURE — 93005 ELECTROCARDIOGRAM TRACING: CPT

## 2017-12-11 PROCEDURE — 85007 BL SMEAR W/DIFF WBC COUNT: CPT | Performed by: INTERNAL MEDICINE

## 2017-12-11 PROCEDURE — 84443 ASSAY THYROID STIM HORMONE: CPT | Performed by: INTERNAL MEDICINE

## 2017-12-11 PROCEDURE — 86704 HEP B CORE ANTIBODY TOTAL: CPT | Performed by: INTERNAL MEDICINE

## 2017-12-11 PROCEDURE — 80048 BASIC METABOLIC PNL TOTAL CA: CPT | Performed by: INTERNAL MEDICINE

## 2017-12-11 PROCEDURE — 86706 HEP B SURFACE ANTIBODY: CPT | Performed by: INTERNAL MEDICINE

## 2017-12-11 PROCEDURE — 86803 HEPATITIS C AB TEST: CPT | Performed by: INTERNAL MEDICINE

## 2017-12-11 PROCEDURE — 87340 HEPATITIS B SURFACE AG IA: CPT | Performed by: INTERNAL MEDICINE

## 2017-12-11 PROCEDURE — 94760 N-INVAS EAR/PLS OXIMETRY 1: CPT

## 2017-12-11 PROCEDURE — 94660 CPAP INITIATION&MGMT: CPT

## 2017-12-11 PROCEDURE — 82948 REAGENT STRIP/BLOOD GLUCOSE: CPT

## 2017-12-11 RX ORDER — FERROUS SULFATE 325(65) MG
325 TABLET ORAL
Status: DISCONTINUED | OUTPATIENT
Start: 2017-12-12 | End: 2017-12-14 | Stop reason: HOSPADM

## 2017-12-11 RX ADMIN — MICONAZOLE NITRATE: 20 CREAM TOPICAL at 17:28

## 2017-12-11 RX ADMIN — APIXABAN 2.5 MG: 2.5 TABLET, FILM COATED ORAL at 07:34

## 2017-12-11 RX ADMIN — DOCUSATE SODIUM 100 MG: 100 CAPSULE, LIQUID FILLED ORAL at 17:27

## 2017-12-11 RX ADMIN — INSULIN GLARGINE 40 UNITS: 100 INJECTION, SOLUTION SUBCUTANEOUS at 21:28

## 2017-12-11 RX ADMIN — LACTULOSE 20 G: 20 SOLUTION ORAL at 13:16

## 2017-12-11 RX ADMIN — LEVOTHYROXINE SODIUM 25 MCG: 25 TABLET ORAL at 05:38

## 2017-12-11 RX ADMIN — Medication 2 TABLET: at 07:35

## 2017-12-11 RX ADMIN — Medication 325 MG: at 07:34

## 2017-12-11 RX ADMIN — METOPROLOL TARTRATE 12.5 MG: 25 TABLET ORAL at 07:33

## 2017-12-11 RX ADMIN — INSULIN HUMAN 10 UNITS: 100 INJECTION, SUSPENSION SUBCUTANEOUS at 17:28

## 2017-12-11 RX ADMIN — CYANOCOBALAMIN TAB 500 MCG 1000 MCG: 500 TAB at 07:34

## 2017-12-11 RX ADMIN — DOCUSATE SODIUM 100 MG: 100 CAPSULE, LIQUID FILLED ORAL at 07:34

## 2017-12-11 RX ADMIN — INSULIN LISPRO 2 UNITS: 100 INJECTION, SOLUTION INTRAVENOUS; SUBCUTANEOUS at 17:27

## 2017-12-11 RX ADMIN — ACETAMINOPHEN 650 MG: 325 TABLET, FILM COATED ORAL at 13:21

## 2017-12-11 RX ADMIN — NYSTATIN: 100000 POWDER TOPICAL at 17:28

## 2017-12-11 RX ADMIN — PREGABALIN 75 MG: 75 CAPSULE ORAL at 07:34

## 2017-12-11 RX ADMIN — INSULIN HUMAN 10 UNITS: 100 INJECTION, SUSPENSION SUBCUTANEOUS at 13:16

## 2017-12-11 RX ADMIN — METOPROLOL TARTRATE 12.5 MG: 25 TABLET ORAL at 21:28

## 2017-12-11 RX ADMIN — APIXABAN 2.5 MG: 2.5 TABLET, FILM COATED ORAL at 17:26

## 2017-12-11 RX ADMIN — PRAVASTATIN SODIUM 80 MG: 80 TABLET ORAL at 07:33

## 2017-12-11 RX ADMIN — Medication 2 TABLET: at 17:28

## 2017-12-11 RX ADMIN — HYDROMORPHONE HYDROCHLORIDE 2 MG: 2 TABLET ORAL at 09:22

## 2017-12-11 RX ADMIN — INSULIN LISPRO 4 UNITS: 100 INJECTION, SOLUTION INTRAVENOUS; SUBCUTANEOUS at 07:36

## 2017-12-11 RX ADMIN — LATANOPROST 1 DROP: 50 SOLUTION OPHTHALMIC at 21:29

## 2017-12-11 RX ADMIN — ALLOPURINOL 300 MG: 300 TABLET ORAL at 07:34

## 2017-12-11 RX ADMIN — PREDNISONE 20 MG: 20 TABLET ORAL at 13:17

## 2017-12-11 RX ADMIN — ACETAMINOPHEN 650 MG: 325 TABLET, FILM COATED ORAL at 05:38

## 2017-12-11 RX ADMIN — NYSTATIN: 100000 POWDER TOPICAL at 07:37

## 2017-12-11 RX ADMIN — VITAMIN D, TAB 1000IU (100/BT) 1000 UNITS: 25 TAB at 07:34

## 2017-12-11 RX ADMIN — Medication 2 TABLET: at 13:16

## 2017-12-11 RX ADMIN — INSULIN LISPRO 4 UNITS: 100 INJECTION, SOLUTION INTRAVENOUS; SUBCUTANEOUS at 21:28

## 2017-12-11 RX ADMIN — PREGABALIN 75 MG: 75 CAPSULE ORAL at 17:27

## 2017-12-11 RX ADMIN — MICONAZOLE NITRATE: 20 CREAM TOPICAL at 07:36

## 2017-12-11 RX ADMIN — PANTOPRAZOLE SODIUM 40 MG: 40 TABLET, DELAYED RELEASE ORAL at 05:38

## 2017-12-11 RX ADMIN — INSULIN HUMAN 10 UNITS: 100 INJECTION, SUSPENSION SUBCUTANEOUS at 07:36

## 2017-12-11 RX ADMIN — GUAIFENESIN AND DEXTROMETHORPHAN 5 ML: 100; 10 SYRUP ORAL at 21:27

## 2017-12-11 NOTE — CASE MANAGEMENT
PATIENT CLASS CLARIFICATION:  WAS OBS 12/8/17 @2308 CHANGED TO INPATIENT 12/10/17 @2034  12/10/17 1222  Inpatient Admission Once     Transfer Service: General Medicine       Question Answer Comment   Admitting Physician [facility] Med Surg    Estimated length of stay More than 2 Midnights    Certification I certify that inpatient services are medically necessary for this patient for a duration of greater than two midnights  See H&P and MD Progress Notes for additional information about the patient's course of treatment          12/10/17 1221

## 2017-12-11 NOTE — PLAN OF CARE
DISCHARGE PLANNING     Discharge to home or other facility with appropriate resources Progressing        DISCHARGE PLANNING - CARE MANAGEMENT     Discharge to post-acute care or home with appropriate resources Progressing        HEMATOLOGIC - ADULT     Maintains hematologic stability Progressing        INFECTION - ADULT     Absence or prevention of progression during hospitalization Progressing        Knowledge Deficit     Patient/family/caregiver demonstrates understanding of disease process, treatment plan, medications, and discharge instructions Progressing        METABOLIC, FLUID AND ELECTROLYTES - ADULT     Electrolytes maintained within normal limits Progressing     Fluid balance maintained Progressing     Glucose maintained within target range Progressing        MUSCULOSKELETAL - ADULT     Maintain or return mobility to safest level of function Progressing     Maintain proper alignment of affected body part Progressing        Nutrition/Hydration-ADULT     Nutrient/Hydration intake appropriate for improving, restoring or maintaining nutritional needs Progressing        PAIN - ADULT     Verbalizes/displays adequate comfort level or baseline comfort level Progressing        Potential for Falls     Patient will remain free of falls Progressing        Prexisting or High Potential for Compromised Skin Integrity     Skin integrity is maintained or improved Progressing        SAFETY ADULT     Maintain or return to baseline ADL function Progressing     Maintain or return mobility status to optimal level Progressing     Patient will remain free of falls Progressing        SKIN/TISSUE INTEGRITY - ADULT     Skin integrity remains intact Progressing     Incision(s), wounds(s) or drain site(s) healing without S/S of infection Progressing     Oral mucous membranes remain intact Progressing

## 2017-12-11 NOTE — PROGRESS NOTES
[facility] Internal Medicine Progress Note  Patient: Rich Carbajal 67 y o  female   MRN: [id number]  PCP: Christen Montejo MD  [facility] Encounter: [id number]  Date Of Visit: 12/11/17    Assessment:    Principal Problem: Thrombocytopenia (HCC)  Active Problems:    [facility] mellitus) [facility]    Morbid obesity ([facility] )    End-stage renal disease on hemodialysis ([facility] )    History of pulmonary embolism    Anemia of chronic disease      Plan:    1  Progressive thrombocytopenia, she has had thrombocytopenia in the past, follow on  HIT panel, hematology is following  2  ESRD on HD MWF, hyperkalemia today, nephrology is following  3  Chronic Respiratory Failure - home oxygen  2 L/min  4  History of DVT/PE  Continue Eliquis  5  DM2 with hyperglycemia- A1c 8 6 continue Lantus at bedtime , and  NPH tid , continue insulin sliding-scale  6  Chronic Diastolic Heart Failure - EF 65 %  Continue with hemodialysis  7  OHS / morbid obesity, CPAP at bedtime  8  Hypothyroidism, on Synthroid, normal TSH  9  Anemia of chronic disease, stable  10  Constipation, continue with stool softener, add lactulose  11  Patient does not know why she is on a prednisone, continue to taper to off   12  Sacral wound, wound care consulted    VTE Pharmacologic Prophylaxis:   Pharmacologic: Apixaban (Eliquis)  Mechanical VTE Prophylaxis in Place: Yes    Patient Centered Rounds: I have performed bedside rounds with nursing staff today  Discussions with Specialists or Other Care Team Provider:     Education and Discussions with Family / Patient:  Patient    Time Spent for Care: 30 minutes  More than 50% of total time spent on counseling and coordination of care as described above      Current Length of Stay: 1 day(s)    Current Patient Status: Inpatient   Certification Statement:  Patient is still require hospital Stay due to workup for thrombocytopenia     Discharge Plan / Estimated Discharge Date:  Not ready yet    Code Status: Level 3 - DNAR and DNI      Subjective:   Patient seen and examined  Complaining of rectal pain  No chest pain or shortness of breath  Had a bowel movement today  No nausea vomiting    Objective:     Vitals:   Temp (24hrs), Av 6 °F (37 °C), Min:98 1 °F (36 7 °C), Max:98 8 °F (37 1 °C)    HR:  [66-83] 68  Resp:  [18-22] 18  BP: (111-157)/(46-73) 129/46  SpO2:  [98 %-100 %] 100 %  Body mass index is 51 15 kg/m²  Input and Output Summary (last 24 hours): Intake/Output Summary (Last 24 hours) at 17 1120  Last data filed at 17 1030   Gross per 24 hour   Intake             1140 ml   Output                0 ml   Net             1140 ml       Physical Exam:     Physical Exam   Patient is awake alert in no acute distress  Currently on hemodialysis machine  Morbid obesity   Lung clear to auscultation bilateral anteriorly  Heart positive S1-S2 no murmur  Abdomen soft obese nontender positive bowel sounds  Lower extremities no edema    Additional Data:     Labs:      Results from last 7 days  Lab Units 17  0842   WBC Thousand/uL 9 31   HEMOGLOBIN g/dL 8 2*   HEMATOCRIT % 25 0*   PLATELETS Thousands/uL 34*   LYMPHO PCT % 7*   MONO PCT MAN % 7   EOSINO PCT MANUAL % 3       Results from last 7 days  Lab Units 17  0842  17  2129   SODIUM mmol/L 133*  < > 133*   POTASSIUM mmol/L 6 0*  < > 5 6*   CHLORIDE mmol/L 97*  < > 96*   CO2 mmol/L 30  < > 30   BUN mg/dL 106*  < > 59*   CREATININE mg/dL 4 59*  < > 2 10*   CALCIUM mg/dL 8 6  < > 8 6   TOTAL PROTEIN g/dL  --   --  6 4   BILIRUBIN TOTAL mg/dL  --   --  0 25   ALK PHOS U/L  --   --  65   ALT U/L  --   --  25   AST U/L  --   --  14   GLUCOSE RANDOM mg/dL 249*  < > 445*   < > = values in this interval not displayed  Results from last 7 days  Lab Units 17  2046   INR  1 05       * I Have Reviewed All Lab Data Listed Above  * Additional Pertinent Lab Tests Reviewed:  [facility] Admission Reviewed    Imaging:    Imaging Reports Reviewed Today Include:   Imaging Personally Reviewed by Myself Includes:      Recent Cultures (last 7 days):           Last 24 Hours Medication List:     allopurinol 200 mg Oral Once per day on Sun Tue Thu Sat   allopurinol 300 mg Oral Once per day on Mon Thu   apixaban 2 5 mg Oral BID   calcium carbonate 2 tablet Oral TID With Meals   cholecalciferol 1,000 Units Oral Daily   cyanocobalamin 1,000 mcg Oral Daily   docusate sodium 100 mg Oral BID   ferrous sulfate 325 mg Oral TID With Meals   insulin glargine 40 Units Subcutaneous HS   insulin lispro 1-5 Units Subcutaneous HS   insulin lispro 2-12 Units Subcutaneous TID AC   insulin NPH 10 Units Subcutaneous TID AC   lactulose 20 g Oral Daily   latanoprost 1 drop Both Eyes HS   levothyroxine 25 mcg Oral Early Morning   metoprolol tartrate 12 5 mg Oral Q12H FREDI   miconazole  Topical BID   nystatin  Topical BID   pantoprazole 40 mg Oral Early Morning   polyethylene glycol 17 g Oral Daily   pravastatin 80 mg Oral Daily   predniSONE 20 mg Oral Daily   Followed by      Nel Costa ON 12/13/2017] predniSONE 10 mg Oral Daily   pregabalin 75 mg Oral BID        Today, Patient Was Seen By: Krystyna Butt DO    ** Please Note: This note has been constructed using a voice recognition system   **

## 2017-12-11 NOTE — PROGRESS NOTES
NEPHROLOGY PROGRESS NOTE   Yamile Vaca 67 y o  female MRN: [id number]  Unit/Bed#: [facility] 832-01 Encounter: [id number]  Reason for Consult: ESRD    ASSESSMENT AND PLAN:  ESRD on HD MWF [address]  - HD today, not on heparin with dialysis  - UF to EDW  - has perm cath placement  I saw and examined patient during hemodialysis treatment at 11:17 AM on 12/11/2017  The patient was receiving hemodialysis for treatment of end stage renal disease  I also reviewed vital signs, intake and output, lab results and recent events, and agree with dialysis order  Tolerating HD  BP acceptable  Time: 4 hours  Qb: 400  Sodium: 138  Dialyzer: F160  Qd: 1 5 times Qb  Potassium: as per protocol  Access: Perm cath  UF goal: to EDW  Bicarbonate: 35 Calcium 2 5    Sudden worsening of azotemia with   - rule out GI bleed, check FOBT  - likely secondary to prednisone  - patient is overall sleepy/lethargic although opens eyes and answers questions appropriately  - dialysis as above  Closely monitor mental status    Hyperkalemia, serum potassium six above goal, two potassium bath with dialysis today  - BMP in a m   - strict potassium restricted diet    CKD anemia, hemoglobin below goal but overall stable  - not on Epogen due to history of PE  - Transfuse p r n  For hemoglobin less than seven, if continued to worsen , consider iron saturation  Thrombocytopenia, ongoing hematology follow-up  - patient is not on heparin with dialysis  Awaiting HI T antibody  - agree with considering Coumadin as anticoagulation of choice    Hyperphosphatemia, serum phosphorus 6 1 in November 2017  - currently remains on calcium carbonate two tablets with each meal  - continue to closely monitor, renal diet    Blood pressure overall acceptable    SUBJECTIVE:  Patient seen and examined at bedside  She is overall lethargic today although opens eyes and answers all questions appropriately  She has been complaining of lower back pain   No chest pain, shortness of breath, nausea, vomiting, abdominal pain or diarrhea      OBJECTIVE:  Current Weight: Weight - Scale: 135 kg (298 lb)  Vitals:    12/11/17 1100   BP: (!) 129/46   Pulse: 68   Resp:    Temp:        Intake/Output Summary (Last 24 hours) at 12/11/17 1116  Last data filed at 12/11/17 1030   Gross per 24 hour   Intake             1140 ml   Output                0 ml   Net             1140 ml       Physical Examination:  General:  Lying in bed, no acute distress   HEENT:  PERRLA, EOMI, mild conjunctival pallor present  Neck:  Supple  Respiratory:  Bilateral decreased breath sound at bases, poor respiratory effort  CVS:  S1, S2 present  GI:  Soft, obese, non tender  CNS:  Overall sleepy although opens eyes, answers all questions appropriately, oriented x3  Extremities:  Trace edema in both lower extremities  Skin:  Unremarkable    Medications:    Current Facility-Administered Medications:     acetaminophen (TYLENOL) tablet 650 mg, 650 mg, Oral, Q6H PRN, Diego Rodriges MD, 650 mg at 12/11/17 0538    allopurinol (ZYLOPRIM) tablet 200 mg, 200 mg, Oral, Once per day on Sun Tue Thu Sat, Diego Rodriges MD, 200 mg at 12/10/17 7404    allopurinol (ZYLOPRIM) tablet 300 mg, 300 mg, Oral, Once per day on Mon Thu, Diego Rodriges MD, 300 mg at 12/11/17 0831    apixaban (ELIQUIS) tablet 2 5 mg, 2 5 mg, Oral, BID, Diego Rodriges MD, 2 5 mg at 12/11/17 8291    bisacodyl (DULCOLAX) EC tablet 5 mg, 5 mg, Oral, Daily PRN, Diego Rodriges MD, 5 mg at 12/10/17 7331    bisacodyl (DULCOLAX) rectal suppository 10 mg, 10 mg, Rectal, Daily PRN, Cristian Wright DO, 10 mg at 12/10/17 1501    calcium carbonate (OYSTER SHELL,OSCAL) 500 mg tablet 2 tablet, 2 tablet, Oral, TID With Meals, Diego Rodriges MD, 2 tablet at 12/11/17 0735    cholecalciferol (VITAMIN D3) tablet 1,000 Units, 1,000 Units, Oral, Daily, Diego Rodriges MD, 1,000 Units at 12/11/17 2764    cyanocobalamin (VITAMIN B-12) tablet 1,000 mcg, 1,000 mcg, Oral, Daily, Diego Rodriges MD, 1,000 mcg at 12/11/17 0734    dextromethorphan-guaiFENesin (ROBITUSSIN DM)  mg/5 mL oral syrup 5 mL, 5 mL, Oral, Q4H PRN, Diego Rodriges MD, 5 mL at 12/10/17 0906    docusate sodium (COLACE) capsule 100 mg, 100 mg, Oral, BID, Diego Rodriges MD, 100 mg at 12/11/17 0734    docusate sodium (COLACE) capsule 100 mg, 100 mg, Oral, BID PRN, Diego Rodriges MD    ferrous sulfate tablet 325 mg, 325 mg, Oral, TID With Meals, Diego Rodriges MD, 325 mg at 12/11/17 0734    HYDROmorphone (DILAUDID) tablet 2 mg, 2 mg, Oral, Q6H PRN, Diego Rodriges MD, 2 mg at 12/11/17 2031    insulin glargine (LANTUS) subcutaneous injection 40 Units, 40 Units, Subcutaneous, HS, Diego Rodriges MD, 40 Units at 12/10/17 2135    insulin lispro (HumaLOG) 100 units/mL subcutaneous injection 1-5 Units, 1-5 Units, Subcutaneous, HS, Diego Rodriges MD, 3 Units at 12/10/17 2135    insulin lispro (HumaLOG) 100 units/mL subcutaneous injection 2-12 Units, 2-12 Units, Subcutaneous, TID AC, 4 Units at 12/11/17 0736 **AND** Fingerstick Glucose (POCT), , , TID ACDiego MD    insulin NPH (HumuLIN N,NovoLIN N) 100 Units/mL subcutaneous injection 10 Units, 10 Units, Subcutaneous, TID AC, Cristian Wright, , 10 Units at 12/11/17 0736    lactulose 20 g/30 mL oral solution 20 g, 20 g, Oral, Daily, Cristian Wright DO, 20 g at 12/10/17 1203    latanoprost (XALATAN) 0 005 % ophthalmic solution 1 drop, 1 drop, Both Eyes, HS, Diego Rodriges MD, 1 drop at 12/10/17 2136    levothyroxine tablet 25 mcg, 25 mcg, Oral, Early Morning, Diego Rodriges MD, 25 mcg at 12/11/17 0538    menthol-methyl salicylate (BENGAY) 77-47 % cream, , Apply externally, 4x Daily PRN, Diego Rodriges MD    metoprolol tartrate (LOPRESSOR) partial tablet 12 5 mg, 12 5 mg, Oral, Q12H [address], Diego Rodriges MD, 12 5 mg at 12/11/17 8798    miconazole 2 % cream, , Topical, BID, Diego Rodriges MD    nystatin (MYCOSTATIN) powder, , Topical, BID, Thomas Wyman MD    ondansetron [facility]) injection 4 mg, 4 mg, Intravenous, Q6H PRN, Thomas Wyman MD    ondansetron (ZOFRAN-ODT) dispersible tablet 4 mg, 4 mg, Oral, Q4H PRN, Thomas Wyman MD    pantoprazole (PROTONIX) EC tablet 40 mg, 40 mg, Oral, Early Morning, Thomas Wyman MD, 40 mg at 12/11/17 0538    polyethylene glycol (MIRALAX) packet 17 g, 17 g, Oral, Daily, Thomas Wyman MD, 17 g at 12/10/17 0848    pravastatin (PRAVACHOL) tablet 80 mg, 80 mg, Oral, Daily, Thomas Wyman MD, 80 mg at 12/11/17 0396    predniSONE tablet 20 mg, 20 mg, Oral, Daily **FOLLOWED BY** [START ON 12/13/2017] predniSONE tablet 10 mg, 10 mg, Oral, Daily, Leopoldo Conquest,     pregabalin (LYRICA) capsule 75 mg, 75 mg, Oral, BID, Thomas Wyman MD, 75 mg at 12/11/17 0734    senna (SENOKOT) tablet 17 2 mg, 2 tablet, Oral, Daily PRN, Thomas Wyman MD, 17 2 mg at 12/10/17 5929    Laboratory Results:    Results from last 7 days  Lab Units 12/11/17  0842 12/10/17  0450 12/09/17  0615 12/08/17  2129   WBC Thousand/uL 9 31 9 93 10 50* 9 84   HEMOGLOBIN g/dL 8 2* 8 3* 8 5* 9 0*   HEMATOCRIT % 25 0* 25 7* 26 8* 27 8*   PLATELETS Thousands/uL 34* 33* 37* 35*   SODIUM mmol/L 133*  --  135* 133*   POTASSIUM mmol/L 6 0*  --  5 3 5 6*   CHLORIDE mmol/L 97*  --  98* 96*   CO2 mmol/L 30  --  33* 30   BUN mg/dL 106*  --  66* 59*   CREATININE mg/dL 4 59*  --  2 31* 2 10*   CALCIUM mg/dL 8 6  --  8 9 8 6   ALBUMIN g/dL  --   --   --  2 3*   TOTAL PROTEIN g/dL  --   --   --  6 4   GLUCOSE RANDOM mg/dL 249*  --  235* 445*       Results for orders placed during the hospital encounter of 11/23/17   XR chest portable    Narrative CHEST     INDICATION:  Permacath Placement    COMPARISON:  November 30, earlier in the morning as well as November 24, 2017  VIEWS:   AP portable semierect view in the conventional and line placement techniques  IMAGES:  2    FINDINGS:         The heart is enlarged    Atherosclerotic changes in the aorta  Left internal jugular dialysis catheter is present  The catheter has become retracted by approximately 5 cm  The tip is now in the mid superior vena cava  Previously, the tip projecting into the right atrium  Lung volumes diminished  Halle and pulmonary vessels are prominent  Visualized osseous structures appear within normal limits for the patient's age  Impression 1  Retraction of the indwelling left internal jugular dialysis catheter by approximately 5 cm  Tip now in the mid superior vena cava  2   Mild vascular congestion  A verbal report will be called by the reading room liaison following this dictation  Workstation performed: [id number]       Results for orders placed in visit on 09/08/15   XR chest pa & lateral    Narrative CHEST    INDICATION-  Shortness of breath   COMPARISON-  7/1/2015   VIEWS-  Frontal and lateral projections    4 images   FINDINGS-     Image quality is somewhat degraded by patient body habitus  Heart shadow is enlarged but stable from prior exam    Lung volumes are small  There are patchy opacities within the left   lower lobe  Visualized osseous structures appear within normal limits for the   patient's age  IMPRESSION-   1  No active pulmonary disease on examination which is somewhat limited   secondary to low lung volumes and patient's body habitus  2   Stable cardiomegaly  Transcribed on- MIKE Contreras MD        Reading Radiologist- MIKE Baker MD        Electronically Signed- MIKE Baker MD        Released Date Time- 09/09/15 0921      ------------------------------------------------------------------------------   Demetrius MCKENZIE      No results found for this or any previous visit  No results found for this or any previous visit    Results for orders placed during the hospital encounter of 08/14/17   CT abdomen pelvis wo contrast Narrative CT ABDOMEN AND PELVIS WITHOUT IV CONTRAST    INDICATION:  Abdominal pain, bright red blood per rectum  COMPARISON: None  TECHNIQUE:  CT examination of the abdomen and pelvis was performed without intravenous contrast   Reformatted images were created in axial, sagittal, and coronal planes  Radiation dose length product (DLP) for this visit:  2979 71 mGy-cm   This examination, like all CT scans performed in the [facility], was performed utilizing techniques to minimize radiation dose exposure, including the use of   iterative reconstruction and automated exposure control  Enteric contrast was administered  FINDINGS:    ABDOMEN    Evaluation is limited by noncontrast technique and body habitus  Patients arms are at their side which also limits the exam     LOWER CHEST:  There is bibasilar dependent atelectasis  Mild cardiomegaly  LIVER/BILIARY TREE:  Unremarkable  GALLBLADDER:  There are gallstone(s) within the gallbladder, without pericholecystic inflammatory changes  SPLEEN:  Unremarkable  PANCREAS:  Unremarkable  ADRENAL GLANDS:  Unremarkable  KIDNEYS/URETERS:  Unremarkable  No hydronephrosis  STOMACH AND BOWEL:  There is mild bowel wall thickening of the sigmoid colon and rectum  Mild stool retention throughout the colon  No bowel obstruction  APPENDIX:  No findings to suggest appendicitis  ABDOMINOPELVIC CAVITY:  No ascites or free intraperitoneal air  No lymphadenopathy  VESSELS:  Unremarkable for patient's age  PELVIS    REPRODUCTIVE ORGANS:  Unremarkable for patient's age  URINARY BLADDER:  Unremarkable  ABDOMINAL WALL/INGUINAL REGIONS:  There is a 7 x 3 5 cm collection along the right lower abdominal wall anteriorly adjacent to the rectus musculature measuring approximately 38 Hounsfield units  OSSEOUS STRUCTURES:  No acute fracture or destructive osseous lesion  Impression 1    Bowel wall thickening of the sigmoid colon and rectum without significant adjacent inflammatory stranding  Differential considerations include colitis to include infectious, inflammatory or ischemic etiology as well as submucosal hemorrhage  Clinical and laboratory correlation are recommended  2   Cholelithiasis without other evidence of acute cholecystitis  3   Ill-defined slightly hyperdense collection along the right lower abdominal wall measuring 7 x 3 5 cm and adjacent to the rectus musculature  This may represent a hematoma  Infectious process such as abscess cannot be excluded given the lack of   intravenous contrast   Recommend clinical and laboratory correlation  Workstation performed: [id number]       Results for orders placed during the hospital encounter of 01/21/16   US retroperitoneal complete    Narrative RENAL ULTRASOUND    INDICATION: 65-year-old with acute renal failure    COMPARISON: March 2, 2014    TECHNIQUE:   Ultrasound of the retroperitoneum was performed with a curvilinear transducer utilizing volumetric sweeps and still imaging techniques  FINDINGS:    KIDNEYS:  Study is significantly limited due to patient obese body habitus  The right kidney is difficult to visualize in any degree of detail  Right kidney:  10 0 x 5 9 cm  No gross evidence for hydronephrosis  Mild cortical thinning and lobulation  Otherwise limited for detail  Left kidney:  10 3 x 6 2 cm  Normal echogenicity and contour  No suspicious masses detected  Subcentimeter cortical cysts noted, lateral mid upper cortex and lower pole  No hydronephrosis  No shadowing calculi  No perinephric fluid collections  URETERS:  Nondilated  BLADDER:   Normally distended  No focal thickening or mass lesions  Impression Right kidney poorly demonstrated, mostly related to body habitus  No hydronephrosis demonstrated  Portions of the record may have been created with voice recognition software   Occasional wrong word or "sound a like" substitutions may have occurred due to the inherent limitations of voice recognition software  Read the chart carefully and recognize, using context, where substitutions have occurred

## 2017-12-12 LAB
ANION GAP SERPL CALCULATED.3IONS-SCNC: 6 MMOL/L (ref 4–13)
BASOPHILS # BLD AUTO: 0 THOUSANDS/ΜL (ref 0–0.1)
BASOPHILS NFR BLD AUTO: 0 % (ref 0–1)
BUN SERPL-MCNC: 41 MG/DL (ref 5–25)
CALCIUM SERPL-MCNC: 8.3 MG/DL (ref 8.3–10.1)
CHLORIDE SERPL-SCNC: 98 MMOL/L (ref 100–108)
CO2 SERPL-SCNC: 30 MMOL/L (ref 21–32)
CREAT SERPL-MCNC: 2.51 MG/DL (ref 0.6–1.3)
EOSINOPHIL # BLD AUTO: 0.06 THOUSAND/ΜL (ref 0–0.61)
EOSINOPHIL NFR BLD AUTO: 1 % (ref 0–6)
ERYTHROCYTE [DISTWIDTH] IN BLOOD BY AUTOMATED COUNT: 14.9 % (ref 11.6–15.1)
GFR SERPL CREATININE-BSD FRML MDRD: 18 ML/MIN/1.73SQ M
GLUCOSE SERPL-MCNC: 104 MG/DL (ref 65–140)
GLUCOSE SERPL-MCNC: 148 MG/DL (ref 65–140)
GLUCOSE SERPL-MCNC: 266 MG/DL (ref 65–140)
GLUCOSE SERPL-MCNC: 317 MG/DL (ref 65–140)
GLUCOSE SERPL-MCNC: 82 MG/DL (ref 65–140)
HBV CORE AB SER QL: NORMAL
HBV CORE IGM SER QL: NORMAL
HBV SURFACE AB SER-ACNC: <3.1 MIU/ML
HBV SURFACE AG SER QL: NORMAL
HCT VFR BLD AUTO: 24.5 % (ref 34.8–46.1)
HCV AB SER QL: NORMAL
HEMOCCULT STL QL: NEGATIVE
HGB BLD-MCNC: 7.8 G/DL (ref 11.5–15.4)
LYMPHOCYTES # BLD AUTO: 1.28 THOUSANDS/ΜL (ref 0.6–4.47)
LYMPHOCYTES NFR BLD AUTO: 14 % (ref 14–44)
MCH RBC QN AUTO: 29.8 PG (ref 26.8–34.3)
MCHC RBC AUTO-ENTMCNC: 31.8 G/DL (ref 31.4–37.4)
MCV RBC AUTO: 94 FL (ref 82–98)
MONOCYTES # BLD AUTO: 0.9 THOUSAND/ΜL (ref 0.17–1.22)
MONOCYTES NFR BLD AUTO: 10 % (ref 4–12)
NEUTROPHILS # BLD AUTO: 6.59 THOUSANDS/ΜL (ref 1.85–7.62)
NEUTS SEG NFR BLD AUTO: 75 % (ref 43–75)
NRBC BLD AUTO-RTO: 0 /100 WBCS
PF4 HEPARIN CMPLX AB SER-ACNC: 0.17 OD (ref 0–0.4)
PLATELET # BLD AUTO: 39 THOUSANDS/UL (ref 149–390)
POTASSIUM SERPL-SCNC: 5.1 MMOL/L (ref 3.5–5.3)
RBC # BLD AUTO: 2.62 MILLION/UL (ref 3.81–5.12)
SODIUM SERPL-SCNC: 134 MMOL/L (ref 136–145)
WBC # BLD AUTO: 9.13 THOUSAND/UL (ref 4.31–10.16)

## 2017-12-12 PROCEDURE — 94660 CPAP INITIATION&MGMT: CPT

## 2017-12-12 PROCEDURE — 80048 BASIC METABOLIC PNL TOTAL CA: CPT | Performed by: INTERNAL MEDICINE

## 2017-12-12 PROCEDURE — 82948 REAGENT STRIP/BLOOD GLUCOSE: CPT

## 2017-12-12 PROCEDURE — 94760 N-INVAS EAR/PLS OXIMETRY 1: CPT

## 2017-12-12 PROCEDURE — 85025 COMPLETE CBC W/AUTO DIFF WBC: CPT | Performed by: INTERNAL MEDICINE

## 2017-12-12 PROCEDURE — 82272 OCCULT BLD FECES 1-3 TESTS: CPT | Performed by: INTERNAL MEDICINE

## 2017-12-12 RX ADMIN — NYSTATIN: 100000 POWDER TOPICAL at 18:26

## 2017-12-12 RX ADMIN — Medication 2 TABLET: at 18:25

## 2017-12-12 RX ADMIN — PREDNISONE 20 MG: 20 TABLET ORAL at 09:13

## 2017-12-12 RX ADMIN — Medication 325 MG: at 09:12

## 2017-12-12 RX ADMIN — INSULIN HUMAN 10 UNITS: 100 INJECTION, SUSPENSION SUBCUTANEOUS at 18:26

## 2017-12-12 RX ADMIN — APIXABAN 2.5 MG: 2.5 TABLET, FILM COATED ORAL at 18:25

## 2017-12-12 RX ADMIN — DOCUSATE SODIUM 100 MG: 100 CAPSULE, LIQUID FILLED ORAL at 09:13

## 2017-12-12 RX ADMIN — LATANOPROST 1 DROP: 50 SOLUTION OPHTHALMIC at 21:55

## 2017-12-12 RX ADMIN — PRAVASTATIN SODIUM 80 MG: 80 TABLET ORAL at 09:13

## 2017-12-12 RX ADMIN — CYANOCOBALAMIN TAB 500 MCG 1000 MCG: 500 TAB at 09:12

## 2017-12-12 RX ADMIN — MICONAZOLE NITRATE: 20 CREAM TOPICAL at 09:12

## 2017-12-12 RX ADMIN — PREGABALIN 75 MG: 75 CAPSULE ORAL at 18:25

## 2017-12-12 RX ADMIN — PANTOPRAZOLE SODIUM 40 MG: 40 TABLET, DELAYED RELEASE ORAL at 06:04

## 2017-12-12 RX ADMIN — INSULIN GLARGINE 40 UNITS: 100 INJECTION, SOLUTION SUBCUTANEOUS at 21:55

## 2017-12-12 RX ADMIN — ALLOPURINOL 200 MG: 100 TABLET ORAL at 09:13

## 2017-12-12 RX ADMIN — POLYETHYLENE GLYCOL 3350 17 G: 17 POWDER, FOR SOLUTION ORAL at 09:13

## 2017-12-12 RX ADMIN — METOPROLOL TARTRATE 12.5 MG: 25 TABLET ORAL at 09:12

## 2017-12-12 RX ADMIN — INSULIN HUMAN 10 UNITS: 100 INJECTION, SUSPENSION SUBCUTANEOUS at 13:03

## 2017-12-12 RX ADMIN — INSULIN LISPRO 3 UNITS: 100 INJECTION, SOLUTION INTRAVENOUS; SUBCUTANEOUS at 21:55

## 2017-12-12 RX ADMIN — INSULIN HUMAN 10 UNITS: 100 INJECTION, SUSPENSION SUBCUTANEOUS at 09:13

## 2017-12-12 RX ADMIN — DOCUSATE SODIUM 100 MG: 100 CAPSULE, LIQUID FILLED ORAL at 18:25

## 2017-12-12 RX ADMIN — GUAIFENESIN AND DEXTROMETHORPHAN 5 ML: 100; 10 SYRUP ORAL at 20:09

## 2017-12-12 RX ADMIN — PREGABALIN 75 MG: 75 CAPSULE ORAL at 09:13

## 2017-12-12 RX ADMIN — APIXABAN 2.5 MG: 2.5 TABLET, FILM COATED ORAL at 09:13

## 2017-12-12 RX ADMIN — Medication 2 TABLET: at 09:12

## 2017-12-12 RX ADMIN — LACTULOSE 20 G: 20 SOLUTION ORAL at 09:12

## 2017-12-12 RX ADMIN — NYSTATIN: 100000 POWDER TOPICAL at 09:12

## 2017-12-12 RX ADMIN — LEVOTHYROXINE SODIUM 25 MCG: 25 TABLET ORAL at 06:04

## 2017-12-12 RX ADMIN — METOPROLOL TARTRATE 12.5 MG: 25 TABLET ORAL at 20:09

## 2017-12-12 RX ADMIN — INSULIN LISPRO 6 UNITS: 100 INJECTION, SOLUTION INTRAVENOUS; SUBCUTANEOUS at 18:25

## 2017-12-12 RX ADMIN — Medication 2 TABLET: at 13:03

## 2017-12-12 RX ADMIN — MICONAZOLE NITRATE 1 APPLICATION: 20 CREAM TOPICAL at 18:26

## 2017-12-12 RX ADMIN — VITAMIN D, TAB 1000IU (100/BT) 1000 UNITS: 25 TAB at 09:13

## 2017-12-12 NOTE — PROGRESS NOTES
[facility] Progress Note  Patient: Jordan Vazquez 67 y o  female   MRN: [id number]  PCP: Belem Beasley MD  Unit/Bed#: [facility] 832-01 Encounter: [id number]  Date Of Visit: 12/12/17    Assessment:    Principal Problem: Thrombocytopenia ([facility] )  Active Problems:    IDDM (insulin dependent diabetes mellitus) ([facility] )    Morbid obesity ([place] [address] )    End-stage renal disease on hemodialysis ([place] [address] )    History of pulmonary embolism    Anemia of chronic disease      Plan:    · Progressive thrombocytopenia:  · Platelet count between 30-40 this admission, was higher before  · Hit panel pending, cleared with Nephrology the patient was not receiving heparin during dialysis  She could have had heparin exposure during previous admissions  · Only new medication has been Eliquis - it can cause thrombocytopenia, will consider changing it to Coumadin, will discuss with patient  · ESRD:  · HD MWF  · Chronic respiratory failure:  · On home O2 2 L  · History of DVT PE:  · Recently started on Eliquis, will discuss about switching to Coumadin  · Chronic diastolic CHF:  · EF 31%, volume management during HD  · VIVIAN/ohs:  CPAP HS  · Diabetes with hyperglycemia:  · Continue Lantus 40 units at bedtime, NPH 10 units 3 times a day a c   · Hypothyroidism:  Continue Synthroid, normal TSH  · Anemia of chronic disease:  Stable  · Patient does not know why he is on prednisone, taper ordered  · Sacral wound, wound care consulted      VTE Pharmacologic Prophylaxis:   Pharmacologic: Apixaban (Eliquis)  Mechanical VTE Prophylaxis in Place: Yes    Patient Centered Rounds: I have performed bedside rounds with nursing staff today  Discussions with Specialists or Other Care Team Provider:      Education and Discussions with Family / Patient:  Patient    Time Spent for Care: 45 minutes  More than 50% of total time spent on counseling and coordination of care as described above      Current Length of Stay: 2 day(s)    Current Patient Status: Inpatient   Certification Statement: The patient will continue to require additional inpatient hospital stay due to Monitor platelet count    Discharge Plan / Estimated Discharge Date:  Once platelet count stable    Code Status: Level 3 - DNAR and DNI      Subjective:   Feels okay, denies any home bleeding, no hemoptysis no blood in stool  No chest pain shortness breath    Objective:     Vitals:   Temp (24hrs), Av 8 °F (37 1 °C), Min:98 7 °F (37 1 °C), Max:99 °F (37 2 °C)    HR:  [69-84] 69  Resp:  [18] 18  BP: (128-144)/(58-63) 141/63  SpO2:  [96 %-100 %] 100 %  Body mass index is 51 15 kg/m²  Input and Output Summary (last 24 hours): Intake/Output Summary (Last 24 hours) at 17 1740  Last data filed at 17 1300   Gross per 24 hour   Intake              773 ml   Output                0 ml   Net              773 ml       Physical Exam:     Physical Exam   Constitutional: She is oriented to person, place, and time  She appears well-developed  Morbidly obese   HENT:   Head: Normocephalic and atraumatic  Eyes: Conjunctivae are normal  No scleral icterus  Cardiovascular: Normal rate and regular rhythm  Exam reveals no gallop and no friction rub  No murmur heard  Pulmonary/Chest: Effort normal and breath sounds normal  No respiratory distress  Abdominal: Soft  She exhibits no distension  There is no tenderness  Musculoskeletal: She exhibits no edema  Neurological: She is alert and oriented to person, place, and time           Additional Data:     Labs:      Results from last 7 days  Lab Units 17  0514   WBC Thousand/uL 9 13   HEMOGLOBIN g/dL 7 8*   HEMATOCRIT % 24 5*   PLATELETS Thousands/uL 39*   NEUTROS PCT % 75   LYMPHS PCT % 14   MONOS PCT % 10   EOS PCT % 1       Results from last 7 days  Lab Units 17  0510  17  2129   SODIUM mmol/L 134*  < > 133*   POTASSIUM mmol/L 5 1  < > 5 6*   CHLORIDE mmol/L 98*  < > 96*   CO2 mmol/L 30  < > 30   BUN mg/dL 41*  < > 59* CREATININE mg/dL 2 51*  < > 2 10*   CALCIUM mg/dL 8 3  < > 8 6   TOTAL PROTEIN g/dL  --   --  6 4   BILIRUBIN TOTAL mg/dL  --   --  0 25   ALK PHOS U/L  --   --  65   ALT U/L  --   --  25   AST U/L  --   --  14   GLUCOSE RANDOM mg/dL 104  < > 445*   < > = values in this interval not displayed  Results from last 7 days  Lab Units 12/09/17  2046   INR  1 05       * I Have Reviewed All Lab Data Listed Above  * Additional Pertinent Lab Tests Reviewed: All Labs Within Last 24 Hours Reviewed    Imaging:    Imaging Reports Reviewed Today Include:   Imaging Personally Reviewed by Myself Includes:      Recent Cultures (last 7 days):           Last 24 Hours Medication List:     allopurinol 200 mg Oral Once per day on Sun Tue Thu Sat   allopurinol 300 mg Oral Once per day on Mon Thu   apixaban 2 5 mg Oral BID   calcium carbonate 2 tablet Oral TID With Meals   cholecalciferol 1,000 Units Oral Daily   cyanocobalamin 1,000 mcg Oral Daily   docusate sodium 100 mg Oral BID   ferrous sulfate 325 mg Oral Daily With Breakfast   insulin glargine 40 Units Subcutaneous HS   insulin lispro 1-5 Units Subcutaneous HS   insulin lispro 2-12 Units Subcutaneous TID AC   insulin NPH 10 Units Subcutaneous TID AC   lactulose 20 g Oral Daily   latanoprost 1 drop Both Eyes HS   levothyroxine 25 mcg Oral Early Morning   metoprolol tartrate 12 5 mg Oral Q12H FREDI   miconazole  Topical BID   nystatin  Topical BID   pantoprazole 40 mg Oral Early Morning   polyethylene glycol 17 g Oral Daily   pravastatin 80 mg Oral Daily   [START ON 12/13/2017] predniSONE 10 mg Oral Daily   pregabalin 75 mg Oral BID        Today, Patient Was Seen By: Collette Hendrickson MD    ** Please Note: This note has been constructed using a voice recognition system   **

## 2017-12-12 NOTE — CONSULTS
Progress Note - Wound   Nasreen Meng 67 y o  female MRN: [id number]  [facility] Encounter: [id number]      Assessment: Patient is seen for wound care consult of the sacral / buttocks area   The patient is seen for the skin care assessment while in bed  The patient is bed fast and is at a skilled facility   The patient has a large pannus and no noted skin breakdown in the abdominal folds  Bilateral heels are dry and intact   The patient has a birth hadley on the left heel area   Noted POA the patient has a stage 2 on the right and left buttocks   Noted MASD on the right lower buttocks related to incontinence   The tissue appearance of the MASD is macerated , fragile , hyperpigmented and ridged related to repeat moisture   The right and left buttocks are fragile tissue with repeat opening and closing of the wound areas   The wound beds are frail and friable tissue that bleeds easily   Discussed plan of care with the patient and the RN   Plan:   1  Apply skin nourishing cream to the skin daily   2  Soft care cushion when OOB in the chair   3 Apply hydraguard bid and prn to bilateral heels   4  Sacral / buttocks area - dust with stomadhesive powder then apply calmoseptine tid and prn   5  Apply hydraguard to the buttocks neeta wound area of dry skin tid and prn   6  Turn and reposition every 2 hours to off load and prevent pressure   7  Elevate heels off of the bed with pillows             Objective:    Vitals: Blood pressure 130/63, pulse 69, temperature 98 7 °F (37 1 °C), temperature source Oral, resp  rate 18, weight 135 kg (298 lb), SpO2 100 %, not currently breastfeeding  ,Body mass index is 51 15 kg/m²  Pressure Ulcer 08/14/17 Buttocks Left (Active)   Staging Stage II 12/12/2017  1:00 PM   Wound Description Clean;Beefy red;Fragile 12/12/2017  1:00 PM   Neeta-wound Assessment Clean;Fragile; Hyperpigmented 12/12/2017  1:00 PM   Wound Length (cm) 7 cm 12/12/2017  1:00 PM   Wound Width (cm) 3 5 cm 12/12/2017  1:00 PM   Wound Depth (cm) 0 1 12/12/2017  1:00 PM   Calculated Wound Area (cm^2) 24 5 cm^2 12/12/2017  1:00 PM   Calculated Wound Volume (cm^3) 2 45 cm^3 12/12/2017  1:00 PM   Change in Wound Size % -1013 64 12/12/2017  1:00 PM   Drainage Amount Small 12/12/2017  1:00 PM   Drainage Description Bloody 12/12/2017  1:00 PM   Treatment Cleansed; Offload; Turn & reposition 12/12/2017  1:00 PM   Dressing Protective barrier 12/12/2017  1:00 PM   Patient Tolerance Tolerated well 12/12/2017  1:00 PM       Pressure Ulcer 11/30/17 Buttocks Right stage 2 this area was scabbed and unroofed  (Active)   Staging Stage II 12/12/2017  1:00 PM   Wound Description Clean;Dry;Fragile;Pink 12/12/2017  1:00 PM   Graciela-wound Assessment Erythema;Fragile 12/12/2017  7:00 AM   Wound Length (cm) 1 cm 12/12/2017  1:00 PM   Wound Width (cm) 1 cm 12/12/2017  1:00 PM   Wound Depth (cm) 0 1 12/12/2017  1:00 PM   Calculated Wound Area (cm^2) 1 cm^2 12/12/2017  1:00 PM   Calculated Wound Volume (cm^3) 0 1 cm^3 12/12/2017  1:00 PM   Change in Wound Size % 0 12/12/2017  1:00 PM   Drainage Amount Small 12/12/2017  1:00 PM   Drainage Description Bloody 12/12/2017  1:00 PM   Treatment Cleansed; Offload; Turn & reposition 12/12/2017  1:00 PM   Dressing Protective barrier 12/12/2017  1:00 PM   Patient Tolerance Tolerated well 12/12/2017  1:00 PM       Wound 11/30/17 Moisture associated skin damage Buttocks Distal;Right partial thickness chaffed area reopened and MASD  (Active)   Wound Description Beefy red;Fragile;Pink 12/12/2017  1:00 PM   Graciela-wound Assessment Excoriated;Fragile; Hyperpigmented 12/12/2017  1:00 PM   Shape IRREGULAR  12/12/2017  1:00 PM   Wound Length (cm) 4 cm 12/12/2017  1:00 PM   Wound Width (cm) 1 5 cm 12/12/2017  1:00 PM   Wound Depth (cm) 0 1 12/12/2017  1:00 PM   Calculated Wound Volume (cm^3) 0 6 cm^3 12/12/2017  1:00 PM   Change in Wound Size % -300 12/12/2017  1:00 PM   Drainage Amount Small 12/12/2017  1:00 PM Drainage Description Bloody 12/12/2017  1:00 PM   Non-staged Wound Description Partial thickness 12/12/2017  1:00 PM   Treatments Cleansed;Site care 12/12/2017  1:00 PM   Dressing Protective barrier 12/12/2017  1:00 PM   Patient Tolerance Tolerated well 12/12/2017  1:00 PM       Wound care will follow weekly for wound care     Dawson Roy RN BSN CWOCN

## 2017-12-12 NOTE — NUTRITION
12/12/17 1509   Recommendations/Interventions   Interventions Diet: order adjustment;Supplement initiate  (Diet adjusted to CCD2, 4 gm Na, 2 gm Potassium with Jerry BID)   Nutrition Recommendations Continue diet as ordered;Lab - consider order (specify); Other (specify)  (May want to add a 1000 ml daily fluid restriction to current diet order   Monitor electrolytes and phosphorus  )

## 2017-12-12 NOTE — PROGRESS NOTES
NEPHROLOGY PROGRESS NOTE   Yamile Vaca 67 y o  female MRN: [id number]  Unit/Bed#: [facility] 832-01 Encounter: [id number]  Reason for Consult: ESRD    ASSESSMENT AND PLAN:  ESRD on HD MWF [address]  - HD tomorrow  not on heparin with dialysis  - UF to EDW  - has perm cath placement       Azotemia, significantly improved after dialysis  - rule out GI bleed, recommend to check FOBT  - likely secondary to prednisone  - no significant uremic symptoms      Hyperkalemia, serum potassium overall improved after dialysis although still remains above goal at 5 1     - BMP in a m   - strict potassium restricted diet     CKD anemia, hemoglobin below goal but overall stable  - not on Epogen due to history of PE  - Transfuse p r n  For hemoglobin less than seven, if continued to worsen , consider iron saturation      Thrombocytopenia, ongoing hematology follow-up  - patient is not on heparin with dialysis  Awaiting HI T antibody  - agree with considering Coumadin as anticoagulation of choice     Hyperphosphatemia, serum phosphorus 6 1 in November 2017  - currently remains on calcium carbonate two tablets with each meal  - continue to closely monitor, renal diet     Blood pressure overall acceptable    SUBJECTIVE:  Patient seen and examined at bedside   No chest pain, shortness of breath, nausea, vomiting, abdominal pain     OBJECTIVE:  Current Weight: Weight - Scale: 135 kg (298 lb)  Vitals:    12/12/17 0700   BP: 130/63   Pulse: 69   Resp: 18   Temp: 98 7 °F (37 1 °C)   SpO2: 100%       Intake/Output Summary (Last 24 hours) at 12/12/17 1104  Last data filed at 12/12/17 0840   Gross per 24 hour   Intake             1048 ml   Output             2580 ml   Net            -1532 ml       Physical Examination:  General:  No acute distress   HEENT:  PERRLA, EOMI, mild conjunctival pallor present  Neck:  Supple  Respiratory:  Overall limited examination due to body habitus, poor respiratory effort  CVS:  S1, S2 present  GI:  Soft, obese, nontender  CNS:  Active alert oriented x3  Extremities:  Trace edema in the lower extremities  Skin:  No new changes    Medications:    Current Facility-Administered Medications:     acetaminophen (TYLENOL) tablet 650 mg, 650 mg, Oral, Q6H PRN, Bernadine Muñoz MD, 650 mg at 12/11/17 1321    allopurinol (ZYLOPRIM) tablet 200 mg, 200 mg, Oral, Once per day on Sun Tue Thu Sat, Bernadine Muñoz MD, 200 mg at 12/12/17 6760    allopurinol (ZYLOPRIM) tablet 300 mg, 300 mg, Oral, Once per day on Mon Thu, Bernadine Muñoz MD, 300 mg at 12/11/17 5032    apixaban (ELIQUIS) tablet 2 5 mg, 2 5 mg, Oral, BID, Bernadine Muñoz MD, 2 5 mg at 12/12/17 0913    bisacodyl (DULCOLAX) EC tablet 5 mg, 5 mg, Oral, Daily PRN, Bernadine Muñoz MD, 5 mg at 12/10/17 8633    bisacodyl (DULCOLAX) rectal suppository 10 mg, 10 mg, Rectal, Daily PRN, Virginia Corral DO, 10 mg at 12/10/17 1501    calcium carbonate (OYSTER SHELL,OSCAL) 500 mg tablet 2 tablet, 2 tablet, Oral, TID With Meals, Bernadine Muñoz MD, 2 tablet at 12/12/17 0912    cholecalciferol (VITAMIN D3) tablet 1,000 Units, 1,000 Units, Oral, Daily, Bernadine Muñoz MD, 1,000 Units at 12/12/17 0913    cyanocobalamin (VITAMIN B-12) tablet 1,000 mcg, 1,000 mcg, Oral, Daily, Bernadine Muñoz MD, 1,000 mcg at 12/12/17 0912    dextromethorphan-guaiFENesin (ROBITUSSIN DM)  mg/5 mL oral syrup 5 mL, 5 mL, Oral, Q4H PRN, Bernadine Muñoz MD, 5 mL at 12/11/17 2127    docusate sodium (COLACE) capsule 100 mg, 100 mg, Oral, BID, Bernadine Muñoz MD, 100 mg at 12/12/17 0913    docusate sodium (COLACE) capsule 100 mg, 100 mg, Oral, BID PRN, Bernadine Muñoz MD    ferrous sulfate tablet 325 mg, 325 mg, Oral, Daily With Breakfast, Virginia Corral DO, 325 mg at 12/12/17 0912    HYDROmorphone (DILAUDID) tablet 2 mg, 2 mg, Oral, Q6H PRN, Bernadine Muñoz MD, 2 mg at 12/11/17 0922    insulin glargine (LANTUS) subcutaneous injection 40 Units, 40 Units, Subcutaneous, , Michael Diego Kong MD, 40 Units at 12/11/17 2128    insulin lispro (HumaLOG) 100 units/mL subcutaneous injection 1-5 Units, 1-5 Units, Subcutaneous, HS, Sammi Huertas MD, 4 Units at 12/11/17 2128    insulin lispro (HumaLOG) 100 units/mL subcutaneous injection 2-12 Units, 2-12 Units, Subcutaneous, TID AC, 2 Units at 12/11/17 1727 **AND** Fingerstick Glucose (POCT), , , TID AC, Sammi Huertas MD    insulin NPH (HumuLIN N,NovoLIN N) 100 Units/mL subcutaneous injection 10 Units, 10 Units, Subcutaneous, TID AC, Flower Antoine DO, 10 Units at 12/12/17 0913    lactulose 20 g/30 mL oral solution 20 g, 20 g, Oral, Daily, Flower Antoine DO, 20 g at 12/12/17 0912    latanoprost (XALATAN) 0 005 % ophthalmic solution 1 drop, 1 drop, Both Eyes, HS, Sammi Huertas MD, 1 drop at 12/11/17 2129    levothyroxine tablet 25 mcg, 25 mcg, Oral, Early Morning, Sammi Huertas MD, 25 mcg at 12/12/17 0604    menthol-methyl salicylate (BENGAY) 43-53 % cream, , Apply externally, 4x Daily PRN, Sammi Huertas MD    metoprolol tartrate (LOPRESSOR) partial tablet 12 5 mg, 12 5 mg, Oral, Q12H [address], Sammi Huertas MD, 12 5 mg at 12/12/17 0912    miconazole 2 % cream, , Topical, BID, Sammi Huertas MD    nystatin (MYCOSTATIN) powder, , Topical, BID, Sammi Huertas MD    ondansetron [facility]) injection 4 mg, 4 mg, Intravenous, Q6H PRN, Sammi Huertas MD    ondansetron (ZOFRAN-ODT) dispersible tablet 4 mg, 4 mg, Oral, Q4H PRN, Sammi Huertas MD    pantoprazole (PROTONIX) EC tablet 40 mg, 40 mg, Oral, Early Morning, Sammi Huertas MD, 40 mg at 12/12/17 0604    polyethylene glycol (MIRALAX) packet 17 g, 17 g, Oral, Daily, Sammi Huertas MD, 17 g at 12/12/17 0913    pravastatin (PRAVACHOL) tablet 80 mg, 80 mg, Oral, Daily, Sammi Huertas MD, 80 mg at 12/12/17 0913    [COMPLETED] predniSONE tablet 20 mg, 20 mg, Oral, Daily, 20 mg at 12/12/17 0913 **FOLLOWED BY** [START ON 12/13/2017] predniSONE tablet 10 mg, 10 mg, Oral, Daily, Leopoldo Conquest,     pregabalin (LYRICA) capsule 75 mg, 75 mg, Oral, BID, Thomas Wyman MD, 75 mg at 12/12/17 0913    senna (SENOKOT) tablet 17 2 mg, 2 tablet, Oral, Daily PRN, Thomas Wyman MD, 17 2 mg at 12/10/17 7414    Laboratory Results:    Results from last 7 days  Lab Units 12/12/17  0514 12/12/17  0510 12/11/17  0842 12/10/17  0450 12/09/17  0615 12/08/17  2129   WBC Thousand/uL 9 13  --  9 31 9 93 10 50* 9 84   HEMOGLOBIN g/dL 7 8*  --  8 2* 8 3* 8 5* 9 0*   HEMATOCRIT % 24 5*  --  25 0* 25 7* 26 8* 27 8*   PLATELETS Thousands/uL 39*  --  34* 33* 37* 35*   SODIUM mmol/L  --  134* 133*  --  135* 133*   POTASSIUM mmol/L  --  5 1 6 0*  --  5 3 5 6*   CHLORIDE mmol/L  --  98* 97*  --  98* 96*   CO2 mmol/L  --  30 30  --  33* 30   BUN mg/dL  --  41* 106*  --  66* 59*   CREATININE mg/dL  --  2 51* 4 59*  --  2 31* 2 10*   CALCIUM mg/dL  --  8 3 8 6  --  8 9 8 6   ALBUMIN g/dL  --   --   --   --   --  2 3*   TOTAL PROTEIN g/dL  --   --   --   --   --  6 4   GLUCOSE RANDOM mg/dL  --  104 249*  --  235* 445*       Results for orders placed during the hospital encounter of 11/23/17   XR chest portable    Narrative CHEST     INDICATION:  Permacath Placement    COMPARISON:  November 30, earlier in the morning as well as November 24, 2017  VIEWS:   AP portable semierect view in the conventional and line placement techniques  IMAGES:  2    FINDINGS:         The heart is enlarged  Atherosclerotic changes in the aorta  Left internal jugular dialysis catheter is present  The catheter has become retracted by approximately 5 cm  The tip is now in the mid superior vena cava  Previously, the tip projecting into the right atrium  Lung volumes diminished  Halle and pulmonary vessels are prominent  Visualized osseous structures appear within normal limits for the patient's age  Impression 1  Retraction of the indwelling left internal jugular dialysis catheter by approximately 5 cm    Tip now in the mid superior vena cava  2   Mild vascular congestion  A verbal report will be called by the reading room liaison following this dictation  Workstation performed: [id number]       Results for orders placed in visit on 09/08/15   XR chest pa & lateral    Narrative CHEST    INDICATION-  Shortness of breath   COMPARISON-  7/1/2015   VIEWS-  Frontal and lateral projections    4 images   FINDINGS-     Image quality is somewhat degraded by patient body habitus  Heart shadow is enlarged but stable from prior exam    Lung volumes are small  There are patchy opacities within the left   lower lobe  Visualized osseous structures appear within normal limits for the   patient's age  IMPRESSION-   1  No active pulmonary disease on examination which is somewhat limited   secondary to low lung volumes and patient's body habitus  2   Stable cardiomegaly  Transcribed on- MIKE Bacon MD        Reading Radiologist- Patsi Cockayne, RAD MD        Electronically Signed- Patsi Cockayne, RAD MD        Released Date Time- 09/09/15 0921      ------------------------------------------------------------------------------   Eliane MCKENZIE      No results found for this or any previous visit  No results found for this or any previous visit  Results for orders placed during the hospital encounter of 08/14/17   CT abdomen pelvis wo contrast    Narrative CT ABDOMEN AND PELVIS WITHOUT IV CONTRAST    INDICATION:  Abdominal pain, bright red blood per rectum  COMPARISON: None  TECHNIQUE:  CT examination of the abdomen and pelvis was performed without intravenous contrast   Reformatted images were created in axial, sagittal, and coronal planes  Radiation dose length product (DLP) for this visit:  2979 71 mGy-cm     This examination, like all CT scans performed in the [facility], was performed utilizing techniques to minimize radiation dose exposure, including the use of   iterative reconstruction and automated exposure control  Enteric contrast was administered  FINDINGS:    ABDOMEN    Evaluation is limited by noncontrast technique and body habitus  Patients arms are at their side which also limits the exam     LOWER CHEST:  There is bibasilar dependent atelectasis  Mild cardiomegaly  LIVER/BILIARY TREE:  Unremarkable  GALLBLADDER:  There are gallstone(s) within the gallbladder, without pericholecystic inflammatory changes  SPLEEN:  Unremarkable  PANCREAS:  Unremarkable  ADRENAL GLANDS:  Unremarkable  KIDNEYS/URETERS:  Unremarkable  No hydronephrosis  STOMACH AND BOWEL:  There is mild bowel wall thickening of the sigmoid colon and rectum  Mild stool retention throughout the colon  No bowel obstruction  APPENDIX:  No findings to suggest appendicitis  ABDOMINOPELVIC CAVITY:  No ascites or free intraperitoneal air  No lymphadenopathy  VESSELS:  Unremarkable for patient's age  PELVIS    REPRODUCTIVE ORGANS:  Unremarkable for patient's age  URINARY BLADDER:  Unremarkable  ABDOMINAL WALL/INGUINAL REGIONS:  There is a 7 x 3 5 cm collection along the right lower abdominal wall anteriorly adjacent to the rectus musculature measuring approximately 38 Hounsfield units  OSSEOUS STRUCTURES:  No acute fracture or destructive osseous lesion  Impression 1  Bowel wall thickening of the sigmoid colon and rectum without significant adjacent inflammatory stranding  Differential considerations include colitis to include infectious, inflammatory or ischemic etiology as well as submucosal hemorrhage  Clinical and laboratory correlation are recommended  2   Cholelithiasis without other evidence of acute cholecystitis  3   Ill-defined slightly hyperdense collection along the right lower abdominal wall measuring 7 x 3 5 cm and adjacent to the rectus musculature  This may represent a hematoma  Infectious process such as abscess cannot be excluded given the lack of   intravenous contrast   Recommend clinical and laboratory correlation  Workstation performed: [id number]       Results for orders placed during the hospital encounter of 01/21/16   US retroperitoneal complete    Narrative RENAL ULTRASOUND    INDICATION: 26-year-old with acute renal failure    COMPARISON: March 2, 2014    TECHNIQUE:   Ultrasound of the retroperitoneum was performed with a curvilinear transducer utilizing volumetric sweeps and still imaging techniques  FINDINGS:    KIDNEYS:  Study is significantly limited due to patient obese body habitus  The right kidney is difficult to visualize in any degree of detail  Right kidney:  10 0 x 5 9 cm  No gross evidence for hydronephrosis  Mild cortical thinning and lobulation  Otherwise limited for detail  Left kidney:  10 3 x 6 2 cm  Normal echogenicity and contour  No suspicious masses detected  Subcentimeter cortical cysts noted, lateral mid upper cortex and lower pole  No hydronephrosis  No shadowing calculi  No perinephric fluid collections  URETERS:  Nondilated  BLADDER:   Normally distended  No focal thickening or mass lesions  Impression Right kidney poorly demonstrated, mostly related to body habitus  No hydronephrosis demonstrated  Portions of the record may have been created with voice recognition software  Occasional wrong word or "sound a like" substitutions may have occurred due to the inherent limitations of voice recognition software  Read the chart carefully and recognize, using context, where substitutions have occurred

## 2017-12-12 NOTE — NUTRITION
12/12/17 8616   Recommendations/Interventions   Interventions Diet: order adjustment;Supplement initiate  (Diet adjusted to CCD2, 4 gm Na with Jerry BID)   Nutrition Recommendations Continue diet as ordered;Lab - consider order (specify); Other (specify)  (May want to add a 1000 ml daily fluid restriction to current diet order   Monitor electrolytes and phosphorus  )

## 2017-12-13 ENCOUNTER — APPOINTMENT (INPATIENT)
Dept: DIALYSIS | Facility: HOSPITAL | Age: 72
DRG: 813 | End: 2017-12-13
Attending: INTERNAL MEDICINE
Payer: MEDICARE

## 2017-12-13 LAB
ANISOCYTOSIS BLD QL SMEAR: PRESENT
BASOPHILS # BLD MANUAL: 0 THOUSAND/UL (ref 0–0.1)
BASOPHILS NFR MAR MANUAL: 0 % (ref 0–1)
DACRYOCYTES BLD QL SMEAR: PRESENT
EOSINOPHIL # BLD MANUAL: 0 THOUSAND/UL (ref 0–0.4)
EOSINOPHIL NFR BLD MANUAL: 0 % (ref 0–6)
ERYTHROCYTE [DISTWIDTH] IN BLOOD BY AUTOMATED COUNT: 15.3 % (ref 11.6–15.1)
GIANT PLATELETS BLD QL SMEAR: PRESENT
GLUCOSE SERPL-MCNC: 143 MG/DL (ref 65–140)
GLUCOSE SERPL-MCNC: 151 MG/DL (ref 65–140)
GLUCOSE SERPL-MCNC: 172 MG/DL (ref 65–140)
GLUCOSE SERPL-MCNC: 370 MG/DL (ref 65–140)
HCT VFR BLD AUTO: 25.6 % (ref 34.8–46.1)
HGB BLD-MCNC: 8.2 G/DL (ref 11.5–15.4)
LYMPHOCYTES # BLD AUTO: 1.17 THOUSAND/UL (ref 0.6–4.47)
LYMPHOCYTES # BLD AUTO: 12 % (ref 14–44)
MCH RBC QN AUTO: 29.8 PG (ref 26.8–34.3)
MCHC RBC AUTO-ENTMCNC: 32 G/DL (ref 31.4–37.4)
MCV RBC AUTO: 93 FL (ref 82–98)
METAMYELOCYTES NFR BLD MANUAL: 2 % (ref 0–1)
MONOCYTES # BLD AUTO: 0.49 THOUSAND/UL (ref 0–1.22)
MONOCYTES NFR BLD: 5 % (ref 4–12)
MYELOCYTES NFR BLD MANUAL: 2 % (ref 0–1)
NEUTROPHILS # BLD MANUAL: 7.71 THOUSAND/UL (ref 1.85–7.62)
NEUTS BAND NFR BLD MANUAL: 4 % (ref 0–8)
NEUTS SEG NFR BLD AUTO: 75 % (ref 43–75)
NRBC BLD AUTO-RTO: 0 /100 WBCS
PLATELET # BLD AUTO: 50 THOUSANDS/UL (ref 149–390)
PLATELET BLD QL SMEAR: ABNORMAL
POIKILOCYTOSIS BLD QL SMEAR: PRESENT
POLYCHROMASIA BLD QL SMEAR: PRESENT
RBC # BLD AUTO: 2.75 MILLION/UL (ref 3.81–5.12)
RBC MORPH BLD: PRESENT
WBC # BLD AUTO: 9.76 THOUSAND/UL (ref 4.31–10.16)

## 2017-12-13 PROCEDURE — 94760 N-INVAS EAR/PLS OXIMETRY 1: CPT

## 2017-12-13 PROCEDURE — 82948 REAGENT STRIP/BLOOD GLUCOSE: CPT

## 2017-12-13 PROCEDURE — 85027 COMPLETE CBC AUTOMATED: CPT | Performed by: HOSPITALIST

## 2017-12-13 PROCEDURE — 85007 BL SMEAR W/DIFF WBC COUNT: CPT | Performed by: HOSPITALIST

## 2017-12-13 PROCEDURE — 94660 CPAP INITIATION&MGMT: CPT

## 2017-12-13 RX ORDER — ASPIRIN 81 MG/1
81 TABLET, CHEWABLE ORAL DAILY
Status: DISCONTINUED | OUTPATIENT
Start: 2017-12-14 | End: 2017-12-14 | Stop reason: HOSPADM

## 2017-12-13 RX ADMIN — INSULIN LISPRO 4 UNITS: 100 INJECTION, SOLUTION INTRAVENOUS; SUBCUTANEOUS at 21:08

## 2017-12-13 RX ADMIN — PREDNISONE 10 MG: 10 TABLET ORAL at 14:37

## 2017-12-13 RX ADMIN — INSULIN HUMAN 10 UNITS: 100 INJECTION, SUSPENSION SUBCUTANEOUS at 07:51

## 2017-12-13 RX ADMIN — LEVOTHYROXINE SODIUM 25 MCG: 25 TABLET ORAL at 05:52

## 2017-12-13 RX ADMIN — PREGABALIN 75 MG: 75 CAPSULE ORAL at 17:51

## 2017-12-13 RX ADMIN — VITAMIN D, TAB 1000IU (100/BT) 1000 UNITS: 25 TAB at 14:37

## 2017-12-13 RX ADMIN — MENTHOL, METHYL SALICYLATE: 10; 15 CREAM TOPICAL at 17:52

## 2017-12-13 RX ADMIN — Medication 2 TABLET: at 07:50

## 2017-12-13 RX ADMIN — CYANOCOBALAMIN TAB 500 MCG 1000 MCG: 500 TAB at 14:37

## 2017-12-13 RX ADMIN — METOPROLOL TARTRATE 12.5 MG: 25 TABLET ORAL at 14:37

## 2017-12-13 RX ADMIN — INSULIN GLARGINE 40 UNITS: 100 INJECTION, SOLUTION SUBCUTANEOUS at 21:08

## 2017-12-13 RX ADMIN — HYDROMORPHONE HYDROCHLORIDE 2 MG: 2 TABLET ORAL at 07:50

## 2017-12-13 RX ADMIN — INSULIN LISPRO 2 UNITS: 100 INJECTION, SOLUTION INTRAVENOUS; SUBCUTANEOUS at 07:50

## 2017-12-13 RX ADMIN — MICONAZOLE NITRATE: 20 CREAM TOPICAL at 17:53

## 2017-12-13 RX ADMIN — DOXERCALCIFEROL 0.5 MCG: 2 INJECTION, SOLUTION INTRAVENOUS at 11:10

## 2017-12-13 RX ADMIN — INSULIN HUMAN 10 UNITS: 100 INJECTION, SUSPENSION SUBCUTANEOUS at 14:37

## 2017-12-13 RX ADMIN — LATANOPROST 1 DROP: 50 SOLUTION OPHTHALMIC at 21:08

## 2017-12-13 RX ADMIN — GUAIFENESIN AND DEXTROMETHORPHAN 5 ML: 100; 10 SYRUP ORAL at 21:18

## 2017-12-13 RX ADMIN — GUAIFENESIN AND DEXTROMETHORPHAN 5 ML: 100; 10 SYRUP ORAL at 08:16

## 2017-12-13 RX ADMIN — NYSTATIN: 100000 POWDER TOPICAL at 17:53

## 2017-12-13 RX ADMIN — Medication 2 TABLET: at 17:52

## 2017-12-13 RX ADMIN — INSULIN LISPRO 2 UNITS: 100 INJECTION, SOLUTION INTRAVENOUS; SUBCUTANEOUS at 17:53

## 2017-12-13 RX ADMIN — Medication 325 MG: at 07:50

## 2017-12-13 RX ADMIN — INSULIN HUMAN 10 UNITS: 100 INJECTION, SUSPENSION SUBCUTANEOUS at 17:53

## 2017-12-13 RX ADMIN — PRAVASTATIN SODIUM 80 MG: 80 TABLET ORAL at 14:37

## 2017-12-13 RX ADMIN — PANTOPRAZOLE SODIUM 40 MG: 40 TABLET, DELAYED RELEASE ORAL at 05:52

## 2017-12-13 NOTE — PROGRESS NOTES
[facility] Progress Note  Patient: Shantel Sahu 67 y o  female   MRN: [id number]  PCP: Sha Watkins MD  Unit/Bed#: [facility] 832-01 Encounter: [id number]  Date Of Visit: 12/13/17    Assessment:    Principal Problem: Thrombocytopenia ([facility] )  Active Problems:    IDDM (insulin dependent diabetes mellitus) ([facility] )    Morbid obesity ([facility] )    End-stage renal disease on hemodialysis ([place] [address] )    History of pulmonary embolism    Anemia of chronic disease      Plan:    · Progressive thrombocytopenia:  ? Platelet count between 30-40 this admission, improved to 50 today  ? Hit panel negative  ? Discussed with Hematology her counts are acceptable for discharge at this point  Discussed with them about Eliquis and oral anticoagulation  On chart review does found the patient had history of PE several years ago approximately 2004 since then she has been on anticoagulation, patient says that she was worried about getting off it and having a recurrence of PE  She had been all based on Coumadin, but since her in 2 admissions in August 2017 with GI bleed, on 2nd admission she was discharged on oral Eliquis 2 5 mg Twice a day  Since he has had only 1 episode of PE in the past, on discussed with Hematology about either switching her from liquids to Coumadin versus completely discontinue anticoagulation  Her activity status is poor hence she is at risk of foot Check thrombosis, but she also has had falls in the past and with low platelets he is a bleeding risk as well  At this time Hematology recommended considering oral aspirin  I discussed this was extensively with the patient as well and the various options of considering Coumadin versus aspirin  At this time patient understands that with aspirin is not as effective as Coumadin but at the same time would have less risk of bleeding and she would want to opt for baby aspirin as she has had history of gastric ulcers    She is aware about the risk of developing DVT or PE in future and is advised to monitor for that  ? Discontinue Eliquis today and start on aspirin 81 mg tomorrow  · ESRD:  ? HD MWF  · Chronic respiratory failure:  ? On home O2 2 L  · History of DVT PE once in :  ? Stop AC as above, start aspirin as above  · Chronic diastolic CHF:  ? EF 04%, volume management during HD  · VIVIAN/ohs:  CPAP HS  · Diabetes with hyperglycemia:  ? Continue Lantus 40 units at bedtime, NPH 10 units 3 times a day a c   · Hypothyroidism:  Continue Synthroid, normal TSH  · Anemia of chronic disease:  Stable  · Patient does not know why he is on prednisone, taper ordered  · Sacral wound, wound care consulted      VTE Pharmacologic Prophylaxis:   Pharmacologic: Pharmacologic VTE Prophylaxis contraindicated due to holding with low platelet  Mechanical VTE Prophylaxis in Place: Yes    Patient Centered Rounds: I have performed bedside rounds with nursing staff today  Discussions with Specialists or Other Care Team Provider: hem, nephro    Education and Discussions with Family / Patient: patient    Time Spent for Care: 45 minutes  More than 50% of total time spent on counseling and coordination of care as described above  Current Length of Stay: 3 day(s)    Current Patient Status: Inpatient   Certification Statement: The patient will continue to require additional inpatient hospital stay due to Anticipate discharge tomorrow    Discharge Plan / Estimated Discharge Date:  Anticipate discharge tomorrow    Code Status: Level 3 - DNAR and DNI      Subjective:   Feels okay except for soreness in her buttocks  No chest pain or shortness of breath abdominal pain nausea and vomiting    Objective:     Vitals:   Temp (24hrs), Av 6 °F (36 4 °C), Min:97 °F (36 1 °C), Max:97 9 °F (36 6 °C)    HR:  [69-80] 80  Resp:  [16-18] 16  BP: ()/(49-63) 102/60  SpO2:  [97 %-100 %] 97 %  Body mass index is 51 15 kg/m²  Input and Output Summary (last 24 hours):        Intake/Output Summary (Last 24 hours) at 12/13/17 1745  Last data filed at 12/13/17 1458   Gross per 24 hour   Intake             1023 ml   Output             4000 ml   Net            -2977 ml       Physical Exam:     Physical Exam   Constitutional: She is oriented to person, place, and time  She appears well-developed and well-nourished  HENT:   Head: Normocephalic and atraumatic  Eyes: Conjunctivae are normal  No scleral icterus  Cardiovascular: Normal rate, regular rhythm and normal heart sounds  Exam reveals no gallop and no friction rub  No murmur heard  Pulmonary/Chest: Effort normal and breath sounds normal  No respiratory distress  She has no wheezes  Abdominal: Soft  She exhibits no distension  There is no tenderness  Musculoskeletal: She exhibits no edema  Neurological: She is alert and oriented to person, place, and time  Additional Data:     Labs:      Results from last 7 days  Lab Units 12/13/17  0928 12/12/17  0514   WBC Thousand/uL 9 76 9 13   HEMOGLOBIN g/dL 8 2* 7 8*   HEMATOCRIT % 25 6* 24 5*   PLATELETS Thousands/uL 50* 39*   NEUTROS PCT %  --  75   LYMPHS PCT %  --  14   LYMPHO PCT % 12*  --    MONOS PCT %  --  10   MONO PCT MAN % 5  --    EOS PCT %  --  1   EOSINO PCT MANUAL % 0  --        Results from last 7 days  Lab Units 12/12/17  0510  12/08/17  2129   SODIUM mmol/L 134*  < > 133*   POTASSIUM mmol/L 5 1  < > 5 6*   CHLORIDE mmol/L 98*  < > 96*   CO2 mmol/L 30  < > 30   BUN mg/dL 41*  < > 59*   CREATININE mg/dL 2 51*  < > 2 10*   CALCIUM mg/dL 8 3  < > 8 6   TOTAL PROTEIN g/dL  --   --  6 4   BILIRUBIN TOTAL mg/dL  --   --  0 25   ALK PHOS U/L  --   --  65   ALT U/L  --   --  25   AST U/L  --   --  14   GLUCOSE RANDOM mg/dL 104  < > 445*   < > = values in this interval not displayed  Results from last 7 days  Lab Units 12/09/17  2046   INR  1 05       * I Have Reviewed All Lab Data Listed Above  * Additional Pertinent Lab Tests Reviewed:  All Labs Within Last 24 Hours Reviewed    Imaging:    Imaging Reports Reviewed Today Include:   Imaging Personally Reviewed by Myself Includes:      Recent Cultures (last 7 days):           Last 24 Hours Medication List:     allopurinol 200 mg Oral Once per day on Sun Tue Thu Sat   allopurinol 300 mg Oral Once per day on Mon Thu   calcium carbonate 2 tablet Oral TID With Meals   cholecalciferol 1,000 Units Oral Daily   cyanocobalamin 1,000 mcg Oral Daily   docusate sodium 100 mg Oral BID   ferrous sulfate 325 mg Oral Daily With Breakfast   insulin glargine 40 Units Subcutaneous HS   insulin lispro 1-5 Units Subcutaneous HS   insulin lispro 2-12 Units Subcutaneous TID AC   insulin NPH 10 Units Subcutaneous TID AC   lactulose 20 g Oral Daily   latanoprost 1 drop Both Eyes HS   levothyroxine 25 mcg Oral Early Morning   metoprolol tartrate 12 5 mg Oral Q12H FREDI   miconazole  Topical BID   nystatin  Topical BID   pantoprazole 40 mg Oral Early Morning   polyethylene glycol 17 g Oral Daily   pravastatin 80 mg Oral Daily   predniSONE 10 mg Oral Daily   pregabalin 75 mg Oral BID        Today, Patient Was Seen By: Mahnaz Lofton MD    ** Please Note: This note has been constructed using a voice recognition system   **

## 2017-12-13 NOTE — PROGRESS NOTES
NEPHROLOGY PROGRESS NOTE   Job Quick 67 y o  female MRN: [id number]  Unit/Bed#: [facility] 832-01 Encounter: [id number]  Reason for Consult: ESRD    ASSESSMENT AND PLAN:  ESRD on HD MWF [address]  - HD today  not on heparin with dialysis while inpatient and as an outpatient  (confirmed with outpatient HD nurse)  - UF to EDW  - has perm cath placement  I saw and examined patient during hemodialysis treatment at 9:38 AM on 12/13/2017  The patient was receiving hemodialysis for treatment of end stage renal disease  I also reviewed vital signs, intake and output, lab results and recent events, and agree with dialysis order  Tolerating HD  BP acceptable  Time: 4 hours  Qb: 400  Sodium: 138  Dialyzer: F160  Qd: 1 5 times Qb  Potassium: as per protocol  Access: Perm cath  UF goal: to EDW  Bicarbonate: 35 Calcium 2 5    Access, right upper extremity AV fistula infiltration and had PermCath placement on 11/30/17  Currently has good bruit and thrill present at access site  Consider cannulating fistula to see if tolerated      Azotemia, improved after dialysis  - FOBT -ve  - likely secondary to prednisone  - no significant uremic symptoms      Hyperkalemia, serum potassium overall improved after dialysis although still remains above goal at 5 1     - BMP in a m   - strict potassium restricted diet     CKD anemia, hemoglobin below goal but overall stable  - not on Epogen due to history of PE  - Transfuse p r n  For hemoglobin less than seven, if continued to worsen , consider iron saturation      Thrombocytopenia, ongoing hematology follow-up  - patient is not on heparin with dialysis   HIT -ve     - agree with considering Coumadin as anticoagulation of choice  - further hematology follow up       Hyperphosphatemia, serum phosphorus 6 1 in November 2017  - currently remains on calcium carbonate two tablets with each meal  - continue to closely monitor, renal diet     Blood pressure overall acceptable    SUBJECTIVE:  Patient seen and examined at bedside  No chest pain, shortness of breath, nausea, vomiting, abdominal pain     OBJECTIVE:  Current Weight: Weight - Scale: 135 kg (298 lb)  Vitals:    12/13/17 0700   BP: 140/63   Pulse: 71   Resp: 18   Temp: 97 9 °F (36 6 °C)   SpO2: 100%       Intake/Output Summary (Last 24 hours) at 12/13/17 7617  Last data filed at 12/13/17 9966   Gross per 24 hour   Intake              645 ml   Output                0 ml   Net              645 ml       Physical Examination:  General:  Lying in bed, no acute distress   HEENT:  PERRLA, EOMI  Neck:  Supple  Respiratory:  Bilateral decreased breath sound at bases, poor respiratory effort, limited examination due to body habitus  CVS:  S1, S2 present  GI:  Soft, obese, nontender  CNS:  Active alert oriented x3  Extremities:  Trace edema in lower extremities overall stable  Skin:  Unremarkable, right upper extremity Elmer present around AV fistula site although overall stable      Medications:    Current Facility-Administered Medications:     acetaminophen (TYLENOL) tablet 650 mg, 650 mg, Oral, Q6H PRN, Gladis Simms MD, 650 mg at 12/11/17 1321    allopurinol (ZYLOPRIM) tablet 200 mg, 200 mg, Oral, Once per day on Sun Tue Thu Sat, Gladis Simms MD, 200 mg at 12/12/17 0512    allopurinol (ZYLOPRIM) tablet 300 mg, 300 mg, Oral, Once per day on Mon Thu, Gladis Simms MD, 300 mg at 12/11/17 0853    apixaban (ELIQUIS) tablet 2 5 mg, 2 5 mg, Oral, BID, Gladis Simms MD, 2 5 mg at 12/12/17 1825    bisacodyl (DULCOLAX) EC tablet 5 mg, 5 mg, Oral, Daily PRN, Gladis Simms MD, 5 mg at 12/10/17 7517    bisacodyl (DULCOLAX) rectal suppository 10 mg, 10 mg, Rectal, Daily PRN, Simona Toussaint DO, 10 mg at 12/10/17 1501    calcium carbonate (OYSTER SHELL,OSCAL) 500 mg tablet 2 tablet, 2 tablet, Oral, TID With Meals, Gladis Simms MD, 2 tablet at 12/13/17 0750    cholecalciferol (VITAMIN D3) tablet 1,000 Units, 1,000 Units, Oral, Daily, Rajat Singleton MD, 1,000 Units at 12/12/17 0913    cyanocobalamin (VITAMIN B-12) tablet 1,000 mcg, 1,000 mcg, Oral, Daily, Rajat Singleton MD, 1,000 mcg at 12/12/17 0912    dextromethorphan-guaiFENesin (ROBITUSSIN DM)  mg/5 mL oral syrup 5 mL, 5 mL, Oral, Q4H PRN, Rajat Singleton MD, 5 mL at 12/13/17 0816    docusate sodium (COLACE) capsule 100 mg, 100 mg, Oral, BID, Rajat Singleton MD, 100 mg at 12/12/17 1825    docusate sodium (COLACE) capsule 100 mg, 100 mg, Oral, BID PRN, Rajat Singleton MD    ferrous sulfate tablet 325 mg, 325 mg, Oral, Daily With Breakfast, Glenora Mass, DO, 325 mg at 12/13/17 0750    HYDROmorphone (DILAUDID) tablet 2 mg, 2 mg, Oral, Q6H PRN, Rajat Singleton MD, 2 mg at 12/13/17 0750    insulin glargine (LANTUS) subcutaneous injection 40 Units, 40 Units, Subcutaneous, HS, Rajat Singleton MD, 40 Units at 12/12/17 2155    insulin lispro (HumaLOG) 100 units/mL subcutaneous injection 1-5 Units, 1-5 Units, Subcutaneous, HS, Rajat Singleton MD, 3 Units at 12/12/17 2155    insulin lispro (HumaLOG) 100 units/mL subcutaneous injection 2-12 Units, 2-12 Units, Subcutaneous, TID AC, 2 Units at 12/13/17 0750 **AND** Fingerstick Glucose (POCT), , , TID AC, Rajat Singleton MD    insulin NPH (HumuLIN N,NovoLIN N) 100 Units/mL subcutaneous injection 10 Units, 10 Units, Subcutaneous, TID AC, Glenora Mass, DO, 10 Units at 12/13/17 0751    lactulose 20 g/30 mL oral solution 20 g, 20 g, Oral, Daily, Glenora Mass, DO, 20 g at 12/12/17 0912    latanoprost (XALATAN) 0 005 % ophthalmic solution 1 drop, 1 drop, Both Eyes, HS, Rajat Singleton MD, 1 drop at 12/12/17 9013    levothyroxine tablet 25 mcg, 25 mcg, Oral, Early Morning, Rajat Singleton MD, 25 mcg at 12/13/17 0552    menthol-methyl salicylate (BENGAY) 88-53 % cream, , Apply externally, 4x Daily PRN, Rajat Singleton MD    metoprolol tartrate (LOPRESSOR) partial tablet 12 5 mg, 12 5 mg, Oral, Q12H [address], Abhishek Kaufman MD, 12 5 mg at 12/12/17 2009    miconazole 2 % cream, , Topical, BID, Abhishek Kaufman MD, 1 application at 96/56/38 1826    nystatin (MYCOSTATIN) powder, , Topical, BID, Abhishek Kaufman MD    ondansetron [facility]) injection 4 mg, 4 mg, Intravenous, Q6H PRN, Abhishek Kaufman MD    ondansetron (ZOFRAN-ODT) dispersible tablet 4 mg, 4 mg, Oral, Q4H PRN, Abhishek Kaufman MD    pantoprazole (PROTONIX) EC tablet 40 mg, 40 mg, Oral, Early Morning, Abhishek Kaufman MD, 40 mg at 12/13/17 0552    polyethylene glycol (MIRALAX) packet 17 g, 17 g, Oral, Daily, Abhishek Kaufman MD, 17 g at 12/12/17 0913    pravastatin (PRAVACHOL) tablet 80 mg, 80 mg, Oral, Daily, Abhishek Kaufman MD, 80 mg at 12/12/17 0913    [COMPLETED] predniSONE tablet 20 mg, 20 mg, Oral, Daily, 20 mg at 12/12/17 0913 **FOLLOWED BY** predniSONE tablet 10 mg, 10 mg, Oral, Daily, Eva Jones DO    pregabalin (LYRICA) capsule 75 mg, 75 mg, Oral, BID, Abhishek Kaufman MD, 75 mg at 12/12/17 1825    senna (SENOKOT) tablet 17 2 mg, 2 tablet, Oral, Daily PRN, Abhishek Kaufman MD, 17 2 mg at 12/10/17 2407    Laboratory Results:    Results from last 7 days  Lab Units 12/12/17  0514 12/12/17  0510 12/11/17  0842 12/10/17  0450 12/09/17  0615 12/08/17  2129   WBC Thousand/uL 9 13  --  9 31 9 93 10 50* 9 84   HEMOGLOBIN g/dL 7 8*  --  8 2* 8 3* 8 5* 9 0*   HEMATOCRIT % 24 5*  --  25 0* 25 7* 26 8* 27 8*   PLATELETS Thousands/uL 39*  --  34* 33* 37* 35*   SODIUM mmol/L  --  134* 133*  --  135* 133*   POTASSIUM mmol/L  --  5 1 6 0*  --  5 3 5 6*   CHLORIDE mmol/L  --  98* 97*  --  98* 96*   CO2 mmol/L  --  30 30  --  33* 30   BUN mg/dL  --  41* 106*  --  66* 59*   CREATININE mg/dL  --  2 51* 4 59*  --  2 31* 2 10*   CALCIUM mg/dL  --  8 3 8 6  --  8 9 8 6   ALBUMIN g/dL  --   --   --   --   --  2 3*   TOTAL PROTEIN g/dL  --   --   --   --   --  6 4   GLUCOSE RANDOM mg/dL  --  104 249*  --  235* 445*       Results for orders placed during the hospital encounter of 11/23/17   XR chest portable    Narrative CHEST     INDICATION:  Permacath Placement    COMPARISON:  November 30, earlier in the morning as well as November 24, 2017  VIEWS:   AP portable semierect view in the conventional and line placement techniques  IMAGES:  2    FINDINGS:         The heart is enlarged  Atherosclerotic changes in the aorta  Left internal jugular dialysis catheter is present  The catheter has become retracted by approximately 5 cm  The tip is now in the mid superior vena cava  Previously, the tip projecting into the right atrium  Lung volumes diminished  Halle and pulmonary vessels are prominent  Visualized osseous structures appear within normal limits for the patient's age  Impression 1  Retraction of the indwelling left internal jugular dialysis catheter by approximately 5 cm  Tip now in the mid superior vena cava  2   Mild vascular congestion  A verbal report will be called by the reading room liaison following this dictation  Workstation performed: [id number]       Results for orders placed in visit on 09/08/15   XR chest pa & lateral    Narrative CHEST    INDICATION-  Shortness of breath   COMPARISON-  7/1/2015   VIEWS-  Frontal and lateral projections    4 images   FINDINGS-     Image quality is somewhat degraded by patient body habitus  Heart shadow is enlarged but stable from prior exam    Lung volumes are small  There are patchy opacities within the left   lower lobe  Visualized osseous structures appear within normal limits for the   patient's age  IMPRESSION-   1  No active pulmonary disease on examination which is somewhat limited   secondary to low lung volumes and patient's body habitus  2   Stable cardiomegaly     Transcribed on- MIKE Dhillon MD        Reading Radiologist- MIKE Lisa MD        Electronically Signed- MIKE Lisa MD        Released Date Time- 09/09/15 0921      ------------------------------------------------------------------------------   Dean Holm PARIRI      No results found for this or any previous visit  No results found for this or any previous visit  Results for orders placed during the hospital encounter of 08/14/17   CT abdomen pelvis wo contrast    Narrative CT ABDOMEN AND PELVIS WITHOUT IV CONTRAST    INDICATION:  Abdominal pain, bright red blood per rectum  COMPARISON: None  TECHNIQUE:  CT examination of the abdomen and pelvis was performed without intravenous contrast   Reformatted images were created in axial, sagittal, and coronal planes  Radiation dose length product (DLP) for this visit:  2979 71 mGy-cm   This examination, like all CT scans performed in the [facility], was performed utilizing techniques to minimize radiation dose exposure, including the use of   iterative reconstruction and automated exposure control  Enteric contrast was administered  FINDINGS:    ABDOMEN    Evaluation is limited by noncontrast technique and body habitus  Patients arms are at their side which also limits the exam     LOWER CHEST:  There is bibasilar dependent atelectasis  Mild cardiomegaly  LIVER/BILIARY TREE:  Unremarkable  GALLBLADDER:  There are gallstone(s) within the gallbladder, without pericholecystic inflammatory changes  SPLEEN:  Unremarkable  PANCREAS:  Unremarkable  ADRENAL GLANDS:  Unremarkable  KIDNEYS/URETERS:  Unremarkable  No hydronephrosis  STOMACH AND BOWEL:  There is mild bowel wall thickening of the sigmoid colon and rectum  Mild stool retention throughout the colon  No bowel obstruction  APPENDIX:  No findings to suggest appendicitis  ABDOMINOPELVIC CAVITY:  No ascites or free intraperitoneal air  No lymphadenopathy  VESSELS:  Unremarkable for patient's age      PELVIS    REPRODUCTIVE ORGANS:  Unremarkable for patient's age     URINARY BLADDER:  Unremarkable  ABDOMINAL WALL/INGUINAL REGIONS:  There is a 7 x 3 5 cm collection along the right lower abdominal wall anteriorly adjacent to the rectus musculature measuring approximately 38 Hounsfield units  OSSEOUS STRUCTURES:  No acute fracture or destructive osseous lesion  Impression 1  Bowel wall thickening of the sigmoid colon and rectum without significant adjacent inflammatory stranding  Differential considerations include colitis to include infectious, inflammatory or ischemic etiology as well as submucosal hemorrhage  Clinical and laboratory correlation are recommended  2   Cholelithiasis without other evidence of acute cholecystitis  3   Ill-defined slightly hyperdense collection along the right lower abdominal wall measuring 7 x 3 5 cm and adjacent to the rectus musculature  This may represent a hematoma  Infectious process such as abscess cannot be excluded given the lack of   intravenous contrast   Recommend clinical and laboratory correlation  Workstation performed: [id number]       Results for orders placed during the hospital encounter of 01/21/16   US retroperitoneal complete    Narrative RENAL ULTRASOUND    INDICATION: 76-year-old with acute renal failure    COMPARISON: March 2, 2014    TECHNIQUE:   Ultrasound of the retroperitoneum was performed with a curvilinear transducer utilizing volumetric sweeps and still imaging techniques  FINDINGS:    KIDNEYS:  Study is significantly limited due to patient obese body habitus  The right kidney is difficult to visualize in any degree of detail  Right kidney:  10 0 x 5 9 cm  No gross evidence for hydronephrosis  Mild cortical thinning and lobulation  Otherwise limited for detail  Left kidney:  10 3 x 6 2 cm  Normal echogenicity and contour  No suspicious masses detected  Subcentimeter cortical cysts noted, lateral mid upper cortex and lower pole  No hydronephrosis    No shadowing calculi  No perinephric fluid collections  URETERS:  Nondilated  BLADDER:   Normally distended  No focal thickening or mass lesions  Impression Right kidney poorly demonstrated, mostly related to body habitus  No hydronephrosis demonstrated  Portions of the record may have been created with voice recognition software  Occasional wrong word or "sound a like" substitutions may have occurred due to the inherent limitations of voice recognition software  Read the chart carefully and recognize, using context, where substitutions have occurred

## 2017-12-13 NOTE — DISCHARGE INSTR - AVS FIRST PAGE
Wound care instructions:   1  Apply skin nourishing cream to the skin daily   2  Soft care cushion when OOB in the chair   3 Apply hydraguard bid and prn to bilateral heels   4  Sacral / buttocks area - dust with stomadhesive powder then apply calmoseptine tid and prn   5  Apply hydraguard to the buttocks neeta wound area of dry skin tid and prn   6  Turn and reposition every 2 hours to off load and prevent pressure   7   Elevate heels off of the bed with pillows       Check CBC on 12/16/17 & then weekly

## 2017-12-13 NOTE — SOCIAL WORK
CM  Met with Dr Willie Lopez and monitoring platelets and possibly changing to coumadin   Cm will follow nick from [facility]

## 2017-12-14 VITALS
HEART RATE: 76 BPM | OXYGEN SATURATION: 97 % | WEIGHT: 293 LBS | HEIGHT: 64 IN | DIASTOLIC BLOOD PRESSURE: 58 MMHG | SYSTOLIC BLOOD PRESSURE: 133 MMHG | RESPIRATION RATE: 18 BRPM | TEMPERATURE: 97.5 F | BODY MASS INDEX: 50.02 KG/M2

## 2017-12-14 LAB
ANISOCYTOSIS BLD QL SMEAR: PRESENT
BASOPHILS # BLD MANUAL: 0 THOUSAND/UL (ref 0–0.1)
BASOPHILS NFR MAR MANUAL: 0 % (ref 0–1)
EOSINOPHIL # BLD MANUAL: 0.28 THOUSAND/UL (ref 0–0.4)
EOSINOPHIL NFR BLD MANUAL: 3 % (ref 0–6)
ERYTHROCYTE [DISTWIDTH] IN BLOOD BY AUTOMATED COUNT: 15.3 % (ref 11.6–15.1)
GLUCOSE SERPL-MCNC: 178 MG/DL (ref 65–140)
GLUCOSE SERPL-MCNC: 301 MG/DL (ref 65–140)
HCT VFR BLD AUTO: 27 % (ref 34.8–46.1)
HGB BLD-MCNC: 8.5 G/DL (ref 11.5–15.4)
LYMPHOCYTES # BLD AUTO: 0.83 THOUSAND/UL (ref 0.6–4.47)
LYMPHOCYTES # BLD AUTO: 9 % (ref 14–44)
MACROCYTES BLD QL AUTO: PRESENT
MCH RBC QN AUTO: 29.7 PG (ref 26.8–34.3)
MCHC RBC AUTO-ENTMCNC: 31.5 G/DL (ref 31.4–37.4)
MCV RBC AUTO: 94 FL (ref 82–98)
METAMYELOCYTES NFR BLD MANUAL: 1 % (ref 0–1)
MONOCYTES # BLD AUTO: 0.28 THOUSAND/UL (ref 0–1.22)
MONOCYTES NFR BLD: 3 % (ref 4–12)
NEUTROPHILS # BLD MANUAL: 7.72 THOUSAND/UL (ref 1.85–7.62)
NEUTS SEG NFR BLD AUTO: 84 % (ref 43–75)
NRBC BLD AUTO-RTO: 0 /100 WBCS
PLATELET # BLD AUTO: 49 THOUSANDS/UL (ref 149–390)
PLATELET BLD QL SMEAR: ABNORMAL
RBC # BLD AUTO: 2.86 MILLION/UL (ref 3.81–5.12)
RBC MORPH BLD: PRESENT
WBC # BLD AUTO: 9.19 THOUSAND/UL (ref 4.31–10.16)

## 2017-12-14 PROCEDURE — 82948 REAGENT STRIP/BLOOD GLUCOSE: CPT

## 2017-12-14 PROCEDURE — 85027 COMPLETE CBC AUTOMATED: CPT | Performed by: HOSPITALIST

## 2017-12-14 PROCEDURE — 85007 BL SMEAR W/DIFF WBC COUNT: CPT | Performed by: HOSPITALIST

## 2017-12-14 RX ORDER — LACTULOSE 20 G/30ML
20 SOLUTION ORAL DAILY
Refills: 0
Start: 2017-12-15

## 2017-12-14 RX ORDER — HYDROMORPHONE HYDROCHLORIDE 2 MG/1
2 TABLET ORAL EVERY 6 HOURS PRN
Qty: 10 TABLET | Refills: 0 | Status: SHIPPED | OUTPATIENT
Start: 2017-12-14 | End: 2017-12-19 | Stop reason: HOSPADM

## 2017-12-14 RX ORDER — ASPIRIN 81 MG/1
81 TABLET, CHEWABLE ORAL DAILY
Refills: 0
Start: 2017-12-15

## 2017-12-14 RX ADMIN — PANTOPRAZOLE SODIUM 40 MG: 40 TABLET, DELAYED RELEASE ORAL at 05:34

## 2017-12-14 RX ADMIN — METOPROLOL TARTRATE 12.5 MG: 25 TABLET ORAL at 08:18

## 2017-12-14 RX ADMIN — INSULIN LISPRO 2 UNITS: 100 INJECTION, SOLUTION INTRAVENOUS; SUBCUTANEOUS at 08:22

## 2017-12-14 RX ADMIN — INSULIN HUMAN 10 UNITS: 100 INJECTION, SUSPENSION SUBCUTANEOUS at 08:22

## 2017-12-14 RX ADMIN — PREGABALIN 75 MG: 75 CAPSULE ORAL at 08:18

## 2017-12-14 RX ADMIN — MICONAZOLE NITRATE: 20 CREAM TOPICAL at 08:21

## 2017-12-14 RX ADMIN — Medication 2 TABLET: at 08:21

## 2017-12-14 RX ADMIN — LEVOTHYROXINE SODIUM 25 MCG: 25 TABLET ORAL at 05:34

## 2017-12-14 RX ADMIN — PREDNISONE 10 MG: 10 TABLET ORAL at 08:18

## 2017-12-14 RX ADMIN — Medication 2 TABLET: at 11:53

## 2017-12-14 RX ADMIN — GUAIFENESIN AND DEXTROMETHORPHAN 5 ML: 100; 10 SYRUP ORAL at 09:00

## 2017-12-14 RX ADMIN — PRAVASTATIN SODIUM 80 MG: 80 TABLET ORAL at 08:18

## 2017-12-14 RX ADMIN — Medication 325 MG: at 08:18

## 2017-12-14 RX ADMIN — INSULIN LISPRO 8 UNITS: 100 INJECTION, SOLUTION INTRAVENOUS; SUBCUTANEOUS at 11:54

## 2017-12-14 RX ADMIN — ALLOPURINOL 200 MG: 100 TABLET ORAL at 08:20

## 2017-12-14 RX ADMIN — ALLOPURINOL 300 MG: 300 TABLET ORAL at 08:20

## 2017-12-14 RX ADMIN — NYSTATIN: 100000 POWDER TOPICAL at 08:20

## 2017-12-14 RX ADMIN — CYANOCOBALAMIN TAB 500 MCG 1000 MCG: 500 TAB at 08:18

## 2017-12-14 RX ADMIN — VITAMIN D, TAB 1000IU (100/BT) 1000 UNITS: 25 TAB at 08:18

## 2017-12-14 RX ADMIN — ASPIRIN 81 MG 81 MG: 81 TABLET ORAL at 08:18

## 2017-12-14 NOTE — PROGRESS NOTES
NEPHROLOGY PROGRESS NOTE   Gabriel Suarez 67 y o  female MRN: [id number]  [facility] Encounter: [id number]  Reason for Consult: ESRD    ASSESSMENT AND PLAN:  ESRD on HD MWF [address]  - HD on Friday  not on heparin with dialysis while inpatient and as an outpatient  - UF to EDW  - has perm cath placement       Access, recent right upper extremity AV fistula infiltration and had PermCath placement on 11/30/17  Currently has good bruit and thrill present at access site  Patient tolerated dialysis well by using AV fistula yesterday with good blood flow achieved  -continue to use fistula going forward and if tolerating, will need PermCath removal as an outpatient      Azotemia, improved after dialysis  - FOBT -ve  - likely secondary to prednisone  - no significant uremic symptoms      Hyperkalemia, serum potassium overall improved after dialysis although still remains above goal at 5 1     - strict potassium restricted diet     CKD anemia, hemoglobin below goal but overall stable  - not on Epogen due to history of PE  - Transfuse p r n  For hemoglobin less than seven, if continued to worsen , consider iron saturation      Thrombocytopenia, ongoing hematology follow-up  - patient is not on heparin with dialysis   HIT -ve  - patient has opted for Aspirin  Eliquis was discontinued after detailed discussion of patient with primary team   There was also concern for Eliquis contributing to thrombocytopenia  - close hematology follow-up     Hyperphosphatemia, serum phosphorus 6 1 in November 2017  - currently remains on calcium carbonate two tablets with each meal  - continue to closely monitor, renal diet     Blood pressure overall acceptable  Stable from Nephrology standpoint for discharge  Discussed with Dr Minerva Joy:  Patient seen and examined at bedside  No chest pain, shortness of breath, nausea, vomiting, abdominal pain or diarrhea      OBJECTIVE:  Current Weight: Weight - Scale: 135 kg (298 lb)  Vitals:    12/14/17 0700   BP: 133/58   Pulse: 76   Resp: 18   Temp: 97 5 °F (36 4 °C)   SpO2: 97%       Intake/Output Summary (Last 24 hours) at 12/14/17 1028  Last data filed at 12/13/17 2012   Gross per 24 hour   Intake              660 ml   Output             4000 ml   Net            -3340 ml       Physical Examination:  General: no acute distress, lying in bed   HEENT:  PERRLA, EOMI, mild conjunctival pallor present  Neck:  Supple  Respiratory:  Decreased breath sound at bases, poor respiratory effort, limited examination due to body habitus  CVS:  S1, S2 present  GI:  Soft, obese, nontender  CNS:  Active alert oriented x3  Extremities:  No significant worsening edema in both lower extremities  Skin:  No new change    Medications:    Current Facility-Administered Medications:     acetaminophen (TYLENOL) tablet 650 mg, 650 mg, Oral, Q6H PRN, Sada Guerrero MD, 650 mg at 12/11/17 1321    allopurinol (ZYLOPRIM) tablet 200 mg, 200 mg, Oral, Once per day on Sun Tue Thu Sat, Sada Guerrero MD, 200 mg at 12/14/17 0820    allopurinol (ZYLOPRIM) tablet 300 mg, 300 mg, Oral, Once per day on Mon Thu, Sada Guerrero MD, 300 mg at 12/14/17 1643    aspirin chewable tablet 81 mg, 81 mg, Oral, Daily, George Cox MD, 81 mg at 12/14/17 0818    bisacodyl (DULCOLAX) EC tablet 5 mg, 5 mg, Oral, Daily PRN, Sada Guerrero MD, 5 mg at 12/10/17 4154    bisacodyl (DULCOLAX) rectal suppository 10 mg, 10 mg, Rectal, Daily PRN, Shu Love DO, 10 mg at 12/10/17 1501    calcium carbonate (OYSTER SHELL,OSCAL) 500 mg tablet 2 tablet, 2 tablet, Oral, TID With Meals, Sada Guerrero MD, 2 tablet at 12/14/17 9660    cholecalciferol (VITAMIN D3) tablet 1,000 Units, 1,000 Units, Oral, Daily, Sada Guerrero MD, 1,000 Units at 12/14/17 0818    cyanocobalamin (VITAMIN B-12) tablet 1,000 mcg, 1,000 mcg, Oral, Daily, Sada Guerrero MD, 1,000 mcg at 12/14/17 0818    dextromethorphan-guaiFENesin (ROBITUSSIN DM)  mg/5 mL oral syrup 5 mL, 5 mL, Oral, Q4H PRN, Kadi Fritz MD, 5 mL at 12/14/17 0900    docusate sodium (COLACE) capsule 100 mg, 100 mg, Oral, BID, Kadi Fritz MD, 100 mg at 12/12/17 1825    docusate sodium (COLACE) capsule 100 mg, 100 mg, Oral, BID PRN, Kadi Fritz MD    doxercalciferol (HECTOROL) injection 0 5 mcg, 0 5 mcg, Intravenous, After Dialysis, Bernard Sellers MD, 0 5 mcg at 12/13/17 1110    ferrous sulfate tablet 325 mg, 325 mg, Oral, Daily With Breakfast, Jerry Dougherty DO, 325 mg at 12/14/17 0818    HYDROmorphone (DILAUDID) tablet 2 mg, 2 mg, Oral, Q6H PRN, Kadi Fritz MD, 2 mg at 12/13/17 0750    insulin glargine (LANTUS) subcutaneous injection 40 Units, 40 Units, Subcutaneous, HS, Kadi Fritz MD, 40 Units at 12/13/17 2108    insulin lispro (HumaLOG) 100 units/mL subcutaneous injection 1-5 Units, 1-5 Units, Subcutaneous, HS, Kadi Fritz MD, 4 Units at 12/13/17 2108    insulin lispro (HumaLOG) 100 units/mL subcutaneous injection 2-12 Units, 2-12 Units, Subcutaneous, TID AC, 2 Units at 12/14/17 0257 **AND** Fingerstick Glucose (POCT), , , TID AC, Kadi Fritz MD    insulin NPH (HumuLIN N,NovoLIN N) 100 Units/mL subcutaneous injection 10 Units, 10 Units, Subcutaneous, BID before breakfast/lunch, Kacy Jane MD    insulin NPH (HumuLIN N,NovoLIN N) 100 Units/mL subcutaneous injection 14 Units, 14 Units, Subcutaneous, Before Dinner, Robbie Dennis MD    lactulose 20 g/30 mL oral solution 20 g, 20 g, Oral, Daily, Jerry Dougherty DO, 20 g at 12/12/17 0912    latanoprost (XALATAN) 0 005 % ophthalmic solution 1 drop, 1 drop, Both Eyes, HS, Kadi Fritz MD, 1 drop at 12/13/17 2107    levothyroxine tablet 25 mcg, 25 mcg, Oral, Early Morning, Kadi Fritz MD, 25 mcg at 12/14/17 0534    menthol-methyl salicylate (BENGAY) 24-39 % cream, , Apply externally, 4x Daily PRN, Kadi Fritz MD    metoprolol tartrate (LOPRESSOR) partial tablet 12 5 mg, 12 5 mg, Oral, Q12H [address], Tim Corral MD, 12 5 mg at 12/14/17 0818    miconazole 2 % cream, , Topical, BID, Tim Corral MD    nystatin (MYCOSTATIN) powder, , Topical, BID, Tim Corral MD    [facility]ron [facility]) injection 4 mg, 4 mg, Intravenous, Q6H PRN, Tim Corral MD    ondansetron (ZOFRAN-ODT) dispersible tablet 4 mg, 4 mg, Oral, Q4H PRN, Tim Corral MD    pantoprazole (PROTONIX) EC tablet 40 mg, 40 mg, Oral, Early Morning, Tim Corral MD, 40 mg at 12/14/17 0534    polyethylene glycol (MIRALAX) packet 17 g, 17 g, Oral, Daily, Tim Corral MD, 17 g at 12/12/17 0913    pravastatin (PRAVACHOL) tablet 80 mg, 80 mg, Oral, Daily, Tim Corral MD, 80 mg at 12/14/17 0818    pregabalin (LYRICA) capsule 75 mg, 75 mg, Oral, BID, Tim Corral MD, 75 mg at 12/14/17 0818    senna (SENOKOT) tablet 17 2 mg, 2 tablet, Oral, Daily PRN, Tim Corral MD, 17 2 mg at 12/10/17 0714    Laboratory Results:    Results from last 7 days  Lab Units 12/14/17  0617 12/13/17  0928 12/12/17  0514 12/12/17  0510 12/11/17  0842 12/10/17  0450 12/09/17  0615 12/08/17  2129   WBC Thousand/uL 9 19 9 76 9 13  --  9 31 9 93 10 50* 9 84   HEMOGLOBIN g/dL 8 5* 8 2* 7 8*  --  8 2* 8 3* 8 5* 9 0*   HEMATOCRIT % 27 0* 25 6* 24 5*  --  25 0* 25 7* 26 8* 27 8*   PLATELETS Thousands/uL 49* 50* 39*  --  34* 33* 37* 35*   SODIUM mmol/L  --   --   --  134* 133*  --  135* 133*   POTASSIUM mmol/L  --   --   --  5 1 6 0*  --  5 3 5 6*   CHLORIDE mmol/L  --   --   --  98* 97*  --  98* 96*   CO2 mmol/L  --   --   --  30 30  --  33* 30   BUN mg/dL  --   --   --  41* 106*  --  66* 59*   CREATININE mg/dL  --   --   --  2 51* 4 59*  --  2 31* 2 10*   CALCIUM mg/dL  --   --   --  8 3 8 6  --  8 9 8 6   ALBUMIN g/dL  --   --   --   --   --   --   --  2 3*   TOTAL PROTEIN g/dL  --   --   --   --   --   --   --  6 4   GLUCOSE RANDOM mg/dL  --   --   --  104 249*  --  235* 445*       Results for orders placed during the hospital encounter of 11/23/17   XR chest portable    Narrative CHEST     INDICATION:  Permacath Placement    COMPARISON:  November 30, earlier in the morning as well as November 24, 2017  VIEWS:   AP portable semierect view in the conventional and line placement techniques  IMAGES:  2    FINDINGS:         The heart is enlarged  Atherosclerotic changes in the aorta  Left internal jugular dialysis catheter is present  The catheter has become retracted by approximately 5 cm  The tip is now in the mid superior vena cava  Previously, the tip projecting into the right atrium  Lung volumes diminished  Halle and pulmonary vessels are prominent  Visualized osseous structures appear within normal limits for the patient's age  Impression 1  Retraction of the indwelling left internal jugular dialysis catheter by approximately 5 cm  Tip now in the mid superior vena cava  2   Mild vascular congestion  A verbal report will be called by the reading room liaison following this dictation  Workstation performed: [id number]       Results for orders placed in visit on 09/08/15   XR chest pa & lateral    Narrative CHEST    INDICATION-  Shortness of breath   COMPARISON-  7/1/2015   VIEWS-  Frontal and lateral projections    4 images   FINDINGS-     Image quality is somewhat degraded by patient body habitus  Heart shadow is enlarged but stable from prior exam    Lung volumes are small  There are patchy opacities within the left   lower lobe  Visualized osseous structures appear within normal limits for the   patient's age  IMPRESSION-   1  No active pulmonary disease on examination which is somewhat limited   secondary to low lung volumes and patient's body habitus  2   Stable cardiomegaly     Transcribed on- MIKE Valencia MD        Reading Radiologist- MIKE Coleman MD        Electronically Signed- Molina Trujillo Mazin Kent MD        Released Date Time- 09/09/15 0921      ------------------------------------------------------------------------------   Jody Paiz      No results found for this or any previous visit  No results found for this or any previous visit  Results for orders placed during the hospital encounter of 08/14/17   CT abdomen pelvis wo contrast    Narrative CT ABDOMEN AND PELVIS WITHOUT IV CONTRAST    INDICATION:  Abdominal pain, bright red blood per rectum  COMPARISON: None  TECHNIQUE:  CT examination of the abdomen and pelvis was performed without intravenous contrast   Reformatted images were created in axial, sagittal, and coronal planes  Radiation dose length product (DLP) for this visit:  2979 71 mGy-cm   This examination, like all CT scans performed in the [facility], was performed utilizing techniques to minimize radiation dose exposure, including the use of   iterative reconstruction and automated exposure control  Enteric contrast was administered  FINDINGS:    ABDOMEN    Evaluation is limited by noncontrast technique and body habitus  Patients arms are at their side which also limits the exam     LOWER CHEST:  There is bibasilar dependent atelectasis  Mild cardiomegaly  LIVER/BILIARY TREE:  Unremarkable  GALLBLADDER:  There are gallstone(s) within the gallbladder, without pericholecystic inflammatory changes  SPLEEN:  Unremarkable  PANCREAS:  Unremarkable  ADRENAL GLANDS:  Unremarkable  KIDNEYS/URETERS:  Unremarkable  No hydronephrosis  STOMACH AND BOWEL:  There is mild bowel wall thickening of the sigmoid colon and rectum  Mild stool retention throughout the colon  No bowel obstruction  APPENDIX:  No findings to suggest appendicitis  ABDOMINOPELVIC CAVITY:  No ascites or free intraperitoneal air  No lymphadenopathy  VESSELS:  Unremarkable for patient's age      PELVIS    REPRODUCTIVE ORGANS: Unremarkable for patient's age  URINARY BLADDER:  Unremarkable  ABDOMINAL WALL/INGUINAL REGIONS:  There is a 7 x 3 5 cm collection along the right lower abdominal wall anteriorly adjacent to the rectus musculature measuring approximately 38 Hounsfield units  OSSEOUS STRUCTURES:  No acute fracture or destructive osseous lesion  Impression 1  Bowel wall thickening of the sigmoid colon and rectum without significant adjacent inflammatory stranding  Differential considerations include colitis to include infectious, inflammatory or ischemic etiology as well as submucosal hemorrhage  Clinical and laboratory correlation are recommended  2   Cholelithiasis without other evidence of acute cholecystitis  3   Ill-defined slightly hyperdense collection along the right lower abdominal wall measuring 7 x 3 5 cm and adjacent to the rectus musculature  This may represent a hematoma  Infectious process such as abscess cannot be excluded given the lack of   intravenous contrast   Recommend clinical and laboratory correlation  Workstation performed: [id number]       Results for orders placed during the hospital encounter of 01/21/16   US retroperitoneal complete    Narrative RENAL ULTRASOUND    INDICATION: 21-year-old with acute renal failure    COMPARISON: March 2, 2014    TECHNIQUE:   Ultrasound of the retroperitoneum was performed with a curvilinear transducer utilizing volumetric sweeps and still imaging techniques  FINDINGS:    KIDNEYS:  Study is significantly limited due to patient obese body habitus  The right kidney is difficult to visualize in any degree of detail  Right kidney:  10 0 x 5 9 cm  No gross evidence for hydronephrosis  Mild cortical thinning and lobulation  Otherwise limited for detail  Left kidney:  10 3 x 6 2 cm  Normal echogenicity and contour  No suspicious masses detected  Subcentimeter cortical cysts noted, lateral mid upper cortex and lower pole    No hydronephrosis  No shadowing calculi  No perinephric fluid collections  URETERS:  Nondilated  BLADDER:   Normally distended  No focal thickening or mass lesions  Impression Right kidney poorly demonstrated, mostly related to body habitus  No hydronephrosis demonstrated  Portions of the record may have been created with voice recognition software  Occasional wrong word or "sound a like" substitutions may have occurred due to the inherent limitations of voice recognition software  Read the chart carefully and recognize, using context, where substitutions have occurred

## 2017-12-14 NOTE — PROGRESS NOTES
Rounded with Dr Jose Maria Jolley  Plan is to d/c at 1330 back to [facility] care  Will continue to monitor

## 2017-12-15 ENCOUNTER — APPOINTMENT (EMERGENCY)
Dept: RADIOLOGY | Facility: HOSPITAL | Age: 72
DRG: 811 | End: 2017-12-15
Payer: MEDICARE

## 2017-12-15 ENCOUNTER — HOSPITAL ENCOUNTER (INPATIENT)
Facility: HOSPITAL | Age: 72
LOS: 3 days | Discharge: NURSING, REHAB OR LONG-TERM CARE | DRG: 811 | End: 2017-12-19
Attending: EMERGENCY MEDICINE | Admitting: INTERNAL MEDICINE
Payer: MEDICARE

## 2017-12-15 DIAGNOSIS — D69.6 THROMBOCYTOPENIA (HCC): ICD-10-CM

## 2017-12-15 DIAGNOSIS — IMO0001 IDDM (INSULIN DEPENDENT DIABETES MELLITUS): ICD-10-CM

## 2017-12-15 DIAGNOSIS — R07.9 CHEST PAIN: Primary | ICD-10-CM

## 2017-12-15 DIAGNOSIS — E87.8 ELECTROLYTE ABNORMALITY: ICD-10-CM

## 2017-12-15 DIAGNOSIS — Z91.89 AT RISK FOR CARDIAC DYSFUNCTION: ICD-10-CM

## 2017-12-15 DIAGNOSIS — N18.6 END-STAGE RENAL DISEASE ON HEMODIALYSIS (HCC): Chronic | ICD-10-CM

## 2017-12-15 DIAGNOSIS — R77.8 ELEVATED TROPONIN: ICD-10-CM

## 2017-12-15 DIAGNOSIS — J96.12 CHRONIC RESPIRATORY FAILURE WITH HYPOXIA AND HYPERCAPNIA (HCC): ICD-10-CM

## 2017-12-15 DIAGNOSIS — L89.159 SACRAL DECUBITUS ULCER: ICD-10-CM

## 2017-12-15 DIAGNOSIS — E66.01 MORBID OBESITY (HCC): ICD-10-CM

## 2017-12-15 DIAGNOSIS — J96.11 CHRONIC RESPIRATORY FAILURE WITH HYPOXIA AND HYPERCAPNIA (HCC): ICD-10-CM

## 2017-12-15 DIAGNOSIS — Z86.711 HISTORY OF PULMONARY EMBOLISM: ICD-10-CM

## 2017-12-15 DIAGNOSIS — I10 ESSENTIAL HYPERTENSION: Chronic | ICD-10-CM

## 2017-12-15 DIAGNOSIS — Z99.2 END-STAGE RENAL DISEASE ON HEMODIALYSIS (HCC): Chronic | ICD-10-CM

## 2017-12-15 PROBLEM — R10.13 EPIGASTRIC PAIN: Status: ACTIVE | Noted: 2017-12-15

## 2017-12-15 PROBLEM — R74.8 ELEVATED LIPASE: Status: ACTIVE | Noted: 2017-12-15

## 2017-12-15 LAB
ALBUMIN SERPL BCP-MCNC: 2.1 G/DL (ref 3.5–5)
ALP SERPL-CCNC: 66 U/L (ref 46–116)
ALT SERPL W P-5'-P-CCNC: 22 U/L (ref 12–78)
ANION GAP SERPL CALCULATED.3IONS-SCNC: 8 MMOL/L (ref 4–13)
ANISOCYTOSIS BLD QL SMEAR: PRESENT
AST SERPL W P-5'-P-CCNC: 14 U/L (ref 5–45)
ATRIAL RATE: 97 BPM
BASOPHILS # BLD MANUAL: 0 THOUSAND/UL (ref 0–0.1)
BASOPHILS NFR MAR MANUAL: 0 % (ref 0–1)
BILIRUB SERPL-MCNC: 0.33 MG/DL (ref 0.2–1)
BUN SERPL-MCNC: 26 MG/DL (ref 5–25)
CALCIUM SERPL-MCNC: 8.4 MG/DL (ref 8.3–10.1)
CHLORIDE SERPL-SCNC: 95 MMOL/L (ref 100–108)
CO2 SERPL-SCNC: 31 MMOL/L (ref 21–32)
CREAT SERPL-MCNC: 1.57 MG/DL (ref 0.6–1.3)
EOSINOPHIL # BLD MANUAL: 0.08 THOUSAND/UL (ref 0–0.4)
EOSINOPHIL NFR BLD MANUAL: 1 % (ref 0–6)
ERYTHROCYTE [DISTWIDTH] IN BLOOD BY AUTOMATED COUNT: 15.1 % (ref 11.6–15.1)
GFR SERPL CREATININE-BSD FRML MDRD: 32 ML/MIN/1.73SQ M
GLUCOSE SERPL-MCNC: 320 MG/DL (ref 65–140)
HCT VFR BLD AUTO: 26.4 % (ref 34.8–46.1)
HGB BLD-MCNC: 8.5 G/DL (ref 11.5–15.4)
LIPASE SERPL-CCNC: 427 U/L (ref 73–393)
LYMPHOCYTES # BLD AUTO: 0.68 THOUSAND/UL (ref 0.6–4.47)
LYMPHOCYTES # BLD AUTO: 9 % (ref 14–44)
MACROCYTES BLD QL AUTO: PRESENT
MCH RBC QN AUTO: 30.5 PG (ref 26.8–34.3)
MCHC RBC AUTO-ENTMCNC: 32.2 G/DL (ref 31.4–37.4)
MCV RBC AUTO: 95 FL (ref 82–98)
METAMYELOCYTES NFR BLD MANUAL: 3 % (ref 0–1)
MONOCYTES # BLD AUTO: 0.3 THOUSAND/UL (ref 0–1.22)
MONOCYTES NFR BLD: 4 % (ref 4–12)
NEUTROPHILS # BLD MANUAL: 6.27 THOUSAND/UL (ref 1.85–7.62)
NEUTS BAND NFR BLD MANUAL: 2 % (ref 0–8)
NEUTS SEG NFR BLD AUTO: 81 % (ref 43–75)
NRBC BLD AUTO-RTO: 0 /100 WBCS
P AXIS: 42 DEGREES
PLATELET # BLD AUTO: 54 THOUSANDS/UL (ref 149–390)
PLATELET BLD QL SMEAR: ABNORMAL
POTASSIUM SERPL-SCNC: 3.4 MMOL/L (ref 3.5–5.3)
PR INTERVAL: 166 MS
PROT SERPL-MCNC: 6 G/DL (ref 6.4–8.2)
QRS AXIS: 6 DEGREES
QRSD INTERVAL: 92 MS
QT INTERVAL: 318 MS
QTC INTERVAL: 403 MS
RBC # BLD AUTO: 2.79 MILLION/UL (ref 3.81–5.12)
RBC MORPH BLD: PRESENT
SODIUM SERPL-SCNC: 134 MMOL/L (ref 136–145)
SPECIMEN SOURCE: NORMAL
T WAVE AXIS: 133 DEGREES
TROPONIN I BLD-MCNC: 0.05 NG/ML (ref 0–0.08)
TROPONIN I SERPL-MCNC: 0.08 NG/ML
TROPONIN I SERPL-MCNC: 1.5 NG/ML
VENTRICULAR RATE: 97 BPM
WBC # BLD AUTO: 7.56 THOUSAND/UL (ref 4.31–10.16)

## 2017-12-15 PROCEDURE — 84484 ASSAY OF TROPONIN QUANT: CPT | Performed by: STUDENT IN AN ORGANIZED HEALTH CARE EDUCATION/TRAINING PROGRAM

## 2017-12-15 PROCEDURE — 84484 ASSAY OF TROPONIN QUANT: CPT | Performed by: EMERGENCY MEDICINE

## 2017-12-15 PROCEDURE — 36415 COLL VENOUS BLD VENIPUNCTURE: CPT | Performed by: EMERGENCY MEDICINE

## 2017-12-15 PROCEDURE — 83690 ASSAY OF LIPASE: CPT | Performed by: EMERGENCY MEDICINE

## 2017-12-15 PROCEDURE — 80053 COMPREHEN METABOLIC PANEL: CPT | Performed by: EMERGENCY MEDICINE

## 2017-12-15 PROCEDURE — 99285 EMERGENCY DEPT VISIT HI MDM: CPT

## 2017-12-15 PROCEDURE — 84484 ASSAY OF TROPONIN QUANT: CPT

## 2017-12-15 PROCEDURE — 93005 ELECTROCARDIOGRAM TRACING: CPT | Performed by: EMERGENCY MEDICINE

## 2017-12-15 PROCEDURE — 85027 COMPLETE CBC AUTOMATED: CPT | Performed by: EMERGENCY MEDICINE

## 2017-12-15 PROCEDURE — 82948 REAGENT STRIP/BLOOD GLUCOSE: CPT

## 2017-12-15 PROCEDURE — 85007 BL SMEAR W/DIFF WBC COUNT: CPT | Performed by: EMERGENCY MEDICINE

## 2017-12-15 PROCEDURE — 93005 ELECTROCARDIOGRAM TRACING: CPT

## 2017-12-15 RX ORDER — ONDANSETRON HYDROCHLORIDE 4 MG/5ML
4 SOLUTION ORAL EVERY 4 HOURS PRN
Status: DISCONTINUED | OUTPATIENT
Start: 2017-12-15 | End: 2017-12-18 | Stop reason: CLARIF

## 2017-12-15 RX ORDER — ZINC OXIDE
OINTMENT (GRAM) TOPICAL AS NEEDED
Status: DISCONTINUED | OUTPATIENT
Start: 2017-12-15 | End: 2017-12-15 | Stop reason: SDUPTHER

## 2017-12-15 RX ORDER — DOCUSATE SODIUM 100 MG/1
100 CAPSULE, LIQUID FILLED ORAL 2 TIMES DAILY
Status: DISCONTINUED | OUTPATIENT
Start: 2017-12-15 | End: 2017-12-19 | Stop reason: HOSPADM

## 2017-12-15 RX ORDER — CHOLECALCIFEROL (VITAMIN D3) 125 MCG
1000 CAPSULE ORAL DAILY
Status: DISCONTINUED | OUTPATIENT
Start: 2017-12-16 | End: 2017-12-19 | Stop reason: HOSPADM

## 2017-12-15 RX ORDER — ACETAMINOPHEN 325 MG/1
650 TABLET ORAL EVERY 6 HOURS PRN
Status: DISCONTINUED | OUTPATIENT
Start: 2017-12-15 | End: 2017-12-19 | Stop reason: HOSPADM

## 2017-12-15 RX ORDER — LACTULOSE 20 G/30ML
20 SOLUTION ORAL DAILY
Status: DISCONTINUED | OUTPATIENT
Start: 2017-12-16 | End: 2017-12-19 | Stop reason: HOSPADM

## 2017-12-15 RX ORDER — 0.9 % SODIUM CHLORIDE 0.9 %
3 VIAL (ML) INJECTION AS NEEDED
Status: DISCONTINUED | OUTPATIENT
Start: 2017-12-15 | End: 2017-12-19 | Stop reason: HOSPADM

## 2017-12-15 RX ORDER — INSULIN GLARGINE 100 [IU]/ML
40 INJECTION, SOLUTION SUBCUTANEOUS
Status: DISCONTINUED | OUTPATIENT
Start: 2017-12-15 | End: 2017-12-19 | Stop reason: HOSPADM

## 2017-12-15 RX ORDER — ASPIRIN 81 MG/1
324 TABLET, CHEWABLE ORAL ONCE
Status: DISCONTINUED | OUTPATIENT
Start: 2017-12-15 | End: 2017-12-19 | Stop reason: HOSPADM

## 2017-12-15 RX ORDER — ALBUTEROL SULFATE 2.5 MG/3ML
2.5 SOLUTION RESPIRATORY (INHALATION) EVERY 6 HOURS PRN
Status: DISCONTINUED | OUTPATIENT
Start: 2017-12-15 | End: 2017-12-19 | Stop reason: HOSPADM

## 2017-12-15 RX ORDER — FAMOTIDINE 20 MG/1
20 TABLET, FILM COATED ORAL ONCE
Status: COMPLETED | OUTPATIENT
Start: 2017-12-15 | End: 2017-12-15

## 2017-12-15 RX ORDER — MUSCLE RUB CREAM 100; 150 MG/G; MG/G
CREAM TOPICAL 2 TIMES DAILY
Status: DISCONTINUED | OUTPATIENT
Start: 2017-12-15 | End: 2017-12-19 | Stop reason: HOSPADM

## 2017-12-15 RX ORDER — POTASSIUM CHLORIDE 20 MEQ/1
20 TABLET, EXTENDED RELEASE ORAL ONCE
Status: COMPLETED | OUTPATIENT
Start: 2017-12-15 | End: 2017-12-15

## 2017-12-15 RX ORDER — PRAVASTATIN SODIUM 80 MG/1
80 TABLET ORAL DAILY
Status: DISCONTINUED | OUTPATIENT
Start: 2017-12-16 | End: 2017-12-19 | Stop reason: HOSPADM

## 2017-12-15 RX ORDER — ALLOPURINOL 300 MG/1
300 TABLET ORAL
Status: DISCONTINUED | OUTPATIENT
Start: 2017-12-18 | End: 2017-12-19 | Stop reason: HOSPADM

## 2017-12-15 RX ORDER — PANTOPRAZOLE SODIUM 20 MG/1
20 TABLET, DELAYED RELEASE ORAL DAILY
Status: DISCONTINUED | OUTPATIENT
Start: 2017-12-16 | End: 2017-12-19 | Stop reason: HOSPADM

## 2017-12-15 RX ORDER — HEPARIN SODIUM 5000 [USP'U]/ML
5000 INJECTION, SOLUTION INTRAVENOUS; SUBCUTANEOUS EVERY 8 HOURS SCHEDULED
Status: DISCONTINUED | OUTPATIENT
Start: 2017-12-15 | End: 2017-12-15

## 2017-12-15 RX ORDER — POLYETHYLENE GLYCOL 3350 17 G/17G
17 POWDER, FOR SOLUTION ORAL DAILY
Status: DISCONTINUED | OUTPATIENT
Start: 2017-12-16 | End: 2017-12-19 | Stop reason: HOSPADM

## 2017-12-15 RX ORDER — ALLOPURINOL 100 MG/1
200 TABLET ORAL
Status: DISCONTINUED | OUTPATIENT
Start: 2017-12-16 | End: 2017-12-19 | Stop reason: HOSPADM

## 2017-12-15 RX ORDER — SENNOSIDES 8.6 MG
2 TABLET ORAL DAILY PRN
Status: DISCONTINUED | OUTPATIENT
Start: 2017-12-15 | End: 2017-12-19 | Stop reason: HOSPADM

## 2017-12-15 RX ORDER — GUAIFENESIN/DEXTROMETHORPHAN 100-10MG/5
5 SYRUP ORAL EVERY 4 HOURS PRN
Status: DISCONTINUED | OUTPATIENT
Start: 2017-12-15 | End: 2017-12-19 | Stop reason: HOSPADM

## 2017-12-15 RX ORDER — MELATONIN
1000 DAILY
Status: DISCONTINUED | OUTPATIENT
Start: 2017-12-16 | End: 2017-12-19 | Stop reason: HOSPADM

## 2017-12-15 RX ORDER — LATANOPROST 50 UG/ML
1 SOLUTION/ DROPS OPHTHALMIC
Status: DISCONTINUED | OUTPATIENT
Start: 2017-12-15 | End: 2017-12-19 | Stop reason: HOSPADM

## 2017-12-15 RX ORDER — FERROUS SULFATE 325(65) MG
325 TABLET ORAL
Status: DISCONTINUED | OUTPATIENT
Start: 2017-12-16 | End: 2017-12-19

## 2017-12-15 RX ORDER — CALCIUM CARBONATE 500(1250)
2 TABLET ORAL
Status: DISCONTINUED | OUTPATIENT
Start: 2017-12-16 | End: 2017-12-19 | Stop reason: HOSPADM

## 2017-12-15 RX ORDER — LEVOTHYROXINE SODIUM 0.03 MG/1
25 TABLET ORAL DAILY
Status: DISCONTINUED | OUTPATIENT
Start: 2017-12-16 | End: 2017-12-19 | Stop reason: HOSPADM

## 2017-12-15 RX ORDER — ASPIRIN 81 MG/1
81 TABLET, CHEWABLE ORAL DAILY
Status: DISCONTINUED | OUTPATIENT
Start: 2017-12-16 | End: 2017-12-16

## 2017-12-15 RX ADMIN — FAMOTIDINE 20 MG: 20 TABLET ORAL at 19:00

## 2017-12-15 RX ADMIN — INSULIN GLARGINE 40 UNITS: 100 INJECTION, SOLUTION SUBCUTANEOUS at 23:00

## 2017-12-15 RX ADMIN — LIDOCAINE HYDROCHLORIDE 15 ML: 20 SOLUTION ORAL; TOPICAL at 19:00

## 2017-12-15 RX ADMIN — POTASSIUM CHLORIDE 20 MEQ: 1500 TABLET, EXTENDED RELEASE ORAL at 23:00

## 2017-12-15 NOTE — ED ATTENDING ATTESTATION
Gabriele Simmons DO, saw and evaluated the patient  I have discussed the patient with the resident/non-physician practitioner and agree with the resident's/non-physician practitioner's findings, Plan of Care, and MDM as documented in the resident's/non-physician practitioner's note, except where noted  All available labs and Radiology studies were reviewed  At this point I agree with the current assessment done in the Emergency Department  I have conducted an independent evaluation of this patient a history and physical is as follows:    67 yof presents with chest pain during dialysis  Prior PE, Prior GERD, +CAD, CHF    /54   Pulse 101   Resp 22   Ht 5' 4" (1 626 m)   Wt 131 kg (289 lb)   LMP  (LMP Unknown)   SpO2 100%   BMI 49 61 kg/m²   Tachy  CTA  Left chest with Port   abd benign    Will r/o PE and further eval for ACS  CT scan and will need repeat dialysis       Dx  Chest pain    Admit        Critical Care Time  CritCare Time

## 2017-12-15 NOTE — ED PROVIDER NOTES
History  Chief Complaint   Patient presents with    Epigastric Pain     at dialysis today for 5 hours, reports a gnawing and burning feeling in her chest  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 and "feels this way when she eats something she is not supposed to"     HPI     76-year-old female with past medical history of CAD CHF diabetes prior PE unprovoked not on anticoagulation presents with chief complaint of chest pain  Patient was at dialysis, patient Monday Wednesday Friday through Port-A-Cath in left chest wall and began to have global central sternal chest discomfort described as chest heaviness  Patient states the pain is 6/10 in severity  This pain does not radiate  Patient states the pain is made worse with sitting up  Patient has never had ACS before  Does not have cardiac intervention in her past   Patient states this feels like her prior GERD and Garcia's esophagus  Patient admits to shortness of breath  Patient says this feels different then her prior PE 30 years ago  Patient denies fever chills rigors headache lightheadedness dizziness chest palpitations abdominal pain nausea vomiting diarrhea constipation urinary symptoms  Patient produced a small amount of urine daily  Prior to Admission Medications   Prescriptions Last Dose Informant Patient Reported? Taking?    Camphor-Menthol-Methyl Sal (Anthony Carrero Ultra Strength) 4-10-30 % CREA 12/15/2017 at Unknown time  Yes Yes   Sig: Apply topically 2 (two) times a day B/l feet/knees and left shoulder   HYDROmorphone (DILAUDID) 2 mg tablet 12/15/2017 at Unknown time  No Yes   Sig: Take 1 tablet by mouth every 6 (six) hours as needed for moderate pain for up to 3 days Max Daily Amount: 8 mg   Insulin Glargine (BASAGLAR KWIKPEN) 100 UNIT/ML SOPN 12/15/2017 at Unknown time  Yes Yes   Sig: Inject 40 Units under the skin daily at bedtime   acetaminophen (TYLENOL) 325 mg tablet 12/15/2017 at Unknown time  No Yes   Sig: Take 2 tablets by mouth every 6 (six) hours as needed for mild pain, headaches or fever   albuterol (2 5 mg/3 mL) 0 083 % nebulizer solution 12/15/2017 at Unknown time  Yes Yes   Sig: Take 2 5 mg by nebulization every 6 (six) hours as needed for wheezing  allopurinol (ZYLOPRIM) 100 mg tablet 12/15/2017 at Unknown time  Yes Yes   Sig: Take 200 mg by mouth Indications: sun, tue, we, fri, sat  Sun, Tues, Wed, Fri, Sat    allopurinol (ZYLOPRIM) 300 mg tablet 12/15/2017 at Unknown time  Yes Yes   Sig: Take 300 mg by mouth Mon, and Thurs    aspirin 81 mg chewable tablet 12/15/2017 at Unknown time  No Yes   Sig: Chew 1 tablet daily   bisacodyl (DULCOLAX) 5 mg EC tablet 12/15/2017 at Unknown time  Yes Yes   Sig: Take 5 mg by mouth daily as needed for constipation   calcium carbonate (OYSTER SHELL,OSCAL) 500 mg 12/15/2017 at Unknown time  No Yes   Sig: Take 2 tablets by mouth 3 (three) times a day with meals   cholecalciferol 1000 units tablet 12/15/2017 at Unknown time  No Yes   Sig: Take 1 tablet by mouth daily   cyanocobalamin (VITAMIN B-12) 1,000 mcg tablet 12/15/2017 at Unknown time  Yes Yes   Sig: Take 1,000 mcg by mouth daily     dextromethorphan-guaiFENesin (ROBITUSSIN DM)  mg/5 mL syrup 12/15/2017 at Unknown time  No Yes   Sig: Take 5 mL by mouth every 4 (four) hours as needed for cough or congestion   docusate sodium (COLACE) 100 mg capsule 12/15/2017 at Unknown time  Yes Yes   Sig: Take 100 mg by mouth 2 (two) times a day     ferrous sulfate 325 (65 Fe) mg tablet 12/15/2017 at Unknown time  Yes Yes   Sig: Take 325 mg by mouth 3 (three) times a day with meals   insulin NPH (HumuLIN N,NovoLIN N) 100 Units/mL subcutaneous injection 12/15/2017 at Unknown time  No Yes   Sig: Inject 10 Units under the skin 2 (two) times a day before breakfast and lunch   insulin NPH (HumuLIN N,NovoLIN N) 100 Units/mL subcutaneous injection 12/15/2017 at Unknown time  No Yes   Sig: Inject 14 Units under the skin daily before dinner   lactulose 20 g/30 mL 12/15/2017 at Unknown time  No Yes   Sig: Take 30 mL by mouth daily   latanoprost (XALATAN) 0 005 % ophthalmic solution 12/15/2017 at Unknown time  Yes Yes   Sig: Administer 1 drop to both eyes daily at bedtime  levothyroxine 25 mcg tablet 12/15/2017 at Unknown time  Yes Yes   Sig: Take 25 mcg by mouth daily  liver oil-zinc oxide (DESITIN) 40 % ointment 12/15/2017 at Unknown time  Yes Yes   Sig: Apply topically as needed for irritation To buttock every day and evening shift   To b/l thigh folds every day and evening shift    metoprolol tartrate (LOPRESSOR) 12 5 mg tablet 12/15/2017 at Unknown time  Yes Yes   Sig: Take 12 5 mg by mouth  Sun, Tues, Thurs, Sat  Hold am dose before dialysis Mon, Wed, and Fri   miconazole (MICOTIN) 2 % powder 12/15/2017 at Unknown time  Yes Yes   Sig: Apply topically as needed for itching Apply to b/l breast and panus twice a day   nystatin (MYCOSTATIN) powder 12/15/2017 at Unknown time  No Yes   Sig: Apply topically 2 (two) times a day   omeprazole (PriLOSEC) 20 mg delayed release capsule 12/15/2017 at Unknown time  Yes Yes   Sig: Take 20 mg by mouth daily     ondansetron (ZOFRAN) 4 mg tablet 12/15/2017 at Unknown time  No Yes   Sig: Take 1 tablet by mouth every 4 (four) hours as needed for nausea or vomiting   polyethylene glycol (MIRALAX) 17 g packet 12/15/2017 at Unknown time  No Yes   Sig: Take 17 g by mouth daily   pravastatin (PRAVACHOL) 80 mg tablet 12/15/2017 at Unknown time  Yes Yes   Sig: Take 80 mg by mouth daily  pregabalin (LYRICA) 75 mg capsule   No No   Sig: Take 1 capsule by mouth 2 (two) times a day for 10 days   Patient taking differently: Take 50 mg by mouth 3 (three) times a day     senna (SENOKOT) 8 6 MG tablet 12/15/2017 at Unknown time  Yes Yes   Sig: Take 2 tablets by mouth daily as needed for constipation        Facility-Administered Medications: None       Past Medical History:   Diagnosis Date    Adrenal insufficiency ([address] )     Adrenocortical insufficiency (HCC)     Anemia     Garcia esophagus     Bradycardia     Cardiac disease     Cataract     CHF (congestive heart failure) (HCC)     Constipation     CPAP (continuous positive airway pressure) dependence     Dependence on supplemental oxygen     Diabetes mellitus (HCC)     Difficulty walking     Disease of thyroid gland     Dysphagia     Encephalopathy     ESRD on hemodialysis (HCC)     GERD (gastroesophageal reflux disease)     Glaucoma     Gout     History of transfusion     Hyperlipidemia     Hypertension     Insomnia     Obesity     Osteoarthritis     PONV (postoperative nausea and vomiting)     Sleep apnea        Past Surgical History:   Procedure Laterality Date    ABDOMINAL SURGERY      APPENDECTOMY      BODY LIFT LOWER Right 4/18/2017    Procedure: UPPER EXTREMITY EXCISION PANNUS ;  Surgeon: Vicky Magaña MD;  [facility];  Service:     CARPAL TUNNEL RELEASE Bilateral     COLONOSCOPY N/A 8/15/2017    Procedure: COLONOSCOPY;  Surgeon: Wellington Feliciano MD;  [facility]: [facility]; Service: Colorectal    COLONOSCOPY N/A 8/29/2017    Procedure: COLONOSCOPY;  Surgeon: Kobe Rubalcava MD;  [facility]; Service: Colorectal    COLONOSCOPY N/A 9/1/2017    Procedure: COLONOSCOPY;  Surgeon: Kobe Rubalcava MD;  [facility]; Service: Colorectal    HYSTERECTOMY      KS ANASTOMOSIS,AV,ANY SITE Right 8/18/2016    Procedure: CREATION OF RIGHT BRACHIOCEPHALIC FISTULA;  Surgeon: Walt Lagunas MD;  [facility];  Service: Vascular    KS REVISE AV FISTULA,W/O THROMBECTOMY Right 4/18/2017    Procedure: UPPER ARM SUPERFICIALIZATION FISTULA ;  Surgeon: Lo Flores MD;  [facility];  Service: Vascular       Family History   Problem Relation Age of Onset    Diabetes Mother     Heart disease Father      I have reviewed and agree with the history as documented      Social History   Substance Use Topics    Smoking status: Former Smoker     Quit date: 1967    Smokeless tobacco: Never Used    Alcohol use No        Review of Systems   Constitutional: Negative for activity change, appetite change, chills, diaphoresis, fatigue, fever and unexpected weight change  HENT: Negative for congestion, ear discharge, ear pain, facial swelling, hearing loss, nosebleeds, postnasal drip, rhinorrhea, sinus pressure, sneezing, sore throat and tinnitus  Eyes: Negative for photophobia, pain, redness, itching and visual disturbance  Respiratory: Positive for chest tightness and shortness of breath  Negative for cough, wheezing and stridor  Cardiovascular: Positive for chest pain  Negative for palpitations and leg swelling  Gastrointestinal: Negative for abdominal distention, abdominal pain, anal bleeding, blood in stool, constipation, diarrhea, nausea and vomiting  Genitourinary: Negative for decreased urine volume, difficulty urinating, dysuria, enuresis, flank pain, frequency, hematuria, menstrual problem, urgency, vaginal bleeding, vaginal discharge and vaginal pain  Musculoskeletal: Negative for arthralgias, back pain, gait problem, joint swelling, myalgias, neck pain and neck stiffness  Skin: Negative for rash and wound  Neurological: Negative for dizziness, tremors, seizures, syncope, facial asymmetry, speech difficulty, weakness, light-headedness, numbness and headaches  Psychiatric/Behavioral: Negative for agitation, behavioral problems, confusion, dysphoric mood, hallucinations, self-injury, sleep disturbance and suicidal ideas  The patient is not nervous/anxious and is not hyperactive          Physical Exam  ED Triage Vitals   Temperature Pulse Respirations Blood Pressure SpO2   12/16/17 0000 12/15/17 1606 12/15/17 1606 12/15/17 1606 12/15/17 1606   98 1 °F (36 7 °C) 101 22 123/54 100 %      Temp Source Heart Rate Source Patient Position - Orthostatic VS BP Location FiO2 (%)   12/16/17 0000 12/15/17 1606 12/15/17 1606 12/15/17 1606 --   Oral Monitor Sitting Right arm       Pain Score       12/15/17 1606       5           Orthostatic Vital Signs  Vitals:    12/16/17 2300 12/17/17 0311 12/17/17 0757 12/17/17 1121   BP: 134/56 138/61 118/61 144/62   Pulse: 84 83 88 87   Patient Position - Orthostatic VS: Lying  Sitting Lying       Physical Exam   Constitutional: She is oriented to person, place, and time  She appears well-developed and well-nourished  No distress  HENT:   Head: Normocephalic and atraumatic  Right Ear: External ear normal    Left Ear: External ear normal    Nose: Nose normal    Mouth/Throat: Oropharynx is clear and moist  No oropharyngeal exudate  Eyes: Conjunctivae and EOM are normal  Pupils are equal, round, and reactive to light  Right eye exhibits no discharge  Left eye exhibits no discharge  No scleral icterus  Neck: Normal range of motion and full passive range of motion without pain  Neck supple  No JVD present  No tracheal deviation present  No thyromegaly present  Cardiovascular: Normal rate, regular rhythm, S1 normal, S2 normal, normal heart sounds and intact distal pulses  No murmur heard  Pulses:       Radial pulses are 2+ on the right side, and 2+ on the left side  Femoral pulses are 2+ on the right side, and 2+ on the left side  Dorsalis pedis pulses are 2+ on the right side, and 2+ on the left side  Posterior tibial pulses are 2+ on the right side, and 2+ on the left side  Pulmonary/Chest: Effort normal and breath sounds normal  No stridor  No respiratory distress  She has no decreased breath sounds  She has no wheezes  She has no rhonchi  She has no rales  Left chest wall port a cath in place, site is c/d/i   Abdominal: Soft  Bowel sounds are normal  She exhibits no distension and no mass  There is no tenderness  There is no rebound and no guarding  No hernia  Musculoskeletal: Normal range of motion  She exhibits no edema, tenderness or deformity  Lymphadenopathy:     She has no cervical adenopathy  Neurological: She is alert and oriented to person, place, and time  She displays normal reflexes  No cranial nerve deficit  She exhibits normal muscle tone  Skin: Skin is warm and dry  No rash noted  She is not diaphoretic  No erythema  Psychiatric: She has a normal mood and affect  Her behavior is normal  Judgment and thought content normal    Nursing note and vitals reviewed        ED Medications  Medications   sodium chloride (PF) 0 9 % injection 3 mL (not administered)   aspirin chewable tablet 324 mg (324 mg Oral Not Given 12/15/17 1626)   acetaminophen (TYLENOL) tablet 650 mg (650 mg Oral Given 12/16/17 1804)   albuterol inhalation solution 2 5 mg (not administered)   allopurinol (ZYLOPRIM) tablet 200 mg (200 mg Oral Given 12/17/17 0925)   allopurinol (ZYLOPRIM) tablet 300 mg (not administered)   bisacodyl (DULCOLAX) EC tablet 5 mg (not administered)   calcium carbonate (OYSTER SHELL,OSCAL) 500 mg tablet 2 tablet (2 tablets Oral Given 12/17/17 1217)   menthol-methyl salicylate (BENGAY) 41-33 % cream ( Apply externally Given 12/17/17 0943)   cholecalciferol (VITAMIN D3) tablet 1,000 Units (1,000 Units Oral Given 12/17/17 0925)   cyanocobalamin (VITAMIN B-12) tablet 1,000 mcg (1,000 mcg Oral Given 12/17/17 0924)   dextromethorphan-guaiFENesin (ROBITUSSIN DM)  mg/5 mL oral syrup 5 mL (not administered)   docusate sodium (COLACE) capsule 100 mg (100 mg Oral Given 12/17/17 0924)   ferrous sulfate tablet 325 mg (325 mg Oral Given 12/17/17 1217)   insulin glargine (LANTUS) subcutaneous injection 40 Units (40 Units Subcutaneous Given 12/16/17 2151)   lactulose 20 g/30 mL oral solution 20 g (20 g Oral Not Given 12/17/17 0926)   latanoprost (XALATAN) 0 005 % ophthalmic solution 1 drop (1 drop Both Eyes Not Given 12/17/17 0208)   levothyroxine tablet 25 mcg (25 mcg Oral Given 12/17/17 0625)   metoprolol tartrate (LOPRESSOR) partial tablet 12 5 mg (not administered)   miconazole 2 % cream (not administered) pantoprazole (PROTONIX) EC tablet 20 mg (20 mg Oral Given 12/17/17 0925)   ondansetron (ZOFRAN) oral solution 4 mg (not administered)   polyethylene glycol (MIRALAX) packet 17 g (17 g Oral Not Given 12/17/17 0925)   pravastatin (PRAVACHOL) tablet 80 mg (80 mg Oral Given 12/17/17 0925)   senna (SENOKOT) tablet 17 2 mg (not administered)   insulin lispro (HumaLOG) 100 units/mL subcutaneous injection 2-12 Units (8 Units Subcutaneous Given 12/17/17 1217)   aspirin chewable tablet 324 mg (324 mg Oral Given 12/17/17 0925)   insulin lispro (HumaLOG) 100 units/mL subcutaneous injection 14 Units (14 Units Subcutaneous Given 12/17/17 1217)   famotidine (PEPCID) tablet 20 mg (20 mg Oral Given 12/15/17 1900)   lidocaine viscous (XYLOCAINE) 2 % mucosal solution 15 mL (15 mL Swish & Swallow Given 12/15/17 1900)   potassium chloride (K-DUR,KLOR-CON) CR tablet 20 mEq (20 mEq Oral Given 12/15/17 2300)   heparin (porcine) injection 10,000 Units (10,000 Units Intravenous Given 12/16/17 0545)   HYDROmorphone (DILAUDID) 1 mg/mL injection 1 mg (1 mg Intravenous Given 12/16/17 1838)       Diagnostic Studies  Results Reviewed     Procedure Component Value Units Date/Time    CBC (With Platelets) [[id number]]  (Abnormal) Collected:  12/16/17 0432    Lab Status:  Final result Specimen:  Blood from Hand, Right Updated:  12/16/17 0521     WBC 8 53 Thousand/uL      RBC 2 83 (L) Million/uL      Hemoglobin 8 5 (L) g/dL      Hematocrit 26 7 (L) %      MCV 94 fL      MCH 30 0 pg      MCHC 31 8 g/dL      RDW 15 3 (H) %      Platelets 68 (L) Thousands/uL     Troponin I [[id number]]  (Abnormal) Collected:  12/16/17 0432    Lab Status:  Final result Specimen:  Blood from Hand, Right Updated:  12/16/17 0500     Troponin I 1 98 (H) ng/mL     Narrative:         [employer] Chemistry analyzer 99% cutoff is > 0 04 ng/mL in network labs    o cTnI 99% cutoff is useful only when applied to patients in the clinical setting of myocardial ischemia  o cTnI 99% cutoff should be interpreted in the context of clinical history, ECG findings and possibly cardiac imaging to establish correct diagnosis  o cTnI 99% cutoff may be suggestive but clearly not indicative of a coronary event without the clinical setting of myocardial ischemia  Comprehensive metabolic panel [[id number]]  (Abnormal) Collected:  12/16/17 0432    Lab Status:  Final result Specimen:  Blood from Hand, Right Updated:  12/16/17 0458     Sodium 133 (L) mmol/L      Potassium 3 9 mmol/L      Chloride 96 (L) mmol/L      CO2 31 mmol/L      Anion Gap 6 mmol/L      BUN 35 (H) mg/dL      Creatinine 2 19 (H) mg/dL      Glucose 212 (H) mg/dL      Calcium 8 4 mg/dL      AST 18 U/L      ALT 23 U/L      Alkaline Phosphatase 62 U/L      Total Protein 6 1 (L) g/dL      Albumin 2 2 (L) g/dL      Total Bilirubin 0 29 mg/dL      eGFR 22 ml/min/1 73sq m     Narrative:         National Kidney Disease Education Program recommendations are as follows:  GFR calculation is accurate only with a steady state creatinine  Chronic Kidney disease less than 60 ml/min/1 73 sq  meters  Kidney failure less than 15 ml/min/1 73 sq  meters      Phosphorus [[id number]]  (Abnormal) Collected:  12/16/17 0432    Lab Status:  Final result Specimen:  Blood from Hand, Right Updated:  12/16/17 0458     Phosphorus 1 8 (L) mg/dL     Lipid panel [[id number]]  (Abnormal) Collected:  12/16/17 0432    Lab Status:  Final result Specimen:  Blood from Hand, Right Updated:  12/16/17 0458     Cholesterol 160 mg/dL      Triglycerides 216 (H) mg/dL      HDL, Direct 76 (H) mg/dL      LDL Calculated 41 mg/dL     Narrative:         Triglyceride:        Normal               <150 mg/dl        Borderline High     150-199 mg/dl        High               200-499 mg/dl        Very High           >499 mg/dl      Cholesterol:       Desirable         <200 mg/dl       Borderline High   200-239 mg/dl       High             >239 mg/dl      HDL Cholesterol:       High    >59 mg/dL       Low     <41 mg/dL      This screening LDL is a calculated result  It does not have the accuracy of the Direct Measured LDL in the monitoring of patients with hyperlipidemia and/or statin therapy  Direct Measure LDL (SYF734) must be ordered separately in these patients  Magnesium [[id number]]  (Normal) Collected:  12/16/17 0432    Lab Status:  Final result Specimen:  Blood from Hand, Right Updated:  12/16/17 0458     Magnesium 2 0 mg/dL     Troponin I [[id number]]  (Abnormal) Resulted:  12/15/17 2348    Lab Status:  Final result Specimen:  Blood Updated:  12/15/17 2348     Troponin I 1 50 (H) ng/mL     Narrative:         [employer] Chemistry analyzer 99% cutoff is > 0 04 ng/mL in network labs    o cTnI 99% cutoff is useful only when applied to patients in the clinical setting of myocardial ischemia  o cTnI 99% cutoff should be interpreted in the context of clinical history, ECG findings and possibly cardiac imaging to establish correct diagnosis  o cTnI 99% cutoff may be suggestive but clearly not indicative of a coronary event without the clinical setting of myocardial ischemia  CBC and differential [[id number]]  (Abnormal) Collected:  12/15/17 1637    Lab Status:  Final result Specimen:  Blood from Arm, Left Updated:  12/15/17 1756     WBC 7 56 Thousand/uL      RBC 2 79 (L) Million/uL      Hemoglobin 8 5 (L) g/dL      Hematocrit 26 4 (L) %      MCV 95 fL      MCH 30 5 pg      MCHC 32 2 g/dL      RDW 15 1 %      Platelets 54 (L) Thousands/uL      nRBC 0 /100 WBCs     Narrative:        No Clots  This is an appended report  These results have been appended to a previously verified report      Troponin I repeat in 3 hrs [[id number]]  (Abnormal) Collected:  12/15/17 1637    Lab Status:  Final result Specimen:  Blood from Arm, Left Updated:  12/15/17 1715     Troponin I 0 08 (H) ng/mL     Narrative:         [employer] Chemistry analyzer 99% cutoff is > 0 04 ng/mL in network labs    o cTnI 99% cutoff is useful only when applied to patients in the clinical setting of myocardial ischemia  o cTnI 99% cutoff should be interpreted in the context of clinical history, ECG findings and possibly cardiac imaging to establish correct diagnosis  o cTnI 99% cutoff may be suggestive but clearly not indicative of a coronary event without the clinical setting of myocardial ischemia  Comprehensive metabolic panel [[id number]]  (Abnormal) Collected:  12/15/17 1637    Lab Status:  Final result Specimen:  Blood from Arm, Left Updated:  12/15/17 1715     Sodium 134 (L) mmol/L      Potassium 3 4 (L) mmol/L      Chloride 95 (L) mmol/L      CO2 31 mmol/L      Anion Gap 8 mmol/L      BUN 26 (H) mg/dL      Creatinine 1 57 (H) mg/dL      Glucose 320 (H) mg/dL      Calcium 8 4 mg/dL      AST 14 U/L      ALT 22 U/L      Alkaline Phosphatase 66 U/L      Total Protein 6 0 (L) g/dL      Albumin 2 1 (L) g/dL      Total Bilirubin 0 33 mg/dL      eGFR 32 ml/min/1 73sq m     Narrative:         National Kidney Disease Education Program recommendations are as follows:  GFR calculation is accurate only with a steady state creatinine  Chronic Kidney disease less than 60 ml/min/1 73 sq  meters  Kidney failure less than 15 ml/min/1 73 sq  meters  Lipase [[id number]]  (Abnormal) Collected:  12/15/17 1637    Lab Status:  Final result Specimen:  Blood from Arm, Left Updated:  12/15/17 1715     Lipase 427 (H) u/L     POCT troponin [[id number]]  (Normal) Collected:  12/15/17 1644    Lab Status:  Final result Updated:  12/15/17 1658     POC Troponin I 0 05 ng/ml      Specimen Type VENOUS    Narrative:         [employer] i-Stat handheld analyzer 99% cutoff is > 0 08ng/mL in [facility] Departments    o cTnI 99% cutoff is useful only when applied to patients in the clinical setting of myocardial ischemia  o cTnI 99% cutoff should be interpreted in the context of clinical history, ECG findings and possibly cardiac imaging to establish correct diagnosis    o cTnI 99% cutoff may be suggestive but clearly not indicative of a coronary event without the clinical setting of myocardial ischemia                   No orders to display         Procedures  ECG 12 Lead Documentation  Date/Time: 12/15/2017 4:31 PM  Performed by: Thompson Kumar by: Darshana Yin     Indications / Diagnosis:  Chest pain  ECG reviewed by me, the ED Provider: yes    Patient location:  ED  Previous ECG:     Previous ECG:  Compared to current    Similarity:  Changes noted  Interpretation:     Interpretation: abnormal    Rate:     ECG rate:  97    ECG rate assessment: normal    Rhythm:     Rhythm: sinus rhythm    Ectopy:     Ectopy: none    QRS:     QRS axis:  Normal  T waves:     T waves: inverted      Inverted:  V5, V6 and aVL  Q waves:     Q waves:  III          Phone Consults  ED Phone Contact    ED Course  ED Course as of Dec 17 1456   Fri Dec 15, 2017   1700 Cardiac versus renal POC Troponin I: 0 05   1725 Renal  Troponin I: (!) 0 08   1726 baseline Hemoglobin: (!) 8 5   1726 Endstage renal Creatinine: (!) 1 57   1746 Asprin giveb Troponin I: (!) 0 08   1748 Patient takes at home insulin Glucose: (!) 320               PERC Rule for PE    Flowsheet Row Most Recent Value   PERC Rule for PE   Age >=50  1 Filed at: 12/15/2017 1624   HR >=100  1 Filed at: 12/15/2017 1624   O2 Sat on room air < 95%  0 Filed at: 12/15/2017 1624   History of PE or DVT  1 Filed at: 12/15/2017 1624   Recent trauma or surgery  0 Filed at: 12/15/2017 1624   Hemoptysis  0 Filed at: 12/15/2017 1624   Exogenous estrogen  0 Filed at: 12/15/2017 1624   Unilateral leg swelling  0 Filed at: 12/15/2017 1624   PERC Rule for PE Results  3 Filed at: 12/15/2017 1624                Wells' Criteria for PE    Flowsheet Row Most Recent Value   Wells' Criteria for PE   Clinical signs and symptoms of DVT  0 Filed at: 12/15/2017 1624   PE is primary diagnosis or equally likely  0 Filed at: 12/15/2017 1624   HR >100  1 5 Filed at: 12/15/2017 1624   Immobilization at least 3 days or Surgery in the previous 4 weeks  1 5 Filed at: 12/15/2017 1624   Previous, objectively diagnosed PE or DVT  1 5 Filed at: 12/15/2017 1624   Hemoptysis  0 Filed at: 12/15/2017 1624   Malignancy with treatment within 6 months or palliative  0 Filed at: 12/15/2017 1624   Wells' Criteria Total  4 5 Filed at: 12/15/2017 1624            [facility]  Number of Diagnoses or Management Options  At risk for cardiac dysfunction:   Chest pain:   Chronic respiratory failure with hypoxia and hypercapnia ([facility] [address] ):   Essential hypertension:   History of pulmonary embolism:   IDDM (insulin dependent diabetes mellitus) ([facility] [facility] ): Morbid obesity [facility]):   Diagnosis management comments: 50-year-old female with extensive chronic medical history presenting with chief complaint of chest pain after dialysis  On exam vital signs normal, patient does not have signs of DVT on lower extremity exam, EKG had new T-wave inversions  Patient had positive troponin mild, renal versus cardiac, patient given aspirin, patient on multiple reassessments no perisent chest pain  Patient was unable to have CT angiogram secondary to anaphylaxis to IV dye , patient admitted to [facility] for possible VQ scan, discussed the allergy with admitting team and they understand that patient has not had evaluation in the ED for pulmonary embolism      CritCare Time    Disposition  Final diagnoses:   Chest pain   History of pulmonary embolism   Essential hypertension   Chronic respiratory failure with hypoxia and hypercapnia (HCC)   At risk for cardiac dysfunction   [facility]DM (insulin dependent diabetes mellitus) ([facility] )   Morbid obesity ([facility] )     Time reflects when diagnosis was documented in both MDM as applicable and the Disposition within this note     Time User Action Codes Description Comment    12/15/2017  6:37 PM Lebron Tom Add [R07 9] Chest pain     12/15/2017  6:37 PM Lebron Tom Add [Z86 711] History of pulmonary embolism     12/15/2017  6:37 PM Abdulkadir Avilez Add [I10] Essential hypertension     12/15/2017  6:37 PM IglesiajoonAbdulkadir Faisal Add [P87 66,  J96 12] Chronic respiratory failure with hypoxia and hypercapnia ([place] [address] )     12/15/2017  6:39 PM Balbina Da Silva Add [Z91 89] At risk for cardiac dysfunction     12/15/2017  6:39 PM Balbina Da Silva Add [E11 9,  Z79 4] IDDM (insulin dependent diabetes mellitus) ([place] [address] )     12/15/2017  6:39 PM IglesiajoonAbdulkadir Faisal Add [E66 01] Morbid obesity ([place] [address] )     12/15/2017  9:21 PM [place] Vincent Add [N18 6,  Z99 2] End-stage renal disease on hemodialysis ([place] [address] )     12/15/2017  9:21 PM [place] Vincent Modify [N18 6,  Z99 2] End-stage renal disease on hemodialysis ([place] [address] )     12/15/2017  9:21 PM Daisha Vincent Modify [N18 6,  Z99 2] End-stage renal disease on hemodialysis ([place] [address] )     12/15/2017  9:21 PM Daisha Vincent Add [E87 8] Electrolyte abnormality     12/15/2017  9:21 PM [place] Vincent Modify [E87 8] Electrolyte abnormality     12/16/2017  8:32 AM Lupe Ruiz Add [R74 8] Elevated troponin     12/17/2017 10:12 AM Serenity Johnson Add [L89 159] Sacral decubitus ulcer       ED Disposition     ED Disposition Condition Comment    Admit  Case was discussed with SOD and the patient's admission status was agreed to be Admission Status: observation status to the service of SOD resident   Follow-up Information    None       Current Discharge Medication List      CONTINUE these medications which have NOT CHANGED    Details   acetaminophen (TYLENOL) 325 mg tablet Take 2 tablets by mouth every 6 (six) hours as needed for mild pain, headaches or fever  Qty: 30 tablet, Refills: 0      albuterol (2 5 mg/3 mL) 0 083 % nebulizer solution Take 2 5 mg by nebulization every 6 (six) hours as needed for wheezing  !! allopurinol (ZYLOPRIM) 100 mg tablet Take 200 mg by mouth Indications: sun, tue, we, fri, sat   Sun, Tues, Wed, Fri, Sat       !! allopurinol (ZYLOPRIM) 300 mg tablet Take 300 mg by mouth Mon, and Thurs       aspirin 81 mg chewable tablet Chew 1 tablet daily  Refills: 0      bisacodyl (DULCOLAX) 5 mg EC tablet Take 5 mg by mouth daily as needed for constipation      calcium carbonate (OYSTER SHELL,OSCAL) 500 mg Take 2 tablets by mouth 3 (three) times a day with meals  Qty: 180 tablet, Refills: 0      Camphor-Menthol-Methyl Sal ([place] Ultra Strength) 4-10-30 % CREA Apply topically 2 (two) times a day B/l feet/knees and left shoulder      cholecalciferol 1000 units tablet Take 1 tablet by mouth daily  Refills: 0      cyanocobalamin (VITAMIN B-12) 1,000 mcg tablet Take 1,000 mcg by mouth daily  dextromethorphan-guaiFENesin (ROBITUSSIN DM)  mg/5 mL syrup Take 5 mL by mouth every 4 (four) hours as needed for cough or congestion  Qty: 118 mL, Refills: 0      docusate sodium (COLACE) 100 mg capsule Take 100 mg by mouth 2 (two) times a day        ferrous sulfate 325 (65 Fe) mg tablet Take 325 mg by mouth 3 (three) times a day with meals      HYDROmorphone (DILAUDID) 2 mg tablet Take 1 tablet by mouth every 6 (six) hours as needed for moderate pain for up to 3 days Max Daily Amount: 8 mg  Qty: 10 tablet, Refills: 0      Insulin Glargine (BASAGLAR KWIKPEN) 100 UNIT/ML SOPN Inject 40 Units under the skin daily at bedtime      !! insulin NPH (HumuLIN N,NovoLIN N) 100 Units/mL subcutaneous injection Inject 10 Units under the skin 2 (two) times a day before breakfast and lunch  Refills: 0      !! insulin NPH (HumuLIN N,NovoLIN N) 100 Units/mL subcutaneous injection Inject 14 Units under the skin daily before dinner  Refills: 0      lactulose 20 g/30 mL Take 30 mL by mouth daily  Refills: 0      latanoprost (XALATAN) 0 005 % ophthalmic solution Administer 1 drop to both eyes daily at bedtime  levothyroxine 25 mcg tablet Take 25 mcg by mouth daily        liver oil-zinc oxide (DESITIN) 40 % ointment Apply topically as needed for irritation To buttock every day and evening shift   To b/l thigh folds every day and evening shift       metoprolol tartrate (LOPRESSOR) 12 5 mg tablet Take 12 5 mg by mouth  Sun, Tues, Thurs, Sat  Hold am dose before dialysis Mon, Wed, and Fri      miconazole (MICOTIN) 2 % powder Apply topically as needed for itching Apply to b/l breast and panus twice a day      nystatin (MYCOSTATIN) powder Apply topically 2 (two) times a day  Qty: 15 g, Refills: 0      omeprazole (PriLOSEC) 20 mg delayed release capsule Take 20 mg by mouth daily        ondansetron (ZOFRAN) 4 mg tablet Take 1 tablet by mouth every 4 (four) hours as needed for nausea or vomiting  Refills: 0      polyethylene glycol (MIRALAX) 17 g packet Take 17 g by mouth daily  Qty: 14 each, Refills: 0      pravastatin (PRAVACHOL) 80 mg tablet Take 80 mg by mouth daily  senna (SENOKOT) 8 6 MG tablet Take 2 tablets by mouth daily as needed for constipation  pregabalin (LYRICA) 75 mg capsule Take 1 capsule by mouth 2 (two) times a day for 10 days  Qty: 20 capsule, Refills: 0       !! - Potential duplicate medications found  Please discuss with provider  No discharge procedures on file  ED Provider  Attending physically available and evaluated Mateo Diaz  I managed the patient along with the ED Attending      Electronically Signed by         Elmer Roper DO  Resident  12/17/17 7194

## 2017-12-16 PROBLEM — N39.0 UTI (URINARY TRACT INFECTION): Status: ACTIVE | Noted: 2017-12-16

## 2017-12-16 LAB
ALBUMIN SERPL BCP-MCNC: 2.2 G/DL (ref 3.5–5)
ALP SERPL-CCNC: 62 U/L (ref 46–116)
ALT SERPL W P-5'-P-CCNC: 23 U/L (ref 12–78)
ANION GAP SERPL CALCULATED.3IONS-SCNC: 6 MMOL/L (ref 4–13)
AST SERPL W P-5'-P-CCNC: 18 U/L (ref 5–45)
ATRIAL RATE: 96 BPM
BILIRUB SERPL-MCNC: 0.29 MG/DL (ref 0.2–1)
BUN SERPL-MCNC: 35 MG/DL (ref 5–25)
CALCIUM SERPL-MCNC: 8.4 MG/DL (ref 8.3–10.1)
CHLORIDE SERPL-SCNC: 96 MMOL/L (ref 100–108)
CHOLEST SERPL-MCNC: 160 MG/DL (ref 50–200)
CO2 SERPL-SCNC: 31 MMOL/L (ref 21–32)
CREAT SERPL-MCNC: 2.19 MG/DL (ref 0.6–1.3)
ERYTHROCYTE [DISTWIDTH] IN BLOOD BY AUTOMATED COUNT: 15.3 % (ref 11.6–15.1)
GFR SERPL CREATININE-BSD FRML MDRD: 22 ML/MIN/1.73SQ M
GLUCOSE SERPL-MCNC: 192 MG/DL (ref 65–140)
GLUCOSE SERPL-MCNC: 212 MG/DL (ref 65–140)
GLUCOSE SERPL-MCNC: 232 MG/DL (ref 65–140)
GLUCOSE SERPL-MCNC: 363 MG/DL (ref 65–140)
GLUCOSE SERPL-MCNC: 367 MG/DL (ref 65–140)
GLUCOSE SERPL-MCNC: 422 MG/DL (ref 65–140)
HCT VFR BLD AUTO: 26.7 % (ref 34.8–46.1)
HDLC SERPL-MCNC: 76 MG/DL (ref 40–60)
HGB BLD-MCNC: 8.5 G/DL (ref 11.5–15.4)
LDLC SERPL CALC-MCNC: 41 MG/DL (ref 0–100)
MAGNESIUM SERPL-MCNC: 2 MG/DL (ref 1.6–2.6)
MCH RBC QN AUTO: 30 PG (ref 26.8–34.3)
MCHC RBC AUTO-ENTMCNC: 31.8 G/DL (ref 31.4–37.4)
MCV RBC AUTO: 94 FL (ref 82–98)
P AXIS: 27 DEGREES
PHOSPHATE SERPL-MCNC: 1.8 MG/DL (ref 2.3–4.1)
PLATELET # BLD AUTO: 68 THOUSANDS/UL (ref 149–390)
POTASSIUM SERPL-SCNC: 3.9 MMOL/L (ref 3.5–5.3)
PR INTERVAL: 162 MS
PROT SERPL-MCNC: 6.1 G/DL (ref 6.4–8.2)
QRS AXIS: 13 DEGREES
QRSD INTERVAL: 92 MS
QT INTERVAL: 340 MS
QTC INTERVAL: 429 MS
RBC # BLD AUTO: 2.83 MILLION/UL (ref 3.81–5.12)
SODIUM SERPL-SCNC: 133 MMOL/L (ref 136–145)
T WAVE AXIS: 117 DEGREES
TRIGL SERPL-MCNC: 216 MG/DL
TROPONIN I SERPL-MCNC: 1.95 NG/ML
TROPONIN I SERPL-MCNC: 1.98 NG/ML
VENTRICULAR RATE: 96 BPM
WBC # BLD AUTO: 8.53 THOUSAND/UL (ref 4.31–10.16)

## 2017-12-16 PROCEDURE — 94660 CPAP INITIATION&MGMT: CPT

## 2017-12-16 PROCEDURE — 85027 COMPLETE CBC AUTOMATED: CPT | Performed by: STUDENT IN AN ORGANIZED HEALTH CARE EDUCATION/TRAINING PROGRAM

## 2017-12-16 PROCEDURE — 84484 ASSAY OF TROPONIN QUANT: CPT | Performed by: STUDENT IN AN ORGANIZED HEALTH CARE EDUCATION/TRAINING PROGRAM

## 2017-12-16 PROCEDURE — 80053 COMPREHEN METABOLIC PANEL: CPT | Performed by: STUDENT IN AN ORGANIZED HEALTH CARE EDUCATION/TRAINING PROGRAM

## 2017-12-16 PROCEDURE — 82948 REAGENT STRIP/BLOOD GLUCOSE: CPT

## 2017-12-16 PROCEDURE — 80061 LIPID PANEL: CPT | Performed by: STUDENT IN AN ORGANIZED HEALTH CARE EDUCATION/TRAINING PROGRAM

## 2017-12-16 PROCEDURE — 83735 ASSAY OF MAGNESIUM: CPT | Performed by: STUDENT IN AN ORGANIZED HEALTH CARE EDUCATION/TRAINING PROGRAM

## 2017-12-16 PROCEDURE — 93005 ELECTROCARDIOGRAM TRACING: CPT | Performed by: STUDENT IN AN ORGANIZED HEALTH CARE EDUCATION/TRAINING PROGRAM

## 2017-12-16 PROCEDURE — 94760 N-INVAS EAR/PLS OXIMETRY 1: CPT | Performed by: SOCIAL WORKER

## 2017-12-16 PROCEDURE — 94760 N-INVAS EAR/PLS OXIMETRY 1: CPT

## 2017-12-16 PROCEDURE — 84100 ASSAY OF PHOSPHORUS: CPT | Performed by: STUDENT IN AN ORGANIZED HEALTH CARE EDUCATION/TRAINING PROGRAM

## 2017-12-16 RX ORDER — HEPARIN SODIUM 1000 [USP'U]/ML
10000 INJECTION, SOLUTION INTRAVENOUS; SUBCUTANEOUS ONCE
Status: COMPLETED | OUTPATIENT
Start: 2017-12-16 | End: 2017-12-16

## 2017-12-16 RX ORDER — HEPARIN SODIUM 1000 [USP'U]/ML
5000 INJECTION, SOLUTION INTRAVENOUS; SUBCUTANEOUS AS NEEDED
Status: DISCONTINUED | OUTPATIENT
Start: 2017-12-16 | End: 2017-12-16

## 2017-12-16 RX ORDER — ASPIRIN 81 MG/1
324 TABLET, CHEWABLE ORAL DAILY
Status: DISCONTINUED | OUTPATIENT
Start: 2017-12-16 | End: 2017-12-19 | Stop reason: HOSPADM

## 2017-12-16 RX ORDER — HEPARIN SODIUM 10000 [USP'U]/100ML
3-30 INJECTION, SOLUTION INTRAVENOUS
Status: DISCONTINUED | OUTPATIENT
Start: 2017-12-16 | End: 2017-12-16

## 2017-12-16 RX ORDER — HEPARIN SODIUM 1000 [USP'U]/ML
10000 INJECTION, SOLUTION INTRAVENOUS; SUBCUTANEOUS AS NEEDED
Status: DISCONTINUED | OUTPATIENT
Start: 2017-12-16 | End: 2017-12-16

## 2017-12-16 RX ADMIN — HYDROMORPHONE HYDROCHLORIDE 1 MG: 1 INJECTION, SOLUTION INTRAMUSCULAR; INTRAVENOUS; SUBCUTANEOUS at 18:38

## 2017-12-16 RX ADMIN — LATANOPROST 1 DROP: 50 SOLUTION OPHTHALMIC at 01:18

## 2017-12-16 RX ADMIN — MENTHOL, METHYL SALICYLATE 1 APPLICATION: 10; 15 CREAM TOPICAL at 17:09

## 2017-12-16 RX ADMIN — VITAMIN D, TAB 1000IU (100/BT) 1000 UNITS: 25 TAB at 08:30

## 2017-12-16 RX ADMIN — HEPARIN SODIUM 10000 UNITS: 1000 INJECTION, SOLUTION INTRAVENOUS; SUBCUTANEOUS at 05:45

## 2017-12-16 RX ADMIN — ASPIRIN 81 MG 81 MG: 81 TABLET ORAL at 08:30

## 2017-12-16 RX ADMIN — DOCUSATE SODIUM 100 MG: 100 CAPSULE, LIQUID FILLED ORAL at 17:09

## 2017-12-16 RX ADMIN — PANTOPRAZOLE SODIUM 20 MG: 20 TABLET, DELAYED RELEASE ORAL at 08:28

## 2017-12-16 RX ADMIN — INSULIN LISPRO 10 UNITS: 100 INJECTION, SOLUTION INTRAVENOUS; SUBCUTANEOUS at 16:22

## 2017-12-16 RX ADMIN — MENTHOL, METHYL SALICYLATE 1 APPLICATION: 10; 15 CREAM TOPICAL at 08:40

## 2017-12-16 RX ADMIN — Medication 2 TABLET: at 11:27

## 2017-12-16 RX ADMIN — Medication 325 MG: at 08:30

## 2017-12-16 RX ADMIN — LEVOTHYROXINE SODIUM 25 MCG: 25 TABLET ORAL at 08:29

## 2017-12-16 RX ADMIN — INSULIN LISPRO 3 UNITS: 100 INJECTION, SOLUTION INTRAVENOUS; SUBCUTANEOUS at 01:18

## 2017-12-16 RX ADMIN — LACTULOSE 20 G: 20 SOLUTION ORAL at 08:34

## 2017-12-16 RX ADMIN — INSULIN LISPRO 6 UNITS: 100 INJECTION, SOLUTION INTRAVENOUS; SUBCUTANEOUS at 21:50

## 2017-12-16 RX ADMIN — ACETAMINOPHEN 650 MG: 325 TABLET, FILM COATED ORAL at 04:01

## 2017-12-16 RX ADMIN — ALLOPURINOL 200 MG: 300 TABLET ORAL at 08:32

## 2017-12-16 RX ADMIN — INSULIN HUMAN 10 UNITS: 100 INJECTION, SUSPENSION SUBCUTANEOUS at 11:27

## 2017-12-16 RX ADMIN — HEPARIN SODIUM AND DEXTROSE 18 UNITS/KG/HR: 10000; 5 INJECTION INTRAVENOUS at 05:51

## 2017-12-16 RX ADMIN — PRAVASTATIN SODIUM 80 MG: 80 TABLET ORAL at 08:28

## 2017-12-16 RX ADMIN — ASPIRIN 81 MG 243 MG: 81 TABLET ORAL at 11:31

## 2017-12-16 RX ADMIN — ACETAMINOPHEN 650 MG: 325 TABLET, FILM COATED ORAL at 18:04

## 2017-12-16 RX ADMIN — INSULIN HUMAN 14 UNITS: 100 INJECTION, SUSPENSION SUBCUTANEOUS at 16:25

## 2017-12-16 RX ADMIN — CYANOCOBALAMIN TAB 500 MCG 1000 MCG: 500 TAB at 08:31

## 2017-12-16 RX ADMIN — INSULIN GLARGINE 40 UNITS: 100 INJECTION, SOLUTION SUBCUTANEOUS at 21:51

## 2017-12-16 RX ADMIN — INSULIN LISPRO 2 UNITS: 100 INJECTION, SOLUTION INTRAVENOUS; SUBCUTANEOUS at 08:20

## 2017-12-16 RX ADMIN — INSULIN LISPRO 12 UNITS: 100 INJECTION, SOLUTION INTRAVENOUS; SUBCUTANEOUS at 11:16

## 2017-12-16 RX ADMIN — Medication 2 TABLET: at 08:31

## 2017-12-16 RX ADMIN — POLYETHYLENE GLYCOL 3350 17 G: 17 POWDER, FOR SOLUTION ORAL at 08:38

## 2017-12-16 RX ADMIN — DOCUSATE SODIUM 100 MG: 100 CAPSULE, LIQUID FILLED ORAL at 08:30

## 2017-12-16 RX ADMIN — Medication 2 TABLET: at 16:27

## 2017-12-16 RX ADMIN — Medication 325 MG: at 16:26

## 2017-12-16 RX ADMIN — Medication 325 MG: at 11:27

## 2017-12-16 NOTE — RESPIRATORY THERAPY NOTE
RT Protocol Note  Mateo Diaz 67 y o  female MRN: [id number]  Unit/Bed#: CW2 214-01 Encounter: [id number]    Assessment    Principal Problem:    Chest pain  Active Problems:    Chronic diastolic CHF (congestive heart failure) ([facility])    IDDM (insulin dependent diabetes mellitus) ([place] [address] )    Morbid obesity ([facility])    End-stage renal disease on hemodialysis ([place] [address] )    History of pulmonary embolism    Thrombocytopenia ([facility])    Sleep apnea    Disease of thyroid gland    Anemia    Gout    Hypertension    Hyperlipidemia    Epigastric pain    Elevated troponin    Elevated lipase    Electrolyte abnormality    Garcia esophagus      Home Pulmonary Medications:      Past Medical History:   Diagnosis Date    Adrenal insufficiency ([facility])     Adrenocortical insufficiency ([facility])     Anemia     Garcia esophagus     Bradycardia     Cardiac disease     Cataract     CHF (congestive heart failure) ([facility])     Constipation     CPAP (continuous positive airway pressure) dependence     Dependence on supplemental oxygen     Diabetes mellitus ([facility])     Difficulty walking     Disease of thyroid gland     Dysphagia     Encephalopathy     ESRD on hemodialysis ([place] [address] )     GERD (gastroesophageal reflux disease)     Glaucoma     Gout     History of transfusion     Hyperlipidemia     Hypertension     Insomnia     Obesity     Osteoarthritis     PONV (postoperative nausea and vomiting)     Sleep apnea      Social History     Social History    Marital status: Single     Spouse name: N/A    Number of children: N/A    Years of education: N/A     Social History Main Topics    Smoking status: Former Smoker     Quit date: 1967    Smokeless tobacco: Never Used    Alcohol use No    Drug use: No    Sexual activity: Not Currently     Other Topics Concern    None     Social History Narrative    None       Subjective         Objective    Physical Exam:   Assessment Type: Assess only  General Appearance: Awake  Respiratory Pattern: Normal  Bilateral Breath Sounds: Clear  Cough: None    Vitals:  Blood pressure 144/72, pulse 90, temperature 98 1 °F (36 7 °C), temperature source Oral, resp  rate 20, height 5' 4" (1 626 m), weight 132 kg (291 lb 14 2 oz), SpO2 100 %, not currently breastfeeding  Imaging and other studies: I have personally reviewed pertinent reports  Plan    Respiratory Plan: No distress/Pulmonary history        Resp Comments: Pt admit for dx:chest pain  m Hx COPD/VIVIAN  Started on BiPAP  Ordered for albuterol UDN PRN  Maintain home Rx  Resp status at baseline

## 2017-12-16 NOTE — PROGRESS NOTES
[facility] CENTRAL Senior Admission Note   Unit/Bed # @DBLINK (EET,52471)@ Encounter: [id number]  SOD Team Marylene Gates Basanta 67 y o  female [id number]       Patient seen and examined   Reviewed H&P per Dr Parveen Arteaga with the assessment and plan as outlined in the resident's H&P    Principal problem: Epigastric pain/atypical chest pain     Assessment/Plan: Principal Problem:    Chest pain  Active Problems:    Chronic diastolic CHF (congestive heart failure) ([facility])    IDDM (insulin dependent diabetes mellitus) ([facility] )    Morbid obesity ([facility] )    End-stage renal disease on hemodialysis ([facility] )    History of pulmonary embolism    Thrombocytopenia (HCC)    Sleep apnea    Disease of thyroid gland    Anemia    Gout    Hypertension    Hyperlipidemia    Epigastric pain    Elevated troponin    Elevated lipase    Electrolyte abnormality    Garcia esophagus         Disposition:  OBSERVATION    Expected LOS: <2 Pilar Fields MD

## 2017-12-16 NOTE — PLAN OF CARE
Problem: Potential for Falls  Goal: Patient will remain free of falls  INTERVENTIONS:  - Assess patient frequently for physical needs  -  Identify cognitive and physical deficits and behaviors that affect risk of falls    -  [place] fall precautions as indicated by assessment   - Educate patient/family on patient safety including physical limitations  - Instruct patient to call for assistance with activity based on assessment  - Modify environment to reduce risk of injury  - Consider OT/PT consult to assist with strengthening/mobility   Outcome: Progressing

## 2017-12-16 NOTE — CONSULTS
Consultation - Nephrology   Raiza Henry 67 y o  female MRN: [id number]  Unit/Bed#: Sherice Lin 214-02 Encounter: [id number]    ASSESSMENT and PLAN:  1  End-stage renal disease on hemodialysis Monday Wednesday Fridays at [address] [facility], patient have a shorter treatment yesterday due to episode of chest pain, currently her labs are okay and her volume status is acceptable  There is no urgent indication of extra dialysis treatment today patient believe she can wait until Monday for her next dialysis treatment  Will proceed with next hemodialysis treatment on Monday following her outpatient schedule  2   Chest pain, further management per primary team, noted that troponins starting to increase, patient was started on a heparin drip  With the patient was just recently discharged with thrombocytopenia, HIT was negative, discussed with primary team about risk of Heparin on this setting  3   Anemia chronic kidney disease, hemoglobin low but is stable, not erythropoietin stimulating agent due to history of PE     4   Hypertension, blood pressure acceptable  5   [place] bone disease continue with renal diet and binders as an outpatient  6   Access, left upper extremity AV fistula with positive thrill and bruit  7   History of DVT/PE, anticoagulation as per primary team, patient was discharged recently on aspirin  SUMMARY OF RECOMMENDATIONS:  Will continue with hemodialysis on Monday following her outpatient schedule  Further management and workup for chest pain as per primary team, patient was started on heparin drip, cautious given risk and associated thrombocytopenia  Follow labs  Discussed with SOD resident      HISTORY OF PRESENT ILLNESS:  Requesting Physician: Ruddy Rivera MD  Reason for Consult:  End-stage renal disease on hemodialysis    Raiza Henry is a 67 y o  female who was admitted to [facility] after presenting with chest pain during dialysis yesterday  A renal consultation is requested today for assistance in the management of end-stage renal disease on hemodialysis  Patient with known history of end-stage renal disease on hemodialysis she was recently discharged from the hospital after admitted with thrombocytopenia, yesterday she went to her regular outpatient dialysis treatment and developed chest pain patient describes like acid reflux reflux pain, her dialysis treatment was cut earlier around 3 hours into  Admitted into the hospital   Last night her troponins increased and she was started on heparin drip, during my evaluation patient is feeling better, denies any chest pain or shortness of Breath, denies nausea, vomiting, abdominal pain  PAST MEDICAL HISTORY:  Past Medical History:   Diagnosis Date    Adrenal insufficiency (HCC)     Adrenocortical insufficiency (HCC)     Anemia     Garcia esophagus     Bradycardia     Cardiac disease     Cataract     CHF (congestive heart failure) (HCC)     Constipation     CPAP (continuous positive airway pressure) dependence     Dependence on supplemental oxygen     Diabetes mellitus (HCC)     Difficulty walking     Disease of thyroid gland     Dysphagia     Encephalopathy     ESRD on hemodialysis (HCC)     GERD (gastroesophageal reflux disease)     Glaucoma     Gout     History of transfusion     Hyperlipidemia     Hypertension     Insomnia     Obesity     Osteoarthritis     PONV (postoperative nausea and vomiting)     Sleep apnea        PAST SURGICAL HISTORY:  Past Surgical History:   Procedure Laterality Date    ABDOMINAL SURGERY      APPENDECTOMY      BODY LIFT LOWER Right 4/18/2017    Procedure: UPPER EXTREMITY EXCISION PANNUS ;  Surgeon: Susan Starks MD;  [facility];  Service:     CARPAL TUNNEL RELEASE Bilateral     COLONOSCOPY N/A 8/15/2017    Procedure: COLONOSCOPY;  Surgeon: Jill Medrano MD;  Location: [facility];   Service: Colorectal    COLONOSCOPY N/A 8/29/2017    Procedure: COLONOSCOPY;  Surgeon: Annika Marc MD;  [facility]; Service: Colorectal    COLONOSCOPY N/A 9/1/2017    Procedure: COLONOSCOPY;  Surgeon: Annika Marc MD;  [facility];   Service: Colorectal    HYSTERECTOMY      [place] ANASTOMOSIS,AV,ANY SITE Right 8/18/2016    Procedure: CREATION OF RIGHT BRACHIOCEPHALIC FISTULA;  Surgeon: Bridgette Flores MD;  [facility];  Service: Vascular    [place] REVISE AV FISTULA,W/O THROMBECTOMY Right 4/18/2017    Procedure: UPPER ARM SUPERFICIALIZATION FISTULA ;  Surgeon: Bridgette Flores MD;  [facility];  Service: Vascular       ALLERGIES:  Allergies   Allergen Reactions    Codeine     Darvon [Propoxyphene]     Iodine     Keflex [Cephalexin]     Morphine And Related     Nsaids     Other      IV DYE and SEAFOOD       SOCIAL HISTORY:  History   Alcohol Use No     History   Drug Use No     History   Smoking Status    Former Smoker    Quit date: 1967   Smokeless Tobacco    Never Used       FAMILY HISTORY:  Family History   Problem Relation Age of Onset    Diabetes Mother     Heart disease Father        MEDICATIONS:    Current Facility-Administered Medications:     acetaminophen (TYLENOL) tablet 650 mg, 650 mg, Oral, Q6H PRN, Dave Barth MD, 650 mg at 12/16/17 0401    albuterol inhalation solution 2 5 mg, 2 5 mg, Nebulization, Q6H PRN, Dave Barth MD    allopurinol (ZYLOPRIM) tablet 200 mg, 200 mg, Oral, Once per day on Sun Tue Wed Fri Sat, Dave Barth MD    [START ON 12/18/2017] allopurinol (ZYLOPRIM) tablet 300 mg, 300 mg, Oral, Once per day on Mon Thu, Dave Barth MD    aspirin chewable tablet 324 mg, 324 mg, Oral, Once, Donia Amen, DO    aspirin chewable tablet 81 mg, 81 mg, Oral, Daily, Dave Barth MD    bisacodyl (DULCOLAX) EC tablet 5 mg, 5 mg, Oral, Daily PRN, Dave Barth MD    calcium carbonate (OYSTER SHELL,OSCAL) 500 mg tablet 2 tablet, 2 tablet, Oral, TID With Meals, Ruben Tolentino Nasir Galan MD    cholecalciferol (VITAMIN D3) tablet 1,000 Units, 1,000 Units, Oral, Daily, Sandy Glover MD    cyanocobalamin (VITAMIN B-12) tablet 1,000 mcg, 1,000 mcg, Oral, Daily, Sandy Glover MD    dextromethorphan-guaiFENesin (ROBITUSSIN DM)  mg/5 mL oral syrup 5 mL, 5 mL, Oral, Q4H PRN, Sandy Glover MD    docusate sodium (COLACE) capsule 100 mg, 100 mg, Oral, BID, Sandy Glover MD    ferrous sulfate tablet 325 mg, 325 mg, Oral, TID With Meals, Sandy Glover MD    heparin (porcine) 25,000 units in 250 mL infusion (premix), 3-30 Units/kg/hr (Order-Specific), Intravenous, Titrated, Shira Dominguez MD, Last Rate: 22 5 mL/hr at 12/16/17 0551, 18 Units/kg/hr at 12/16/17 0551    heparin (porcine) injection 10,000 Units, 10,000 Units, Intravenous, PRN, Shira Dominguez MD    heparin (porcine) injection 5,000 Units, 5,000 Units, Intravenous, PRN, Shira Dominguez MD    insulin glargine (LANTUS) subcutaneous injection 40 Units, 40 Units, Subcutaneous, HS, Sandy Glover MD, 40 Units at 12/15/17 2300    insulin lispro (HumaLOG) 100 units/mL subcutaneous injection 1-6 Units, 1-6 Units, Subcutaneous, HS, Sandy Glover MD, 3 Units at 12/16/17 0118    insulin lispro (HumaLOG) 100 units/mL subcutaneous injection 2-12 Units, 2-12 Units, Subcutaneous, TID AC **AND** Fingerstick Glucose (POCT), , , TID AC, Sandy Glover MD    insulin NPH (HumuLIN N,NovoLIN N) 100 Units/mL subcutaneous injection 10 Units, 10 Units, Subcutaneous, BID before breakfast/lunch, Sandy Glover MD    insulin NPH (HumuLIN N,NovoLIN N) 100 Units/mL subcutaneous injection 14 Units, 14 Units, Subcutaneous, Before Les MD Todd    lactulose 20 g/30 mL oral solution 20 g, 20 g, Oral, Daily, Sandy Glover MD    latanoprost (XALATAN) 0 005 % ophthalmic solution 1 drop, 1 drop, Both Eyes, HS, Sandy Glover MD, 1 drop at 12/16/17 0118    levothyroxine tablet 25 mcg, 25 mcg, Oral, Daily, Sandy Glover MD    menthol-methyl salicylate (BENGAY) 35-63 % cream, , Apply externally, BID, Nathan Shields MD    metoprolol tartrate (LOPRESSOR) partial tablet 12 5 mg, 12 5 mg, Oral, See Admin Instructions, Nathan Shields MD    miconazole 2 % cream, , Topical, BID PRN, Nathan Shields MD    ondansetron [facility] oral solution 4 mg, 4 mg, Oral, Q4H PRN, Nathan Shields MD    pantoprazole (PROTONIX) EC tablet 20 mg, 20 mg, Oral, Daily, Nathan Shields MD    polyethylene glycol [facility]) packet 17 g, 17 g, Oral, Daily, Nathan Shields MD    pravastatin (PRAVACHOL) tablet 80 mg, 80 mg, Oral, Daily, Nathan Shields MD    [facility]) tablet 17 2 mg, 2 tablet, Oral, Daily PRN, Nathan Shields MD    Insert peripheral IV, , , Once **AND** sodium chloride (PF) 0 9 % injection 3 mL, 3 mL, Intravenous, PRN, Parminder Avilez DO    REVIEW OF SYSTEMS:  All the systems were reviewed and were negative except as documented on the HPI      PHYSICAL EXAM:  Current Weight: Weight - Scale: 132 kg (291 lb 14 2 oz)  First Weight: Weight - Scale: 131 kg (289 lb)  Vitals:    12/16/17 0000 12/16/17 0325 12/16/17 0326 12/16/17 0500   BP: 144/72 129/59     Pulse: 90 94     Resp: 20 20     Temp: 98 1 °F (36 7 °C)      TempSrc: Oral      SpO2: 100% 98% 98%    Weight: 132 kg (291 lb 14 2 oz)   132 kg (291 lb 14 2 oz)   Height: 5' 4" (1 626 m)        No intake or output data in the 24 hours ending 12/16/17 0746    General:  In no acute distress, morbidly obese, on oxygen via nasal cannula  Skin:  Warm, pale  Eyes:  No jaundice  ENT:  Mucous membranes moist, oxygen via nasal cannula  Neck:  Supple, JVD no able to be visualized due to body habitus  Chest:  Slightly decreased breath sounds at bases   CVS:  Regular rate and rhythm  Abdomen:  Obese, soft, nontender, nondistended, normal bowel sounds  Extremities:  No significant lower extremity edema, left upper extremity with AV fistula with positive thrill and bruit   Neuro:  Alert and oriented  Psych:  Appropriate mood      Invasive Devices:      Lab Results: Results from last 7 days  Lab Units 12/16/17  0432 12/15/17  1637 12/14/17  0617  12/12/17  0510   WBC Thousand/uL 8 53 7 56 9 19  < >  --    HEMOGLOBIN g/dL 8 5* 8 5* 8 5*  < >  --    HEMATOCRIT % 26 7* 26 4* 27 0*  < >  --    PLATELETS Thousands/uL 68* 54* 49*  < >  --    SODIUM mmol/L 133* 134*  --   --  134*   POTASSIUM mmol/L 3 9 3 4*  --   --  5 1   CHLORIDE mmol/L 96* 95*  --   --  98*   CO2 mmol/L 31 31  --   --  30   BUN mg/dL 35* 26*  --   --  41*   CREATININE mg/dL 2 19* 1 57*  --   --  2 51*   CALCIUM mg/dL 8 4 8 4  --   --  8 3   MAGNESIUM mg/dL 2 0  --   --   --   --    PHOSPHORUS mg/dL 1 8*  --   --   --   --    ALBUMIN g/dL 2 2* 2 1*  --   --   --    TOTAL PROTEIN g/dL 6 1* 6 0*  --   --   --    BILIRUBIN TOTAL mg/dL 0 29 0 33  --   --   --    ALK PHOS U/L 62 66  --   --   --    ALT U/L 23 22  --   --   --    AST U/L 18 14  --   --   --    GLUCOSE RANDOM mg/dL 212* 320*  --   --  104   < > = values in this interval not displayed  Portions of the record may have been created with voice recognition software  Occasional wrong word or "sound a like" substitutions may have occurred due to the inherent limitations of voice recognition software  Read the chart carefully and recognize, using context, where substitutions have occurred  If you have any questions, please contact the dictating provider

## 2017-12-16 NOTE — PROGRESS NOTES
IM Residency Progress Note   Unit/Bed#: CW2 214-02 Encounter: [id number]  SOD Team C       Hunter Baugh 67 y o  female [id number]    Hospital Stay Days: 0      Assessment/Plan:    Principal Problem:    Chest pain  Active Problems:    Chronic diastolic CHF (congestive heart failure) (HCC)    IDDM (insulin dependent diabetes mellitus) ([facility] [address] )    Morbid obesity ([facility] )    End-stage renal disease on hemodialysis ([facility] )    History of pulmonary embolism    Thrombocytopenia (HCC)    Sleep apnea    Disease of thyroid gland    Anemia    Gout    Hypertension    Hyperlipidemia    Epigastric pain    Elevated troponin    Elevated lipase    Electrolyte abnormality    Garcia esophagus    Chest Pain  - resolved  - Troponin has been rising 0 08>1 50>1 98, telemetry showed no events, repeat EKG this AM showed T-wave inversion in leads 1 and aVL  We will continue to trend troponin  - cardiology consulted   - patient was started on heparin infusion overnight but has thrombocytopenia 76 - nephrology is on board  -Continue aspirin, metoprolol, protonix  - Lipid panel showed a total cholesterol of 160, LDL of 41, HDL of 76, triglyceride of 216      IDDM  - not well controlled - HgbA1c on 12/9/17 was 8 6    - Blood glucose over the past 24 hours ranged from 192-320  -continue with insulin Lantus, NPH and insulin sliding scale, Accu-Cheks and diabetic diet       ESRD  -on hemodialysis Mondays Wednesdays and Fridays  -last dialysis session was yesterday   -next dialysis session is due on December 18th, 2017  - nephrology is on board     Elevated Lipase  Lipase 427 on admission, upper range of normal  Currently asymptomatic    Will monitor clinically      Hyponatremia    -improving   - sodium today is 133 down from 134 yesterday  - continue to monitor BMP     Hypokalemia  - resolved  - potassium this morning is 3 9    Hypertension  -stable   -continue metoprolol      HLD  -lipid panel showed total cholesterol of 160, LDL of 41, HDL of 76 and triglycerides of 216  - Continue with pravastatin 80mg po daily     Anemia  -hemoglobin today is 8 5 unchanged from yesterday  -  continue ferrous sulfate 325mg po tid       History of PE  -  continue with aspirin and bilateral SCDs      Thrombocytopenia  -platelets today are 68 up  from  54 yesterday with a baseline of 30 to 50 in the past 2 weeks  -will continue to monitor CBC     Diastolic Heart Failure  Echo on 2016 showed EF 65% with grade 1 diastolic dysfunction  Currently stable, physical exam unremarkable  Continue metoprolol 12 5mg po daily excluding M, W, F before dialysis      Hypothyroidism  TSH on 17 1 5  Continue levothyroxine 25mcg po daily     Gout  Currently stable, no acute flair  Continue home dosage allopurinol 200mg po Sun, , , Fri, Sat and 300mg po Mon,       Garcia's Esophagus  -continue with Protonix  -outpatient follow-up with GI     Constipation  -bowel regimen     Morbid Obesity  -BMI is 49 61  -diet and exercise     Sleep Apnea  -likely secondary to morbid obesity  - Continue with CPAP at night on p r n  oxygen by nasal cannula    Ambulatory Dysfunction  -likely secondary to morbid obesity and deconditioning   -PT/OT        Disposition: We will follow up with Cardiology recommendations  Subjective:   Patient seen and examined  She said her chest pain has resolved  It lasted about 2 hours yesterday  She denies any more chest pain, shortness of breath, palpitations, cough, fever, chills, night sweats, abdominal pain, nausea, vomiting, diarrhea  She complains of occasional constipation but had a bowel movement yesterday         Vitals: Temp (24hrs), Av 3 °F (36 8 °C), Min:98 1 °F (36 7 °C), Max:98 4 °F (36 9 °C)  Current: Temperature: 98 4 °F (36 9 °C)  Vitals:    17 0326 17 0500 17 0809 17 0845   BP:    123/62   Pulse:    97   Resp:    18   Temp:    98 4 °F (36 9 °C)   TempSrc:    Oral   SpO2: 98%  99% 98%   Weight:  132 kg (291 lb 14 2 oz)     Height:        Body mass index is 50 1 kg/m²  No intake/output data recorded  Physical Exam:   GENERAL:  Morbidly obese, in no obvious distress, sitting comfortably in her bed, afebrile to touch  HEENT:  Normocephalic, atraumatic  Nasal cannula in place and on 2 L of oxygen  Pupils equal round reactive to light and accommodation  NECK:  Morbidly obese  CVS:  S1, S2   Regular rate and rhythm  No murmurs, rubs or gallops  RESPIRATORY:  Bibasilar crackles  ABDOMEN:  Obese, nontender nondistended  No masses or organomegaly palpable  Normoactive bowel sounds  MSK:  1+ pitting pedal edema bilaterally  Chest is not tender on palpation  NEURO:  Awake, alert and oriented x3      Invasive Devices     Peripheral Intravenous Line            Peripheral IV Left Wrist -- days          Line            Hemodialysis AV Fistula 08/18/16 Right Upper arm 485 days    Hemodialysis AV Fistula 11/23/17 Right Upper arm 22 days    HD Cath Double 16 days                          Labs:   Recent Results (from the past 24 hour(s))   ECG 12 lead    Collection Time: 12/15/17  4:12 PM   Result Value Ref Range    Ventricular Rate 97 BPM    Atrial Rate 97 BPM    ID Interval 166 ms    QRSD Interval 92 ms    QT Interval 318 ms    QTC Interval 403 ms    P [place] 42 degrees    QRS Axis 6 degrees    T Wave Axis 133 degrees   CBC and differential    Collection Time: 12/15/17  4:37 PM   Result Value Ref Range    WBC 7 56 4 31 - 10 16 Thousand/uL    RBC 2 79 (L) 3 81 - 5 12 Million/uL    Hemoglobin 8 5 (L) 11 5 - 15 4 g/dL    Hematocrit 26 4 (L) 34 8 - 46 1 %    MCV 95 82 - 98 fL    MCH 30 5 26 8 - 34 3 pg    MCHC 32 2 31 4 - 37 4 g/dL    RDW 15 1 11 6 - 15 1 %    Platelets 54 (L) 088 - 390 Thousands/uL    nRBC 0 /100 WBCs   Comprehensive metabolic panel    Collection Time: 12/15/17  4:37 PM   Result Value Ref Range    Sodium 134 (L) 136 - 145 mmol/L    Potassium 3 4 (L) 3 5 - 5 3 mmol/L    Chloride 95 (L) 100 - 108 mmol/L CO2 31 21 - 32 mmol/L    Anion Gap 8 4 - 13 mmol/L    BUN 26 (H) 5 - 25 mg/dL    Creatinine 1 57 (H) 0 60 - 1 30 mg/dL    Glucose 320 (H) 65 - 140 mg/dL    Calcium 8 4 8 3 - 10 1 mg/dL    AST 14 5 - 45 U/L    ALT 22 12 - 78 U/L    Alkaline Phosphatase 66 46 - 116 U/L    Total Protein 6 0 (L) 6 4 - 8 2 g/dL    Albumin 2 1 (L) 3 5 - 5 0 g/dL    Total Bilirubin 0 33 0 20 - 1 00 mg/dL    eGFR 32 ml/min/1 73sq m   Troponin I repeat in 3 hrs    Collection Time: 12/15/17  4:37 PM   Result Value Ref Range    Troponin I 0 08 (H) <=0 04 ng/mL   Lipase    Collection Time: 12/15/17  4:37 PM   Result Value Ref Range    Lipase 427 (H) 73 - 393 u/L   Manual Differential(PHLEBS Do Not Order)    Collection Time: 12/15/17  4:37 PM   Result Value Ref Range    Segmented % 81 (H) 43 - 75 %    Bands % 2 0 - 8 %    Lymphocytes % 9 (L) 14 - 44 %    Monocytes % 4 4 - 12 %    Eosinophils % 1 0 - 6 %    Basophils % 0 0 - 1 %    Metamyelocytes% 3 (H) 0 - 1 %    Absolute Neutrophils 6 27 1 85 - 7 62 Thousand/uL    Lymphocytes Absolute 0 68 0 60 - 4 47 Thousand/uL    Monocytes Absolute 0 30 0 00 - 1 22 Thousand/uL    Eosinophils Absolute 0 08 0 00 - 0 40 Thousand/uL    Basophils Absolute 0 00 0 00 - 0 10 Thousand/uL    Total Counted      RBC Morphology Present     Anisocytosis Present     Macrocytes Present     Platelet Estimate Decreased (A) Adequate   POCT troponin    Collection Time: 12/15/17  4:44 PM   Result Value Ref Range    POC Troponin I 0 05 0 00 - 0 08 ng/ml    Specimen Type VENOUS    Fingerstick Glucose (POCT)    Collection Time: 12/15/17 11:00 PM   Result Value Ref Range    POC Glucose 232 (H) 65 - 140 mg/dl   Troponin I    Collection Time: 12/15/17 11:48 PM   Result Value Ref Range    Troponin I 1 50 (H) <=0 04 ng/mL   Troponin I    Collection Time: 12/16/17  4:32 AM   Result Value Ref Range    Troponin I 1 98 (H) <=0 04 ng/mL   Comprehensive metabolic panel    Collection Time: 12/16/17  4:32 AM   Result Value Ref Range    Sodium 133 (L) 136 - 145 mmol/L    Potassium 3 9 3 5 - 5 3 mmol/L    Chloride 96 (L) 100 - 108 mmol/L    CO2 31 21 - 32 mmol/L    Anion Gap 6 4 - 13 mmol/L    BUN 35 (H) 5 - 25 mg/dL    Creatinine 2 19 (H) 0 60 - 1 30 mg/dL    Glucose 212 (H) 65 - 140 mg/dL    Calcium 8 4 8 3 - 10 1 mg/dL    AST 18 5 - 45 U/L    ALT 23 12 - 78 U/L    Alkaline Phosphatase 62 46 - 116 U/L    Total Protein 6 1 (L) 6 4 - 8 2 g/dL    Albumin 2 2 (L) 3 5 - 5 0 g/dL    Total Bilirubin 0 29 0 20 - 1 00 mg/dL    eGFR 22 ml/min/1 73sq m   Magnesium    Collection Time: 12/16/17  4:32 AM   Result Value Ref Range    Magnesium 2 0 1 6 - 2 6 mg/dL   Phosphorus    Collection Time: 12/16/17  4:32 AM   Result Value Ref Range    Phosphorus 1 8 (L) 2 3 - 4 1 mg/dL   CBC (With Platelets)    Collection Time: 12/16/17  4:32 AM   Result Value Ref Range    WBC 8 53 4 31 - 10 16 Thousand/uL    RBC 2 83 (L) 3 81 - 5 12 Million/uL    Hemoglobin 8 5 (L) 11 5 - 15 4 g/dL    Hematocrit 26 7 (L) 34 8 - 46 1 %    MCV 94 82 - 98 fL    MCH 30 0 26 8 - 34 3 pg    MCHC 31 8 31 4 - 37 4 g/dL    RDW 15 3 (H) 11 6 - 15 1 %    Platelets 68 (L) 763 - 390 Thousands/uL   Lipid panel    Collection Time: 12/16/17  4:32 AM   Result Value Ref Range    Cholesterol 160 50 - 200 mg/dL    Triglycerides 216 (H) <=150 mg/dL    HDL, Direct 76 (H) 40 - 60 mg/dL    LDL Calculated 41 0 - 100 mg/dL   Fingerstick Glucose (POCT)    Collection Time: 12/16/17  5:31 AM   Result Value Ref Range    POC Glucose 192 (H) 65 - 140 mg/dl   ECG 12 lead    Collection Time: 12/16/17  8:33 AM   Result Value Ref Range    Ventricular Rate 96 BPM    Atrial Rate 96 BPM    WV Interval 162 ms    QRSD Interval 92 ms    QT Interval 340 ms    QTC Interval 429 ms    P [place] 27 degrees    QRS Axis 13 degrees    T Wave Axis 117 degrees       Radiology Results: I have personally reviewed pertinent reports  Other Diagnostic Testing:   I have personally reviewed pertinent reports          Active Meds:   Current Facility-Administered Medications   Medication Dose Route Frequency    acetaminophen (TYLENOL) tablet 650 mg  650 mg Oral Q6H PRN    albuterol inhalation solution 2 5 mg  2 5 mg Nebulization Q6H PRN    allopurinol (ZYLOPRIM) tablet 200 mg  200 mg Oral Once per day on Sun Tue Wed Fri Sat    [START ON 12/18/2017] allopurinol (ZYLOPRIM) tablet 300 mg  300 mg Oral Once per day on Mon Thu    aspirin chewable tablet 324 mg  324 mg Oral Once    aspirin chewable tablet 81 mg  81 mg Oral Daily    bisacodyl (DULCOLAX) EC tablet 5 mg  5 mg Oral Daily PRN    calcium carbonate (OYSTER SHELL,OSCAL) 500 mg tablet 2 tablet  2 tablet Oral TID With Meals    cholecalciferol (VITAMIN D3) tablet 1,000 Units  1,000 Units Oral Daily    cyanocobalamin (VITAMIN B-12) tablet 1,000 mcg  1,000 mcg Oral Daily    dextromethorphan-guaiFENesin (ROBITUSSIN DM)  mg/5 mL oral syrup 5 mL  5 mL Oral Q4H PRN    docusate sodium (COLACE) capsule 100 mg  100 mg Oral BID    ferrous sulfate tablet 325 mg  325 mg Oral TID With Meals    heparin (porcine) 25,000 units in 250 mL infusion (premix)  3-30 Units/kg/hr (Order-Specific) Intravenous Titrated    heparin (porcine) injection 10,000 Units  10,000 Units Intravenous PRN    heparin (porcine) injection 5,000 Units  5,000 Units Intravenous PRN    insulin glargine (LANTUS) subcutaneous injection 40 Units  40 Units Subcutaneous HS    insulin lispro (HumaLOG) 100 units/mL subcutaneous injection 1-6 Units  1-6 Units Subcutaneous HS    insulin lispro (HumaLOG) 100 units/mL subcutaneous injection 2-12 Units  2-12 Units Subcutaneous TID AC    insulin NPH (HumuLIN N,NovoLIN N) 100 Units/mL subcutaneous injection 10 Units  10 Units Subcutaneous BID before breakfast/lunch    insulin NPH (HumuLIN N,NovoLIN N) 100 Units/mL subcutaneous injection 14 Units  14 Units Subcutaneous Before Dinner    lactulose 20 g/30 mL oral solution 20 g  20 g Oral Daily    latanoprost (XALATAN) 0 005 % ophthalmic solution 1 drop  1 drop Both Eyes HS    levothyroxine tablet 25 mcg  25 mcg Oral Daily    menthol-methyl salicylate (BENGAY) 57-13 % cream   Apply externally BID    metoprolol tartrate (LOPRESSOR) partial tablet 12 5 mg  12 5 mg Oral See Admin Instructions    miconazole 2 % cream   Topical BID PRN    ondansetron (ZOFRAN) oral solution 4 mg  4 mg Oral Q4H PRN    pantoprazole (PROTONIX) EC tablet 20 mg  20 mg Oral Daily    polyethylene glycol (MIRALAX) packet 17 g  17 g Oral Daily    pravastatin (PRAVACHOL) tablet 80 mg  80 mg Oral Daily    senna (SENOKOT) tablet 17 2 mg  2 tablet Oral Daily PRN    sodium chloride (PF) 0 9 % injection 3 mL  3 mL Intravenous PRN         VTE Pharmacologic Prophylaxis: Heparin  VTE Mechanical Prophylaxis: sequential compression device    Lupe Ruiz DO

## 2017-12-16 NOTE — CONSULTS
Consultation - Cardiology   Diana Jordan 67 y o  female MRN: [id number]  Unit/Bed#: [facility] 214-02 Encounter: [id number]    Inpatient consult to Cardiology  Consult performed by: Nick Kaufman ordered by: Georges Jimenez        Chief Complaint   Patient presents with    Epigastric Pain       at dialysis today for 5 hours, reports a gnawing and burning feeling in her chest  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 and "feels this way when she eats something she is not supposed to"         Physician Requesting Consult: Maryjo Valero MD  Reason for Consult / Principal Problem:  Chest pain; elevated troponin  History of Present Illness   HPI: Diana Jordan is a 67y o  year old female nursing home resident who has a history of CAD, morbid obesity ( BMI>58), diabetes mellitus, hypertension, dyslipidemia ,chronic diastolic heart failure, &  ESRD/HD  She was seen by cardiologist Dr Ivett Pino for a heart failure evaluation a year ago  She does not follow with a cardiologist regularly  Notably, she has thrombocytopenia with a platelet count of 34 to 60,000  She has a chronic gait dysfunction has fallen multiple times in the past   Therefore, she is non ambulatory  She resides at [facility]    She had a cardiac catheterization in 2002 by Dr Francisco Mcelroy She was found to have single-vessel CAD with a long 50% mid circumflex lesion, treated medically  She was at dialysis yesterday when she developed substernal chest burning, 7 on a 1 to 10 scale  It was bad heartburn "  It resolved after several hours  There was no radiation to the jaw , arms or back  There was no pleuritic pain, or dyspnea  She was given some Tums, cleared by her nephrologist which she suggest may have helped    The patient is certain this it is similar to her GI discomfort when she was diagnosed with Garcia's esophagus  She believes she is in need of an EGD  She has had a couple episodes of this pain over the last few months      Due to her chest pain she was brought to the hospital   Initial troponin was 0 05 and bumped 1 98 and is on the descent  Her EKG shows normal sinus rhythm and LVH with repolarization abnormalities and nonspecific ST T wave changes laterally  Review of Systems   Constitution: Negative for chills, fever, weakness and malaise/fatigue  Cardiovascular:        No current chest pain   Respiratory: Negative for cough and shortness of breath  Gastrointestinal: Negative for anorexia, nausea and vomiting  Neurological: Negative for dizziness, focal weakness, headaches and light-headedness  All other systems reviewed and are negative  Historical Information   Past Medical History:   Diagnosis Date    Adrenal insufficiency (HCC)     Adrenocortical insufficiency (HCC)     Anemia     Garcia esophagus     Bradycardia     Cardiac disease     Cataract     CHF (congestive heart failure) (HCC)     Constipation     CPAP (continuous positive airway pressure) dependence     Dependence on supplemental oxygen     Diabetes mellitus (HCC)     Difficulty walking     Disease of thyroid gland     Dysphagia     Encephalopathy     ESRD on hemodialysis (HCC)     GERD (gastroesophageal reflux disease)     Glaucoma     Gout     History of transfusion     Hyperlipidemia     Hypertension     Insomnia     Obesity     Osteoarthritis     PONV (postoperative nausea and vomiting)     Sleep apnea      Past Surgical History:   Procedure Laterality Date    ABDOMINAL SURGERY      APPENDECTOMY      BODY LIFT LOWER Right 4/18/2017    Procedure: UPPER EXTREMITY EXCISION PANNUS ;  Surgeon: Reagan Lucas MD;  [facility];  Service:     CARPAL TUNNEL RELEASE Bilateral     COLONOSCOPY N/A 8/15/2017    Procedure: COLONOSCOPY;  Surgeon: Jenniffer Robins MD;  [facility];   Service: Colorectal    COLONOSCOPY N/A 8/29/2017    Procedure: COLONOSCOPY;  Surgeon: Roland Sandoval MD;  [facility]; Service: Colorectal    COLONOSCOPY N/A 9/1/2017    Procedure: COLONOSCOPY;  Surgeon: Nurys Bermudez MD;  [facility];   Service: Colorectal    HYSTERECTOMY      WY ANASTOMOSIS,AV,ANY SITE Right 8/18/2016    Procedure: CREATION OF RIGHT BRACHIOCEPHALIC FISTULA;  Surgeon: Yazmin Flores MD;  [facility];  Service: Vascular    WY REVISE AV FISTULA,W/O THROMBECTOMY Right 4/18/2017    Procedure: UPPER ARM SUPERFICIALIZATION FISTULA ;  Surgeon: Yazmin Flores MD;  [facility];  Service: Vascular     History   Alcohol Use No     History   Drug Use No     History   Smoking Status    Former Smoker    Quit date: 1967   Smokeless Tobacco    Never Used     Family History:   Family History   Problem Relation Age of Onset    Diabetes Mother     Heart disease Father        Meds/Allergies   all current active meds have been reviewed, current meds:   Current Facility-Administered Medications   Medication Dose Route Frequency    acetaminophen (TYLENOL) tablet 650 mg  650 mg Oral Q6H PRN    albuterol inhalation solution 2 5 mg  2 5 mg Nebulization Q6H PRN    allopurinol (ZYLOPRIM) tablet 200 mg  200 mg Oral Once per day on Sun Tue Wed Fri Sat    [START ON 12/18/2017] allopurinol (ZYLOPRIM) tablet 300 mg  300 mg Oral Once per day on Mon Thu    aspirin chewable tablet 324 mg  324 mg Oral Once    aspirin chewable tablet 81 mg  81 mg Oral Daily    bisacodyl (DULCOLAX) EC tablet 5 mg  5 mg Oral Daily PRN    calcium carbonate (OYSTER SHELL,OSCAL) 500 mg tablet 2 tablet  2 tablet Oral TID With Meals    cholecalciferol (VITAMIN D3) tablet 1,000 Units  1,000 Units Oral Daily    cyanocobalamin (VITAMIN B-12) tablet 1,000 mcg  1,000 mcg Oral Daily    dextromethorphan-guaiFENesin (ROBITUSSIN DM)  mg/5 mL oral syrup 5 mL  5 mL Oral Q4H PRN    docusate sodium (COLACE) capsule 100 mg  100 mg Oral BID    ferrous sulfate tablet 325 mg  325 mg Oral TID With Meals    heparin (porcine) 25,000 units in 250 mL infusion (premix)  3-30 Units/kg/hr (Order-Specific) Intravenous Titrated    heparin (porcine) injection 10,000 Units  10,000 Units Intravenous PRN    heparin (porcine) injection 5,000 Units  5,000 Units Intravenous PRN    insulin glargine (LANTUS) subcutaneous injection 40 Units  40 Units Subcutaneous HS    insulin lispro (HumaLOG) 100 units/mL subcutaneous injection 1-6 Units  1-6 Units Subcutaneous HS    insulin lispro (HumaLOG) 100 units/mL subcutaneous injection 2-12 Units  2-12 Units Subcutaneous TID AC    insulin NPH (HumuLIN N,NovoLIN N) 100 Units/mL subcutaneous injection 10 Units  10 Units Subcutaneous BID before breakfast/lunch    insulin NPH (HumuLIN N,NovoLIN N) 100 Units/mL subcutaneous injection 14 Units  14 Units Subcutaneous Before Dinner    lactulose 20 g/30 mL oral solution 20 g  20 g Oral Daily    latanoprost (XALATAN) 0 005 % ophthalmic solution 1 drop  1 drop Both Eyes HS    levothyroxine tablet 25 mcg  25 mcg Oral Daily    menthol-methyl salicylate (BENGAY) 04-44 % cream   Apply externally BID    metoprolol tartrate (LOPRESSOR) partial tablet 12 5 mg  12 5 mg Oral See Admin Instructions    miconazole 2 % cream   Topical BID PRN    ondansetron (ZOFRAN) oral solution 4 mg  4 mg Oral Q4H PRN    pantoprazole (PROTONIX) EC tablet 20 mg  20 mg Oral Daily    polyethylene glycol (MIRALAX) packet 17 g  17 g Oral Daily    pravastatin (PRAVACHOL) tablet 80 mg  80 mg Oral Daily    senna (SENOKOT) tablet 17 2 mg  2 tablet Oral Daily PRN    sodium chloride (PF) 0 9 % injection 3 mL  3 mL Intravenous PRN    and PTA meds:   Prior to Admission Medications   Prescriptions Last Dose Informant Patient Reported? Taking?    Camphor-Menthol-Methyl Sal (Anthony Carrero Ultra Strength) 4-10-30 % CREA 12/15/2017 at Unknown time  Yes Yes   Sig: Apply topically 2 (two) times a day B/l feet/knees and left shoulder   HYDROmorphone (DILAUDID) 2 mg tablet 12/15/2017 at Unknown time  No Yes   Sig: Take 1 tablet by mouth every 6 (six) hours as needed for moderate pain for up to 3 days Max Daily Amount: 8 mg   Insulin Glargine (BASAGLAR KWIKPEN) 100 UNIT/ML SOPN 12/15/2017 at Unknown time  Yes Yes   Sig: Inject 40 Units under the skin daily at bedtime   acetaminophen (TYLENOL) 325 mg tablet 12/15/2017 at Unknown time  No Yes   Sig: Take 2 tablets by mouth every 6 (six) hours as needed for mild pain, headaches or fever   albuterol (2 5 mg/3 mL) 0 083 % nebulizer solution 12/15/2017 at Unknown time  Yes Yes   Sig: Take 2 5 mg by nebulization every 6 (six) hours as needed for wheezing  allopurinol (ZYLOPRIM) 100 mg tablet 12/15/2017 at Unknown time  Yes Yes   Sig: Take 200 mg by mouth Indications: sun, tue, we, fri, sat  Sun, Tues, Wed, Fri, Sat    allopurinol (ZYLOPRIM) 300 mg tablet 12/15/2017 at Unknown time  Yes Yes   Sig: Take 300 mg by mouth Mon, and Thurs    aspirin 81 mg chewable tablet 12/15/2017 at Unknown time  No Yes   Sig: Chew 1 tablet daily   bisacodyl (DULCOLAX) 5 mg EC tablet 12/15/2017 at Unknown time  Yes Yes   Sig: Take 5 mg by mouth daily as needed for constipation   calcium carbonate (OYSTER SHELL,OSCAL) 500 mg 12/15/2017 at Unknown time  No Yes   Sig: Take 2 tablets by mouth 3 (three) times a day with meals   cholecalciferol 1000 units tablet 12/15/2017 at Unknown time  No Yes   Sig: Take 1 tablet by mouth daily   cyanocobalamin (VITAMIN B-12) 1,000 mcg tablet 12/15/2017 at Unknown time  Yes Yes   Sig: Take 1,000 mcg by mouth daily     dextromethorphan-guaiFENesin (ROBITUSSIN DM)  mg/5 mL syrup 12/15/2017 at Unknown time  No Yes   Sig: Take 5 mL by mouth every 4 (four) hours as needed for cough or congestion   docusate sodium (COLACE) 100 mg capsule 12/15/2017 at Unknown time  Yes Yes   Sig: Take 100 mg by mouth 2 (two) times a day     ferrous sulfate 325 (65 Fe) mg tablet 12/15/2017 at Unknown time  Yes Yes   Sig: Take 325 mg by mouth 3 (three) times a day with meals   insulin NPH (HumuLIN N,NovoLIN N) 100 Units/mL subcutaneous injection 12/15/2017 at Unknown time  No Yes   Sig: Inject 10 Units under the skin 2 (two) times a day before breakfast and lunch   insulin NPH (HumuLIN N,NovoLIN N) 100 Units/mL subcutaneous injection 12/15/2017 at Unknown time  No Yes   Sig: Inject 14 Units under the skin daily before dinner   lactulose 20 g/30 mL 12/15/2017 at Unknown time  No Yes   Sig: Take 30 mL by mouth daily   latanoprost (XALATAN) 0 005 % ophthalmic solution 12/15/2017 at Unknown time  Yes Yes   Sig: Administer 1 drop to both eyes daily at bedtime  levothyroxine 25 mcg tablet 12/15/2017 at Unknown time  Yes Yes   Sig: Take 25 mcg by mouth daily  liver oil-zinc oxide (DESITIN) 40 % ointment 12/15/2017 at Unknown time  Yes Yes   Sig: Apply topically as needed for irritation To buttock every day and evening shift   To b/l thigh folds every day and evening shift    metoprolol tartrate (LOPRESSOR) 12 5 mg tablet 12/15/2017 at Unknown time  Yes Yes   Sig: Take 12 5 mg by mouth  Sun, Tues, Thurs, Sat  Hold am dose before dialysis Mon, Wed, and Fri   miconazole (MICOTIN) 2 % powder 12/15/2017 at Unknown time  Yes Yes   Sig: Apply topically as needed for itching Apply to b/l breast and panus twice a day   nystatin (MYCOSTATIN) powder 12/15/2017 at Unknown time  No Yes   Sig: Apply topically 2 (two) times a day   omeprazole (PriLOSEC) 20 mg delayed release capsule 12/15/2017 at Unknown time  Yes Yes   Sig: Take 20 mg by mouth daily     ondansetron (ZOFRAN) 4 mg tablet 12/15/2017 at Unknown time  No Yes   Sig: Take 1 tablet by mouth every 4 (four) hours as needed for nausea or vomiting   polyethylene glycol (MIRALAX) 17 g packet 12/15/2017 at Unknown time  No Yes   Sig: Take 17 g by mouth daily   pravastatin (PRAVACHOL) 80 mg tablet 12/15/2017 at Unknown time  Yes Yes   Sig: Take 80 mg by mouth daily     pregabalin (LYRICA) 75 mg capsule   No No   Sig: Take 1 capsule by mouth 2 (two) times a day for 10 days Patient taking differently: Take 50 mg by mouth 3 (three) times a day     senna (SENOKOT) 8 6 MG tablet 12/15/2017 at Unknown time  Yes Yes   Sig: Take 2 tablets by mouth daily as needed for constipation  Facility-Administered Medications: None       heparin (porcine) 3-30 Units/kg/hr (Order-Specific) Last Rate: 18 Units/kg/hr (12/16/17 0551)       Allergies   Allergen Reactions    Codeine     Darvon [Propoxyphene]     Iodine     Keflex [Cephalexin]     Morphine And Related     Nsaids     Other      IV DYE and SEAFOOD       Objective   Vitals: Blood pressure 123/62, pulse 97, temperature 98 4 °F (36 9 °C), temperature source Oral, resp  rate 18, height 5' 4" (1 626 m), weight 132 kg (291 lb 14 2 oz), SpO2 98 %, not currently breastfeeding ,     Body mass index is 50 1 kg/m²  ,     Systolic (43FEV), UYI:519 , Min:95 , POJ:069     Diastolic (90EUD), ZSX:64, Min:54, Max:73      No intake or output data in the 24 hours ending 12/16/17 1014  Weight (last 2 days)     Date/Time   Weight    12/16/17 0500  132 (291 89)    12/16/17 0000  132 (291 89)    12/15/17 1606  131 (289)            Invasive Devices     Peripheral Intravenous Line            Peripheral IV Left Wrist -- days          Line            Hemodialysis AV Fistula 08/18/16 Right Upper arm 485 days    Hemodialysis AV Fistula 11/23/17 Right Upper arm 22 days    HD Cath Double 16 days                  Physical Exam   Constitutional: No distress  HENT:   Head: Normocephalic and atraumatic  Eyes: Conjunctivae and EOM are normal    Neck: Normal range of motion  Neck supple  Cardiovascular: Normal rate and regular rhythm  Exam reveals distant heart sounds and decreased pulses  Chronic venous stasis changes about her ankles   Abdominal: Soft  Bowel sounds are normal    Skin: Skin is warm and dry  Bruising and ecchymosis noted  She is not diaphoretic  Nursing note and vitals reviewed          LABORATORY RESULTS:    Results from last 7 days  Lab Units 12/16/17  0840 12/16/17  0432 12/15/17  2348   TROPONIN I ng/mL 1 95* 1 98* 1 50*     CBC with diff:   Results from last 7 days  Lab Units 12/16/17  0432 12/15/17  1637 12/14/17  0617 12/13/17  0928 12/12/17  0514 12/11/17  0842 12/10/17  0450   WBC Thousand/uL 8 53 7 56 9 19 9 76 9 13 9 31 9 93   HEMOGLOBIN g/dL 8 5* 8 5* 8 5* 8 2* 7 8* 8 2* 8 3*   HEMATOCRIT % 26 7* 26 4* 27 0* 25 6* 24 5* 25 0* 25 7*   MCV fL 94 95 94 93 94 93 95   PLATELETS Thousands/uL 68* 54* 49* 50* 39* 34* 33*   MCH pg 30 0 30 5 29 7 29 8 29 8 30 4 30 5   MCHC g/dL 31 8 32 2 31 5 32 0 31 8 32 8 32 3   RDW % 15 3* 15 1 15 3* 15 3* 14 9 15 2* 15 2*   NRBC AUTO /100 WBCs  --  0 0 0 0 0 0       CMP:  Results from last 7 days  Lab Units 12/16/17  0432 12/15/17  1637 12/12/17  0510 12/11/17  0842   SODIUM mmol/L 133* 134* 134* 133*   POTASSIUM mmol/L 3 9 3 4* 5 1 6 0*   CHLORIDE mmol/L 96* 95* 98* 97*   CO2 mmol/L 31 31 30 30   ANION GAP mmol/L 6 8 6 6   BUN mg/dL 35* 26* 41* 106*   CREATININE mg/dL 2 19* 1 57* 2 51* 4 59*   GLUCOSE RANDOM mg/dL 212* 320* 104 249*   CALCIUM mg/dL 8 4 8 4 8 3 8 6   AST U/L 18 14  --   --    ALT U/L 23 22  --   --    ALK PHOS U/L 62 66  --   --    TOTAL PROTEIN g/dL 6 1* 6 0*  --   --    ALBUMIN g/dL 2 2* 2 1*  --   --    BILIRUBIN TOTAL mg/dL 0 29 0 33  --   --    EGFR ml/min/1 73sq m 22 32 18 9       BMP:  Results from last 7 days  Lab Units 12/16/17  0432 12/15/17  1637 12/12/17  0510 12/11/17  0842   SODIUM mmol/L 133* 134* 134* 133*   POTASSIUM mmol/L 3 9 3 4* 5 1 6 0*   CHLORIDE mmol/L 96* 95* 98* 97*   CO2 mmol/L 31 31 30 30   BUN mg/dL 35* 26* 41* 106*   CREATININE mg/dL 2 19* 1 57* 2 51* 4 59*   GLUCOSE RANDOM mg/dL 212* 320* 104 249*   CALCIUM mg/dL 8 4 8 4 8 3 8 6                 Results from last 7 days  Lab Units 12/16/17  0432   MAGNESIUM mg/dL 2 0                 Results from last 7 days  Lab Units 12/11/17  0842   TSH 3RD GENERATON uIU/mL 1 510         Results from last 7 days  Lab Units 17  2046   INR  1 05     Lipid Profile:   Lab Results   Component Value Date    CHOL 160 2017     Lab Results   Component Value Date    HDL 76 (H) 2017     Lab Results   Component Value Date    LDLCALC 41 2017     Lab Results   Component Value Date    TRIG 216 (H) 2017         Cardiac testing:   Results for orders placed during the hospital encounter of 16   Echo complete with contrast if indicated    Narrative [facility] [address]  [facility], [address] [facility]  [phone]    Transthoracic Echocardiogram  Limited 2D, M-mode, Doppler, and Color Doppler    Study date:  09-May-2016    Patient: Winifred Butcher  MR number: [id number]  Account number: [de-identified]  : 1945  Age: 66 years  Gender: Female  Status: Inpatient  Location: Bedside  Height: 60 in  Weight: 368 lb  BP: 136/ 69 mmHg    Indications: Assess left ventricular function  Diagnoses: I50 9 - Heart failure, unspecified    Sonographer:  PAUL Mckeon  Primary Physician:  Peter Tristan MD  Referring Physician:  Evan Burks MD  Group:  [facility]  Cardiology Fellow:  Sayda Bird MD  Interpreting Physician:  Elizabeth Le MD    SUMMARY    PROCEDURE INFORMATION:  This was a technically difficult study  LEFT VENTRICLE:  Systolic function was normal  Ejection fraction was estimated to be 65 %  There were no regional wall motion abnormalities  Wall thickness was increased  Concentric hypertrophy was present  Doppler parameters were consistent with abnormal left ventricular relaxation  (grade 1 diastolic dysfunction)  LEFT ATRIUM:  The atrium was mildly dilated  TRICUSPID VALVE:  There was trace regurgitation  HISTORY: Change in mental status  PRIOR HISTORY: Congestive heart failure  Arrhythmia  Risk factors: hypertension, diabetes, renal failure, and  hypercholesterolemia      PROCEDURE: The procedure was performed at the bedside  This was a routine  study  The transthoracic approach was used  The study included limited 2D  imaging, M-mode, limited spectral Doppler, and color Doppler  The heart rate  was 74 bpm, at the start of the study  Images were obtained from the  parasternal, apical, subcostal, and suprasternal notch acoustic windows  Intravenous contrast (Definity solution [1 3 ml Definity/8 7ml normal saline  solution], 2 ml) was administered to opacify the left ventricle  Echocardiographic views were limited due to poor acoustic window availability,  decreased penetration, and lung interference  This was a technically difficult  study  LEFT VENTRICLE: Size was normal  Systolic function was normal  Ejection  fraction was estimated to be 65 %  There were no regional wall motion  abnormalities  Wall thickness was increased  Concentric hypertrophy was  present  DOPPLER: Doppler parameters were consistent with abnormal left  ventricular relaxation (grade 1 diastolic dysfunction)  RIGHT VENTRICLE: The size was normal  Systolic function was normal  Wall  thickness was normal     LEFT ATRIUM: The atrium was mildly dilated  RIGHT ATRIUM: Size was normal     MITRAL VALVE: Valve structure was normal  There was normal leaflet separation  DOPPLER: The transmitral velocity was within the normal range  There was no  evidence for stenosis  There was no regurgitation  AORTIC VALVE: Leaflets exhibited sclerosis  DOPPLER: Transaortic velocity was  within the normal range  There was no evidence for stenosis  There was no  regurgitation  TRICUSPID VALVE: The valve structure was normal  There was normal leaflet  separation  DOPPLER: The transtricuspid velocity was within the normal range  There was no evidence for stenosis  There was trace regurgitation  PULMONIC VALVE: Not well visualized  PERICARDIUM: There was no pericardial effusion  The pericardium was normal in  appearance      AORTA: The root exhibited normal size     SYSTEM MEASUREMENT TABLES    2D  %FS: 35 89 %  Ao Diam: 2 9 cm  EDV(Teich): 104 57 ml  EF(Teich): 65 4 %  ESV(Teich): 36 18 ml  IVSd: 1 32 cm  LA Diam: 4 52 cm  LVIDd: 4 74 cm  LVIDs: 3 04 cm  LVPWd: 1 29 cm  SV(Teich): 68 39 ml    CW  AV Env  Ti: 309 11 ms  AV VTI: 49 61 cm  AV Vmax: 2 76 m/s  AV Vmean: 1 6 m/s  AV maxP 53 mmHg  AV meanP 47 mmHg  HR: 73 06 BPM  MV PHT: 55 56 ms  MV VTI: 40 1 cm  MV Vmax: 1 16 m/s  MV Vmean: 0 71 m/s  MV maxP 43 mmHg  MV meanP 37 mmHg  MVA By PHT: 3 96 cm2    PW  E': 0 05 m/s  E/E': 15 12  LVOT Env  Ti: 327 57 ms  LVOT VTI: 26 68 cm  LVOT Vmax: 1 34 m/s  LVOT Vmean: 0 81 m/s  LVOT maxP 21 mmHg  LVOT meanPG: 3 22 mmHg  MV A Nithin: 1 14 m/s  MV Dec Murray: 2 95 m/s2  MV DecT: 256 55 ms  MV E Nithin: 0 76 m/s  MV E/A Ratio: 0 66  MV PHT: 74 4 ms  MVA By PHT: 2 96 cm2    [employer] Accredited Echocardiography Laboratory    Prepared and electronically signed by    Jun Cotter MD  Signed [id number] 17:47:30           Imaging: I have personally reviewed pertinent reports  and I have personally reviewed pertinent films in PACS  Ct Chest Abdomen Pelvis Wo Contrast  Result Date: 2017  Narrative: CT CHEST, ABDOMEN AND PELVIS WITHOUT IV CONTRAST INDICATION:  Cough  Hypoxia  Abdominal pain  End-stage renal disease on dialysis  Diabetes  COMPARISON: 2017: TECHNIQUE: CT examination of the chest, abdomen and pelvis was performed without intravenous contrast   Reformatted images were created in axial, sagittal, and coronal planes  Radiation dose length product (DLP) for this visit:  3281 29 mGy-cm   This examination, like all CT scans performed in the [facility], was performed utilizing techniques to minimize radiation dose exposure, including the use of iterative reconstruction and automated exposure control  Enteric contrast was administered   FINDINGS: CHEST LUNGS:  Imaging through the lungs degraded due to obese body habitus as well as motion artifact  There is subsegmental linear atelectasis and mild peribronchial thickening in the RIGHT upper lobe  Suspicious for subsegmental region of airway inflammation  No significant airspace component  There is mild subsegmental bibasilar atelectasis, RIGHT greater than LEFT  Mild central pulmonary venous distention suggesting pulmonary venous hypertension  PLEURA:  Unremarkable  HEART/GREAT VESSELS:  Unremarkable for patient's age  Coronary artery calcification noted  MEDIASTINUM AND MOON:  Within the superior mediastinum, RIGHT paratracheal there are 2  Prominent lymph nodes, one borderline in size at 9 mm and the other mildly enlarged at 15 mm short axis dimension  CHEST WALL AND LOWER NECK:       Normal  ABDOMEN LIVER/BILIARY TREE:  Unremarkable  GALLBLADDER:  A few small dependent calculi within the gallbladder  Gallbladder caliber and appearance otherwise within normal limits  Stable compared to prior  SPLEEN:  Unremarkable  PANCREAS:  Unremarkable  ADRENAL GLANDS:  Unremarkable  KIDNEYS/URETERS:  Unremarkable  No hydronephrosis  STOMACH AND BOWEL:  Unremarkable  APPENDIX:  No findings to suggest appendicitis  ABDOMINOPELVIC CAVITY:  No ascites or free intraperitoneal air  No lymphadenopathy  VESSELS:  Unremarkable for patient's age  PELVIS REPRODUCTIVE ORGANS:  Unremarkable for patient's age  URINARY BLADDER:  Unremarkable  ABDOMINAL WALL/INGUINAL REGIONS:  Unremarkable  OSSEOUS STRUCTURES:  No acute fracture or destructive osseous lesion  Impression: No acute intra-abdominal pelvic abnormality identified  Cholelithiasis without evidence for cholecystitis  Evaluation of the lungs limited due to obese body habitus and motion artifact  Subsegmental linear and nodular opacities in the RIGHT upper lung, primarily with mild peribronchial thickening centrally suggesting possible focus of acute airway inflammation    Otherwise, appearance suggests dependent subsegmental atelectasis in both lung bases  There is moderate central pulmonary venous distention bilaterally suggesting component of fluid overload/pulmonary venous hypertension  No significant groundglass or airspace infiltrates  No pleural effusion  Borderline and mildly enlarged adenopathy in the RIGHT superior mediastinum  Workstation performed: [id number]     Xr Chest Portable  Result Date: 11/30/2017  Narrative: CHEST INDICATION:  Permacath Placement COMPARISON:  November 30, earlier in the morning as well as November 24, 2017  VIEWS:   AP portable semierect view in the conventional and line placement techniques  IMAGES:  2 FINDINGS:     The heart is enlarged  Atherosclerotic changes in the aorta  Left internal jugular dialysis catheter is present  The catheter has become retracted by approximately 5 cm  The tip is now in the mid superior vena cava  Previously, the tip projecting into the right atrium  Lung volumes diminished  Halle and pulmonary vessels are prominent  Visualized osseous structures appear within normal limits for the patient's age  Impression: 1  Retraction of the indwelling left internal jugular dialysis catheter by approximately 5 cm  Tip now in the mid superior vena cava  2   Mild vascular congestion  A verbal report will be called by the reading room liaison following this dictation  Workstation performed: [id number]     Xr Chest Portable  Result Date: 11/30/2017  Narrative: CHEST INDICATION:  History of cough with chronic hypoxic respiratory failure COMPARISON:  None VIEWS:   AP frontal IMAGES:  1 FINDINGS:  Dialysis catheter is again seen  Heart shadow is enlarged but unchanged from prior exam  The lungs are clear  No pneumothorax or pleural effusion  Visualized osseous structures appear within normal limits for the patient's age  Impression: No active pulmonary disease  Cardiomegaly   Workstation performed: [id number]     Xr Chest Portable  Result Date: 11/24/2017  Narrative: CHEST  INDICATION: Dialysis catheter placement  COMPARISON:  11/23/2017  VIEWS:   AP frontal; 1 image FINDINGS: Left jugular dialysis catheter tip overlies the right atrium  The cardiomediastinal silhouette is stable  The lungs are clear  No pleural effusion  Osseous structures are age appropriate  Impression: Line placement as described  Workstation performed: [id number]     Xr Chest Portable  Result Date: 11/23/2017  Narrative: CHEST INDICATION:  cough, hypoxia  History taken directly from the electronic ordering system  COMPARISON:  8/24/2017 VIEWS:   AP frontal IMAGES:  1 FINDINGS:     Heart shadow is enlarged but unchanged from prior exam  Mild central vessel prominence  No consolidation  No pneumothorax or pleural effusion  Visualized osseous structures appear within normal limits for the patient's age  Impression: Mild central vessel prominence  No consolidation  Workstation performed: [id number]     Ir Temp Hd Cath  Result Date: 11/24/2017  Narrative: Procedure: Temporary dialysis catheter placement  Indications:Fistula not functioning, contrast allergy prevents fistulogram, dialysis access required  Technique/findings: Risks, benefits and alternatives were explained to the patient  Questions were answered  Written consent was documented  Once patient was in the radiology area a timeout was performed identifying patient and procedure  Patient was kept in hospital bed  Limited ultrasound imaging of the left neck was performed, the internal jugular vein was found to be patent and compressible  The left neck was prepped and draped in standard fashion  One percent lidocaine was infiltrated along the anticipated needle tract for local anesthesia  Under ultrasound visualization a micropuncture needle was advanced into the internal jugular vein with permanent recording and reporting  An 018 wire was advanced  Needle was removed and transition sheath was placed over the wire    Estimating length a 24 cm temporary dialysis catheter was selected  Through the transition sheath a heavy-duty wire was advanced into the right atrium  The transition sheath was removed over the wire  Following serial dilation the temporary dialysis catheter was placed  Catheter was advanced completely  Subsequent x-ray confirmed appropriate positioning of the catheter within the right atrium  Both lumens of the catheter flushed and aspirated well  The catheter was secured to the skin using 2-0 Prolene sutures  Dressing was applied  Patient tolerated the procedure without any immediate postprocedural complications  Fluoroscopy time:Only ultrasound guidance was used  Impression: Technically successful temporary dialysis catheter placement via the left internal jugular vein  Subsequent x-ray confirmed appropriate positioning within the right atrium, The catheter is ready for use  Workstation performed: [id number]       EKG reviewed personally:   12/16/17:  Sinus rhythm  Nonspecific ST T wave changes laterally  12/15/17 sinus rhythm  There is left ventricular hypertrophy with repolarization abnormalities  There were nonspecific lateral ST segment changes      Telemetry reviewed personally:  Sinus rhythm        Assessment  Principal Problem:    Chest pain  Active Problems:    Chronic diastolic CHF (congestive heart failure) ([facility])    IDDM (insulin dependent diabetes mellitus) ([facility] [address] )    Morbid obesity (HCC)    End-stage renal disease on hemodialysis ([facility])    History of pulmonary embolism    Thrombocytopenia (HCC)    Sleep apnea    Disease of thyroid gland    Anemia    Gout    Hypertension    Hyperlipidemia    Epigastric pain    Elevated troponin    Elevated lipase    Electrolyte abnormality    Garcia esophagus      Assessment  Elevated troponin to 1 98, trending down-unclear at this point if this is a type 1 or 2 MI     Thrombocytopenia  Chronic diastolic heart failure   · Ejection fraction 65% May 2016 without RWMA  End-stage renal disease on dialysis  History of pulmonary emboli when she was 26years old  Morbid Obesity-  DM, type 2  Chronic Anemia, normocytic  Hemoglobin is 8 5 hematocrit 26 7, at baseline  On chronic iron  Wheelchair bound/non ambulatory    Plan  Aspirin 25 mg p o  Daily  Continue aspirin, statin, beta-blocker   Will order an echocardiogram to assess for LV dysfunction and regional wall motion abnormalities  Given her multiple comorbidities, would recommend medical management  Would discontinue IV heparin given her thrombocytopenia       Thank you for allowing us to participate in this patient's care  This pt will follow up with [facility] once discharged  Counseling / Coordination of Care  Total floor / unit time spent today 40 minutes  Greater than 50% of total time was spent with the patient and / or family counseling and / or coordination of care  A description of the counseling / coordination of care: Review of history, current assessment, development of a plan  Code Status: Level 3 - DNAR and DNI    ** Please Note: Dragon 360 Dictation voice to text software may have been used in the creation of this document   **

## 2017-12-16 NOTE — ED NOTES
CW called again at this time for tele box number  CW2 states they currently do not have any tele boxes at this time  Hospital sup called and made aware and said she would get tele box and send to [facility]       Joyce Solis  12/15/17 2120

## 2017-12-16 NOTE — PROGRESS NOTES
Patient arrived from [facility] with transport team, stable  Telemetry applied and patient in NSR  Visual assessment benign  Patient does not complain of chest pain or difficulty breathing  Complains of buttock pain  Will continue to monitor

## 2017-12-16 NOTE — H&P
INTERNAL MEDICINE HISTORY AND PHYSICAL  ED 29 SOD Team B     NAME: Anna Akhtar  AGE: 67 y o  SEX: female  : 1945   MRN: [id number]  ENCOUNTER: [id number]    DATE: 12/15/2017  TIME: 8:16 PM    Primary Care Physician: Taylor Melendez MD  Admitting Provider: Mine Kate MD    Assessment and Plan     Chest Pain  Patient complained of 7/10 chest tightness, nonradiating that lasted for several hours and currently resolved  Trop on admission 0 08  EKG showed NSR with LVH  No D-dimer was drawn because a measurement is unreliable in the setting of ESRD  Wells score 2, Heart score 4  Differential includes ACS, PE, GERD  Patient unable to receive CTA due to ESRD  Will defer to day team regarding ordering a V/Q scan  Continue aspirin, metoprolol, protonix  Monitor on tele, f/u trops, cbc, bmp, mg, phos, lipid panel, am EKG  Overnight the patient's troponins increased from 0 08 to 1 50 then 1 98  Started her on a heparin gtt to provide dual coverage for ACS and PE since we were not able to rule out PE at the time  IDDM  HgbA1c on 17 was 8 6  Fingersticks before meals and at bedtime  Continue insulin 10u before lunch, breakfast, 14u before dinner, 40u bedtime  ESRD  On admission, BUN/Cr was 26/1  57  eGFR 32 although her baseline is usually in the 10-20 range  Is on MWF hemodialysis, last session on 12/15/17  Will consult nephrology for management of next session   Elevated Lipase  Lipase 427 on admission, upper range of normal  Currently asymptomatic  Will monitor clinically  Electrolyte abnormality  On admission, Na 134, K 3 4, Cl 95  Secondary to ESRD  She undergoes HD on ,,  Repleted with seven  Will also consult nephrology    Hypertension  BP has been stable on this admission  Continue metoprolol 12 5mg po daily except for MWF before HD      HLD  F/u am lipid panel  Continue pravastatin 80mg po daily    Anemia  Hgb on admission 8 5, stable secondary to ESRD  Transfusion if needed for Hgb < 7 0  Will monitor CBC, continue ferrous sulfate 325mg po tid  History of PE  Occurred 30 years ago  Patient was taking coumadin, then switched to eliquis, but most recently off anticoagulation and only on aspirin  Will hold heparin sc in the setting of thrombocytopenia on this admission, and continue aspirin and bilateral SCDs  Thrombocytopenia  Platelets 54 on admission, at baseline secondary to ESRD  She has bruising on the left hand and over the bilateral ankles  Will monitor CBC    Diastolic Heart Failure  Echo on 5/2016 showed EF 65% with grade 1 diastolic dysfunction  Currently stable, physical exam unremarkable  Continue metoprolol 12 5mg po daily excluding M, W, F before dialysis  Hypothyroidism  TSH on 12/11/17 1 5  Continue levothyroxine 25mcg po daily    Gout  Currently stable, no acute flair  Continue home dosage allopurinol 200mg po Sun, Tu, We, Fri, Sat and 300mg po Mon, Th     Garcia's Esophagus  Secondary to longstanding GERD  Patient reports her last EGD was several years ago  It is a likely source of her chest pain pain  Continue protonix 20mg po daily  F/u with GI as an outpatient    Constipation  Chronic history of constipation, last bowel movement was 1 day prior to admission  Continue bowel regimen and laxatives prn  Morbid Obesity  Patient weighs 131kg at the time of admission  Sleep Apnea  Secondary to morbid obesity  Patient wears a cpap at night  Will continue CPAP on this admission and nasal cannula in the daytime as needed  Ambulatory Dysfunction  Secondary to morbid obesity    PT/OT consulted      Chief complaint: chest pain    History of Present Illness     Josee Mcnulty is a 67 y o  female with PMH of ESRD on MWF hemodialysis, IDDM, Diastolic CHF, Hx of PE 30 years ago not currently on anticoagulation, gout, hypothyroidism, anemia, Garcia's esophagus, morbid obesity, sleep apnea on cpap who presented to the ED with a 1 day history of chest pressure  She was being dialyzed earlier today and shortly after drinking box of juice, started experiencing 7/10 constant pressure in the center of her chest  The pain was worsened when sitting up, but was not associated with exertion, palpation, inspiration  She denied any radiation, nausea, diaphoresis, dyspnea, abdominal pain or other associated symptoms  She states that it almost felt like indigestion because she has a history of GERD with Garcia's  She states that she often drinks the same substance (but doesn't recall the name) at prior hemodialysis sessions, and it has never caused the same symptoms before  Her other complaints include headache "due to the chest pain", and chronic constipation  In the Ed, heart rate in the 90-100s, BP stable, saturating 100% on 2l  Labwork notable for troponin 0 08, hgb 8 5, platelets 54, potassium 3 4, BUN/Cr 26/1 57, glucose 320, albumin 2 1, lipase 427  EKG showed normal sinus rhythm with LVH  CTA chest was originally ordered due to suspected Pe, however it was cancelled given her renal status  Wells score 2 on admission, Heart score 4  Will defer to day team for possible v/q scan  She was given high dose aspirin, pepside, xylocaine in the ED and admitted to observation for ACS rule out  Overnight, patient's troponins trended up conley from 0 08 to 1 5 to 1 98  The decision was made to start patient on heparin drip  Review of Systems   Review of Systems   Constitutional: Negative for activity change, appetite change, chills, diaphoresis, fatigue and fever  HENT: Negative for congestion, ear pain and sinus pain  Eyes: Negative for pain and visual disturbance  Respiratory: Positive for chest tightness  Negative for apnea, cough, choking, shortness of breath, wheezing and stridor  Cardiovascular: Negative for chest pain, palpitations and leg swelling  Gastrointestinal: Positive for constipation   Negative for abdominal distention, abdominal pain, anal bleeding, blood in stool, diarrhea, nausea, rectal pain and vomiting  Endocrine: Negative for polyuria  Genitourinary: Positive for decreased urine volume  Negative for difficulty urinating, dysuria, enuresis, flank pain, frequency, hematuria and urgency  Musculoskeletal: Negative for arthralgias, joint swelling and myalgias  Skin: Negative for rash and wound  Neurological: Positive for headaches  Negative for dizziness, tremors, seizures, syncope, facial asymmetry, speech difficulty, weakness, light-headedness and numbness  Hematological: Bruises/bleeds easily  Past Medical History     Past Medical History:   Diagnosis Date    Adrenal insufficiency (HCC)     Adrenocortical insufficiency (HCC)     Anemia     Bradycardia     Cardiac disease     Cataract     CHF (congestive heart failure) (HCC)     Constipation     CPAP (continuous positive airway pressure) dependence     Dependence on supplemental oxygen     Diabetes mellitus (HCC)     Difficulty walking     Disease of thyroid gland     Dysphagia     Encephalopathy     GERD (gastroesophageal reflux disease)     Glaucoma     Gout     History of transfusion     Hyperlipidemia     Hypertension     Insomnia     Insomnia     Obesity     Osteoarthritis     PONV (postoperative nausea and vomiting)     Renal disorder     renal failure    Sleep apnea        Past Surgical History     Past Surgical History:   Procedure Laterality Date    ABDOMINAL SURGERY      APPENDECTOMY      BODY LIFT LOWER Right 4/18/2017    Procedure: UPPER EXTREMITY EXCISION PANNUS ;  Surgeon: Armando Brice MD;  [facility]: [facility];  Service:     CARPAL TUNNEL RELEASE Bilateral     COLONOSCOPY N/A 8/15/2017    Procedure: COLONOSCOPY;  Surgeon: Lucrecia Chan MD;  [facility]: [facility];   Service: Colorectal    COLONOSCOPY N/A 8/29/2017    Procedure: COLONOSCOPY;  Surgeon: Agnes Mendez MD;  [facility]; Service: Colorectal    COLONOSCOPY N/A 9/1/2017    Procedure: COLONOSCOPY;  Surgeon: Ted Zepeda MD;  [facility]; Service: Colorectal    HYSTERECTOMY      MA ANASTOMOSIS,AV,ANY SITE Right 8/18/2016    Procedure: CREATION OF RIGHT BRACHIOCEPHALIC FISTULA;  Surgeon: Anselmo Flores MD;  [facility];  Service: Vascular    MA REVISE AV FISTULA,W/O THROMBECTOMY Right 4/18/2017    Procedure: UPPER ARM SUPERFICIALIZATION FISTULA ;  Surgeon: Anselmo Flores MD;  [facility];  Service: Vascular       Social History     History   Alcohol Use No     History   Drug Use No     History   Smoking Status    Former Smoker    Quit date: 1967   Smokeless Tobacco    Never Used       Family History     Family History   Problem Relation Age of Onset    Diabetes Mother     Heart disease Father        Medications Prior to Admission     Prior to Admission medications    Medication Sig Start Date End Date Taking? Authorizing Provider   acetaminophen (TYLENOL) 325 mg tablet Take 2 tablets by mouth every 6 (six) hours as needed for mild pain, headaches or fever 11/4/16  Yes LIZZETTE Garcia   albuterol (2 5 mg/3 mL) 0 083 % nebulizer solution Take 2 5 mg by nebulization every 6 (six) hours as needed for wheezing  Yes Historical Provider, MD   allopurinol (ZYLOPRIM) 100 mg tablet Take 200 mg by mouth Indications: sun, tue, we, fri, sat   Sha Romero, Wed, Fri, Sat    Yes Historical Provider, MD   allopurinol (ZYLOPRIM) 300 mg tablet Take 300 mg by mouth Mon, and Thurs    Yes Historical Provider, MD   aspirin 81 mg chewable tablet Chew 1 tablet daily 12/15/17  Yes Judy Wagner MD   bisacodyl (DULCOLAX) 5 mg EC tablet Take 5 mg by mouth daily as needed for constipation   Yes Historical Provider, MD   calcium carbonate (OYSTER SHELL,OSCAL) 500 mg Take 2 tablets by mouth 3 (three) times a day with meals 9/7/17  Yes Payton Snow DO   Camphor-Menthol-Methyl Sal (Anthony Carrero Ultra Strength) 4-10-30 % CREA Apply topically 2 (two) times a day B/l feet/knees and left shoulder   Yes Historical Provider, MD   cholecalciferol 1000 units tablet Take 1 tablet by mouth daily 12/4/17  Yes Noah Simons MD   cyanocobalamin (VITAMIN B-12) 1,000 mcg tablet Take 1,000 mcg by mouth daily  Yes Historical Provider, MD   dextromethorphan-guaiFENesin (ROBITUSSIN DM)  mg/5 mL syrup Take 5 mL by mouth every 4 (four) hours as needed for cough or congestion 12/3/17  Yes Noah Simons MD   docusate sodium (COLACE) 100 mg capsule Take 100 mg by mouth 2 (two) times a day     Yes Historical Provider, MD   ferrous sulfate 325 (65 Fe) mg tablet Take 325 mg by mouth 3 (three) times a day with meals   Yes Historical Provider, MD   HYDROmorphone (DILAUDID) 2 mg tablet Take 1 tablet by mouth every 6 (six) hours as needed for moderate pain for up to 3 days Max Daily Amount: 8 mg 12/14/17 12/17/17 Yes King John MD   Insulin Glargine (BASAGLAR KWIKPEN) 100 UNIT/ML SOPN Inject 40 Units under the skin daily at bedtime   Yes Historical Provider, MD   insulin NPH (HumuLIN N,NovoLIN N) 100 Units/mL subcutaneous injection Inject 10 Units under the skin 2 (two) times a day before breakfast and lunch 12/14/17  Yes King John MD   insulin NPH (HumuLIN N,NovoLIN N) 100 Units/mL subcutaneous injection Inject 14 Units under the skin daily before dinner 12/14/17  Yes King John MD   lactulose 20 g/30 mL Take 30 mL by mouth daily 12/15/17  Yes King John MD   latanoprost (XALATAN) 0 005 % ophthalmic solution Administer 1 drop to both eyes daily at bedtime  Yes Historical Provider, MD   levothyroxine 25 mcg tablet Take 25 mcg by mouth daily     Yes Historical Provider, MD   liver oil-zinc oxide (DESITIN) 40 % ointment Apply topically as needed for irritation To buttock every day and evening shift   To b/l thigh folds every day and evening shift    Yes Historical Provider, MD   metoprolol tartrate (LOPRESSOR) 12 5 mg tablet Take 12 5 mg by mouth  Bettie Sandra Thurs, Sat  Hold am dose before dialysis Mon, Wed, and Fri   Yes Historical Provider, MD   miconazole (MICOTIN) 2 % powder Apply topically as needed for itching Apply to b/l breast and panus twice a day   Yes Historical Provider, MD   nystatin (MYCOSTATIN) powder Apply topically 2 (two) times a day 9/7/17  Yes Chante Lazar,    omeprazole (PriLOSEC) 20 mg delayed release capsule Take 20 mg by mouth daily     Yes Historical Provider, MD   ondansetron (ZOFRAN) 4 mg tablet Take 1 tablet by mouth every 4 (four) hours as needed for nausea or vomiting 12/3/17  Yes Cherry Hu MD   polyethylene glycol (MIRALAX) 17 g packet Take 17 g by mouth daily 9/7/17  Yes Payton Snow,    pravastatin (PRAVACHOL) 80 mg tablet Take 80 mg by mouth daily  Yes Historical Provider, MD   senna (SENOKOT) 8 6 MG tablet Take 2 tablets by mouth daily as needed for constipation  Yes Historical Provider, MD   pregabalin (LYRICA) 75 mg capsule Take 1 capsule by mouth 2 (two) times a day for 10 days  Patient taking differently: Take 50 mg by mouth 3 (three) times a day   8/21/17 11/23/17  Rusty Ricci MD       Allergies     Allergies   Allergen Reactions    Codeine     Darvon [Propoxyphene]     Iodine     Keflex [Cephalexin]     Morphine And Related     Nsaids     Other      IV DYE and SEAFOOD       Objective     Vitals:    12/15/17 1606 12/15/17 1714 12/15/17 1846   BP: 123/54 121/73 110/58   Pulse: 101 95 88   Resp: 22 22 22   SpO2: 100% 100% 100%   Weight: 131 kg (289 lb)     Height: 5' 4" (1 626 m)       Body mass index is 49 61 kg/m²    No intake or output data in the 24 hours ending 12/15/17 2016  Invasive Devices     Line            Hemodialysis AV Fistula 08/18/16 Right Upper arm 484 days    Hemodialysis AV Fistula 11/23/17 Right Upper arm 22 days    HD Cath Double 15 days                Physical Exam  GENERAL: Morbidly obese female lying in bed in no acute distress, on 2L nasal cannula  HEENT: Normocephalic and atraumatic  No scleral icterus  PERRLA  EOMI B/L  Pupils with cataracts  No oropharyngeal edema  MM dry  NECK: Neck supple with no lymphadenopathy  Trachea midline  Cannot appreciate JVD due to obese neck  CARDIOVASCULAR: S1 and S2 are present  Regular rate and rhythm  No murmurs, rubs, or gallops  RESPIRATORY: CTA B/L, no rales, rhonci or wheezes   BREAST: Deferred  ABDOMINAL: Obese belly  Bowel sounds present in all 4 quadrants, non-tender, soft, non-distended  No organomegaly, rebound, or guarding  EXTREMITIES: 2+ DP and PT pulses bilaterally; no cyanosis, clubbing, edema  Bilateral ankles have circumferential bruising, as well as the left hand  /GYN: Deferred  RECTAL: Deferred  BACK: No tenderness to palpation   NEUROLOGIC: Patient is alert and oriented to person, place, and time  No sensory or motor deficits  CN 2-12 intact  Speech fluent  SKIN: Skin is warm and dry  No rashes  PSYCHIATRIC: Normal mood and affect     Lab Results: I have personally reviewed pertinent reports  CBC:   Results from last 7 days  Lab Units 12/15/17  1637  12/12/17  0514   WBC Thousand/uL 7 56  < > 9 13   RBC Million/uL 2 79*  < > 2 62*   HEMOGLOBIN g/dL 8 5*  < > 7 8*   HEMATOCRIT % 26 4*  < > 24 5*   MCV fL 95  < > 94   MCH pg 30 5  < > 29 8   MCHC g/dL 32 2  < > 31 8   RDW % 15 1  < > 14 9   PLATELETS Thousands/uL 54*  < > 39*   NRBC AUTO /100 WBCs 0  < > 0   NEUTROS PCT %  --   --  75   LYMPHS PCT %  --   --  14   LYMPHO PCT % 9*  < >  --    MONOS PCT %  --   --  10   MONO PCT MAN % 4  < >  --    EOS PCT %  --   --  1   EOSINO PCT MANUAL % 1  < >  --    BASOS PCT %  --   --  0   BASOS PCT MAN % 0  < >  --    NEUTROS ABS Thousands/µL  --   --  6 59   LYMPHS ABS Thousands/µL  --   --  1 28   MONOS ABS Thousand/µL  --   --  0 90   EOS ABS Thousand/µL  --   --  0 06   EOS ABS MAN Thousand/uL 0 08  < >  --    < > = values in this interval not displayed  , Chemistry Profile: Results from last 7 days  Lab Units 12/15/17  1637   SODIUM mmol/L 134*   POTASSIUM mmol/L 3 4*   CHLORIDE mmol/L 95*   CO2 mmol/L 31   ANION GAP mmol/L 8   BUN mg/dL 26*   CREATININE mg/dL 1 57*   GLUCOSE RANDOM mg/dL 320*   CALCIUM mg/dL 8 4   AST U/L 14   ALT U/L 22   ALK PHOS U/L 66   TOTAL PROTEIN g/dL 6 0*   ALBUMIN g/dL 2 1*   BILIRUBIN TOTAL mg/dL 0 33   EGFR ml/min/1 73sq m 32   , Coagulation Studies:   Results from last 7 days  Lab Units 12/09/17  2046   PROTIME seconds 13 7   INR  1 05   PTT seconds 25   , Cardiac Studies:   Results from last 7 days  Lab Units 12/15/17  1644 12/15/17  1637   TROPONIN I ng/mL  --  0 08*   POC TROPONIN I  ng/ml 0 05  --    , Additional Labs:   Results from last 7 days  Lab Units 12/15/17  1637   LIPASE u/L 427*   , iSTAT CHEM 8:   Results from last 7 days  Lab Units 12/15/17  1637   EGFR ml/min/1 73sq m 32   GLUCOSE RANDOM mg/dL 320*   HEMOGLOBIN g/dL 8 5*   , ABG:   , Toxicology:   , Last A1C/Lipid Panel/Thyroid Panel:   Lab Results   Component Value Date    HGBA1C 8 6 (H) 12/09/2017    HGBA1C 7 0 (H) 08/28/2017    [id number] 1 510 12/11/2017    FREET4 1 0 07/01/2015       Imaging: I have personally reviewed pertinent films in PACS  Ct Chest Abdomen Pelvis Wo Contrast    Result Date: 11/23/2017  Narrative: CT CHEST, ABDOMEN AND PELVIS WITHOUT IV CONTRAST INDICATION:  Cough  Hypoxia  Abdominal pain  End-stage renal disease on dialysis  Diabetes  COMPARISON: 8/14/2017: TECHNIQUE: CT examination of the chest, abdomen and pelvis was performed without intravenous contrast   Reformatted images were created in axial, sagittal, and coronal planes  Radiation dose length product (DLP) for this visit:  3281 29 mGy-cm   This examination, like all CT scans performed in the [facility], was performed utilizing techniques to minimize radiation dose exposure, including the use of iterative reconstruction and automated exposure control   Enteric contrast was administered  FINDINGS: CHEST LUNGS:  Imaging through the lungs degraded due to obese body habitus as well as motion artifact  There is subsegmental linear atelectasis and mild peribronchial thickening in the RIGHT upper lobe  Suspicious for subsegmental region of airway inflammation  No significant airspace component  There is mild subsegmental bibasilar atelectasis, RIGHT greater than LEFT  Mild central pulmonary venous distention suggesting pulmonary venous hypertension  PLEURA:  Unremarkable  HEART/GREAT VESSELS:  Unremarkable for patient's age  Coronary artery calcification noted  MEDIASTINUM AND MOON:  Within the superior mediastinum, RIGHT paratracheal there are 2  Prominent lymph nodes, one borderline in size at 9 mm and the other mildly enlarged at 15 mm short axis dimension  CHEST WALL AND LOWER NECK:       Normal  ABDOMEN LIVER/BILIARY TREE:  Unremarkable  GALLBLADDER:  A few small dependent calculi within the gallbladder  Gallbladder caliber and appearance otherwise within normal limits  Stable compared to prior  SPLEEN:  Unremarkable  PANCREAS:  Unremarkable  ADRENAL GLANDS:  Unremarkable  KIDNEYS/URETERS:  Unremarkable  No hydronephrosis  STOMACH AND BOWEL:  Unremarkable  APPENDIX:  No findings to suggest appendicitis  ABDOMINOPELVIC CAVITY:  No ascites or free intraperitoneal air  No lymphadenopathy  VESSELS:  Unremarkable for patient's age  PELVIS REPRODUCTIVE ORGANS:  Unremarkable for patient's age  URINARY BLADDER:  Unremarkable  ABDOMINAL WALL/INGUINAL REGIONS:  Unremarkable  OSSEOUS STRUCTURES:  No acute fracture or destructive osseous lesion  Impression: No acute intra-abdominal pelvic abnormality identified  Cholelithiasis without evidence for cholecystitis  Evaluation of the lungs limited due to obese body habitus and motion artifact    Subsegmental linear and nodular opacities in the RIGHT upper lung, primarily with mild peribronchial thickening centrally suggesting possible focus of acute airway inflammation  Otherwise, appearance suggests dependent subsegmental atelectasis in both lung bases  There is moderate central pulmonary venous distention bilaterally suggesting component of fluid overload/pulmonary venous hypertension  No significant groundglass or airspace infiltrates  No pleural effusion  Borderline and mildly enlarged adenopathy in the RIGHT superior mediastinum  Workstation performed: [id number]     Xr Chest Portable    Result Date: 11/30/2017  Narrative: CHEST INDICATION:  Permacath Placement COMPARISON:  November 30, earlier in the morning as well as November 24, 2017  VIEWS:   AP portable semierect view in the conventional and line placement techniques  IMAGES:  2 FINDINGS:     The heart is enlarged  Atherosclerotic changes in the aorta  Left internal jugular dialysis catheter is present  The catheter has become retracted by approximately 5 cm  The tip is now in the mid superior vena cava  Previously, the tip projecting into the right atrium  Lung volumes diminished  Halle and pulmonary vessels are prominent  Visualized osseous structures appear within normal limits for the patient's age  Impression: 1  Retraction of the indwelling left internal jugular dialysis catheter by approximately 5 cm  Tip now in the mid superior vena cava  2   Mild vascular congestion  A verbal report will be called by the reading room liaison following this dictation  Workstation performed: [id number]     Xr Chest Portable    Result Date: 11/30/2017  Narrative: CHEST INDICATION:  History of cough with chronic hypoxic respiratory failure COMPARISON:  None VIEWS:   AP frontal IMAGES:  1 FINDINGS:  Dialysis catheter is again seen  Heart shadow is enlarged but unchanged from prior exam  The lungs are clear  No pneumothorax or pleural effusion  Visualized osseous structures appear within normal limits for the patient's age  Impression: No active pulmonary disease  Cardiomegaly  Workstation performed: [id number]     Xr Chest Portable    Result Date: 11/24/2017  Narrative: CHEST  INDICATION: Dialysis catheter placement  COMPARISON:  11/23/2017  VIEWS:   AP frontal; 1 image FINDINGS: Left jugular dialysis catheter tip overlies the right atrium  The cardiomediastinal silhouette is stable  The lungs are clear  No pleural effusion  Osseous structures are age appropriate  Impression: Line placement as described  Workstation performed: [id number]     Xr Chest Portable    Result Date: 11/23/2017  Narrative: CHEST INDICATION:  cough, hypoxia  History taken directly from the electronic ordering system  COMPARISON:  8/24/2017 VIEWS:   AP frontal IMAGES:  1 FINDINGS:     Heart shadow is enlarged but unchanged from prior exam  Mild central vessel prominence  No consolidation  No pneumothorax or pleural effusion  Visualized osseous structures appear within normal limits for the patient's age  Impression: Mild central vessel prominence  No consolidation  Workstation performed: [id number]     Ir Temp Hd Cath    Result Date: 11/24/2017  Narrative: Procedure: Temporary dialysis catheter placement  Indications:Fistula not functioning, contrast allergy prevents fistulogram, dialysis access required  Technique/findings: Risks, benefits and alternatives were explained to the patient  Questions were answered  Written consent was documented  Once patient was in the radiology area a timeout was performed identifying patient and procedure  Patient was kept in hospital bed  Limited ultrasound imaging of the left neck was performed, the internal jugular vein was found to be patent and compressible  The left neck was prepped and draped in standard fashion  One percent lidocaine was infiltrated along the anticipated needle tract for local anesthesia  Under ultrasound visualization a micropuncture needle was advanced into the internal jugular vein with permanent recording and reporting    An 018 wire was advanced  Needle was removed and transition sheath was placed over the wire  Estimating length a 24 cm temporary dialysis catheter was selected  Through the transition sheath a heavy-duty wire was advanced into the right atrium  The transition sheath was removed over the wire  Following serial dilation the temporary dialysis catheter was placed  Catheter was advanced completely  Subsequent x-ray confirmed appropriate positioning of the catheter within the right atrium  Both lumens of the catheter flushed and aspirated well  The catheter was secured to the skin using 2-0 Prolene sutures  Dressing was applied  Patient tolerated the procedure without any immediate postprocedural complications  Fluoroscopy time:Only ultrasound guidance was used  Impression: Impression: Technically successful temporary dialysis catheter placement via the left internal jugular vein  Subsequent x-ray confirmed appropriate positioning within the right [facility], The catheter is ready for use  Workstation performed: [id number]       Microbiology: cultures obtained in emergency department include    Urinalysis:       Invalid input(s): URIBILINOGEN     Urine Micro:        EKG, Pathology, and Other Studies: I have personally reviewed pertinent reports        Medications given in Emergency Department     Medication Administration - last 24 hours from 12/14/2017 2016 to 12/15/2017 2016       Date/Time Order Dose Route Action Action by     12/15/2017 1626 aspirin chewable tablet 324 mg 324 mg Oral Not Given Shawn Chau RN     12/15/2017 1900 famotidine (PEPCID) tablet 20 mg 20 mg Oral Given Shawn Chau RN     12/15/2017 1900 lidocaine viscous (XYLOCAINE) 2 % mucosal solution 15 mL 15 mL Swish & Swallow Given Shawn Chau RN          Code Status: Level 3 - DNAR/DNI  VTE Pharmacologic Prophylaxis: Heparin   VTE Mechanical Prophylaxis: sequential compression device  Admission Status: OBSERVATION    Admission Time  I spent 30 minutes admitting the patient  This involved direct patient contact where I performed a full history and physical, reviewing previous records, and reviewing laboratory and other diagnostic studies      Tiago Catalan MD  Internal Medicine  PGY-1

## 2017-12-17 ENCOUNTER — APPOINTMENT (INPATIENT)
Dept: NON INVASIVE DIAGNOSTICS | Facility: HOSPITAL | Age: 72
DRG: 811 | End: 2017-12-17
Payer: MEDICARE

## 2017-12-17 LAB
ANION GAP SERPL CALCULATED.3IONS-SCNC: 11 MMOL/L (ref 4–13)
BUN SERPL-MCNC: 49 MG/DL (ref 5–25)
CALCIUM SERPL-MCNC: 8.8 MG/DL (ref 8.3–10.1)
CHLORIDE SERPL-SCNC: 95 MMOL/L (ref 100–108)
CO2 SERPL-SCNC: 26 MMOL/L (ref 21–32)
CREAT SERPL-MCNC: 3.55 MG/DL (ref 0.6–1.3)
ERYTHROCYTE [DISTWIDTH] IN BLOOD BY AUTOMATED COUNT: 15.8 % (ref 11.6–15.1)
GFR SERPL CREATININE-BSD FRML MDRD: 12 ML/MIN/1.73SQ M
GLUCOSE SERPL-MCNC: 237 MG/DL (ref 65–140)
GLUCOSE SERPL-MCNC: 251 MG/DL (ref 65–140)
GLUCOSE SERPL-MCNC: 338 MG/DL (ref 65–140)
GLUCOSE SERPL-MCNC: 340 MG/DL (ref 65–140)
GLUCOSE SERPL-MCNC: 354 MG/DL (ref 65–140)
HCT VFR BLD AUTO: 26.5 % (ref 34.8–46.1)
HGB BLD-MCNC: 8.3 G/DL (ref 11.5–15.4)
MCH RBC QN AUTO: 30.2 PG (ref 26.8–34.3)
MCHC RBC AUTO-ENTMCNC: 31.3 G/DL (ref 31.4–37.4)
MCV RBC AUTO: 96 FL (ref 82–98)
PLATELET # BLD AUTO: 85 THOUSANDS/UL (ref 149–390)
POTASSIUM SERPL-SCNC: 4.8 MMOL/L (ref 3.5–5.3)
RBC # BLD AUTO: 2.75 MILLION/UL (ref 3.81–5.12)
SODIUM SERPL-SCNC: 132 MMOL/L (ref 136–145)
WBC # BLD AUTO: 6.94 THOUSAND/UL (ref 4.31–10.16)

## 2017-12-17 PROCEDURE — 94760 N-INVAS EAR/PLS OXIMETRY 1: CPT | Performed by: SOCIAL WORKER

## 2017-12-17 PROCEDURE — 94660 CPAP INITIATION&MGMT: CPT

## 2017-12-17 PROCEDURE — 80048 BASIC METABOLIC PNL TOTAL CA: CPT | Performed by: INTERNAL MEDICINE

## 2017-12-17 PROCEDURE — 94760 N-INVAS EAR/PLS OXIMETRY 1: CPT

## 2017-12-17 PROCEDURE — 85027 COMPLETE CBC AUTOMATED: CPT | Performed by: INTERNAL MEDICINE

## 2017-12-17 PROCEDURE — 82948 REAGENT STRIP/BLOOD GLUCOSE: CPT

## 2017-12-17 RX ORDER — MAGNESIUM HYDROXIDE/ALUMINUM HYDROXICE/SIMETHICONE 120; 1200; 1200 MG/30ML; MG/30ML; MG/30ML
15 SUSPENSION ORAL EVERY 4 HOURS PRN
Status: DISCONTINUED | OUTPATIENT
Start: 2017-12-17 | End: 2017-12-19 | Stop reason: HOSPADM

## 2017-12-17 RX ADMIN — VITAMIN D, TAB 1000IU (100/BT) 1000 UNITS: 25 TAB at 09:25

## 2017-12-17 RX ADMIN — INSULIN HUMAN 10 UNITS: 100 INJECTION, SUSPENSION SUBCUTANEOUS at 09:33

## 2017-12-17 RX ADMIN — INSULIN LISPRO 6 UNITS: 100 INJECTION, SOLUTION INTRAVENOUS; SUBCUTANEOUS at 09:33

## 2017-12-17 RX ADMIN — PRAVASTATIN SODIUM 80 MG: 80 TABLET ORAL at 09:25

## 2017-12-17 RX ADMIN — CYANOCOBALAMIN TAB 500 MCG 1000 MCG: 500 TAB at 09:24

## 2017-12-17 RX ADMIN — DOCUSATE SODIUM 100 MG: 100 CAPSULE, LIQUID FILLED ORAL at 09:24

## 2017-12-17 RX ADMIN — Medication 325 MG: at 12:17

## 2017-12-17 RX ADMIN — ASPIRIN 81 MG 324 MG: 81 TABLET ORAL at 09:25

## 2017-12-17 RX ADMIN — ALUMINUM HYDROXIDE, MAGNESIUM HYDROXIDE, AND SIMETHICONE 15 ML: 200; 200; 20 SUSPENSION ORAL at 19:02

## 2017-12-17 RX ADMIN — Medication 325 MG: at 17:29

## 2017-12-17 RX ADMIN — LATANOPROST 1 DROP: 50 SOLUTION OPHTHALMIC at 21:21

## 2017-12-17 RX ADMIN — MENTHOL, METHYL SALICYLATE: 10; 15 CREAM TOPICAL at 09:43

## 2017-12-17 RX ADMIN — Medication 2 TABLET: at 17:31

## 2017-12-17 RX ADMIN — INSULIN GLARGINE 40 UNITS: 100 INJECTION, SOLUTION SUBCUTANEOUS at 21:21

## 2017-12-17 RX ADMIN — INSULIN LISPRO 14 UNITS: 100 INJECTION, SOLUTION INTRAVENOUS; SUBCUTANEOUS at 12:17

## 2017-12-17 RX ADMIN — Medication 325 MG: at 09:25

## 2017-12-17 RX ADMIN — DOCUSATE SODIUM 100 MG: 100 CAPSULE, LIQUID FILLED ORAL at 17:28

## 2017-12-17 RX ADMIN — INSULIN LISPRO 8 UNITS: 100 INJECTION, SOLUTION INTRAVENOUS; SUBCUTANEOUS at 17:29

## 2017-12-17 RX ADMIN — INSULIN LISPRO 8 UNITS: 100 INJECTION, SOLUTION INTRAVENOUS; SUBCUTANEOUS at 12:17

## 2017-12-17 RX ADMIN — Medication 2 TABLET: at 09:27

## 2017-12-17 RX ADMIN — ALLOPURINOL 200 MG: 300 TABLET ORAL at 09:25

## 2017-12-17 RX ADMIN — LEVOTHYROXINE SODIUM 25 MCG: 25 TABLET ORAL at 06:25

## 2017-12-17 RX ADMIN — INSULIN LISPRO 14 UNITS: 100 INJECTION, SOLUTION INTRAVENOUS; SUBCUTANEOUS at 17:30

## 2017-12-17 RX ADMIN — Medication 2 TABLET: at 12:17

## 2017-12-17 RX ADMIN — PANTOPRAZOLE SODIUM 20 MG: 20 TABLET, DELAYED RELEASE ORAL at 09:25

## 2017-12-17 NOTE — PROGRESS NOTES
Progress Note - Cardiology   Suzanna Robles 67 y o  female MRN: [id number]  [facility] Encounter: [id number]  12/17/17  11:39 AM        Assessment/Plan:    1  Elevated troponin, peak less than 2  Echo pending to assess for any change in LV function  Patient's symptoms resolved  Continue low dose beta blocker as BP tolerates, aspirin, and statin  Plan for medical management due to patient's nonambulatory status and overall poor condition  No Plavix given chronic thrombocytopenia  2  HTN  On low dose Metoprolol on nondialysis days, continue as tolerated  3  DM  On insulin  4  HL  On statin  LDL at goal, but TG elevated  Was on Pravastatin as outpt  Switch to Atorvastatin 80 or Rosuvastatin 40 for discharge  Follows with Dr Elmo De La Vega as outpt, last seen in 7/16  Subjective/Objective   Chief Complaint:   Chief Complaint   Patient presents with    Epigastric Pain     at dialysis today for 5 hours, reports a gnawing and burning feeling in her chest  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 and "feels this way when she eats something she is not supposed to"         Subjective:  69-year-old woman with a history of coronary disease (cardiac catheterization in 2002 by Dr Ralf Larsen showed long 50% mid circumflex lesion, treated medically), morbid obesity with BMI greater than 58, non ambulatory at baseline, diabetes, hypertension, hyperlipidemia, chronic diastolic heart failure, end-stage renal disease on hemodialysis, chronic thrombocytopenia, who is admitted for chest burning while at dialysis on the day of admission  Troponins were mildly elevated  The patient felt that it chest burning was related to her Garcia's esophagus  Echocardiogram is pending  Chest burning has resolved on its own  No current shortness of breath  No fevers currently  Lying in bed flat without any orthopnea  No palpitations        Patient Active Problem List   Diagnosis    [facility]    IDDM (insulin dependent diabetes mellitus) ([facility] )    Morbid obesity (HCC)    End-stage renal disease on hemodialysis (HCC)    Chronic respiratory failure with hypoxia and hypercapnia (HCC)    History of pulmonary embolism    Chronic pain of right upper extremity    Acquired coagulopathy - Coumadin    Thrombocytopenia (HCC)    Anemia of chronic disease    Sleep apnea    Disease of thyroid gland    Anemia    Gout    Hypertension    Hyperlipidemia    Epigastric pain    Elevated troponin    Elevated lipase    Electrolyte abnormality    Chest pain    Garcia esophagus    UTI (urinary tract infection)     Past Medical History:   Diagnosis Date    Adrenal insufficiency (HCC)     Adrenocortical insufficiency (HCC)     Anemia     Garcia esophagus     Bradycardia     Cardiac disease     Cataract     CHF (congestive heart failure) (HCC)     Constipation     CPAP (continuous positive airway pressure) dependence     Dependence on supplemental oxygen     Diabetes mellitus (HCC)     Difficulty walking     Disease of thyroid gland     Dysphagia     Encephalopathy     ESRD on hemodialysis (HCC)     GERD (gastroesophageal reflux disease)     Glaucoma     Gout     History of transfusion     Hyperlipidemia     Hypertension     Insomnia     Obesity     Osteoarthritis     PONV (postoperative nausea and vomiting)     Sleep apnea        Allergies   Allergen Reactions    Codeine     Darvon [Propoxyphene]     Iodine     Keflex [Cephalexin]     Morphine And Related     Nsaids     Other      IV DYE and SEAFOOD       Current Facility-Administered Medications   Medication Dose Route Frequency Provider Last Rate Last Dose    acetaminophen (TYLENOL) tablet 650 mg  650 mg Oral Q6H PRN Christiano Simmons MD   650 mg at 12/16/17 1804    albuterol inhalation solution 2 5 mg  2 5 mg Nebulization Q6H PRN Christiano Simmons MD        allopurinol (ZYLOPRIM) tablet 200 mg  200 mg Oral Once per day on Sun Tue Wed Fri Sat Chay Valadez MD   200 mg at 12/17/17 0925    [START ON 12/18/2017] allopurinol (ZYLOPRIM) tablet 300 mg  300 mg Oral Once per day on Mon Thu Chay Valadez MD        aspirin chewable tablet 324 mg  324 mg Oral Once Adenike [employer], DO        aspirin chewable tablet 324 mg  324 mg Oral Daily DoLIZZETTE Palma   324 mg at 12/17/17 1257    bisacodyl (DULCOLAX) EC tablet 5 mg  5 mg Oral Daily PRN Chay Valadez MD        calcium carbonate (OYSTER SHELL,OSCAL) 500 mg tablet 2 tablet  2 tablet Oral TID With Meals Chay Valadez MD   2 tablet at 12/17/17 8228    cholecalciferol (VITAMIN D3) tablet 1,000 Units  1,000 Units Oral Daily Chay Valadez MD   1,000 Units at 12/17/17 2798    cyanocobalamin (VITAMIN B-12) tablet 1,000 mcg  1,000 mcg Oral Daily Chay Valadez MD   1,000 mcg at 12/17/17 0042    dextromethorphan-guaiFENesin (ROBITUSSIN DM)  mg/5 mL oral syrup 5 mL  5 mL Oral Q4H PRN Chay Valadez MD        docusate sodium (COLACE) capsule 100 mg  100 mg Oral BID Chay Valadez MD   100 mg at 12/17/17 2170    ferrous sulfate tablet 325 mg  325 mg Oral TID With Meals Chay Valadez MD   325 mg at 12/17/17 0925    insulin glargine (LANTUS) subcutaneous injection 40 Units  40 Units Subcutaneous HS Chay Valadez MD   40 Units at 12/16/17 2151    insulin lispro (HumaLOG) 100 units/mL subcutaneous injection 14 Units  14 Units Subcutaneous TID With Meals [facility]        insulin lispro (HumaLOG) 100 units/mL subcutaneous injection 2-12 Units  2-12 Units Subcutaneous TID AC Chay Valadez MD   6 Units at 12/17/17 0933    lactulose 20 g/30 mL oral solution 20 g  20 g Oral Daily Chay Valadez MD   20 g at 12/16/17 0834    latanoprost (XALATAN) 0 005 % ophthalmic solution 1 drop  1 drop Both Eyes HS Chay Valadez MD   1 drop at 12/16/17 0118    levothyroxine tablet 25 mcg  25 mcg Oral Daily Chay Valadez MD   25 mcg at 12/17/17 0625    menthol-methyl salicylate (BENGAY) 51-92 % cream Apply externally BID Alanis Grady MD        metoprolol tartrate (LOPRESSOR) partial tablet 12 5 mg  12 5 mg Oral See Admin Instructions Alanis Grady MD        miconazole 2 % cream   Topical BID PRN Alanis Grady MD        ondansetron [facility]) oral solution 4 mg  4 mg Oral Q4H PRN Alanis Grady MD        pantoprazole (PROTONIX) EC tablet 20 mg  20 mg Oral Daily Alanis Grady MD   20 mg at 12/17/17 0925    polyethylene glycol (MIRALAX) packet 17 g  17 g Oral Daily Alanis Grady MD   17 g at 12/16/17 4072    pravastatin (PRAVACHOL) tablet 80 mg  80 mg Oral Daily Alanis Grady MD   80 mg at 12/17/17 1840    senna (SENOKOT) tablet 17 2 mg  2 tablet Oral Daily PRN Alanis Grady MD        sodium chloride (PF) 0 9 % injection 3 mL  3 mL Intravenous PRN Parminder Avilez DO           Vitals: /62   Pulse 87   Temp 98 2 °F (36 8 °C) (Oral)   Resp 20   Ht 5' 4" (1 626 m)   Wt (!) 139 kg (306 lb 7 oz)   LMP  (LMP Unknown)   SpO2 99%   BMI 55 74 kg/m²     Systolic (86BGP), JYW:266 , Min:113 , HYW:187     Diastolic (52CPR), IIW:02, Min:46, Max:62      Vitals:    12/17/17 0326 12/17/17 0600   Weight: (!) 139 kg (306 lb 7 oz) (!) 139 kg (306 lb 7 oz)     Orthostatic Blood Pressures    Flowsheet Row Most Recent Value   Blood Pressure  144/62 filed at 12/17/2017 1121   Patient Position - Orthostatic VS  Lying filed at 12/17/2017 1121            Intake/Output Summary (Last 24 hours) at 12/17/17 1139  Last data filed at 12/17/17 0823   Gross per 24 hour   Intake             1020 ml   Output                0 ml   Net             1020 ml       Invasive Devices     Peripheral Intravenous Line            Peripheral IV 12/17/17 Left Hand less than 1 day          Line            Hemodialysis AV Fistula 08/18/16 Right Upper arm 486 days    Hemodialysis AV Fistula 11/23/17 Right Upper arm 23 days    HD Cath Double 17 days                  Physical Exam:     GEN: Awake and alert, morbidly obese, in no acute distress      HEENT: Sclera anicteric, conjunctivae pink, mucous membranes moist   NECK: Supple, no carotid bruits, no significant JVD  HEART: Regular rhythm, normal S1 and S2, no murmurs, clicks, gallops or rubs  LUNGS: Clear to auscultation bilaterally; no wheezes, rales, or rhonchi   ABDOMEN: Obese, soft, nontender, nondistended, normoactive bowel sounds  EXTREMITIES: Skin warm and well perfused, no clubbing, cyanosis, or edema  NEURO: No focal findings  SKIN: Normal without suspicious lesions on exposed skin        Lab Results:     Troponins:   Results from last 7 days  Lab Units 12/16/17  0840 12/16/17  0432 12/15/17  2348   TROPONIN I ng/mL 1 95* 1 98* 1 50*       CBC with diff:   Results from last 7 days  Lab Units 12/17/17  0739 12/16/17  0432 12/15/17  1637 12/14/17  0617 12/13/17  0928 12/12/17  0514 12/11/17  0842   WBC Thousand/uL 6 94 8 53 7 56 9 19 9 76 9 13 9 31   HEMOGLOBIN g/dL 8 3* 8 5* 8 5* 8 5* 8 2* 7 8* 8 2*   HEMATOCRIT % 26 5* 26 7* 26 4* 27 0* 25 6* 24 5* 25 0*   MCV fL 96 94 95 94 93 94 93   PLATELETS Thousands/uL 85* 68* 54* 49* 50* 39* 34*   MCH pg 30 2 30 0 30 5 29 7 29 8 29 8 30 4   MCHC g/dL 31 3* 31 8 32 2 31 5 32 0 31 8 32 8   RDW % 15 8* 15 3* 15 1 15 3* 15 3* 14 9 15 2*   NRBC AUTO /100 WBCs  --   --  0 0 0 0 0         CMP:  Results from last 7 days  Lab Units 12/17/17  0718 12/16/17  0432 12/15/17  1637 12/12/17  0510 12/11/17  0842   SODIUM mmol/L 132* 133* 134* 134* 133*   POTASSIUM mmol/L 4 8 3 9 3 4* 5 1 6 0*   CHLORIDE mmol/L 95* 96* 95* 98* 97*   CO2 mmol/L 26 31 31 30 30   ANION GAP mmol/L 11 6 8 6 6   BUN mg/dL 49* 35* 26* 41* 106*   CREATININE mg/dL 3 55* 2 19* 1 57* 2 51* 4 59*   GLUCOSE RANDOM mg/dL 237* 212* 320* 104 249*   CALCIUM mg/dL 8 8 8 4 8 4 8 3 8 6   AST U/L  --  18 14  --   --    ALT U/L  --  23 22  --   --    ALK PHOS U/L  --  62 66  --   --    TOTAL PROTEIN g/dL  --  6 1* 6 0*  --   --    ALBUMIN g/dL  --  2 2* 2 1*  --   --    BILIRUBIN TOTAL mg/dL  --  0 29 0 33  -- --    EGFR ml/min/1 73sq  12 22 32 18 9       Magnesium:   Results from last 7 days  Lab Units 17  0432   MAGNESIUM mg/dL 2 0       Lipid Profile:   Results from last 7 days  Lab Units 17  0432   CHOLESTEROL mg/dL 160   TRIGLYCERIDES mg/dL 216*   HDL mg/dL 76*   LDL CALC mg/dL 41       Cardiac testing:   Results for orders placed during the hospital encounter of 16   Echo complete with contrast if indicated    Narrative [address]  [facility] [place], [place], [address] [facility]  [phone]    Transthoracic Echocardiogram  Limited 2D, M-mode, Doppler, and Color Doppler    Study date:  09-May-2016    Patient: Endy Hager  MR number: [id number]  Account number: [de-identified]  : 1945  Age: 66 years  Gender: Female  Status: Inpatient  Location: Bedside  Height: 60 in  Weight: 368 lb  BP: 136/ 69 mmHg    Indications: Assess left ventricular function  Diagnoses: I50 9 - Heart failure, unspecified    Sonographer:  PAUL Delacruz  Primary Physician:  Madelin Laird MD  Referring Physician:  Rita Mensah MD  Group:  [id number]  Cardiology Fellow:  Cash Logan MD  Interpreting Physician:  Laquita Rose MD    SUMMARY    PROCEDURE INFORMATION:  This was a technically difficult study  LEFT VENTRICLE:  Systolic function was normal  Ejection fraction was estimated to be 65 %  There were no regional wall motion abnormalities  Wall thickness was increased  Concentric hypertrophy was present  Doppler parameters were consistent with abnormal left ventricular relaxation  (grade 1 diastolic dysfunction)  LEFT ATRIUM:  The atrium was mildly dilated  TRICUSPID VALVE:  There was trace regurgitation  HISTORY: Change in mental status  PRIOR HISTORY: Congestive heart failure  Arrhythmia  Risk factors: hypertension, diabetes, renal failure, and  hypercholesterolemia  PROCEDURE: The procedure was performed at the bedside   This was a routine  study  The transthoracic approach was used  The study included limited 2D  imaging, M-mode, limited spectral Doppler, and color Doppler  The heart rate  was 74 bpm, at the start of the study  Images were obtained from the  parasternal, apical, subcostal, and suprasternal notch acoustic windows  Intravenous contrast (Definity solution [1 3 ml Definity/8 7ml normal saline  solution], 2 ml) was administered to opacify the left ventricle  Echocardiographic views were limited due to poor acoustic window availability,  decreased penetration, and lung interference  This was a technically difficult  study  LEFT VENTRICLE: Size was normal  Systolic function was normal  Ejection  fraction was estimated to be 65 %  There were no regional wall motion  abnormalities  Wall thickness was increased  Concentric hypertrophy was  present  DOPPLER: Doppler parameters were consistent with abnormal left  ventricular relaxation (grade 1 diastolic dysfunction)  RIGHT VENTRICLE: The size was normal  Systolic function was normal  Wall  thickness was normal     LEFT ATRIUM: The atrium was mildly dilated  RIGHT ATRIUM: Size was normal     MITRAL VALVE: Valve structure was normal  There was normal leaflet separation  DOPPLER: The transmitral velocity was within the normal range  There was no  evidence for stenosis  There was no regurgitation  AORTIC VALVE: Leaflets exhibited sclerosis  DOPPLER: Transaortic velocity was  within the normal range  There was no evidence for stenosis  There was no  regurgitation  TRICUSPID VALVE: The valve structure was normal  There was normal leaflet  separation  DOPPLER: The transtricuspid velocity was within the normal range  There was no evidence for stenosis  There was trace regurgitation  PULMONIC VALVE: Not well visualized  PERICARDIUM: There was no pericardial effusion  The pericardium was normal in  appearance  AORTA: The root exhibited normal size      SYSTEM MEASUREMENT TABLES    2D  %FS: 35 89 %  Ao Diam: 2 9 cm  EDV(Teich): 104 57 ml  EF(Teich): 65 4 %  ESV(Teich): 36 18 ml  IVSd: 1 32 cm  LA Diam: 4 52 cm  LVIDd: 4 74 cm  LVIDs: 3 04 cm  LVPWd: 1 29 cm  SV(Teich): 68 39 ml    CW  AV Env  Ti: 309 11 ms  AV VTI: 49 61 cm  AV Vmax: 2 76 m/s  AV Vmean: 1 6 m/s  AV maxP 53 mmHg  AV meanP 47 mmHg  HR: 73 06 BPM  MV PHT: 55 56 ms  MV VTI: 40 1 cm  MV Vmax: 1 16 m/s  MV Vmean: 0 71 m/s  MV maxP 43 mmHg  MV meanP 37 mmHg  MVA By PHT: 3 96 cm2    PW  E': 0 05 m/s  E/E': 15 12  LVOT Env  Ti: 327 57 ms  LVOT VTI: 26 68 cm  LVOT Vmax: 1 34 m/s  LVOT Vmean: 0 81 m/s  LVOT maxP 21 mmHg  LVOT meanPG: 3 22 mmHg  MV A Nithin: 1 14 m/s  MV Dec Grand: 2 95 m/s2  MV DecT: 256 55 ms  MV E Nithin: 0 76 m/s  MV E/A Ratio: 0 66  MV PHT: 74 4 ms  MVA By PHT: 2 96 cm2    [employer] Accredited Echocardiography Laboratory    Prepared and electronically signed by    Raymond Banegas MD  Signed [id number] 17:47:30         Imaging: I have personally reviewed pertinent reports          Telemetry: personally reviewed, SR

## 2017-12-17 NOTE — PHYSICIAN ADVISOR
Current patient class: Inpatient  The patient is currently on Hospital Day: 3      The patient was admitted to the hospital  on 12/16/17 at 1530 for the following diagnosis:  Morbid obesity ([facility] ) [E66 01]  Epigastric pain [R10 13]  Chest pain [R07 9]  Essential hypertension [I10]  History of pulmonary embolism [Z86 711]  Electrolyte abnormality [E87 8]  At risk for cardiac dysfunction [Z91 89]  IDDM (insulin dependent diabetes mellitus) ([facility] ) [E11 9, Z79 4]  End-stage renal disease on hemodialysis ([facility] ) [N18 6, Z99 2]  Chronic respiratory failure with hypoxia and hypercapnia ([facility] ) [J96 11, J96 12]       There is documentation in the medical record of an expected length of stay of at least 2 midnights  The patient is therefore expected to satisfy the 2 midnight benchmark and given the 2 midnight presumption is appropriate for INPATIENT ADMISSION  Given this expectation of a satisfying stay, CMS instructs us that the patient is most often appropriate for inpatient admission under part A provided medical necessity is documented in the chart  After review of the relevant documentation, labs, vital signs and test results, the patient is appropriate for INPATIENT ADMISSION  Admission to the hospital as an inpatient is a complex decision making process which requires the practitioner to consider the patients presenting complaint, history and physical examination and all relevant testing  With this in mind, in this case, the patient was deemed appropriate for INPATIENT ADMISSION  After review of the documentation and testing available at the time of the admission I concur with this clinical determination of medical necessity  The patient does have an inpatient admission within the previous 30 days  The patient was admitted on 12/8/17 and discharged on 12/14/17 as an inpatient  The patient therefore required readmission review   In this case the patient should be considered a SEPARATE and UNRELATED INPATIENT ADMISSION  The patient had been discharged in stable condition with a completed care plan  There were no unresolved acute medical issues at the time of discharged which would have reasonably been expected to prompt this readmission  The previous admission was for progressive thrombocytopenia while this admission is for chest pain  Rationale is as follows:     The patient is a 67 yrs   Female who presented to the ED at 12/15/2017  3:59 PM with a chief complaint of Epigastric Pain (at dialysis today for 5 hours, reports a gnawing and burning feeling in her chest  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 and "feels this way when she eats something she is not supposed to")    The patients vitals on arrival were ED Triage Vitals   Temperature Pulse Respirations Blood Pressure SpO2   12/16/17 0000 12/15/17 1606 12/15/17 1606 12/15/17 1606 12/15/17 1606   98 1 °F (36 7 °C) 101 22 123/54 100 %      Temp Source Heart Rate Source Patient Position - Orthostatic VS BP Location FiO2 (%)   12/16/17 0000 12/15/17 1606 12/15/17 1606 12/15/17 1606 --   Oral Monitor Sitting Right arm       Pain Score       12/15/17 1606       5           Past Medical History:   Diagnosis Date    Adrenal insufficiency (HCC)     Adrenocortical insufficiency (HCC)     Anemia     Garcia esophagus     Bradycardia     Cardiac disease     Cataract     CHF (congestive heart failure) (HCC)     Constipation     CPAP (continuous positive airway pressure) dependence     Dependence on supplemental oxygen     Diabetes mellitus (HCC)     Difficulty walking     Disease of thyroid gland     Dysphagia     Encephalopathy     ESRD on hemodialysis (HCC)     GERD (gastroesophageal reflux disease)     Glaucoma     Gout     History of transfusion     Hyperlipidemia     Hypertension     Insomnia     Obesity     Osteoarthritis     PONV (postoperative nausea and vomiting)     Sleep apnea      Past Surgical History:   Procedure Laterality Date    ABDOMINAL SURGERY      APPENDECTOMY      BODY LIFT LOWER Right 4/18/2017    Procedure: UPPER EXTREMITY EXCISION PANNUS ;  Surgeon: Reena Mojica MD;  [facility];  Service:     CARPAL TUNNEL RELEASE Bilateral     COLONOSCOPY N/A 8/15/2017    Procedure: COLONOSCOPY;  Surgeon: Fredy Gerardo MD;  Location: [facility]; Service: Colorectal    COLONOSCOPY N/A 8/29/2017    Procedure: COLONOSCOPY;  Surgeon: Melody Keith MD;  [facility]; Service: Colorectal    COLONOSCOPY N/A 9/1/2017    Procedure: COLONOSCOPY;  Surgeon: Melody Keith MD;  [facility];   Service: Colorectal    HYSTERECTOMY      SD ANASTOMOSIS,AV,ANY SITE Right 8/18/2016    Procedure: CREATION OF RIGHT BRACHIOCEPHALIC FISTULA;  Surgeon: Shalodna Flores MD;  [facility];  Service: Vascular    SD REVISE AV FISTULA,W/O THROMBECTOMY Right 4/18/2017    Procedure: UPPER ARM SUPERFICIALIZATION FISTULA ;  Surgeon: Shalonda Flores MD;  [facility];  Service: Vascular           Consults have been placed to:   IP CONSULT TO CASE MANAGEMENT  IP CONSULT TO NEPHROLOGY  IP CONSULT TO CARDIOLOGY    Vitals:    12/17/17 0311 12/17/17 0326 12/17/17 0600 12/17/17 0757   BP: 138/61   118/61   Pulse: 83   88   Resp: 20   20   Temp: 99 3 °F (37 4 °C)   98 9 °F (37 2 °C)   TempSrc: Axillary   Oral   SpO2: 99%   100%   Weight:  (!) 139 kg (306 lb 7 oz) (!) 139 kg (306 lb 7 oz)    Height:           Most recent labs:    Recent Labs      12/15/17   1637   12/16/17   0432  12/16/17   0840  12/17/17   0718  12/17/17   0739   WBC  7 56   --   8 53   --    --   6 94   HGB  8 5*   --   8 5*   --    --   8 3*   HCT  26 4*   --   26 7*   --    --   26 5*   PLT  54*   --   68*   --    --   85*   K  3 4*   --   3 9   --   4 8   --    NA  134*   --   133*   --   132*   --    CALCIUM  8 4   --   8 4   --   8 8   --    BUN  26*   --   35*   --   49*   --    CREATININE  1 57*   --   2 19*   --   3 55*   --    LIPASE  427*   --    --    -- --    --    TROPONINI  0 08*   < >  1 98*  1 95*   --    --    AST  14   --   18   --    --    --    ALT  22   --   23   --    --    --    ALKPHOS  66   --   62   --    --    --    BILITOT  0 33   --   0 29   --    --    --     < > = values in this interval not displayed         Scheduled Meds:  allopurinol 200 mg Oral Once per day on Sun Tue Wed Fri Sat   [START ON 12/18/2017] allopurinol 300 mg Oral Once per day on Mon Thu   aspirin 324 mg Oral Once   aspirin 324 mg Oral Daily   calcium carbonate 2 tablet Oral TID With Meals   cholecalciferol 1,000 Units Oral Daily   cyanocobalamin 1,000 mcg Oral Daily   docusate sodium 100 mg Oral BID   ferrous sulfate 325 mg Oral TID With Meals   insulin glargine 40 Units Subcutaneous HS   insulin lispro 1-6 Units Subcutaneous HS   insulin lispro 2-12 Units Subcutaneous TID AC   insulin NPH 10 Units Subcutaneous BID before breakfast/lunch   insulin NPH 14 Units Subcutaneous Before Dinner   lactulose 20 g Oral Daily   latanoprost 1 drop Both Eyes HS   levothyroxine 25 mcg Oral Daily   menthol-methyl salicylate  Apply externally BID   metoprolol tartrate 12 5 mg Oral See Admin Instructions   pantoprazole 20 mg Oral Daily   polyethylene glycol 17 g Oral Daily   pravastatin 80 mg Oral Daily     Continuous Infusions:   PRN Meds:   acetaminophen    albuterol    bisacodyl    dextromethorphan-guaiFENesin    miconazole    ondansetron    senna    Insert peripheral IV **AND** sodium chloride (PF)

## 2017-12-17 NOTE — PROGRESS NOTES
IM Residency Progress Note   Unit/Bed#: [facility] 701-01 Encounter: [id number]  SOD Team C       Rozina Welch 67 y o  female [id number]    Hospital Stay Days: 1      Assessment/Plan:      Chest Pain  - resolved  - Troponin wer rising 0 08>1 50>1 98, telemetry showed no events,  EKG tshowed T-wave inversion in leads 1 and aVL  - cardiology consulted and recommended continue medical management and stop heparin due to thrombocytopenia  - ECHO is pending  - Continue aspirin, metoprolol, pravastatin        IDDM  - not well controlled - HgbA1c on 12/9/17 was 8 6    - Blood glucose over the past 24 hours ranged from 251-422  - patient was on  insulin Lantus 40, NPH 10/14 , will stop NPH and transition to humalog 14 TID, continue correctional insulin and diabetic diet       ESRD  -on hemodialysis Mondays Wednesdays and Fridays  -creatinine 3 55  -nephrology is on board     Hypertension  - normotensive  -continue metoprolol      HLD  - Continue with pravastatin 80mg po daily     Anemia  - normocytic/normochromic   - hemoglobin stable  - continue ferrous sulfate 325mg po tid       History of PE  - continue with aspirin and bilateral SCDs      Thrombocytopenia  -platelets today are 85, improving  -will continue to monitor CBC     Diastolic Heart Failure  Echo on 5/2016 showed EF 65% with grade 1 diastolic dysfunction  Continue metoprolol 12 5mg po daily excluding M, W, F before dialysis      Hypothyroidism  TSH on 12/11/17 1 5  Continue levothyroxine 25mcg po daily     Gout  Currently stable, no acute flair  Continue home dosage allopurinol 200mg po Sun, Tu, We, Fri, Sat and 300mg po Mon, Th      Garcia's Esophagus  -continue with Protonix      Constipation  -on bowel regimen, currently having BM     Morbid Obesity  -BMI is 49 61, on diet and exercise     Sleep Apnea  -likely secondary to morbid obesity, continue with CPAP at night  p r n   Continue  oxygen by nasal cannula 2 L    Ambulatory Dysfunction  -likely secondary to morbid obesity and deconditioning   -PT/OT        Disposition:  Continue medical management, ECHO and PT/OT evaluation are pending  Subjective:   Patient seen and examined at her bed, currently denies any chest pain or shortness of breath, has been tolerating the p o  route  Denies any nausea or vomiting, denies abdominal pain, is passing gas and having bowel movements, has not been ambulating       Vitals: Temp (24hrs), Av 6 °F (37 °C), Min:97 9 °F (36 6 °C), Max:99 3 °F (37 4 °C)  Current: Temperature: 98 9 °F (37 2 °C)  Vitals:    17 0311 17 0326 17 0600 17 0757   BP: 138/61   118/61   Pulse: 83   88   Resp: 20   20   Temp: 99 3 °F (37 4 °C)   98 9 °F (37 2 °C)   TempSrc: Axillary   Oral   SpO2: 99%   100%   Weight:  (!) 139 kg (306 lb 7 oz) (!) 139 kg (306 lb 7 oz)    Height:        Body mass index is 52 6 kg/m²  I/O last 24 hours: In: 1020 [P O :1020]  Out: -       Physical Exam:   GENERAL:  Morbidly obese, in no obvious distress, sitting comfortably in her bed, afebrile to touch  HEENT:  Normocephalic, atraumatic  Nasal cannula in place and on 2 L of oxygen  Pupils equal round reactive to light and accommodation  NECK:  Morbidly obese  CVS:  S1, S2   Regular rate and rhythm  No murmurs, rubs or gallops  RESPIRATORY:  Bibasilar crackles  ABDOMEN:  Obese, nontender nondistended  No masses or organomegaly palpable  Normoactive bowel sounds  MSK:  1+ pitting pedal edema bilaterally  Chest is not tender on palpation  NEURO:  Awake, alert and oriented x3      Invasive Devices     Peripheral Intravenous Line            Peripheral IV 17 Left Hand less than 1 day          Line            Hemodialysis AV Fistula 16 Right Upper arm 486 days    Hemodialysis AV Fistula 17 Right Upper arm 23 days    HD Cath Double 17 days                          Labs:   Recent Results (from the past 24 hour(s))   Fingerstick Glucose (POCT)    Collection Time: 17 11:09 AM   Result Value Ref Range    POC Glucose 422 (H) 65 - 140 mg/dl   Fingerstick Glucose (POCT)    Collection Time: 12/16/17  3:57 PM   Result Value Ref Range    POC Glucose 363 (H) 65 - 140 mg/dl   Fingerstick Glucose (POCT)    Collection Time: 12/16/17  9:24 PM   Result Value Ref Range    POC Glucose 367 (H) 65 - 140 mg/dl   Fingerstick Glucose (POCT)    Collection Time: 12/17/17  6:45 AM   Result Value Ref Range    POC Glucose 251 (H) 65 - 140 mg/dl   Basic metabolic panel    Collection Time: 12/17/17  7:18 AM   Result Value Ref Range    Sodium 132 (L) 136 - 145 mmol/L    Potassium 4 8 3 5 - 5 3 mmol/L    Chloride 95 (L) 100 - 108 mmol/L    CO2 26 21 - 32 mmol/L    Anion Gap 11 4 - 13 mmol/L    BUN 49 (H) 5 - 25 mg/dL    Creatinine 3 55 (H) 0 60 - 1 30 mg/dL    Glucose 237 (H) 65 - 140 mg/dL    Calcium 8 8 8 3 - 10 1 mg/dL    eGFR 12 ml/min/1 73sq m   CBC    Collection Time: 12/17/17  7:39 AM   Result Value Ref Range    WBC 6 94 4 31 - 10 16 Thousand/uL    RBC 2 75 (L) 3 81 - 5 12 Million/uL    Hemoglobin 8 3 (L) 11 5 - 15 4 g/dL    Hematocrit 26 5 (L) 34 8 - 46 1 %    MCV 96 82 - 98 fL    MCH 30 2 26 8 - 34 3 pg    MCHC 31 3 (L) 31 4 - 37 4 g/dL    RDW 15 8 (H) 11 6 - 15 1 %    Platelets 85 (L) 254 - 390 Thousands/uL       Radiology Results: I have personally reviewed pertinent reports  Other Diagnostic Testing:   I have personally reviewed pertinent reports          Active Meds:   Current Facility-Administered Medications   Medication Dose Route Frequency    acetaminophen (TYLENOL) tablet 650 mg  650 mg Oral Q6H PRN    albuterol inhalation solution 2 5 mg  2 5 mg Nebulization Q6H PRN    allopurinol (ZYLOPRIM) tablet 200 mg  200 mg Oral Once per day on Sun Tue Wed Fri Sat    [START ON 12/18/2017] allopurinol (ZYLOPRIM) tablet 300 mg  300 mg Oral Once per day on Mon Thu    aspirin chewable tablet 324 mg  324 mg Oral Once    aspirin chewable tablet 324 mg  324 mg Oral Daily    bisacodyl (DULCOLAX) EC tablet 5 mg  5 mg Oral Daily PRN    calcium carbonate (OYSTER SHELL,OSCAL) 500 mg tablet 2 tablet  2 tablet Oral TID With Meals    cholecalciferol (VITAMIN D3) tablet 1,000 Units  1,000 Units Oral Daily    cyanocobalamin (VITAMIN B-12) tablet 1,000 mcg  1,000 mcg Oral Daily    dextromethorphan-guaiFENesin (ROBITUSSIN DM)  mg/5 mL oral syrup 5 mL  5 mL Oral Q4H PRN    docusate sodium (COLACE) capsule 100 mg  100 mg Oral BID    ferrous sulfate tablet 325 mg  325 mg Oral TID With Meals    insulin glargine (LANTUS) subcutaneous injection 40 Units  40 Units Subcutaneous HS    insulin lispro (HumaLOG) 100 units/mL subcutaneous injection 1-6 Units  1-6 Units Subcutaneous HS    insulin lispro (HumaLOG) 100 units/mL subcutaneous injection 2-12 Units  2-12 Units Subcutaneous TID AC    insulin NPH (HumuLIN N,NovoLIN N) 100 Units/mL subcutaneous injection 10 Units  10 Units Subcutaneous BID before breakfast/lunch    insulin NPH (HumuLIN N,NovoLIN N) 100 Units/mL subcutaneous injection 14 Units  14 Units Subcutaneous Before Dinner    lactulose 20 g/30 mL oral solution 20 g  20 g Oral Daily    latanoprost (XALATAN) 0 005 % ophthalmic solution 1 drop  1 drop Both Eyes HS    levothyroxine tablet 25 mcg  25 mcg Oral Daily    menthol-methyl salicylate (BENGAY) 87-58 % cream   Apply externally BID    metoprolol tartrate (LOPRESSOR) partial tablet 12 5 mg  12 5 mg Oral See Admin Instructions    miconazole 2 % cream   Topical BID PRN    ondansetron (ZOFRAN) oral solution 4 mg  4 mg Oral Q4H PRN    pantoprazole (PROTONIX) EC tablet 20 mg  20 mg Oral Daily    polyethylene glycol (MIRALAX) packet 17 g  17 g Oral Daily    pravastatin (PRAVACHOL) tablet 80 mg  80 mg Oral Daily    senna (SENOKOT) tablet 17 2 mg  2 tablet Oral Daily PRN    sodium chloride (PF) 0 9 % injection 3 mL  3 mL Intravenous PRN         VTE Pharmacologic Prophylaxis: Heparin  VTE Mechanical Prophylaxis: sequential compression device    [employer]

## 2017-12-17 NOTE — PROGRESS NOTES
Patient resting in bed  No signs of distress or pain  Call bell within reach  Will continue to monitor

## 2017-12-18 ENCOUNTER — APPOINTMENT (INPATIENT)
Dept: DIALYSIS | Facility: HOSPITAL | Age: 72
DRG: 811 | End: 2017-12-18
Payer: MEDICARE

## 2017-12-18 ENCOUNTER — APPOINTMENT (INPATIENT)
Dept: NON INVASIVE DIAGNOSTICS | Facility: HOSPITAL | Age: 72
DRG: 811 | End: 2017-12-18
Payer: MEDICARE

## 2017-12-18 LAB
ANION GAP SERPL CALCULATED.3IONS-SCNC: 9 MMOL/L (ref 4–13)
BUN SERPL-MCNC: 57 MG/DL (ref 5–25)
CALCIUM SERPL-MCNC: 9 MG/DL (ref 8.3–10.1)
CHLORIDE SERPL-SCNC: 98 MMOL/L (ref 100–108)
CO2 SERPL-SCNC: 24 MMOL/L (ref 21–32)
CREAT SERPL-MCNC: 4.76 MG/DL (ref 0.6–1.3)
GFR SERPL CREATININE-BSD FRML MDRD: 8 ML/MIN/1.73SQ M
GLUCOSE SERPL-MCNC: 217 MG/DL (ref 65–140)
GLUCOSE SERPL-MCNC: 265 MG/DL (ref 65–140)
GLUCOSE SERPL-MCNC: 271 MG/DL (ref 65–140)
GLUCOSE SERPL-MCNC: 279 MG/DL (ref 65–140)
GLUCOSE SERPL-MCNC: 293 MG/DL (ref 65–140)
POTASSIUM SERPL-SCNC: 4.6 MMOL/L (ref 3.5–5.3)
SODIUM SERPL-SCNC: 131 MMOL/L (ref 136–145)

## 2017-12-18 PROCEDURE — 94760 N-INVAS EAR/PLS OXIMETRY 1: CPT

## 2017-12-18 PROCEDURE — C8929 TTE W OR WO FOL WCON,DOPPLER: HCPCS

## 2017-12-18 PROCEDURE — 94660 CPAP INITIATION&MGMT: CPT

## 2017-12-18 PROCEDURE — 80048 BASIC METABOLIC PNL TOTAL CA: CPT | Performed by: INTERNAL MEDICINE

## 2017-12-18 PROCEDURE — 82948 REAGENT STRIP/BLOOD GLUCOSE: CPT

## 2017-12-18 RX ORDER — ONDANSETRON 4 MG/1
4 TABLET, ORALLY DISINTEGRATING ORAL EVERY 4 HOURS PRN
Status: DISCONTINUED | OUTPATIENT
Start: 2017-12-18 | End: 2017-12-19 | Stop reason: HOSPADM

## 2017-12-18 RX ORDER — ACETAMINOPHEN 325 MG/1
975 TABLET ORAL EVERY 8 HOURS SCHEDULED
Status: DISCONTINUED | OUTPATIENT
Start: 2017-12-18 | End: 2017-12-19 | Stop reason: HOSPADM

## 2017-12-18 RX ADMIN — LEVOTHYROXINE SODIUM 25 MCG: 25 TABLET ORAL at 07:16

## 2017-12-18 RX ADMIN — DOXERCALCIFEROL 0.5 MCG: 2 INJECTION, SOLUTION INTRAVENOUS at 09:23

## 2017-12-18 RX ADMIN — IRON SUCROSE 50 MG: 20 INJECTION, SOLUTION INTRAVENOUS at 09:48

## 2017-12-18 RX ADMIN — VITAMIN D, TAB 1000IU (100/BT) 1000 UNITS: 25 TAB at 14:22

## 2017-12-18 RX ADMIN — MENTHOL, METHYL SALICYLATE: 10; 15 CREAM TOPICAL at 14:23

## 2017-12-18 RX ADMIN — INSULIN LISPRO 6 UNITS: 100 INJECTION, SOLUTION INTRAVENOUS; SUBCUTANEOUS at 17:42

## 2017-12-18 RX ADMIN — PRAVASTATIN SODIUM 80 MG: 80 TABLET ORAL at 14:23

## 2017-12-18 RX ADMIN — INSULIN GLARGINE 40 UNITS: 100 INJECTION, SOLUTION SUBCUTANEOUS at 22:49

## 2017-12-18 RX ADMIN — Medication 2 TABLET: at 07:17

## 2017-12-18 RX ADMIN — Medication 325 MG: at 14:24

## 2017-12-18 RX ADMIN — PERFLUTREN 1 ML/MIN: 6.52 INJECTION, SUSPENSION INTRAVENOUS at 15:09

## 2017-12-18 RX ADMIN — INSULIN LISPRO 4 UNITS: 100 INJECTION, SOLUTION INTRAVENOUS; SUBCUTANEOUS at 14:36

## 2017-12-18 RX ADMIN — INSULIN LISPRO 14 UNITS: 100 INJECTION, SOLUTION INTRAVENOUS; SUBCUTANEOUS at 17:42

## 2017-12-18 RX ADMIN — DOCUSATE SODIUM 100 MG: 100 CAPSULE, LIQUID FILLED ORAL at 14:22

## 2017-12-18 RX ADMIN — Medication 2 TABLET: at 14:24

## 2017-12-18 RX ADMIN — MENTHOL, METHYL SALICYLATE: 10; 15 CREAM TOPICAL at 17:44

## 2017-12-18 RX ADMIN — ACETAMINOPHEN 975 MG: 325 TABLET ORAL at 09:53

## 2017-12-18 RX ADMIN — CYANOCOBALAMIN TAB 500 MCG 1000 MCG: 500 TAB at 14:23

## 2017-12-18 RX ADMIN — ACETAMINOPHEN 975 MG: 325 TABLET ORAL at 14:20

## 2017-12-18 RX ADMIN — PANTOPRAZOLE SODIUM 20 MG: 20 TABLET, DELAYED RELEASE ORAL at 14:22

## 2017-12-18 RX ADMIN — INSULIN LISPRO 6 UNITS: 100 INJECTION, SOLUTION INTRAVENOUS; SUBCUTANEOUS at 07:17

## 2017-12-18 RX ADMIN — INSULIN LISPRO 14 UNITS: 100 INJECTION, SOLUTION INTRAVENOUS; SUBCUTANEOUS at 07:18

## 2017-12-18 RX ADMIN — ACETAMINOPHEN 650 MG: 325 TABLET ORAL at 22:48

## 2017-12-18 RX ADMIN — LATANOPROST 1 DROP: 50 SOLUTION OPHTHALMIC at 22:46

## 2017-12-18 RX ADMIN — INSULIN LISPRO 14 UNITS: 100 INJECTION, SOLUTION INTRAVENOUS; SUBCUTANEOUS at 14:36

## 2017-12-18 RX ADMIN — Medication 325 MG: at 07:16

## 2017-12-18 RX ADMIN — ALLOPURINOL 300 MG: 100 TABLET ORAL at 14:19

## 2017-12-18 RX ADMIN — ASPIRIN 81 MG 324 MG: 81 TABLET ORAL at 14:21

## 2017-12-18 NOTE — PROGRESS NOTES
Progress Note - Nephrology   Joel Galicia 67 y o  female MRN: [id number]  [facility]: [id number]      Assessment / Plan:  1  ESRD on HD at [facility] - seen by me on HD today    2  Chest pain - undergoing cardiac work up  For echo today  Off heparin gtt now  Cardio consulted  Echo pending  Trop slightly high  3  Anemia of CKD - no JESSICA d/t hx PE, on oral iron and venofer 50mg weekly, Hgb below goal     4  HTN - BP acceptable, continue UF as tolerated and on metoprolol 12 5mg po on non HD days    5  Access - LUE AVF - +clots pulled from venous side today  Dialyzed via HD catheter and arterial side of AVF  6  Hx DVT/PE - A/C per primary team    7  CKD BMD/secondary hyperparathyroidism - continue hectorol 0 5mcg after HD sessions    Other issues: gout, DM2, hypothyroidism, thrombocytopenia-plts improving, Garcia's esophagus, Class III obesity      Subjective:   Patient seen examined on dialysis approximately 9:00 a m     Blood pressure 124/56, goal UF 2 L, blood flow 350 mL/min, dialysis fluid 800 mL/min  Patient tolerating dialysis well  She complains of buttock pain  No other complaints  Objective:     Vitals: Blood pressure 164/59, pulse 83, temperature 97 7 °F (36 5 °C), temperature source Tympanic, resp  rate 16, height 5' 4" (1 626 m), weight (!) 139 kg (306 lb 7 oz), SpO2 100 %, not currently breastfeeding  ,Body mass index is 52 6 kg/m²  Temp (24hrs), Av 6 °F (37 °C), Min:97 7 °F (36 5 °C), Max:99 5 °F (37 5 °C)      Weight (last 2 days)     Date/Time   Weight    17 0600  (!)  139 (306 44)    17 0326  (!)  139 (306 44)    17 1845  132 (291 89)    17 0500  132 (291 89)    17 0000  132 (291 89)                Intake/Output Summary (Last 24 hours) at 17 1315  Last data filed at 17 1310   Gross per 24 hour   Intake              665 ml   Output             2500 ml   Net            -1835 ml     I/O last 24 hours:   In: 1025 [P O :825;  I V :200]  Out: 2500 [Other:2500]        Physical Exam:   Gen: NAD, obese  Neck: supple  CV: +s1s2, no friction rub  Pulm: clear anteriorly  Abdomen: soft, ND,  Obese, NTTP  Ext: no edema  Neuro: awake, alert  Skin: warm, dry    Invasive Devices     Peripheral Intravenous Line            Peripheral IV 12/17/17 Left Hand 1 day          Line            Hemodialysis AV Fistula 08/18/16 Right Upper arm 487 days    Hemodialysis AV Fistula 11/23/17 Right Upper arm 24 days    HD Cath Double 18 days                Medications:    Scheduled Meds:  acetaminophen 975 mg Oral Q8H [facility]   allopurinol 200 mg Oral Once per day on Sun Tue Wed Fri Sat   allopurinol 300 mg Oral Once per day on Mon Thu   aspirin 324 mg Oral Once   aspirin 324 mg Oral Daily   calcium carbonate 2 tablet Oral TID With Meals   cholecalciferol 1,000 Units Oral Daily   cyanocobalamin 1,000 mcg Oral Daily   docusate sodium 100 mg Oral BID   ferrous sulfate 325 mg Oral TID With Meals   insulin glargine 40 Units Subcutaneous HS   insulin lispro 14 Units Subcutaneous TID With Meals   insulin lispro 2-12 Units Subcutaneous TID AC   lactulose 20 g Oral Daily   latanoprost 1 drop Both Eyes HS   levothyroxine 25 mcg Oral Daily   menthol-methyl salicylate  Apply externally BID   metoprolol tartrate 12 5 mg Oral See Admin Instructions   pantoprazole 20 mg Oral Daily   polyethylene glycol 17 g Oral Daily   pravastatin 80 mg Oral Daily       PRN Meds:   acetaminophen    albuterol    aluminum-magnesium hydroxide-simethicone    bisacodyl    dextromethorphan-guaiFENesin    doxercalciferol    iron sucrose    miconazole    ondansetron    senna    Insert peripheral IV **AND** sodium chloride (PF)    Continuous Infusions:         LAB RESULTS:        Results from last 7 days  Lab Units 12/18/17  0444 12/17/17  0739 12/17/17  0718 12/16/17  0432 12/15/17  1637 12/14/17  0617 12/13/17  0928 12/12/17  0514 12/12/17  0510   WBC Thousand/uL  --  6 94  -- 8  53 7 56 9 19 9 76 9 13  --    HEMOGLOBIN g/dL  --  8 3*  --  8 5* 8 5* 8 5* 8 2* 7 8*  --    HEMATOCRIT %  --  26 5*  --  26 7* 26 4* 27 0* 25 6* 24 5*  --    PLATELETS Thousands/uL  --  85*  --  68* 54* 49* 50* 39*  --    NEUTROS PCT %  --   --   --   --   --   --   --  75  --    LYMPHS PCT %  --   --   --   --   --   --   --  14  --    LYMPHO PCT %  --   --   --   --  9* 9* 12*  --   --    MONOS PCT %  --   --   --   --   --   --   --  10  --    MONO PCT MAN %  --   --   --   --  4 3* 5  --   --    EOS PCT %  --   --   --   --   --   --   --  1  --    EOSINO PCT MANUAL %  --   --   --   --  1 3 0  --   --    SODIUM mmol/L 131*  --  132* 133* 134*  --   --   --  134*   POTASSIUM mmol/L 4 6  --  4 8 3 9 3 4*  --   --   --  5 1   CHLORIDE mmol/L 98*  --  95* 96* 95*  --   --   --  98*   CO2 mmol/L 24  --  26 31 31  --   --   --  30   BUN mg/dL 57*  --  49* 35* 26*  --   --   --  41*   CREATININE mg/dL 4 76*  --  3 55* 2 19* 1 57*  --   --   --  2 51*   CALCIUM mg/dL 9 0  --  8 8 8 4 8 4  --   --   --  8 3   ALBUMIN g/dL  --   --   --  2 2* 2 1*  --   --   --   --    TOTAL PROTEIN g/dL  --   --   --  6 1* 6 0*  --   --   --   --    BILIRUBIN TOTAL mg/dL  --   --   --  0 29 0 33  --   --   --   --    ALK PHOS U/L  --   --   --  62 66  --   --   --   --    ALT U/L  --   --   --  23 22  --   --   --   --    AST U/L  --   --   --  18 14  --   --   --   --    GLUCOSE RANDOM mg/dL 265*  --  237* 212* 320*  --   --   --  104   MAGNESIUM mg/dL  --   --   --  2 0  --   --   --   --   --    PHOSPHORUS mg/dL  --   --   --  1 8*  --   --   --   --   --        CUTURES:  Lab Results   Component Value Date    BLOODCX No Growth After 5 Days  11/23/2017    BLOODCX No Growth After 5 Days  11/23/2017    BLOODCX No Growth After 5 Days  10/31/2016    BLOODCX No Growth After 5 Days   10/31/2016    URINECX >100,000 cfu/ml Escherichia coli ESBL (A) 11/01/2016    URINECX 50,000-59,000 cfu/ml Klebsiella pneumoniae ESBL (A) 11/01/2016 URINECX 50,000-59,000 cfu/ml Providencia stuartii 11/01/2016                 Portions of the record may have been created with voice recognition software  Occasional wrong word or "sound a like" substitutions may have occurred due to the inherent limitations of voice recognition software  Read the chart carefully and recognize, using context, where substitutions have occurred  If you have any questions, please contact the dictating provider

## 2017-12-18 NOTE — OCCUPATIONAL THERAPY NOTE
OT SCREEN    OT orders received and chart reviewed  Pt is a resident of [address] [place] [address]  At baseline, pt is dependent for all transfers, requires a [employer], and receives assist for all ADLs  OT anticipates return to facility when medically stable  No acute OT needs identified at this time   D/C OT     Reita Arrant, OTR/L

## 2017-12-18 NOTE — PROGRESS NOTES
IM Residency Progress Note   Unit/Bed#: [facility] 701-01 Encounter: [id number]  SOD Team C       [facility] 67 y o  female [id number]    Hospital Stay Days: 2      Assessment/Plan:    Principal Problem:    Chest pain  Active Problems:    Chronic diastolic CHF (congestive heart failure) (HCC)    IDDM (insulin dependent diabetes mellitus) ([facility] )    Morbid obesity ([facility] )    End-stage renal disease on hemodialysis ([facility] )    History of pulmonary embolism    Thrombocytopenia (HCC)    Sleep apnea    Disease of thyroid gland    Anemia    Gout    Hypertension    Hyperlipidemia    Epigastric pain    Elevated troponin    Elevated lipase    Electrolyte abnormality    Garcia esophagus    UTI (urinary tract infection)    Chest Pain  - resolved  - rising troponin peaked at 1 98 and started trending down so we stopped checking it  - Per cardiology, plan is for medical management due to patient's overall poor condition  -we will continue with aspirin, metoprolol, protonix, pravastatin  Plavix held secondary to thrombocytopenia  - Lipid panel showed a total cholesterol of 160, LDL of 41, HDL of 76, triglyceride of 216      IDDM  - not well controlled - HgbA1c on 12/9/17 was 8 6    - Blood glucose over the past 24 hours ranged from 265 to 354 -review of medical records shows the patient has been on a dialysis diet but not on a carb controlled diet  -we have switched her to a carb controlled diet and we will continue with insulin Lantus, mealtime Humalog and insulin sliding scale, Accu-Cheks  If her sugars remain high, we will increase her insulin regimen      ESRD  -on hemodialysis Mondays Wednesdays and Fridays  -next dialysis session is due today  - nephrology is on board     Elevated Lipase  Lipase 427 on admission, upper range of normal  Currently asymptomatic  Will monitor clinically      Hyponatremia    - sodium today is 131 down from 132 yesterday    Patient is not symptomatic   - continue to monitor BMP     Hypokalemia  - resolved  - potassium this morning is 4 6     Hypertension  -stable   -continue metoprolol      HLD  -lipid panel showed total cholesterol of 160, LDL of 41, HDL of 76 and triglycerides of 216  - Continue with pravastatin 80mg po daily  -  Per Cardiology, patient should be discharged with atorvastatin 80 mg or rosuvastatin 40 mg     Anemia  -hemoglobin was 8 3 yesterday likely secondary to end-stage renal disease  -  continue ferrous sulfate 325mg po tid       History of PE  -  continue with aspirin and bilateral SCDs      Thrombocytopenia  -platelets were 85 yesterday with a baseline of 30 to 50 in the past 2 weeks  -will continue to monitor CBC      Diastolic Heart Failure  - Echo on 5/2016 showed EF 65% with grade 1 diastolic dysfunction  New echo is still pending   - Currently stable, physical exam unremarkable  - Continue metoprolol 12 5mg po daily excluding M, W, F before dialysis      Hypothyroidism  - TSH on 12/11/17 1 5  - Continue levothyroxine 25mcg po daily     Gout  - Currently stable, no acute flare   - Continue home dosage allopurinol 200mg po Sun, Tu, We, Fri, Sat and 300mg po Mon, Th      Garcia's Esophagus  -continue with Protonix  -outpatient follow-up with GI     Constipation  -bowel regimen     Morbid Obesity  -BMI is 49 61  -diet and exercise     Sleep Apnea  -likely secondary to morbid obesity  - Continue with CPAP at night and p r n  oxygen by nasal cannula     Ambulatory Dysfunction  -likely secondary to morbid obesity and deconditioning   -PT/OT      Buttock pain secondary to stage 2 decubitus pressure ulcer - POA  -offload pressure and turn patient regularly every 2 hours  -wound dressing  -continue with Tylenol for pain     Disposition: We will follow up with the result of the echocardiogram   For likely discharge back to [facility] today  Subjective:   Patient seen and examined    She reports 8/10 pain in her buttock area where she has a pressure ulcer especially after she is moved  She denies chest pain, fever, chills, night sweats, shortness of breath, cough, nausea, vomiting, diarrhea, constipation, headache  Per nursing staff, she had no overnight events       Vitals: Temp (24hrs), Av 6 °F (37 °C), Min:98 2 °F (36 8 °C), Max:98 9 °F (37 2 °C)  Current: Temperature: 98 3 °F (36 8 °C)  Vitals:    17 1921 17 2315 17 0720 17 0725   BP: 153/63 140/65 (!) 137/47    Pulse: 102 95 98    Resp: 20 20 18    Temp:  98 8 °F (37 1 °C) 98 3 °F (36 8 °C)    TempSrc:   Oral    SpO2: 99% 100% 100% 100%   Weight:       Height:        Body mass index is 52 6 kg/m²  I/O last 24 hours: In: 825 [P O :825]  Out: -       Physical Exam:   GENERAL:  Morbidly obese, in no obvious distress, sitting up comfortably in her bed eating breakfast   Afebrile to touch  HEENT:  Normocephalic, atraumatic  Pupils equal round reactive to light and accommodation  CVS:  S1, S2   Regular rate and rhythm  No murmurs, rubs or gallops  RESPIRATORY:  Clear to auscultation bilaterally  Decreased breath sounds in the lung bases  ABDOMEN:  Obese, soft, nontender nondistended  No masses or organomegaly palpable  Normoactive bowel sounds  MSK:  1+ pitting pedal edema bilaterally  NEURO:  Awake, alert and oriented x3      Invasive Devices     Peripheral Intravenous Line            Peripheral IV 17 Left Hand 1 day          Line            Hemodialysis AV Fistula 16 Right Upper arm 487 days    Hemodialysis AV Fistula 17 Right Upper arm 24 days    HD Cath Double 17 days                          Labs:   Recent Results (from the past 24 hour(s))   CBC    Collection Time: 17  7:39 AM   Result Value Ref Range    WBC 6 94 4 31 - 10 16 Thousand/uL    RBC 2 75 (L) 3 81 - 5 12 Million/uL    Hemoglobin 8 3 (L) 11 5 - 15 4 g/dL    Hematocrit 26 5 (L) 34 8 - 46 1 %    MCV 96 82 - 98 fL    MCH 30 2 26 8 - 34 3 pg    MCHC 31 3 (L) 31 4 - 37 4 g/dL    RDW 15 8 (H) 11 6 - 15 1 %    Platelets 85 (L) 097 - 390 Thousands/uL   Fingerstick Glucose (POCT)    Collection Time: 12/17/17 11:24 AM   Result Value Ref Range    POC Glucose 338 (H) 65 - 140 mg/dl   Fingerstick Glucose (POCT)    Collection Time: 12/17/17  4:31 PM   Result Value Ref Range    POC Glucose 340 (H) 65 - 140 mg/dl   Fingerstick Glucose (POCT)    Collection Time: 12/17/17  8:52 PM   Result Value Ref Range    POC Glucose 354 (H) 65 - 140 mg/dl   Basic metabolic panel    Collection Time: 12/18/17  4:44 AM   Result Value Ref Range    Sodium 131 (L) 136 - 145 mmol/L    Potassium 4 6 3 5 - 5 3 mmol/L    Chloride 98 (L) 100 - 108 mmol/L    CO2 24 21 - 32 mmol/L    Anion Gap 9 4 - 13 mmol/L    BUN 57 (H) 5 - 25 mg/dL    Creatinine 4 76 (H) 0 60 - 1 30 mg/dL    Glucose 265 (H) 65 - 140 mg/dL    Calcium 9 0 8 3 - 10 1 mg/dL    eGFR 8 ml/min/1 73sq m   Fingerstick Glucose (POCT)    Collection Time: 12/18/17  6:43 AM   Result Value Ref Range    POC Glucose 293 (H) 65 - 140 mg/dl       Radiology Results: I have personally reviewed pertinent reports  Other Diagnostic Testing:   I have personally reviewed pertinent reports          Active Meds:   Current Facility-Administered Medications   Medication Dose Route Frequency    acetaminophen (TYLENOL) tablet 650 mg  650 mg Oral Q6H PRN    albuterol inhalation solution 2 5 mg  2 5 mg Nebulization Q6H PRN    allopurinol (ZYLOPRIM) tablet 200 mg  200 mg Oral Once per day on Sun Tue Wed Fri Sat    allopurinol (ZYLOPRIM) tablet 300 mg  300 mg Oral Once per day on Mon Thu    aluminum-magnesium hydroxide-simethicone (MYLANTA) 200-200-20 mg/5 mL oral suspension 15 mL  15 mL Oral Q4H PRN    aspirin chewable tablet 324 mg  324 mg Oral Once    aspirin chewable tablet 324 mg  324 mg Oral Daily    bisacodyl (DULCOLAX) EC tablet 5 mg  5 mg Oral Daily PRN    calcium carbonate (OYSTER SHELL,OSCAL) 500 mg tablet 2 tablet  2 tablet Oral TID With Meals    cholecalciferol (VITAMIN D3) tablet 1,000 Units  1,000 Units Oral Daily    cyanocobalamin (VITAMIN B-12) tablet 1,000 mcg  1,000 mcg Oral Daily    dextromethorphan-guaiFENesin (ROBITUSSIN DM)  mg/5 mL oral syrup 5 mL  5 mL Oral Q4H PRN    docusate sodium (COLACE) capsule 100 mg  100 mg Oral BID    ferrous sulfate tablet 325 mg  325 mg Oral TID With Meals    insulin glargine (LANTUS) subcutaneous injection 40 Units  40 Units Subcutaneous HS    insulin lispro (HumaLOG) 100 units/mL subcutaneous injection 14 Units  14 Units Subcutaneous TID With Meals    insulin lispro (HumaLOG) 100 units/mL subcutaneous injection 2-12 Units  2-12 Units Subcutaneous TID AC    lactulose 20 g/30 mL oral solution 20 g  20 g Oral Daily    latanoprost (XALATAN) 0 005 % ophthalmic solution 1 drop  1 drop Both Eyes HS    levothyroxine tablet 25 mcg  25 mcg Oral Daily    menthol-methyl salicylate (BENGAY) 29-27 % cream   Apply externally BID    metoprolol tartrate (LOPRESSOR) partial tablet 12 5 mg  12 5 mg Oral See Admin Instructions    miconazole 2 % cream   Topical BID PRN    ondansetron (ZOFRAN) oral solution 4 mg  4 mg Oral Q4H PRN    pantoprazole (PROTONIX) EC tablet 20 mg  20 mg Oral Daily    polyethylene glycol (MIRALAX) packet 17 g  17 g Oral Daily    pravastatin (PRAVACHOL) tablet 80 mg  80 mg Oral Daily    senna (SENOKOT) tablet 17 2 mg  2 tablet Oral Daily PRN    sodium chloride (PF) 0 9 % injection 3 mL  3 mL Intravenous PRN         VTE Pharmacologic Prophylaxis: Reason for no pharmacologic prophylaxis Held for thrombocytopenia  VTE Mechanical Prophylaxis: sequential compression device    Lupe Ruiz DO

## 2017-12-18 NOTE — CONSULTS
Progress Note - Wound   Zulma Cr 67 y o  female MRN: [id number]  Unit/Bed#: [facility] 701-01 Encounter: [id number]      Assessment: Patient is seen for wound care consult later today after her dialysis treatment   The patient is sen for wound care while in the bed  Bilateral heels are dry and intact with noted birthmark on the left heel   The patient has stage 2 areas on the right and left buttocks present on admission and a moisture associated skin damage on the right lower buttocks   The areas are fragile with noted peeling , hyperpigmentation and dry areas of the wound bed with dark dry red areas noted   The patient is incontinent of bowel and bladder with moisture being a issue and is unable to effectively off load   The areas have open and closed several times with the wound bed appearance of chaffed due to sitting , long term incontinence and movement with a pad due to dependent for all care needs   Abdominal pannus is very large and the folds area all intact   Discussed the plan of care with the RN and orders placed   Discussed with the patient of the need to be side laying and off of the areas   Please refer to the plan of care listed below   Plan:   1 Hydraguard to bilateral heels bid  and prn   2  Apply moisturizing cream with skin nourishing cream to the skin daily   3  Soft care cushion when out of bed in the chair   4  Sacral / buttocks - dust with stomahesive powder to the open areas then apply calazime paste tid and prn   Apply hydraguard tid and prn to the dry neeta wound areas   5  Elevate heels off of the bed with pillows   6  P-500 mattress   7  Turn and reposition every 2 hours to off load and re-distribute on the skin           Objective:    Vitals: Blood pressure (!) 99/44, pulse 86, temperature 97 6 °F (36 4 °C), temperature source Oral, resp  rate 17, height 5' 4" (1 626 m), weight (!) 139 kg (306 lb 7 oz), SpO2 99 %, not currently breastfeeding  ,Body mass index is 52 6 kg/m²  Pressure Ulcer 12/16/17 Buttocks Left (Active)   Staging Stage II 12/18/2017  4:19 PM   Wound Description Beefy red;Fragile;Pink 12/18/2017  4:19 PM   Graciela-wound Assessment Clean;Fragile; Hyperpigmented 12/18/2017  4:19 PM   Wound Length (cm) 6 cm 12/18/2017  4:19 PM   Wound Width (cm) 2 cm 12/18/2017  4:19 PM   Wound Depth (cm) 0 1 12/18/2017  4:19 PM   Calculated Wound Area (cm^2) 12 cm^2 12/18/2017  4:19 PM   Calculated Wound Volume (cm^3) 1 2 cm^3 12/18/2017  4:19 PM   Change in Wound Size % -33 33 12/18/2017  4:19 PM   Drainage Amount Scant 12/18/2017  4:19 PM   Drainage Description Bloody 12/18/2017  4:19 PM   Treatment Cleansed; Offload; Turn & reposition 12/18/2017  4:19 PM   Dressing Protective barrier; Other (Comment) 12/18/2017  4:19 PM   Patient Tolerance Tolerated well 12/18/2017  4:19 PM   Dressing Status Open to air 12/18/2017  8:00 AM       Pressure Ulcer 12/18/17 Buttocks Right stage 2  (Active)   Staging Stage II 12/18/2017  4:19 PM   Wound Description Clean;Fragile;Pink 12/18/2017  4:19 PM   Graciela-wound Assessment Fragile; Hyperpigmented 12/18/2017  4:19 PM   Wound Length (cm) 3 cm 12/18/2017  4:19 PM   Wound Width (cm) 2 cm 12/18/2017  4:19 PM   Wound Depth (cm) 0 1 12/18/2017  4:19 PM   Calculated Wound Area (cm^2) 6 cm^2 12/18/2017  4:19 PM   Calculated Wound Volume (cm^3) 0 6 cm^3 12/18/2017  4:19 PM   Drainage Amount Scant 12/18/2017  4:19 PM   Drainage Description Bloody 12/18/2017  4:19 PM   Treatment Cleansed; Offload; Turn & reposition 12/18/2017  4:19 PM   Dressing Protective barrier 12/18/2017  4:19 PM   Patient Tolerance Tolerated well 12/18/2017  4:19 PM       Wound 11/30/17 Moisture associated skin damage Buttocks Distal;Right partial thickness chaffed area reopened and MASD  (Active)   Wound Description Clean;Dry;Fragile;Pink 12/18/2017  4:19 PM   Graciela-wound Assessment Clean;Fragile; Hyperpigmented 12/18/2017  4:19 PM   Wound Length (cm) 2 5 cm 12/18/2017  4:19 PM   Wound Width (cm) 2 5 cm 12/18/2017  4:19 PM   Wound Depth (cm) 0 1 12/18/2017  4:19 PM   Calculated Wound Volume (cm^3) 0 62 cm^3 12/18/2017  4:19 PM   Change in Wound Size % -313 33 12/18/2017  4:19 PM   Drainage Amount None 12/18/2017  4:19 PM   Drainage Description Bloody 12/18/2017  8:00 AM   Non-staged Wound Description Partial thickness 12/18/2017  4:19 PM   Treatments Cleansed 12/18/2017  4:19 PM   Dressing Open to air; Other (Comment) 12/18/2017  4:19 PM   Patient Tolerance Tolerated well 12/18/2017  4:19 PM   Dressing Status Open to air 12/18/2017  8:00 AM       Wound care will follow weekly     Dylan Nicholas RN BSN [facility]

## 2017-12-18 NOTE — WOUND OSTOMY CARE
Patient is a 66year old female admitted with complaints of chest pain and present on admission pressure injury to sacrum, buttocks areas  Attempted to see patient this morning and arrival to unit patient was already gone to dialysis  We are placing preliminary skin care orders today and will see tomorrow  Please call wound care to ext [phone] with questions or concerns  Prelimary skin care orders:    1-Calazime paste to sacrum, buttocks TID and PRN  2-Hydraguard to bilateral heels BID and PRN  3-Softcare cushion when out of bed  4-Moisturize skin daily with skin nourishing cream  5-Elevate heels to offload pressure  6-Turn/reposition q2h for pressure re-distribution on skin  Wendy Shah, RN, BSN, [facility]

## 2017-12-18 NOTE — DISCHARGE INSTRUCTIONS
Angina   WHAT YOU NEED TO KNOW:   What is angina? Angina is pain, pressure, or tightness that is usually felt in your chest  Chest pain may come on when you are stressed or do physical activities, such as walking or exercising  Angina is caused by decreased blood flow and oxygen to your heart  These are often caused by atherosclerosis (hardening of the arteries)  If left untreated, angina may get worse, increase your risk for a heart attack, or become life-threatening  What increases my risk for angina? · Age older than 54 years    · Being a man    · Being a woman who smokes and takes birth control pills, or is in menopause    · Diabetes, high blood pressure, or high cholesterol     · A heart problem, such as a coronary artery spasm, heart valve disease, or an enlarged heart    · A history of smoking, being around secondhand smoke, or using cocaine    · Not enough exercise, or being overweight    · A family member diagnosed with heart disease at a young age  What other signs and symptoms may I have with angina? You may feel pressure, tightness, or pain in your neck, jaw, shoulder, or back  You may have pain or numbness in either arm, or discomfort that feels like heartburn  You may also have shortness of breath, sweating, nausea, or lightheadedness  How is angina diagnosed? · An EKG  records your heart rhythm and how fast your heart beats  It is also used to look for problems or damage in different areas of the heart  · Blood tests  may show if there is damage to your heart  Your healthcare provider may also use blood tests to get information about your overall health  · A stress test  helps healthcare providers see the changes that take place in your heart while it is under stress  Healthcare providers may place stress on your heart with exercise or medicine  Ask your healthcare provider for more information about this test     · An echocardiogram  is a type of ultrasound   Sound waves are used to show the structure, movement, and blood vessels of your heart  · Cardiac catheterization  is a procedure that uses dye and an x-ray to check the blood flow in your coronary arteries  This can help your healthcare provider decide how to treat your angina  Sometimes blockages can be treated during a cardiac catheterization  How is angina treated? · Medicines:      ¨ Antiplatelets , such as aspirin, help prevent blood clots  Take your antiplatelet medicine exactly as directed  These medicines make it more likely for you to bleed or bruise  If you are told to take aspirin, do not take acetaminophen or ibuprofen instead  ¨ Blood thinners  keep clots from forming in your blood  Clots may cause heart attacks, strokes, or death  This medicine makes it more likely for you to bleed or bruise  ¨ Other medicines  may be given to open the arteries to your heart, slow your heartbeat, or decrease your blood pressure or cholesterol  ¨ Do not take certain medicines without asking your healthcare provider first   These include NSAIDs, herbal or vitamin supplements, or hormones (estrogen or progestin)  · Angioplasty and stenting  help open the coronary arteries and allow blood to flow to the heart  Ask for more information about these procedures  · Coronary artery bypass graft (CABG) , or open heart surgery, can improve blood flow to the heart  This will help decrease your chest pain and prevent a heart attack  Call 911 if:   · You have any of the following signs of a heart attack:      ¨ Squeezing, pressure, or pain in your chest that lasts longer than 5 minutes or returns    ¨ Discomfort or pain in your back, neck, jaw, stomach, or arm     ¨ Trouble breathing    ¨ Nausea or vomiting    ¨ Lightheadedness or a sudden cold sweat, especially with chest pain or trouble breathing    · You have chest pain that does not go away after you take medicine as directed      · You lose feeling in your face, arms, or legs, or you suddenly feel weak  When should I seek immediate care? · Your angina is happening more frequently, lasting longer, or causing worse pain  When should I contact my healthcare provider? · You are dizzy or nauseated after you take your medicine  · You have shortness of breath at rest     · You have new or worse swelling in your feet or ankles  · You have questions or concerns about your condition or care  CARE AGREEMENT:   You have the right to help plan your care  Learn about your health condition and how it may be treated  Discuss treatment options with your caregivers to decide what care you want to receive  You always have the right to refuse treatment  The above information is an  only  It is not intended as medical advice for individual conditions or treatments  Talk to your doctor, nurse or pharmacist before following any medical regimen to see if it is safe and effective for you  © 2017 [employer] Information is for End User's use only and may not be sold, redistributed or otherwise used for commercial purposes  All illustrations and images included in CareNotes® are the copyrighted property of [employer]  or [employer]  Chest Wall Pain   WHAT YOU NEED TO KNOW:   What do I need to know about chest wall pain? Chest wall pain may be caused by problems with the muscles, cartilage, or bones of the chest wall  Chest wall pain may also be caused by pain that spreads to your chest from another part of your body  The pain may be aching, severe, dull, or sharp  It may come and go, or it may be constant  The pain may be worse when you move in certain ways, breathe deeply, or cough  What causes chest wall pain?    · Conditions that affect the joints or cartilage of the chest wall, such as arthritis or costochondritis    · Strain or injury of the chest wall muscles     · Fractures of the ribs or vertebrae (bones in your spine)    · Herniation of the discs in the upper or middle section of your spine     · Shingles  How is chest wall pain diagnosed? Your healthcare provider will ask you to describe your pain  Tell him when the pain started, what type of pain it is, and what makes it better or worse  He will also ask if you have any other symptoms  He will examine your chest  He may also ask you to move your arms in different directions to see how it affects your pain  Ask your healthcare provider about these and other tests you may need:  · A chest x-ray  may show the cause of your chest wall pain  You may need more than one x-ray  · An MRI  takes pictures of your chest or spine to show the cause of your chest wall pain  You may be given contrast liquid to help the pictures show up better  Tell a healthcare provider if you have ever had an allergic reaction to contrast liquid  Do not enter the MRI room with anything metal  Metal can cause serious injury  Tell a healthcare provider if you have any metal in or on your body  How is chest wall pain treated? Treatment depends on the cause of your chest wall pain  You may need any of the following:  · NSAIDs , such as ibuprofen, help decrease swelling, pain, and fever  This medicine is available with or without a doctor's order  NSAIDs can cause stomach bleeding or kidney problems in certain people  If you take blood thinner medicine, always ask your healthcare provider if NSAIDs are safe for you  Always read the medicine label and follow directions  · Acetaminophen  decreases pain  It is available without a doctor's order  Ask how much to take and how often to take it  Follow directions  Acetaminophen can cause liver damage if not taken correctly  · A cream  may be applied to your chest to decrease pain  · Apply heat  on your chest for 20 to 30 minutes every 2 hours for as many days as directed  Heat helps decrease pain and muscle spasms      · Apply ice  on your chest for 15 to 20 minutes every hour or as directed  Use an ice pack, or put crushed ice in a plastic bag  Cover it with a towel  Ice helps prevent tissue damage and decreases swelling and pain  Call 911 if:   · You have any of the following signs of a heart attack:      ¨ Squeezing, pressure, or pain in your chest that lasts longer than 5 minutes or returns    ¨ Discomfort or pain in your back, neck, jaw, stomach, or arm     ¨ Trouble breathing    ¨ Nausea or vomiting    ¨ Lightheadedness or a sudden cold sweat, especially with chest pain or trouble breathing    When should I seek immediate care? · You have severe pain  When should I contact my healthcare provider? · You develop a rash  · You have other new symptoms  · Your pain does not improve, even with treatment  · You have questions or concerns about your condition or care  CARE AGREEMENT:   You have the right to help plan your care  Learn about your health condition and how it may be treated  Discuss treatment options with your caregivers to decide what care you want to receive  You always have the right to refuse treatment  The above information is an  only  It is not intended as medical advice for individual conditions or treatments  Talk to your doctor, nurse or pharmacist before following any medical regimen to see if it is safe and effective for you  © 2017 [employer] Information is for End User's use only and may not be sold, redistributed or otherwise used for commercial purposes  All illustrations and images included in CareNotes® are the copyrighted property of [employer] , Inc  or [employer]

## 2017-12-18 NOTE — PROGRESS NOTES
Cardiology Progress Note - Roverto Torre 67 y o  female MRN: [id number]    Unit/Bed#: [facility] 701-01 Encounter: [id number]      Assessment:  Principal Problem:    Chest pain  Active Problems:    Chronic diastolic CHF (congestive heart failure) ([facility])    IDDM (insulin dependent diabetes mellitus) ([facility] [address] )    Morbid obesity ([facility] )    End-stage renal disease on hemodialysis ([facility] )    History of pulmonary embolism    Thrombocytopenia ([facility])    Sleep apnea    Disease of thyroid gland    Anemia    Gout    Hypertension    Hyperlipidemia    Epigastric pain    Elevated troponin    Elevated lipase    Electrolyte abnormality    Adams esophagus    UTI (urinary tract infection)      Plan:    # h/o CAD  Cardiac cath in 2002 by Dr Sita Moreno- prox Cx 50% lesion, prox LAD 30% disease, being managed medically  Trops peaked at 1 98, chest pain similar to her existing  Adams's symptoms  LVH and Non Specific ST- T wave changes on EKG  Symptoms now resolved  Heparin discontinued due to thrombocytopenia  Not a candidate for invasive management due to poor functional status  Continue medical therapy with aspirin, statin  No plavix given thrombocytopenia  # Diastolic dysfunction: Echo from 05/16- EF 65% with grade 1 diastolic dysfunction and concentric hypertrophy  Appears euvolemic  Repeat Echo pending  #hypertension- Controlled, On metoprolol 12 5 bid  #hyperlipidemia- Total Cholesterol 160, , HDL 76 , LDL 41 on 12/16/17  On pravastatin 80 daily  #ESRD on HD M-W-F  #morbid obesity  #adams's Esophagus  #thrombocytopenia   #remote history of PE when she was 26years old  #diabetes-On insulin  #gout-On Allopurinol  #hypothyroidism- On synthroid    Subjective:   Patient seen and examined  No significant events overnight  Denies chest pain, shortness of breath  Complains of back pain  Objective:     Vitals: Blood pressure 100/59, pulse 82, temperature 99 5 °F (37 5 °C), temperature source Tympanic, resp   rate 16, height 5' 4" (1 626 m), weight (!) 139 kg (306 lb 7 oz), SpO2 100 %, not currently breastfeeding , Body mass index is 52 6 kg/m² , Orthostatic Blood Pressures    Flowsheet Row Most Recent Value   Blood Pressure  100/59 filed at 12/18/2017 1100   Patient Position - Orthostatic VS  Lying filed at 12/18/2017 0830            Intake/Output Summary (Last 24 hours) at 12/18/17 1132  Last data filed at 12/18/17 0830   Gross per 24 hour   Intake              845 ml   Output                0 ml   Net              845 ml           Physical Exam:    GEN: Ashleigh Apurva Morbidly obese, not in acute distress  HEENT: anicteric, mucous membranes moist  NECK: no jvd,   HEART: regular rhythm, normal S1 and S2, no murmurs, clicks, gallops or rubs   LUNGS: clear to auscultation bilaterally; no wheezes, rales, or rhonchi   ABDOMEN: Obese normal bowel sounds, soft, no tenderness, no distention  EXTREMITIES: No edema  NEURO: no focal findings   SKIN: venous stasis skin changes         Current Facility-Administered Medications:     acetaminophen (TYLENOL) tablet 650 mg, 650 mg, Oral, Q6H PRN, Anthony Kumar MD, 650 mg at 12/16/17 1804    acetaminophen (TYLENOL) tablet 975 mg, 975 mg, Oral, Q8H [facility], [facility], 975 mg at 12/18/17 0953    albuterol inhalation solution 2 5 mg, 2 5 mg, Nebulization, Q6H PRN, Anthony Kumar MD    allopurinol [facility]) tablet 200 mg, 200 mg, Oral, Once per day on Sun Tue Wed Fri Sat, Anthony Kumar MD, 200 mg at 12/17/17 5586    allopurinol (ZYLOPRIM) tablet 300 mg, 300 mg, Oral, Once per day on Mon Thu, Anthony Kumar MD    aluminum-magnesium hydroxide-simethicone (MYLANTA) 200-200-20 mg/5 mL oral suspension 15 mL, 15 mL, Oral, Q4H PRN, Herber Black DO, 15 mL at 12/17/17 1902    aspirin chewable tablet 324 mg, 324 mg, Oral, Once, Darlene Klever, DO    aspirin chewable tablet 324 mg, 324 mg, Oral, Daily, LIZZETTE Randall, 324 mg at 12/17/17 0925    bisacodyl (DULCOLAX) EC tablet 5 mg, 5 mg, Oral, Daily PRN, Fatoumata Vernon MD    calcium carbonate (OYSTER SHELL,OSCAL) 500 mg tablet 2 tablet, 2 tablet, Oral, TID With Meals, Fatoumata Vernon MD, 2 tablet at 12/18/17 2752    cholecalciferol (VITAMIN D3) tablet 1,000 Units, 1,000 Units, Oral, Daily, Fatoumata Vernon MD, 1,000 Units at 12/17/17 1268    cyanocobalamin (VITAMIN B-12) tablet 1,000 mcg, 1,000 mcg, Oral, Daily, Fatoumata Vernon MD, 1,000 mcg at 12/17/17 0924    dextromethorphan-guaiFENesin (ROBITUSSIN DM)  mg/5 mL oral syrup 5 mL, 5 mL, Oral, Q4H PRN, Fatoumata Vernon MD    docusate sodium (COLACE) capsule 100 mg, 100 mg, Oral, BID, Fatoumata Vernon MD, 100 mg at 12/17/17 1728    doxercalciferol (HECTOROL) injection 0 5 mcg, 0 5 mcg, Intravenous, After Dialysis, Gloria Mckeon DO, 0 5 mcg at 12/18/17 4981    ferrous sulfate tablet 325 mg, 325 mg, Oral, TID With Meals, Fatoumata Vernon MD, 325 mg at 12/18/17 0716    insulin glargine (LANTUS) subcutaneous injection 40 Units, 40 Units, Subcutaneous, HS, Fatoumata Vernon MD, 40 Units at 12/17/17 2121    insulin lispro (HumaLOG) 100 units/mL subcutaneous injection 14 Units, 14 Units, Subcutaneous, TID With Meals, [address] [facility], 14 Units at 12/18/17 0718    insulin lispro (HumaLOG) 100 units/mL subcutaneous injection 2-12 Units, 2-12 Units, Subcutaneous, TID AC, 6 Units at 12/18/17 0717 **AND** Fingerstick Glucose (POCT), , , TID AC, Fatoumata Vernon MD    iron sucrose (VENOFER) 50 mg in sodium chloride 0 9 % 100 mL IVPB, 50 mg, Intravenous, After Dialysis, Gloria Mckeon DO, Stopped at 12/18/17 1037    lactulose 20 g/30 mL oral solution 20 g, 20 g, Oral, Daily, Fatoumata Vernon MD, 20 g at 12/16/17 0834    latanoprost (XALATAN) 0 005 % ophthalmic solution 1 drop, 1 drop, Both Eyes, HS, Fatoumata Vernon MD, 1 drop at 12/17/17 2121    levothyroxine tablet 25 mcg, 25 mcg, Oral, Daily, Fatoumata Vernon MD, 25 mcg at 12/18/17 0716    menthol-methyl salicylate (BENGAY) 67-35 % cream, , Apply externally, BID, MD Licha Queen metoprolol tartrate (LOPRESSOR) partial tablet 12 5 mg, 12 5 mg, Oral, See Admin Instructions, Lilly Ortega MD    miconazole 2 % cream, , Topical, BID PRN, Lilly Ortega MD    ondansetron (ZOFRAN-ODT) dispersible tablet 4 mg, 4 mg, Oral, Q4H PRN, Kathrine Shirley MD    pantoprazole (PROTONIX) EC tablet 20 mg, 20 mg, Oral, Daily, Lilly Ortega MD, 20 mg at 12/17/17 0925    polyethylene glycol (MIRALAX) packet 17 g, 17 g, Oral, Daily, Lilly Ortega MD, 17 g at 12/16/17 0999    pravastatin (PRAVACHOL) tablet 80 mg, 80 mg, Oral, Daily, Lilly Ortega MD, 80 mg at 12/17/17 4308    senna (SENOKOT) tablet 17 2 mg, 2 tablet, Oral, Daily PRN, Lilly Ortega MD    Insert peripheral IV, , , Once **AND** sodium chloride (PF) 0 9 % injection 3 mL, 3 mL, Intravenous, PRN, Bibi DO Clif    Labs & Results:    Lab Results   Component Value Date    CKTOTAL 303 (H) 02/23/2014    CKMBINDEX <1 02/23/2014    TROPONINI 1 95 (H) 12/16/2017    TROPONINI 1 98 (H) 12/16/2017    TROPONINI 1 50 (H) 12/15/2017       Lab Results   Component Value Date    GLUCOSE 265 (H) 12/18/2017    CALCIUM 9 0 12/18/2017     (L) 12/18/2017    K 4 6 12/18/2017    CO2 24 12/18/2017    CL 98 (L) 12/18/2017    BUN 57 (H) 12/18/2017    CREATININE 4 76 (H) 12/18/2017       Lab Results   Component Value Date    WBC 6 94 12/17/2017    HGB 8 3 (L) 12/17/2017    HCT 26 5 (L) 12/17/2017    MCV 96 12/17/2017    PLT 85 (L) 12/17/2017           Lab Results   Component Value Date    CHOL 160 12/16/2017     Lab Results   Component Value Date    HDL 76 (H) 12/16/2017     Lab Results   Component Value Date    LDLCALC 41 12/16/2017     Lab Results   Component Value Date    TRIG 216 (H) 12/16/2017       Lab Results   Component Value Date    ALT 23 12/16/2017    AST 18 12/16/2017

## 2017-12-18 NOTE — PHYSICAL THERAPY NOTE
Pt screened for PT services  Pt is a long term resident of [facility], and is a kelly for mobility  Will sign off    [place] Hair PT, DPT CSRS

## 2017-12-18 NOTE — DISCHARGE SUMMARY
[facility] CENTRAL Discharge Summary - Esther Daley 67 y o  female MRN: [id number]    Kwabena Fisher BE PPHP 7 Room / Bed: [facility] 701/[facility] 701-01 Encounter: [id number]    BRIEF OVERVIEW    Admitting Provider: Keara Tamez MD  Discharge Provider: Keara Tamez MD  Primary Care Physician at Discharge: Zaire Menard MD    Discharge To:  [facility] / Family Member Name: [facility]      Admission Date: 12/15/2017     Discharge Date:12/19/17    Primary Discharge Diagnosis  Principal Problem (Resolved):    Chest pain  Active Problems:    Chronic diastolic CHF (congestive heart failure) (HCC)    IDDM (insulin dependent diabetes mellitus) ([facility] )    Morbid obesity ([facility] )    End-stage renal disease on hemodialysis ([facility] )    History of pulmonary embolism    Thrombocytopenia (HCC)    Sleep apnea    Disease of thyroid gland    Anemia    Gout    Hypertension    Hyperlipidemia    Epigastric pain    Elevated troponin    Elevated lipase    Electrolyte abnormality    Garcia esophagus    UTI (urinary tract infection)    Decubitus ulcer of sacral region, stage 2- present on arrival      Other Problems Addressed: None  Consulting Providers   Cardiology - Dr Adrian Gomez  Nephrology  - Dr Lawyer Cevallos      Therapeutic Operative Procedures Performed  None    Diagnostic Procedures Performed   EKG- normal sinus rhythm  ST and T-wave abnormality ? lateral ischemia  Echocardiography - Systolic function was normal   Ejection fraction - 60% and no regional wall motion abnormalities    Grade 1 diastolic dysfunction     Discharge Disposition: Released to SNF/TCU/SNU Facility  Discharged With Lines: HD catheter    Test Results Pending at Discharge: Hemolysis smear, heparin antibody by serotonin release    Outpatient Follow-Up   Primary care physician    Follow up with consulting providers   Nephrology - for dialysis   Hematology -patient has been instructed to establish care with Hematology    Active Issues Requiring Follow-up    Coronary artery disease, end-stage renal disease, diabetes, thrombocytopenia    Code Status: Level 3 - DNAR and DNI    Medications   Current Discharge Medication List      CONTINUE these medications which have NOT CHANGED    Details   acetaminophen (TYLENOL) 325 mg tablet Take 2 tablets by mouth every 6 (six) hours as needed for mild pain, headaches or fever  Qty: 30 tablet, Refills: 0      albuterol (2 5 mg/3 mL) 0 083 % nebulizer solution Take 2 5 mg by nebulization every 6 (six) hours as needed for wheezing  !! allopurinol (ZYLOPRIM) 100 mg tablet Take 200 mg by mouth Indications: sun, tue, we, fri, sat  Sun, Tues, Wed, Fri, Sat       !! allopurinol (ZYLOPRIM) 300 mg tablet Take 300 mg by mouth Mon, and Thurs       aspirin 81 mg chewable tablet Chew 1 tablet daily  Refills: 0      bisacodyl (DULCOLAX) 5 mg EC tablet Take 5 mg by mouth daily as needed for constipation      calcium carbonate (OYSTER SHELL,OSCAL) 500 mg Take 2 tablets by mouth 3 (three) times a day with meals  Qty: 180 tablet, Refills: 0      Camphor-Menthol-Methyl Sal (Anthony Carrero Ultra Strength) 4-10-30 % CREA Apply topically 2 (two) times a day B/l feet/knees and left shoulder      cholecalciferol 1000 units tablet Take 1 tablet by mouth daily  Refills: 0      cyanocobalamin (VITAMIN B-12) 1,000 mcg tablet Take 1,000 mcg by mouth daily        dextromethorphan-guaiFENesin (ROBITUSSIN DM)  mg/5 mL syrup Take 5 mL by mouth every 4 (four) hours as needed for cough or congestion  Qty: 118 mL, Refills: 0      docusate sodium (COLACE) 100 mg capsule Take 100 mg by mouth 2 (two) times a day        ferrous sulfate 325 (65 Fe) mg tablet Take 325 mg by mouth 3 (three) times a day with meals      Insulin Glargine (BASAGLAR KWIKPEN) 100 UNIT/ML SOPN Inject 40 Units under the skin daily at bedtime      !! insulin NPH (HumuLIN N,NovoLIN N) 100 Units/mL subcutaneous injection Inject 10 Units under the skin 2 (two) times a day before breakfast and lunch  Refills: 0      !! insulin NPH (HumuLIN N,NovoLIN N) 100 Units/mL subcutaneous injection Inject 14 Units under the skin daily before dinner  Refills: 0      lactulose 20 g/30 mL Take 30 mL by mouth daily  Refills: 0      latanoprost (XALATAN) 0 005 % ophthalmic solution Administer 1 drop to both eyes daily at bedtime  levothyroxine 25 mcg tablet Take 25 mcg by mouth daily  liver oil-zinc oxide (DESITIN) 40 % ointment Apply topically as needed for irritation To buttock every day and evening shift   To b/l thigh folds every day and evening shift       metoprolol tartrate (LOPRESSOR) 12 5 mg tablet Take 12 5 mg by mouth  Sun, Tues, Thurs, Sat  Hold am dose before dialysis Mon, Wed, and Fri      miconazole (MICOTIN) 2 % powder Apply topically as needed for itching Apply to b/l breast and panus twice a day      nystatin (MYCOSTATIN) powder Apply topically 2 (two) times a day  Qty: 15 g, Refills: 0      omeprazole (PriLOSEC) 20 mg delayed release capsule Take 20 mg by mouth daily        ondansetron (ZOFRAN) 4 mg tablet Take 1 tablet by mouth every 4 (four) hours as needed for nausea or vomiting  Refills: 0      polyethylene glycol (MIRALAX) 17 g packet Take 17 g by mouth daily  Qty: 14 each, Refills: 0      senna (SENOKOT) 8 6 MG tablet Take 2 tablets by mouth daily as needed for constipation  !! - Potential duplicate medications found  Please discuss with provider          Discharge Medication List as of 12/19/2017 11:31 AM - New medication    Atorvastatin 80 mg - Take 1 tablet daily            Current Discharge Medication List      STOP taking these medications       HYDROmorphone (DILAUDID) 2 mg tablet Comments:   Reason for Stopping:         pravastatin (PRAVACHOL) 80 mg tablet Comments:   Reason for Stopping:         pregabalin (LYRICA) 75 mg capsule Comments:   Reason for Stopping:                 Allergies  Allergies   Allergen Reactions    Codeine     Darvon [Propoxyphene]     Iodine     Keflex [Cephalexin]     Morphine And Related     Nsaids     Other      IV DYE and SEAFOOD     Discharge Diet: cardiac diet, diabetic diet and 2 g sodium  Activity restrictions: none    Carter is a 76-year-old female with an extensive past medical history including chronic diastolic congestive heart failure, diabetes mellitus type 2, morbid obesity, end-stage renal disease on hemodialysis, hypothyroidism, hypertension and gout, who was admitted with chest pain which started while she was undergoing dialysis on December 15th, 2017  Her EKG showed evidence of possible lateral ischemia which was not definitive due to baseline LVH with repolarization abnormalities but her troponins bumped from less than 0 08 up to 1 98  Patient was initially started on heparin infusion which was later stopped because of her thrombocytopenia  Her troponin trended down from 1 98 to 1 95  Cardiology was consulted and they determined that given her morbid obesity, poor baseline functional status and significant thrombocytopenia they would avoid any invasive cardiac interventions if possible  They determined to treat her medically  Echocardiography was done which showed normal systolic function, EF of 54% and grade 1 diastolic dysfunction but no regional wall motion abnormality  Per Cardiology, patient will continue with aspirin, statin, and metoprolol on non dialysis days  They advised that her pravastatin be stopped and she be started on a high-intensity statin - either  atorvastatin 80 mg or Rosuvastatin 40 mg  She got a dialysis session yesterday without any complications or chest pain  Nephrology was on board also and they recommended a hematology consult for her thrombocytopenia  Labs were drawn to rule out HIT and the results are still pending    Patient has been given information to establish care with a hematologist for further workup of her thrombocytopenia  Of note, patient complained of pain from her sacral decubitus ulcer which is chronic and wound care was consulted  Her wounds were dressed and she improved  She is being discharged back [place] [facility]  Her condition on discharge is stable  Presenting Problem/History of Present Illness  Principal Problem (Resolved):    Chest pain  Active Problems:    Chronic diastolic CHF (congestive heart failure) ([facility])    IDDM (insulin dependent diabetes mellitus) ([facility] )    Morbid obesity ([facility])    End-stage renal disease on hemodialysis ([facility] )    History of pulmonary embolism    Thrombocytopenia ([facility])    Sleep apnea    Disease of thyroid gland    Anemia    Gout    Hypertension    Hyperlipidemia    Epigastric pain    Elevated troponin    Elevated lipase    Electrolyte abnormality    Garcia esophagus    UTI (urinary tract infection)    Decubitus ulcer of sacral region, stage 2- present on arrival         Chest Pain  - resolved  - rising troponin peaked at 1 98 and started trending down   - Per cardiology, plan is for medical management due to patient's overall poor condition  - echocardiography done yesterday showed normal systolic function with an ejection fraction of 60% and grade 1 diastolic dysfunction  - continue with aspirin, metoprolol, protonix, pravastatin   Plavix held secondary to thrombocytopenia    - Lipid panel showed a total cholesterol of 160, LDL of 41, HDL of 76, triglyceride of 216      IDDM  - not well controlled - HgbA1c on 12/9/17 was 8 6    - Blood glucose over the past 24 hours ranged from  253 to 293  -continue with insulin Lantus 40 units QHS, NPH insulin, diabetic diet and Accu-Cheks        ESRD  -on hemodialysis Mondays Wednesdays and Fridays  -last dialysis session was yesterday and the next one is due on Wednesday 12/20/17      Elevated Lipase  - Lipase 427 on admission, upper range of normal  Currently asymptomatic   -outpatient follow-up     Hyponatremia    - sodium today is 133 up from 131 yesterday   Patient is not symptomatic   - outpatient follow-up     Hypokalemia  -resolved  - potassium this morning is 3 9     Hypertension  -stable   -continue metoprolol      HLD  -lipid panel showed total cholesterol of 160, LDL of 41, HDL of 76 and triglycerides of 216  - patient has been started on atorvastatin 80 mg daily      Anemia  -hemoglobin today is 7 8 down from 8 3 on 12/17/17 likely secondary to end-stage renal disease  -  continue ferrous sulfate 325mg po tid       History of PE  -  continue with aspirin      Thrombocytopenia  -platelets today is 62 with a baseline of 30 to 50 in the past 2 weeks  -lab result for HITare pending  Repeat CBC in the nursing home      Diastolic Heart Failure  - Echo done yesterday showed normal systolic function, EF of 28% and grade 1 diastolic dysfunction  - Currently stable  - Continue with metoprolol 12 5mg po daily excluding M, W, F before dialysis      Hypothyroidism  - TSH on 12/11/17 1 5  - Continue levothyroxine 25mcg po daily     Gout  - Currently stable, no acute flare   - Continue home dosage allopurinol 200mg po Sun, Tu, We, Fri, Sat and 300mg po Mon, Th      Garcia's Esophagus  -continue with Protonix  -outpatient follow-up with GI     Constipation  -stable, continue with bowel regimen     Morbid Obesity  -BMI is 49 61  -diet and exercise     Sleep Apnea  -likely secondary to morbid obesity  - Continue with CPAP at night and p r n  oxygen by nasal cannula     Ambulatory Dysfunction  -likely secondary to morbid obesity and deconditioning   -PT/OT       Buttock pain secondary to stage 2 sacral and gluteal decubitus pressure ulcer - POA  -improving   - continue to offload pressure and turn patient regularly every 2 hours  -continue with wound care and Tylenol for pain     Discharge Condition: stable      Discharge  Statement   I spent 30 minutes minutes discharging the patient   This time was spent on the day of discharge  I had direct contact with the patient on the day of discharge  Additional documentation is required if more than 30 minutes were spent on discharge

## 2017-12-18 NOTE — PROGRESS NOTES
Complete care provided, pts skin fold actually look fairly good not yeasty or particualrly red, left neck fold has a puncture site cleansed and dried and intact, propped with pillows on both sides, sacral wound and rt ischial wounds both have some tan exudate and the wound beds are beefy red, will try discussing with wound nurse, wounds seen by SOD resident during the care, calmazine cream applied liberally , heels also elevated off bed

## 2017-12-18 NOTE — RESTORATIVE TECHNICIAN NOTE
Restorative Specialist Mobility Note                      Repositioned: Other (Comment) (Rep /sat pt upright in bed  Bed alarm on   Pt callbell, phone/tray within reach )       Emerald WOOTEN, Restorative Technician, [employer]

## 2017-12-18 NOTE — PLAN OF CARE
Problem: DISCHARGE PLANNING - CARE MANAGEMENT  Goal: Discharge to post-acute care or home with appropriate resources  INTERVENTIONS:  - Conduct assessment to determine patient/family and health care team treatment goals, and need for post-acute services based on payer coverage, community resources, and patient preferences, and barriers to discharge  - Address psychosocial, clinical, and financial barriers to discharge as identified in assessment in conjunction with the patient/family and health care team  - Arrange appropriate level of post-acute services according to patient's   needs and preference and payer coverage in collaboration with the physician and health care team  - Communicate with and update the patient/family, physician, and health care team regarding progress on the discharge plan  - Arrange appropriate transportation to post-acute venues  - Plan for discharge to [facility]  Outcome: Progressing

## 2017-12-19 VITALS
BODY MASS INDEX: 50.02 KG/M2 | WEIGHT: 293 LBS | HEART RATE: 89 BPM | OXYGEN SATURATION: 100 % | DIASTOLIC BLOOD PRESSURE: 49 MMHG | RESPIRATION RATE: 18 BRPM | HEIGHT: 64 IN | TEMPERATURE: 97.6 F | SYSTOLIC BLOOD PRESSURE: 116 MMHG

## 2017-12-19 PROBLEM — R07.9 CHEST PAIN: Status: RESOLVED | Noted: 2017-12-15 | Resolved: 2017-12-19

## 2017-12-19 PROBLEM — L89.152 DECUBITUS ULCER OF SACRAL REGION, STAGE 2 (HCC): Chronic | Status: ACTIVE | Noted: 2017-12-19

## 2017-12-19 LAB
ANION GAP SERPL CALCULATED.3IONS-SCNC: 6 MMOL/L (ref 4–13)
ANISOCYTOSIS BLD QL SMEAR: PRESENT
BASOPHILS # BLD MANUAL: 0 THOUSAND/UL (ref 0–0.1)
BASOPHILS NFR MAR MANUAL: 0 % (ref 0–1)
BLD SMEAR INTERP: NORMAL
BUN SERPL-MCNC: 28 MG/DL (ref 5–25)
CALCIUM SERPL-MCNC: 8.7 MG/DL (ref 8.3–10.1)
CHLORIDE SERPL-SCNC: 99 MMOL/L (ref 100–108)
CO2 SERPL-SCNC: 28 MMOL/L (ref 21–32)
CREAT SERPL-MCNC: 3.04 MG/DL (ref 0.6–1.3)
EOSINOPHIL # BLD MANUAL: 0.06 THOUSAND/UL (ref 0–0.4)
EOSINOPHIL NFR BLD MANUAL: 1 % (ref 0–6)
ERYTHROCYTE [DISTWIDTH] IN BLOOD BY AUTOMATED COUNT: 15.7 % (ref 11.6–15.1)
GFR SERPL CREATININE-BSD FRML MDRD: 15 ML/MIN/1.73SQ M
GLUCOSE SERPL-MCNC: 253 MG/DL (ref 65–140)
GLUCOSE SERPL-MCNC: 269 MG/DL (ref 65–140)
GLUCOSE SERPL-MCNC: 283 MG/DL (ref 65–140)
GLUCOSE SERPL-MCNC: 290 MG/DL (ref 65–140)
HCT VFR BLD AUTO: 24.7 % (ref 34.8–46.1)
HGB BLD-MCNC: 7.8 G/DL (ref 11.5–15.4)
HYPERCHROMIA BLD QL SMEAR: PRESENT
LG PLATELETS BLD QL SMEAR: PRESENT
LYMPHOCYTES # BLD AUTO: 0.42 THOUSAND/UL (ref 0.6–4.47)
LYMPHOCYTES # BLD AUTO: 7 % (ref 14–44)
MCH RBC QN AUTO: 30.1 PG (ref 26.8–34.3)
MCHC RBC AUTO-ENTMCNC: 31.6 G/DL (ref 31.4–37.4)
MCV RBC AUTO: 95 FL (ref 82–98)
METAMYELOCYTES NFR BLD MANUAL: 2 % (ref 0–1)
MONOCYTES # BLD AUTO: 0.6 THOUSAND/UL (ref 0–1.22)
MONOCYTES NFR BLD: 10 % (ref 4–12)
MYELOCYTES NFR BLD MANUAL: 1 % (ref 0–1)
NEUTROPHILS # BLD MANUAL: 4.72 THOUSAND/UL (ref 1.85–7.62)
NEUTS BAND NFR BLD MANUAL: 1 % (ref 0–8)
NEUTS SEG NFR BLD AUTO: 78 % (ref 43–75)
NRBC BLD AUTO-RTO: 1 /100 WBCS
PHOSPHATE SERPL-MCNC: 1.9 MG/DL (ref 2.3–4.1)
PLATELET # BLD AUTO: 62 THOUSANDS/UL (ref 149–390)
PLATELET BLD QL SMEAR: ABNORMAL
PMV BLD AUTO: 13 FL (ref 8.9–12.7)
POIKILOCYTOSIS BLD QL SMEAR: PRESENT
POLYCHROMASIA BLD QL SMEAR: PRESENT
POTASSIUM SERPL-SCNC: 3.9 MMOL/L (ref 3.5–5.3)
RBC # BLD AUTO: 2.59 MILLION/UL (ref 3.81–5.12)
SODIUM SERPL-SCNC: 133 MMOL/L (ref 136–145)
TOTAL CELLS COUNTED SPEC: 100
WBC # BLD AUTO: 5.98 THOUSAND/UL (ref 4.31–10.16)

## 2017-12-19 PROCEDURE — 85027 COMPLETE CBC AUTOMATED: CPT | Performed by: INTERNAL MEDICINE

## 2017-12-19 PROCEDURE — 84100 ASSAY OF PHOSPHORUS: CPT | Performed by: INTERNAL MEDICINE

## 2017-12-19 PROCEDURE — 94760 N-INVAS EAR/PLS OXIMETRY 1: CPT

## 2017-12-19 PROCEDURE — 82542 COL CHROMOTOGRAPHY QUAL/QUAN: CPT | Performed by: INTERNAL MEDICINE

## 2017-12-19 PROCEDURE — 85007 BL SMEAR W/DIFF WBC COUNT: CPT | Performed by: INTERNAL MEDICINE

## 2017-12-19 PROCEDURE — 80048 BASIC METABOLIC PNL TOTAL CA: CPT | Performed by: INTERNAL MEDICINE

## 2017-12-19 PROCEDURE — 82948 REAGENT STRIP/BLOOD GLUCOSE: CPT

## 2017-12-19 RX ORDER — ATORVASTATIN CALCIUM 80 MG/1
80 TABLET, FILM COATED ORAL DAILY
Qty: 30 TABLET | Refills: 0 | Status: SHIPPED | OUTPATIENT
Start: 2017-12-19

## 2017-12-19 RX ADMIN — Medication 2 TABLET: at 07:02

## 2017-12-19 RX ADMIN — ALLOPURINOL 200 MG: 300 TABLET ORAL at 08:26

## 2017-12-19 RX ADMIN — CYANOCOBALAMIN TAB 500 MCG 1000 MCG: 500 TAB at 08:27

## 2017-12-19 RX ADMIN — ASPIRIN 81 MG 324 MG: 81 TABLET ORAL at 08:26

## 2017-12-19 RX ADMIN — INSULIN LISPRO 14 UNITS: 100 INJECTION, SOLUTION INTRAVENOUS; SUBCUTANEOUS at 07:04

## 2017-12-19 RX ADMIN — ACETAMINOPHEN 975 MG: 325 TABLET ORAL at 07:01

## 2017-12-19 RX ADMIN — PRAVASTATIN SODIUM 80 MG: 80 TABLET ORAL at 08:25

## 2017-12-19 RX ADMIN — INSULIN LISPRO 6 UNITS: 100 INJECTION, SOLUTION INTRAVENOUS; SUBCUTANEOUS at 07:05

## 2017-12-19 RX ADMIN — Medication 2 TABLET: at 11:22

## 2017-12-19 RX ADMIN — MENTHOL, METHYL SALICYLATE: 10; 15 CREAM TOPICAL at 08:28

## 2017-12-19 RX ADMIN — INSULIN LISPRO 6 UNITS: 100 INJECTION, SOLUTION INTRAVENOUS; SUBCUTANEOUS at 11:21

## 2017-12-19 RX ADMIN — PANTOPRAZOLE SODIUM 20 MG: 20 TABLET, DELAYED RELEASE ORAL at 08:27

## 2017-12-19 RX ADMIN — DOCUSATE SODIUM 100 MG: 100 CAPSULE, LIQUID FILLED ORAL at 08:27

## 2017-12-19 RX ADMIN — Medication 325 MG: at 07:02

## 2017-12-19 RX ADMIN — VITAMIN D, TAB 1000IU (100/BT) 1000 UNITS: 25 TAB at 08:25

## 2017-12-19 RX ADMIN — LEVOTHYROXINE SODIUM 25 MCG: 25 TABLET ORAL at 07:01

## 2017-12-19 NOTE — PROGRESS NOTES
Progress Note - Nephrology   Jose Blake 67 y o  female MRN: [id number]  [facility] Encounter: [id number]    ASSESSMENT AND PLAN:  1  ESRD at [address] [place] [address] Monday Wednesday and Friday:  For hemodialysis in a m  access demonstrated clots  We will use the access again tomorrow if it is still has clots then she will need a fistulogram   She also has a permanent Waylon catheter  2   Volume:  She appears euvolemic at this time  UF as tolerated  3   Hypertension:  Low normal   I will place hold parameters on medications and hold pre hemodialysis  4   Electrolytes:  Hyponatremia corrects for glucose  Potassium normal   5   Mineral bone disorder:  Magnesium has been acceptable  Phosphorus is now low  Recheck  Off binders  Secondary hyperparathyroidism on Hectorol  6   Anemia:  Hemoglobin decreased to 7 8  Transfuse as needed for hemoglobin less than 7 per primary service  Ferritin over 900 so receiving weekly intravenous iron  NO JESSICA BECAUSE OF PE  7  Thrombocytopenia:  Since November  Actually worse  I would consult Hematology  Make sure the patient is heparin free  Recommendations:  -evaluate for hemolytic process with a peripheral smear/LVH/haptoglobin  -HIT serotonin level  -consult Hematology  8  Other issues:  -atypical chest pain:  Followed by Cardiology  Echo pending  Slight elevation in troponin  -morbid obesity  -gout  -diabetes mellitus type 2  -Garcia's esophagus  -hypothyroidism        Subjective:   Patient is asymptomatic  No chest pain or shortness of Breath at this time  No nausea vomiting or diarrhea  She does complain of buttocks pain from sores  Objective:     Vitals: Blood pressure 112/53, pulse 85, temperature 97 7 °F (36 5 °C), temperature source Axillary, resp  rate 18, height 5' 4" (1 626 m), weight (!) 137 kg (301 lb 13 oz), SpO2 100 %, not currently breastfeeding  ,Body mass index is 51 81 kg/m²      Weight (last 2 days)     Date/Time   Weight 12/19/17 0545  (!)  137 (301 81)    Weight: Bed scale at 12/19/17 0545    12/17/17 0600  (!)  139 (306 44)    12/17/17 0326  (!)  139 (306 44)                Intake/Output Summary (Last 24 hours) at 12/19/17 0755  Last data filed at 12/18/17 2329   Gross per 24 hour   Intake              722 ml   Output             2534 ml   Net            -1812 ml       HD Cath Double (Active)   Reasons to continue HD Cath Treatment Therapy 12/19/2017  7:52 AM   Line Necessity Reviewed Yes, reviewed with provider 12/18/2017  9:00 AM   Site Assessment Clean;Dry; Intact; Pink 12/18/2017  9:00 PM   Proximal Lumen (Red Port-PICC only) Status Capped 12/18/2017  9:00 PM   Distal Lumen (Gallo Port-PICC only) Status Cap changed 12/18/2017  9:00 PM   Line Care Connections checked and tightened 12/18/2017  9:00 PM   Dressing Type Chlorhexidine dressing 12/18/2017  9:00 PM   Dressing Status Clean;Dry; Intact 12/18/2017  9:00 PM   Dressing Intervention Dressing changed 12/18/2017  9:00 AM   Dressing Change Due 12/25/17 12/18/2017  9:00 PM       Physical Exam: General:  Morbidly obese, no acute distress  Skin:  No acute rash  Eyes:  No scleral icterus  ENT:  Moist mucous membranes  Neck:  Obese, supple and no jugular venous distention  Chest:  Clear to auscultation  CVS:  No rubs or gallops appreciated  Abdomen:   Morbidly obese, soft and nontender with normal bowel sounds  Extremities:  No edema,  upper extremity AV fistula with a good bruit and thrill  Neuro:  Grossly intact  Psych:  Alert and oriented                Medications:    Scheduled Meds:  acetaminophen 975 mg Oral Q8H [facility]   allopurinol 200 mg Oral Once per day on Sun Tue Wed Fri Sat   allopurinol 300 mg Oral Once per day on Mon Thu   aspirin 324 mg Oral Once   aspirin 324 mg Oral Daily   calcium carbonate 2 tablet Oral TID With Meals   cholecalciferol 1,000 Units Oral Daily   cyanocobalamin 1,000 mcg Oral Daily   docusate sodium 100 mg Oral BID   ferrous sulfate 325 mg Oral TID With Meals   insulin glargine 40 Units Subcutaneous HS   insulin lispro 14 Units Subcutaneous TID With Meals   insulin lispro 2-12 Units Subcutaneous TID AC   lactulose 20 g Oral Daily   latanoprost 1 drop Both Eyes HS   levothyroxine 25 mcg Oral Daily   menthol-methyl salicylate  Apply externally BID   metoprolol tartrate 12 5 mg Oral See Admin Instructions   pantoprazole 20 mg Oral Daily   polyethylene glycol 17 g Oral Daily   pravastatin 80 mg Oral Daily       PRN Meds:   acetaminophen    albuterol    aluminum-magnesium hydroxide-simethicone    bisacodyl    dextromethorphan-guaiFENesin    doxercalciferol    iron sucrose    miconazole    ondansetron    senna    Insert peripheral IV **AND** sodium chloride (PF)    Continuous Infusions:     Lab, Imaging and other studies: I have personally reviewed pertinent labs    Laboratory Results:    Results from last 7 days  Lab Units 12/19/17  0614 12/18/17  0444 12/17/17  0739 12/17/17  0718 12/16/17  0432 12/15/17  1637 12/14/17  0617 12/13/17  0928   WBC Thousand/uL 5 98  --  6 94  --  8 53 7 56 9 19 9 76   HEMOGLOBIN g/dL 7 8*  --  8 3*  --  8 5* 8 5* 8 5* 8 2*   HEMATOCRIT % 24 7*  --  26 5*  --  26 7* 26 4* 27 0* 25 6*   PLATELETS Thousands/uL 62*  --  85*  --  68* 54* 49* 50*   SODIUM mmol/L 133* 131*  --  132* 133* 134*  --   --    POTASSIUM mmol/L 3 9 4 6  --  4 8 3 9 3 4*  --   --    CHLORIDE mmol/L 99* 98*  --  95* 96* 95*  --   --    CO2 mmol/L 28 24  --  26 31 31  --   --    BUN mg/dL 28* 57*  --  49* 35* 26*  --   --    CREATININE mg/dL 3 04* 4 76*  --  3 55* 2 19* 1 57*  --   --    CALCIUM mg/dL 8 7 9 0  --  8 8 8 4 8 4  --   --    MAGNESIUM mg/dL  --   --   --   --  2 0  --   --   --    PHOSPHORUS mg/dL  --   --   --   --  1 8*  --   --   --    ALBUMIN g/dL  --   --   --   --  2 2* 2 1*  --   --    TOTAL PROTEIN g/dL  --   --   --   --  6 1* 6 0*  --   --    GLUCOSE RANDOM mg/dL 253* 265*  --  237* 212* 320*  --   --      Urinalysis: Lab Results Component Value Date    COLORU Yellow 11/01/2016    COLORU Yellow 09/22/2015    CLARITYU Turbid 11/01/2016    CLARITYU Slightly Cloudy 09/22/2015    SPECGRAV 1 017 11/01/2016    SPECGRAV 1 025 09/22/2015    PHUR 7 0 11/01/2016    PHUR 7 0 09/22/2015    LEUKOCYTESUR Large (A) 11/01/2016    LEUKOCYTESUR Trace (A) 09/22/2015    NITRITE Negative 11/01/2016    NITRITE Negative 09/22/2015    PROTEINUA 300 (3+) (A) 11/01/2016    PROTEINUA >=300(3+) (A) 09/22/2015    GLUCOSEU Negative 11/01/2016    GLUCOSEU Negative 09/22/2015    KETONESU Negative 11/01/2016    KETONESU Negative 09/22/2015    BILIRUBINUR Negative 11/01/2016    BILIRUBINUR Negative 09/22/2015    BLOODU Large (A) 11/01/2016    BLOODU Small (A) 09/22/2015     ABGs: No results found for: [facility]  Radiology review:     Portions of the record may have been created with voice recognition software   Occasional wrong word or "sound a like" substitutions may have occurred due to the inherent limitations of voice recognition software   Read the chart carefully and recognize, using context, where substitutions have occurred

## 2017-12-19 NOTE — NURSING NOTE
Iv site dcd left hand and dressing intact over site, complete am care provided and treatment regimen for sacral decubs performed before d/c , report called to [facility], pt eating lunch

## 2017-12-19 NOTE — RESTORATIVE TECHNICIAN NOTE
Restorative Specialist Mobility Note                      Repositioned: Other (Comment) (Rep /sat pt upright in bed  Bed alarm on   Pt callbell, phone/tray within reach )       Abad WOOTEN, Restorative Technician, [employer]

## 2017-12-19 NOTE — PROGRESS NOTES
IM Residency Progress Note   Unit/Bed#: PPHP 701-01 Encounter: [id number]  SOD Team C       Nessa Cornejo 67 y o  female [id number]    Hospital Stay Days: 3      Assessment/Plan:    Principal Problem:    Chest pain  Active Problems:    Chronic diastolic CHF (congestive heart failure) (HCC)    IDDM (insulin dependent diabetes mellitus) ([facility] [address] )    Morbid obesity ([facility] )    End-stage renal disease on hemodialysis ([facility] )    History of pulmonary embolism    Thrombocytopenia (HCC)    Sleep apnea    Disease of thyroid gland    Anemia    Gout    Hypertension    Hyperlipidemia    Epigastric pain    Elevated troponin    Elevated lipase    Electrolyte abnormality    Garcia esophagus    UTI (urinary tract infection)    Chest Pain  - resolved  - rising troponin peaked at 1 98 and started trending down so we stopped checking it  - Per cardiology, plan is for medical management due to patient's overall poor condition  - echocardiography done yesterday showed normal systolic function with an ejection fraction of 60% and grade 1 diastolic dysfunction  -we will continue with aspirin, metoprolol, protonix, pravastatin  Plavix held secondary to thrombocytopenia  - Lipid panel showed a total cholesterol of 160, LDL of 41, HDL of 76, triglyceride of 216      IDDM  - not well controlled - HgbA1c on 12/9/17 was 8 6    - Blood glucose over the past 24 hours ranged from  253 to 293  -we have switched her to a carb controlled diet and we will continue with insulin Lantus 40 units QHS, increase mealtime Humalog from 40 units t i d  to 16 units t i d  and continue with  insulin sliding scale, Accu-Cheks        ESRD  -on hemodialysis Mondays Wednesdays and Fridays  -last dialysis session was yesterday and the next one is due on Wednesday 12/20/17   - nephrology is on board     Elevated Lipase  Lipase 427 on admission, upper range of normal  Currently asymptomatic    Will monitor clinically      Hyponatremia    - sodium today is 133 up from 131 yesterday  Patient is not symptomatic   - continue to monitor BMP     Hypokalemia  - resolved  - potassium this morning is 3 9     Hypertension  -stable   -continue metoprolol      HLD  -lipid panel showed total cholesterol of 160, LDL of 41, HDL of 76 and triglycerides of 216  - Continue with pravastatin 80mg po daily  -  Per Cardiology, patient should be discharged with atorvastatin 80 mg or rosuvastatin 40 mg     Anemia  -hemoglobin today is 7 8 down from 8 3 on 12/17/17 likely secondary to end-stage renal disease  -  continue ferrous sulfate 325mg po tid       History of PE  -  continue with aspirin and bilateral SCDs      Thrombocytopenia  -platelets today is 62 with a baseline of 30 to 50 in the past 2 weeks  -will continue to monitor CBC      Diastolic Heart Failure  - Echo done yesterday showed normal systolic function, EF of 47% and grade 1 diastolic dysfunction  - Currently stable  - Continue with metoprolol 12 5mg po daily excluding M, W, F before dialysis      Hypothyroidism  - TSH on 12/11/17 1 5  - Continue levothyroxine 25mcg po daily     Gout  - Currently stable, no acute flare   - Continue home dosage allopurinol 200mg po Sun, Tu, We, Fri, Sat and 300mg po Mon, Th      Garcia's Esophagus  -continue with Protonix  -outpatient follow-up with GI     Constipation  -stable, continue with bowel regimen     Morbid Obesity  -BMI is 49 61  -diet and exercise     Sleep Apnea  -likely secondary to morbid obesity  - Continue with CPAP at night and p r n  oxygen by nasal cannula     Ambulatory Dysfunction  -likely secondary to morbid obesity and deconditioning   -PT/OT       Buttock pain secondary to stage 2 decubitus pressure ulcer - POA  -improving   - wound care on board  - continue to offload pressure and turn patient regularly every 2 hours  -continue with scheduled Tylenol for pain          Disposition: For likely discharge back to [facility] today  Subjective:   Patient seen and examined  She has no new complaints today  Her sacral pain is much improved  She denies chest pain, fever, chills, night sweats, shortness of breath, nausea, vomiting, abdominal pain, diarrhea, constipation  Vitals: Temp (24hrs), Av 1 °F (36 7 °C), Min:97 6 °F (36 4 °C), Max:99 5 °F (37 5 °C)  Current: Temperature: 97 7 °F (36 5 °C)  Vitals:    17 1940 17 2244 17 2308 17 0545   BP:   112/53    Pulse:   85    Resp:   18    Temp:   97 7 °F (36 5 °C)    TempSrc:   Axillary    SpO2: 98% 95% 100%    Weight:    (!) 137 kg (301 lb 13 oz)   Height:        Body mass index is 51 81 kg/m²  I/O last 24 hours: In: 334 [P O :222; I V :500]  Out: 2534 [Urine:34; Other:2500]      Physical Exam:   GENERAL:  Morbidly obese, in no obvious distress, lying comfortably in her bed, afebrile to touch  HEENT:  Normocephalic, atraumatic  Pupils equal, reactive to light and accommodation  Not pale, anicteric  NECK:  Morbidly obese  CVS:  S1, S2, regular rate and rhythm  2/6 systolic murmur maximal in aortic valve area  No rubs or gallops  RESPIRATORY:  Clear to auscultation bilaterally with mildly reduced breath sounds in the lower lung zones  ABDOMEN:  Obese, soft, nontender nondistended  Normoactive bowel sounds  MSK:  1+ pitting pedal edema bilaterally  Mild tenderness in her lower legs  Chronic venous stasis changes  NEURO:  Awake, alert and oriented x3      Invasive Devices     Peripheral Intravenous Line            Peripheral IV 17 Left Hand 2 days          Line            Hemodialysis AV Fistula 16 Right Upper arm 488 days    Hemodialysis AV Fistula 17 Right Upper arm 25 days    HD Cath Double 18 days                          Labs:   Recent Results (from the past 24 hour(s))   Fingerstick Glucose (POCT)    Collection Time: 17 10:56 AM   Result Value Ref Range    POC Glucose 217 (H) 65 - 140 mg/dl   Fingerstick Glucose (POCT)    Collection Time: 17  4:27 PM   Result Value Ref Range    POC Glucose 271 (H) 65 - 140 mg/dl   Fingerstick Glucose (POCT)    Collection Time: 12/18/17  8:56 PM   Result Value Ref Range    POC Glucose 279 (H) 65 - 140 mg/dl   Basic metabolic panel    Collection Time: 12/19/17  6:14 AM   Result Value Ref Range    Sodium 133 (L) 136 - 145 mmol/L    Potassium 3 9 3 5 - 5 3 mmol/L    Chloride 99 (L) 100 - 108 mmol/L    CO2 28 21 - 32 mmol/L    Anion Gap 6 4 - 13 mmol/L    BUN 28 (H) 5 - 25 mg/dL    Creatinine 3 04 (H) 0 60 - 1 30 mg/dL    Glucose 253 (H) 65 - 140 mg/dL    Calcium 8 7 8 3 - 10 1 mg/dL    eGFR 15 ml/min/1 73sq m   CBC and differential    Collection Time: 12/19/17  6:14 AM   Result Value Ref Range    WBC 5 98 4 31 - 10 16 Thousand/uL    RBC 2 59 (L) 3 81 - 5 12 Million/uL    Hemoglobin 7 8 (L) 11 5 - 15 4 g/dL    Hematocrit 24 7 (L) 34 8 - 46 1 %    MCV 95 82 - 98 fL    MCH 30 1 26 8 - 34 3 pg    MCHC 31 6 31 4 - 37 4 g/dL    RDW 15 7 (H) 11 6 - 15 1 %    MPV 13 0 (H) 8 9 - 12 7 fL    Platelets 62 (L) 304 - 390 Thousands/uL    nRBC 1 /100 WBCs       Radiology Results: I have personally reviewed pertinent reports  Other Diagnostic Testing:   I have personally reviewed pertinent reports          Active Meds:   Current Facility-Administered Medications   Medication Dose Route Frequency    acetaminophen (TYLENOL) tablet 650 mg  650 mg Oral Q6H PRN    acetaminophen (TYLENOL) tablet 975 mg  975 mg Oral Q8H [facility]    albuterol inhalation solution 2 5 mg  2 5 mg Nebulization Q6H PRN    allopurinol (ZYLOPRIM) tablet 200 mg  200 mg Oral Once per day on Sun Tue Wed Fri Sat    allopurinol (ZYLOPRIM) tablet 300 mg  300 mg Oral Once per day on Mon Thu    aluminum-magnesium hydroxide-simethicone (MYLANTA) 200-200-20 mg/5 mL oral suspension 15 mL  15 mL Oral Q4H PRN    aspirin chewable tablet 324 mg  324 mg Oral Once    aspirin chewable tablet 324 mg  324 mg Oral Daily    bisacodyl (DULCOLAX) EC tablet 5 mg  5 mg Oral Daily PRN    calcium carbonate (OYSTER SHELL,OSCAL) 500 mg tablet 2 tablet  2 tablet Oral TID With Meals    cholecalciferol (VITAMIN D3) tablet 1,000 Units  1,000 Units Oral Daily    cyanocobalamin (VITAMIN B-12) tablet 1,000 mcg  1,000 mcg Oral Daily    dextromethorphan-guaiFENesin (ROBITUSSIN DM)  mg/5 mL oral syrup 5 mL  5 mL Oral Q4H PRN    docusate sodium (COLACE) capsule 100 mg  100 mg Oral BID    doxercalciferol (HECTOROL) injection 0 5 mcg  0 5 mcg Intravenous After Dialysis    ferrous sulfate tablet 325 mg  325 mg Oral TID With Meals    insulin glargine (LANTUS) subcutaneous injection 40 Units  40 Units Subcutaneous HS    insulin lispro (HumaLOG) 100 units/mL subcutaneous injection 14 Units  14 Units Subcutaneous TID With Meals    insulin lispro (HumaLOG) 100 units/mL subcutaneous injection 2-12 Units  2-12 Units Subcutaneous TID AC    iron sucrose (VENOFER) 50 mg in sodium chloride 0 9 % 100 mL IVPB  50 mg Intravenous After Dialysis    lactulose 20 g/30 mL oral solution 20 g  20 g Oral Daily    latanoprost (XALATAN) 0 005 % ophthalmic solution 1 drop  1 drop Both Eyes HS    levothyroxine tablet 25 mcg  25 mcg Oral Daily    menthol-methyl salicylate (BENGAY) 10-02 % cream   Apply externally BID    metoprolol tartrate (LOPRESSOR) partial tablet 12 5 mg  12 5 mg Oral See Admin Instructions    miconazole 2 % cream   Topical BID PRN    ondansetron (ZOFRAN-ODT) dispersible tablet 4 mg  4 mg Oral Q4H PRN    pantoprazole (PROTONIX) EC tablet 20 mg  20 mg Oral Daily    polyethylene glycol (MIRALAX) packet 17 g  17 g Oral Daily    pravastatin (PRAVACHOL) tablet 80 mg  80 mg Oral Daily    senna (SENOKOT) tablet 17 2 mg  2 tablet Oral Daily PRN    sodium chloride (PF) 0 9 % injection 3 mL  3 mL Intravenous PRN         VTE Pharmacologic Prophylaxis: Reason for no pharmacologic prophylaxis Thrombocytopenia  VTE Mechanical Prophylaxis: sequential compression device    Lupe Ruiz DO

## 2017-12-19 NOTE — SOCIAL WORK
Informed by Dr Sha Jones that pt is medically stable for discharge to return to -[facility]  CM arranged BLS transport with [facility] for a 12 pm   Pt, pt's bedside JR Adam , pt's aunt Keisha Mitchell, contact # [phone], and Haylie, [facility] liaison, made aware of transport time  Facility Agency Transfer form and CMN completed  Chart copy requested

## 2017-12-25 LAB
SRA .2 IU/ML UFH SER-ACNC: 3 % (ref 0–20)
SRA 100IU/ML UFH SER-ACNC: 1 % (ref 0–20)
SRA UFH SER-IMP: NORMAL

## 2018-01-03 ENCOUNTER — GENERIC CONVERSION - ENCOUNTER (OUTPATIENT)
Dept: OTHER | Facility: OTHER | Age: 73
End: 2018-01-03

## 2018-01-04 ENCOUNTER — GENERIC CONVERSION - ENCOUNTER (OUTPATIENT)
Dept: OTHER | Facility: OTHER | Age: 73
End: 2018-01-04

## 2018-01-04 ENCOUNTER — HOSPITAL ENCOUNTER (OUTPATIENT)
Dept: RADIOLOGY | Facility: HOSPITAL | Age: 73
Discharge: HOME | End: 2018-01-04
Attending: INTERNAL MEDICINE | Admitting: RADIOLOGY
Payer: MEDICARE

## 2018-01-04 VITALS — OXYGEN SATURATION: 98 % | RESPIRATION RATE: 18 BRPM | HEART RATE: 82 BPM

## 2018-01-04 DIAGNOSIS — N18.6 ESRD (END STAGE RENAL DISEASE) (HCC): ICD-10-CM

## 2018-01-04 PROCEDURE — 36589 REMOVAL TUNNELED CV CATH: CPT

## 2018-01-04 NOTE — SEDATION DOCUMENTATION
Left IJ vein Permacath removed by Dr Coby Tristan  Has functioning right Upper arm AV fistula  Steri-strips to left chest permacath removal site  No s/s of bleeding

## 2018-01-05 ENCOUNTER — GENERIC CONVERSION - ENCOUNTER (OUTPATIENT)
Dept: OTHER | Facility: OTHER | Age: 73
End: 2018-01-05

## 2018-01-09 NOTE — PROCEDURES
Procedures by Oskar Lund MD  at 8/15/2017 12:21 PM      Author:  Oskar Lund MD Service:  Surgery-General Author Type:  Resident    Filed:  8/21/2017  4:55 PM Date of Service:  8/15/2017 12:21 PM Status:  Attested Addendum    :  Oskar Lund MD (Resident)      Related Notes:  Original Note by Oskar Lund MD (Resident) filed at 8/21/2017  4:54 PM      Cosigner:  Meena Gunn DO at 8/21/2017  5:56 PM        Procedure Orders:       1  INCISION AND DRAINAGE [[id number]] ordered by Oskar Lund MD at 08/15/17 1222                 Post-procedure Diagnoses:       1  Sacral decubitus ulcer, stage II [L89 152]              Attestation signed by Meena Gunn DO at 8/21/2017  5:56 PM      I agree with the resident dictated procedure note  Incision and Drainage  Date/Time: 8/15/2017 12:22 PM  Performed by: Thu Rojas  Authorized by: Jimi Cortez     Patient location:  Bedside  Location:     Indications for incision and drainage: necrotic eschar overlying sacral decubitus ulcer  Size:  3cm x 2cm  Sedation:     Sedation type: Moderate (conscious) sedation (See separate Procedural Sedation form) (performed during colonoscopy with colorectal)  Procedure details:     Complexity:  Simple    Incision types:  Elliptical (cut with scissors)    Approach:  Open    Incision depth:  Subcutaneous    Wound management:  Debrided    Drainage:  Bloody    Drainage amount:  Scant    Wound treatment:  Wound left open    Packing materials:  None  Post-procedure details:     Patient tolerance of procedure:   Tolerated well, no immediate complications  Comments:      Covered with Calazime cream, wound appears to be stage 2 sacral decubitus ulcer          Excisional debridement of necrotic tissue overlying sacral decubitus ulcer in the setting of morbid obesity as evidence by use of scissors to debride down to subcutaneous tissue excisional debridement using scissors removing necrotic tissue to bleeding  tissue 3cm x 2cm to subcutaneous tissue      Pre-procedure:        Post-procedure:                     Received for:Dionicio Childers MD  Aug 21 2017  5:57PM [facility] Standard Time

## 2018-01-09 NOTE — PROCEDURES
Procedures by Louise Rodney MD at 5/6/2016   3:30 PM      Author:  Louise Rodney MD Service:  Interventional Radiology  Author Type:  Physician     Filed:  5/6/2016  3:33 PM Date of Service:  5/6/2016  3:30 PM Status:  Signed     :  Louise Rodney MD (Physician)            Procedures  INTERVENTIONAL RADIOLOGY PROCEDURE NOTE    Date: 05/06/16    Procedures:     1 US guided right IJ temp dialysis catheter placement    Preoperative diagnosis: renal failure    Postoperative diagnosis: Same    Surgeon: Louise Rodney MD     Assistant: None  No qualified resident was available  Blood loss: 250 mL     Specimens: none    Findings: Tip is in the right atrium    Complications: Buckling/perforation of the peel away sheath during catheter placement  This was replaced over a wire with a new peel away sheath  Anesthesia: 1% local lidocaine infiltration    Plan:  Catheter is ready for use                  Received for:Provider  EPIC   May  6 2016  3:33PM [facility] Standard Time

## 2018-01-10 NOTE — MISCELLANEOUS
Message   Recorded as Task   Date: 02/02/2016 12:57 PM, Created By: Giancarlo Alexandra   Task Name: Miscellaneous   Assigned To: Zulma Hernandez   Regarding Patient: Jasmeet Hammonds, Status: In Progress   LioAretha Diaz - 02 Feb 2016 12:57 PM     TASK CREATED  please update med list s/p recent hospitalization  Also, CMP/mg/p04/cbc now before colten't later this week   Zulma Hernandez - 02 Feb 2016 2:55 PM     TASK IN PROGRESS   I spoke with Chantale Romeo at [facility] and I reviewed the patient's medications with her  I updated the med is in the chart  Patient will have lab work drawn prior to her appt later this week  kja      Active Problems    1  Acute deep vein thrombosis of lower limb, unspecified laterality   2  Acute kidney failure (584 9) (N17 9)   3  Anemia (285 9) (D64 9)   4  Benign hypertensive CKD (403 10) (I12 9)   5  Cellulitis of right leg (682 6) (L03 115)   6  Chronic diastolic congestive heart failure (428 32,428 0) (I50 32)   7  Chronic kidney disease, stage 3 (585 3) (N18 3)   8  Chronic obstructive pulmonary disease (496) (J44 9)   9  Congestive heart failure (428 0) (I50 9)   10  Diabetes Mellitus (250 00)   11  Gout (274 9) (M10 9)   12  Hyperkalemia (276 7) (E87 5)   13  Hyperlipidemia (272 4) (E78 5)   14  Hypernatremia (276 0) (E87 0)   15  Hypertension (401 9) (I10)   16  Morbid or severe obesity due to excess calories (278 01) (E66 01)   17  Obstructive sleep apnea (327 23) (G47 33)   18  Proteinuria (791 0) (R80 9)   19  Pulmonary embolism (415 19) (I26 99)   20  Secondary hyperparathyroidism, renal (588 81) (N25 81)    Current Meds   1  Acetaminophen 325 MG Oral Tablet; TAKE 1 TO 2 TABLETS EVERY 4 HOURS AS   NEEDED; Therapy: ([phone]) to Recorded   2  Albuterol Sulfate (2 5 MG/3ML) 0 083% Inhalation Nebulization Solution; USE 1 UNIT   DOSE EVERY 4-6 HOURS AS NEEDED FOR WHEEZING ; Therapy: (Recorded:70Jln5147) to Recorded   3   Allopurinol 100 MG Oral Tablet; TAKE 1 TABLET DAILY; Therapy: (Recorded:20Xyl0282) to Recorded   4  Antacid TABS; take 1 tablet daily prn; Therapy: (Recorded:12Qdz9851) to Recorded   5  Azopt 1 % Ophthalmic Suspension; INSTILL 1 DROP IN BOTH EYES TWICE DAILY; Therapy: (Recorded:58Kgd7748) to Recorded   6  Bengay Ultra Strength CREA; APPLY INCH  PRN; Therapy: (Recorded:22Gcq1267) to Recorded   7  Calcitriol 0 25 MCG Oral Capsule (Rocaltrol); Take one capsule every   Monday/Wednesday/Friday/Sunday; Therapy: 28Apr2015 to (Evaluate:07Ezu3025); Last Rx:28Apr2015 Ordered   8  Coumadin 1 MG Oral Tablet (Warfarin Sodium); Take 1 capsule on Tuesday, and   wednesday; Therapy: (Recorded:37Pco6727) to Recorded   9  Coumadin 6 MG Oral Tablet (Warfarin Sodium); Take 1 capsule on Sun, Mon, Thu, Fri,   Sat; Therapy: (Recorded:83Kbm4361) to Recorded   10  [employer] Oyster Shell Calcium 500 MG Oral Tablet; Take 1 tablet daily; Therapy: (Recorded:14Qqa9721) to Recorded   11  Docusate Sodium 100 MG Oral Tablet; Take 1 tablet twice daily; Therapy: (Recorded:39Ynp9371) to Recorded   12  Furosemide 40 MG Oral Tablet; Take one tablet twice daily; Therapy: (Recorded:61Daw4835) to Recorded   13  HumaLOG Pen 100 UNIT/ML SOLN; USE AS DIRECTED; Therapy: (Recorded:12Nvs6969) to Recorded   14  Hydrocortisone 2 5 % Rectal Cream; USE 1 APPLICATORFUL RECTALLY 1-2 TIMES    DAILY; Therapy: (Recorded:88Dui1936) to Recorded   15  Lantus SoloStar 100 UNIT/ML SOLN; USE AS DIRECTED; Therapy: (Recorded:49Fav6719) to Recorded   16  Latanoprost 0 005 % Ophthalmic Solution; INSTILL 1 DROP IN BOTH EYES AT    BEDTIME; Therapy: (Recorded:18Gze5434) to Recorded   17  Levothyroxine Sodium 25 MCG Oral Tablet; Therapy: 15KYR9868 to Recorded   18  Lyrica 150 MG Oral Capsule; TAKE 1 CAPSULE TWICE DAILY; Therapy: (Recorded:65Imo5805) to Recorded   19  Lyrica 75 MG Oral Capsule; TAKE 1 CAPSULE AT BEDTIME; Therapy: (Recorded:31Bzv7730) to Recorded   20   Melatonin 5 MG Oral Tablet; TAKE AS DIRECTED; Therapy: (Recorded:08Keq7926) to Recorded   21  Metoprolol Tartrate 50 MG Oral Tablet; Take 1 tablet twice daily; Therapy: (Recorded:71Qcw0441) to Recorded   22  Omega 3 CAPS; take 1 capsule daily; Therapy: (Recorded:20Rzs5119) to Recorded   23  Omeprazole 20 MG Oral Capsule Delayed Release; TAKE 1 CAPSULE DAILY; Therapy: (Recorded:44Dri7662) to Recorded   24  Orajel GEL; USE AS DIRECTED; Therapy: (Recorded:26Nmi1595) to Recorded   25  Pravastatin Sodium 80 MG Oral Tablet; TAKE 1 TABLET DAILY; Therapy: (Recorded:88Heb7381) to Recorded   26  Procrit 96380 UNIT/ML Injection Solution; Every 30 days; Therapy: [phone]) to Recorded   27  Q-Tussin CF LIQD; USE AS DIRECTED; Therapy: (Recorded:65Osz9973) to Recorded   28  Senna 8 6 MG Oral Tablet; TAKE AS DIRECTED; Therapy: (Recorded:02Oez4974) to Recorded   29  TraMADol HCl - 50 MG Oral Tablet; TAKE TABLET  PRN; Therapy: (Recorded:98Cir4565) to Recorded   30  Triple Antibiotic 3 5-400-5000 External Ointment; Therapy: 18Apr2015 to Recorded   31  Vitamin C 500 MG Oral Tablet; TAKE 1 TABLET DAILY; Therapy: (Recorded:87Qmz4036) to Recorded   32  Voltaren 1 % Transdermal Gel; APPLY SPARINGLY TO RIGHT HIP AREA SPARINGLY    THREE TIMES A DAY AS NEEDED; Therapy: (Recorded:65Orm2648) to Recorded    Allergies    1  Keflex   2  Codeine Derivatives   3  Darvocet-N 50 TABS   4  Morphine Derivatives   5  Propoxyphene HCl CAPS    6   NO KNOWN DRUG ALLERGY    Signatures   Electronically signed by : SHADE Freeman ; Feb 2 2016  4:22PM EST

## 2018-01-10 NOTE — MISCELLANEOUS
Message  Received a call from [facility] @ Dr Mireille Slade office because Dr Eli Mejía would like to schedule the pts creation of a dialysis access as a joint case with Dr Paramjit Burrell  I have sent a message to Dr Paramjit Burrell in regards to this matter and have called [facility] back letting her know that I would have to get back to her  Her phone # is [phone]      Active Problems    1  Acute deep vein thrombosis of lower limb, unspecified laterality   2  Acute kidney failure (584 9) (N17 9)   3  Anemia (285 9) (D64 9)   4  Benign hypertensive CKD (403 10) (I12 9)   5  Cellulitis of right leg (682 6) (L03 115)   6  Chronic diastolic congestive heart failure (428 32,428 0) (I50 32)   7  Chronic hypoxemic respiratory failure (518 83,799 02) (J96 11)   8  Chronic obstructive pulmonary disease (496) (J44 9)   9  Chronic renal disease, stage 4 (severe) (585 4) (N18 4)   10  Chronic respiratory failure with hypercapnia (518 83) (J96 12)   11  Congestive heart failure (428 0) (I50 9)   12  Diabetes Mellitus (250 00)   13  Dyspnea (786 09) (R06 00)   14  Edema (782 3) (R60 9)   15  End-stage renal disease on hemodialysis (585 6,V45 11) (N18 6,Z99 2)   16  Gout (274 9) (M10 9)   17  Hemodialysis patient (V45 11) (Z99 2)   18  Hyperkalemia (276 7) (E87 5)   19  Hyperlipidemia (272 4) (E78 5)   20  Hypernatremia (276 0) (E87 0)   21  Hypertension (401 9) (I10)   22  Morbid or severe obesity due to excess calories (278 01) (E66 01)   23  Obesity hypoventilation syndrome (278 03) (E66 2)   24  Obstructive sleep apnea (327 23) (G47 33)   25  Proteinuria (791 0) (R80 9)   26  Pulmonary embolism (415 19) (I26 99)   27  Secondary hyperparathyroidism, renal (588 81) (N25 81)    Current Meds   1  Acetaminophen 325 MG Oral Tablet; TAKE 1 TO 2 TABLETS EVERY 4 HOURS AS   NEEDED; Therapy: ([id number]) to Recorded   2   Albuterol Sulfate (2 5 MG/3ML) 0 083% Inhalation Nebulization Solution; USE 1 UNIT   DOSE EVERY 4-6 HOURS AS NEEDED FOR WHEEZING ; Therapy: (Recorded:25Sub1195) to Recorded   3  Allopurinol 100 MG Oral Tablet; TAKE 1 TABLET DAILY; Therapy: (Recorded:21Aug2014) to Recorded   4  AmLODIPine Besylate 2 5 MG Oral Tablet; TAKE 1 TABLET DAILY AS DIRECTED; Therapy: [id number] to (Juan Pablo Avila)  Requested for: 73ZXD4199; Last   Rx:06Apr2016 Ordered   5  Antacid TABS; take 1 tablet daily prn; Therapy: (Recorded:17Apr2015) to Recorded   6  Bengay Ultra Strength CREA; APPLY INCH  PRN; Therapy: (Recorded:17Apr2015) to Recorded   7  Calcitriol 0 25 MCG Oral Capsule (Rocaltrol); Take one capsule every   Monday/Wednesday/Friday/Sunday; Therapy: 28Apr2015 to (Evaluate:25Oct2015); Last Rx:28Apr2015 Ordered   8  Coumadin 1 MG Oral Tablet (Warfarin Sodium); Take 1 capsule on Tuesday, and   wednesday; Therapy: (Recorded:13Tiu3327) to Recorded   9  Coumadin 6 MG Oral Tablet (Warfarin Sodium); Take 1 capsule on Sun, Mon, Thu, Fri,   Sat; Therapy: (Recorded:21Aug2014) to Recorded   10  [employer] Oyster Shell Calcium 500 MG TABS; Take 1 tablet daily; Therapy: (Recorded:21Aug2014) to Recorded   11  Docusate Sodium 100 MG Oral Tablet; Take 1 tablet twice daily; Therapy: (Recorded:36Vae9773) to Recorded   12  Furosemide 40 MG Oral Tablet; Take 40mg am and 20mg pm;    Therapy: (Recorded:64Eoj8642) to Recorded   13  HumaLOG Pen 100 UNIT/ML SOLN; USE AS DIRECTED; Therapy: (Recorded:06Uoh4730) to Recorded   14  Lantus SoloStar 100 UNIT/ML SOLN; USE AS DIRECTED; Therapy: (Recorded:60Fvd5885) to Recorded   15  Latanoprost 0 005 % Ophthalmic Solution; INSTILL 1 DROP IN BOTH EYES AT    BEDTIME; Therapy: (Recorded:05Cwy6146) to Recorded   16  Levothyroxine Sodium 25 MCG Oral Tablet; Therapy: 15WAL7350 to Recorded   17  Lyrica 75 MG Oral Capsule; TAKE 1 CAPSULE AT BEDTIME; Therapy: (Recorded:80Avq4270) to Recorded   18  Metoprolol Succinate ER 50 MG Oral Tablet Extended Release 24 Hour; Take 1 tablet    twice daily;     Therapy: (Recorded:06Apr2016) to Recorded   19  Metoprolol Tartrate 25 MG Oral Tablet; Therapy: 42TBD3215 to Recorded   20  Omega 3 CAPS; take 1 capsule daily; Therapy: (Recorded:32Ned0667) to Recorded   21  Omega-3-acid Ethyl Esters 1 GM Oral Capsule; Therapy: 90Vao1318 to Recorded   22  Omeprazole 20 MG Oral Capsule Delayed Release; TAKE 1 CAPSULE DAILY; Therapy: (Recorded:74Efg4409) to Recorded   23  Pravastatin Sodium 20 MG Oral Tablet; Therapy: 59MMT2163 to Recorded   24  Pravastatin Sodium 80 MG Oral Tablet; TAKE 1 TABLET DAILY; Therapy: (Recorded:18Cio2095) to Recorded   25  Procrit 48754 UNIT/ML Injection Solution; Every 30 days; Therapy: ([phone]) to Recorded   26  Restasis 0 05 % Ophthalmic Emulsion; 1 drop in bot eyes in the morning; Therapy: (Recorded:06Apr2016) to Recorded   27  Senna 8 6 MG Oral Tablet; TAKE AS DIRECTED; Therapy: (Recorded:41Gao3523) to Recorded   28  Sulfamethoxazole-Trimethoprim 800-160 MG Oral Tablet; Therapy: 72NAG4771 to Recorded   29  TraMADol HCl - 50 MG Oral Tablet; TAKE TABLET  PRN; Therapy: (Recorded:88Upn7244) to Recorded   30  Triple Antibiotic 3 5-400-5000 External Ointment; Therapy: 18Apr2015 to Recorded   31  Veltassa 8 4 GM Oral Packet; Dissolve contents of 1 packet in 3 ounces of water and    drink full amount once daily; Therapy: 12Apr2016 to (Last Rx:18Apr2016) Ordered   32  Vitamin C 500 MG Oral Tablet; TAKE 1 TABLET DAILY; Therapy: (Recorded:23Czb2990) to Recorded    Allergies    1  Keflex   2  Codeine Derivatives   3  Darvocet-N 50 TABS   4  Morphine Derivatives   5  Propoxyphene HCl CAPS    6  IVP Dye   7  Seafood  Denied    8   NO KNOWN DRUG ALLERGY    Signatures   Electronically signed by : Helena Cameron, ; Jan 18 2017  1:36PM EST                       (Author)

## 2018-01-10 NOTE — MISCELLANEOUS
Message   Recorded as Task   Date: 05/03/2016 09:16 AM, Created By: Naz Cobos   Task Name: Miscellaneous   Assigned To: Naz Cobos   Regarding Patient: Marco Antonio Flores, Status: Active   CommentCherylludinleonel Diaz - 03 May 2016 9:16 AM     TASK CREATED  Hold 2 doses of Lasix  Please update medication list including any when necessary medications  Repeat BMP this coming Monday  Zulma Hernandez - 03 May 2016 9:24 AM     TASK REPLIED TO: Previously Assigned To [facility],Team  I spoke with Andrea Bray at [facility] and she relayed to me that the patient is disoriented and "not herself" today  Therefore, she will be going to the ER at [facility]  Patient was seen in the ER and is returning to [facility]  Per Dr Sasha Lemus, orders as above  I spoke with Andrea Bray at [facility] and she is aware of the above  She will forward the results from Monday's BMP to our office  kja      Active Problems    1  Acute deep vein thrombosis of lower limb, unspecified laterality   2  Acute kidney failure (584 9) (N17 9)   3  Anemia (285 9) (D64 9)   4  Benign hypertensive CKD (403 10) (I12 9)   5  Cellulitis of right leg (682 6) (L03 115)   6  Chronic diastolic congestive heart failure (428 32,428 0) (I50 32)   7  Chronic obstructive pulmonary disease (496) (J44 9)   8  Chronic renal disease, stage 4 (severe) (585 4) (N18 4)   9  Congestive heart failure (428 0) (I50 9)   10  Diabetes Mellitus (250 00)   11  Edema (782 3) (R60 9)   12  Gout (274 9) (M10 9)   13  Hyperkalemia (276 7) (E87 5)   14  Hyperlipidemia (272 4) (E78 5)   15  Hypernatremia (276 0) (E87 0)   16  Hypertension (401 9) (I10)   17  Morbid or severe obesity due to excess calories (278 01) (E66 01)   18  Obstructive sleep apnea (327 23) (G47 33)   19  Proteinuria (791 0) (R80 9)   20  Pulmonary embolism (415 19) (I26 99)   21  Secondary hyperparathyroidism, renal (588 81) (N25 81)    Current Meds   1   Acetaminophen 325 MG Oral Tablet; TAKE 1 TO 2 TABLETS EVERY 4 HOURS AS   NEEDED; Therapy: ([phone]) to Recorded   2  Albuterol Sulfate (2 5 MG/3ML) 0 083% Inhalation Nebulization Solution; USE 1 UNIT   DOSE EVERY 4-6 HOURS AS NEEDED FOR WHEEZING ; Therapy: (Recorded:93Nuy1986) to Recorded   3  Allopurinol 100 MG Oral Tablet; TAKE 1 TABLET DAILY; Therapy: (Recorded:21Aug2014) to Recorded   4  AmLODIPine Besylate 2 5 MG Oral Tablet; TAKE 1 TABLET DAILY AS DIRECTED; Therapy: 50UWF4842 to (21 )  Requested for: 63JYP3113; Last   Rx:06Apr2016 Ordered   5  Antacid TABS; take 1 tablet daily prn; Therapy: (Recorded:86Wjx3264) to Recorded   6  Bengay Ultra Strength CREA; APPLY INCH  PRN; Therapy: (Recorded:17Apr2015) to Recorded   7  Calcitriol 0 25 MCG Oral Capsule (Rocaltrol); Take one capsule every   Monday/Wednesday/Friday/Sunday; Therapy: 28Apr2015 to (Evaluate:25Oct2015); Last Rx:28Apr2015 Ordered   8  Coumadin 1 MG Oral Tablet (Warfarin Sodium); Take 1 capsule on Tuesday, and   wednesday; Therapy: (Recorded:29Mvv1481) to Recorded   9  Coumadin 6 MG Oral Tablet (Warfarin Sodium); Take 1 capsule on Sun, Mon, Thu, Fri,   Sat; Therapy: (Recorded:60Gdv4504) to Recorded   10  CVS Oyster Shell Calcium 500 MG Oral Tablet; Take 1 tablet daily; Therapy: (Recorded:34Pxo0051) to Recorded   11  Docusate Sodium 100 MG Oral Tablet; Take 1 tablet twice daily; Therapy: (Recorded:44Kgo4341) to Recorded   12  Furosemide 40 MG Oral Tablet; Take 40mg am and 20mg pm;    Therapy: (Recorded:92Uyu6898) to Recorded   13  HumaLOG Pen 100 UNIT/ML SOLN; USE AS DIRECTED; Therapy: (Recorded:20Dkf8297) to Recorded   14  Lantus SoloStar 100 UNIT/ML SOLN; USE AS DIRECTED; Therapy: (Recorded:80Dyi1227) to Recorded   15  Latanoprost 0 005 % Ophthalmic Solution; INSTILL 1 DROP IN BOTH EYES AT    BEDTIME; Therapy: (Recorded:52Xsy9658) to Recorded   16  Levothyroxine Sodium 25 MCG Oral Tablet; Therapy: 37ZPG3751 to Recorded   17   Lyrica 75 MG Oral Capsule; TAKE 1 CAPSULE AT BEDTIME; Therapy: (Recorded:17Apr2015) to Recorded   18  Metoprolol Succinate ER 50 MG Oral Tablet Extended Release 24 Hour; Take 1 tablet    twice daily; Therapy: (Recorded:06Apr2016) to Recorded   19  Omega 3 CAPS; take 1 capsule daily; Therapy: (Recorded:17Apr2015) to Recorded   20  Omeprazole 20 MG Oral Capsule Delayed Release; TAKE 1 CAPSULE DAILY; Therapy: (Recorded:21Aug2014) to Recorded   21  Pravastatin Sodium 80 MG Oral Tablet; TAKE 1 TABLET DAILY; Therapy: (Recorded:21Aug2014) to Recorded   22  Procrit 35815 UNIT/ML Injection Solution; Every 30 days; Therapy: [phone]) to Recorded   23  Restasis 0 05 % Ophthalmic Emulsion; 1 drop in botg eyes in the morning; Therapy: (Recorded:06Apr2016) to Recorded   24  Senna 8 6 MG Oral Tablet; TAKE AS DIRECTED; Therapy: (Recorded:21Aug2014) to Recorded   25  TraMADol HCl - 50 MG Oral Tablet; TAKE TABLET  PRN; Therapy: (Recorded:17Apr2015) to Recorded   26  Triple Antibiotic 3 5-400-5000 External Ointment; Therapy: 18Apr2015 to Recorded   27  Veltassa 8 4 GM Oral Packet; Dissolve contents of 1 packet in 3 ounces of water and    drink full amount once daily; Therapy: 12Apr2016 to (Last Rx:18Apr2016) Ordered   28  Vitamin C 500 MG Oral Tablet; TAKE 1 TABLET DAILY; Therapy: (Recorded:21Aug2014) to Recorded    Allergies    1  Keflex   2  Codeine Derivatives   3  Darvocet-N 50 TABS   4  Morphine Derivatives   5  Propoxyphene HCl CAPS    6   NO KNOWN DRUG ALLERGY    Signatures   Electronically signed by : SHADE Alvarez ; May  3 2016  4:12PM EST

## 2018-01-10 NOTE — MISCELLANEOUS
Message  Per Dr Ministerio Dunn, due to increased creatinine of 3 41, patient to hold 4 doses of the Torsemide, and repeat a STAT BMP on Thursday  I spoke with Hilary Mcintyre at [facility] and she is aware of the above  I also reviewed Pily's meds with Hilary Mcintyre and patient is not taking any new medications or NSAIDS  kja      Active Problems    1  Acute deep vein thrombosis of lower limb, unspecified laterality   2  Acute kidney failure (584 9) (N17 9)   3  Anemia (285 9) (D64 9)   4  Benign hypertensive CKD (403 10) (I12 9)   5  Cellulitis of right leg (682 6) (L03 115)   6  Chronic diastolic congestive heart failure (428 32,428 0) (I50 32)   7  Chronic kidney disease, stage 3 (585 3) (N18 3)   8  Chronic obstructive pulmonary disease (496) (J44 9)   9  Congestive heart failure (428 0) (I50 9)   10  Diabetes Mellitus (250 00)   11  Gout (274 9) (M10 9)   12  Hyperkalemia (276 7) (E87 5)   13  Hyperlipidemia (272 4) (E78 5)   14  Hypernatremia (276 0) (E87 0)   15  Hypertension (401 9) (I10)   16  Morbid or severe obesity due to excess calories (278 01) (E66 01)   17  Obstructive sleep apnea (327 23) (G47 33)   18  Proteinuria (791 0) (R80 9)   19  Pulmonary embolism (415 19) (I26 99)   20  Secondary hyperparathyroidism, renal (588 81) (N25 81)    Current Meds   1  Acetaminophen 325 MG Oral Tablet; TAKE 1 TO 2 TABLETS EVERY 4 HOURS AS   NEEDED; Therapy: [phone]) to Recorded   2  Albuterol Sulfate (2 5 MG/3ML) 0 083% Inhalation Nebulization Solution; USE 1 UNIT   DOSE EVERY 4-6 HOURS AS NEEDED FOR WHEEZING ; Therapy: (Recorded:52Maz2069) to Recorded   3  Allopurinol 100 MG Oral Tablet; TAKE 1 TABLET DAILY; Therapy: (Recorded:67Dwy0913) to Recorded   4  Ammonium Lactate 12 % External Cream; APPLY INCH  PRN; Therapy: (Recorded:79Mns2756) to Recorded   5  Antacid TABS; take 1 tablet daily prn; Therapy: (Recorded:82Lvf9827) to Recorded   6   Azopt 1 % Ophthalmic Suspension; INSTILL 1 DROP IN BOTH EYES TWICE DAILY; Therapy: (Recorded:92Ifq5267) to Recorded   7  Bengay Ultra Strength CREA; APPLY INCH  PRN; Therapy: (Recorded:06Dgg5181) to Recorded   8  Calcitriol 0 25 MCG Oral Capsule (Rocaltrol); Take one capsule every   Monday/Wednesday/Friday/Sunday; Therapy: 28Apr2015 to (Evaluate:42Jry4973); Last Rx:28Apr2015 Ordered   9  Coumadin 1 MG Oral Tablet (Warfarin Sodium); Take 1 capsule on Tuesday, and   wednesday; Therapy: (Recorded:07Osn1508) to Recorded   10  Coumadin 6 MG Oral Tablet (Warfarin Sodium); Take 1 capsule on Sun, Mon, Thu, Fri,    Sat; Therapy: (Recorded:21Aug2014) to Recorded   11  [employer]S Oyster Shell Calcium 500 MG Oral Tablet; Take 1 tablet daily; Therapy: (Recorded:21Aug2014) to Recorded   12  Docusate Sodium 100 MG Oral Tablet; Take 1 tablet twice daily; Therapy: (Recorded:21Aug2014) to Recorded   13  HumaLOG Pen 100 UNIT/ML SOLN; USE AS DIRECTED; Therapy: (Recorded:70Jqz4457) to Recorded   14  Hydrocortisone 2 5 % Rectal Cream; USE 1 APPLICATORFUL RECTALLY 1-2 TIMES    DAILY; Therapy: (Recorded:17Apr2015) to Recorded   15  Lantus SoloStar 100 UNIT/ML SOLN; USE AS DIRECTED; Therapy: (Recorded:22Jyf1931) to Recorded   16  Latanoprost 0 005 % Ophthalmic Solution; INSTILL 1 DROP IN BOTH EYES AT    BEDTIME; Therapy: (Recorded:18Ogq6177) to Recorded   17  Levothyroxine Sodium 25 MCG Oral Tablet; Therapy: 37BIH0951 to Recorded   18  Lyrica 150 MG Oral Capsule; TAKE 1 CAPSULE TWICE DAILY; Therapy: (Recorded:64Cuc4472) to Recorded   19  Lyrica 75 MG Oral Capsule; TAKE 1 CAPSULE AT BEDTIME; Therapy: (Recorded:74Zsj5419) to Recorded   20  Melatonin 5 MG Oral Tablet; TAKE AS DIRECTED; Therapy: (Recorded:87Qst6137) to Recorded   21  Metoprolol Tartrate 50 MG Oral Tablet; Take 1 tablet twice daily; Therapy: (Recorded:13Vcr4326) to Recorded   22  Omega 3 CAPS; take 1 capsule daily; Therapy: (Recorded:52Ybv6181) to Recorded   23   Omeprazole 20 MG Oral Capsule Delayed Release; TAKE 1 CAPSULE DAILY; Therapy: (Recorded:35Xio7123) to Recorded   24  Orajel GEL; USE AS DIRECTED; Therapy: (Recorded:21Aug2014) to Recorded   25  Pravastatin Sodium 80 MG Oral Tablet; TAKE 1 TABLET DAILY; Therapy: (Recorded:21Aug2014) to Recorded   26  Procrit 90385 UNIT/ML Injection Solution; Every 30 days; Therapy: [phone]) to Recorded   27  Q-Tussin CF LIQD; USE AS DIRECTED; Therapy: (Recorded:14Izk4957) to Recorded   28  Senna 8 6 MG Oral Tablet; TAKE AS DIRECTED; Therapy: (Recorded:58Kvu6226) to Recorded   29  Torsemide 20 MG Oral Tablet; Take 60mg in am and 40mg in pm;    Therapy: (Recorded:10Aug2015) to Recorded   30  TraMADol HCl - 50 MG Oral Tablet; TAKE TABLET  PRN; Therapy: (Recorded:17Apr2015) to Recorded   31  Triple Antibiotic 3 5-400-5000 External Ointment; Therapy: 18Apr2015 to Recorded   32  Vitamin C 500 MG Oral Tablet; TAKE 1 TABLET DAILY; Therapy: (Recorded:96Efi8696) to Recorded   33  Voltaren 1 % Transdermal Gel; APPLY SPARINGLY TO RIGHT HIP AREA SPARINGLY    THREE TIMES A DAY AS NEEDED; Therapy: (Recorded:17Apr2015) to Recorded    Allergies    1  Keflex   2  Codeine Derivatives   3  Darvocet-N 50 TABS   4  Morphine Derivatives   5  Propoxyphene HCl CAPS    6   NO KNOWN DRUG ALLERGY    Signatures   Electronically signed by : SHADE Thomas ; Jan 21 2016  1:36PM EST

## 2018-01-10 NOTE — MISCELLANEOUS
Message  Per Dr Katherin Rowell, due to increasing creatinine and potassium of 6 2, patient should go the Emergency Room  I spoke with Daryn Conroy at [facility] and she is aware  kja      Active Problems    1  Acute deep vein thrombosis of lower limb, unspecified laterality   2  Acute kidney failure (584 9) (N17 9)   3  Anemia (285 9) (D64 9)   4  Benign hypertensive CKD (403 10) (I12 9)   5  Cellulitis of right leg (682 6) (L03 115)   6  Chronic diastolic congestive heart failure (428 32,428 0) (I50 32)   7  Chronic kidney disease, stage 3 (585 3) (N18 3)   8  Chronic obstructive pulmonary disease (496) (J44 9)   9  Congestive heart failure (428 0) (I50 9)   10  Diabetes Mellitus (250 00)   11  Gout (274 9) (M10 9)   12  Hyperkalemia (276 7) (E87 5)   13  Hyperlipidemia (272 4) (E78 5)   14  Hypernatremia (276 0) (E87 0)   15  Hypertension (401 9) (I10)   16  Morbid or severe obesity due to excess calories (278 01) (E66 01)   17  Obstructive sleep apnea (327 23) (G47 33)   18  Proteinuria (791 0) (R80 9)   19  Pulmonary embolism (415 19) (I26 99)   20  Secondary hyperparathyroidism, renal (588 81) (N25 81)    Current Meds   1  Acetaminophen 325 MG Oral Tablet; TAKE 1 TO 2 TABLETS EVERY 4 HOURS AS   NEEDED; Therapy: ([phone]) to Recorded   2  Albuterol Sulfate (2 5 MG/3ML) 0 083% Inhalation Nebulization Solution; USE 1 UNIT   DOSE EVERY 4-6 HOURS AS NEEDED FOR WHEEZING ; Therapy: (Recorded:02Yle3947) to Recorded   3  Allopurinol 100 MG Oral Tablet; TAKE 1 TABLET DAILY; Therapy: (Recorded:58Lar0461) to Recorded   4  Ammonium Lactate 12 % External Cream; APPLY INCH  PRN; Therapy: (Recorded:26Umv7450) to Recorded   5  Antacid TABS; take 1 tablet daily prn; Therapy: (Recorded:02Wjo1021) to Recorded   6  Azopt 1 % Ophthalmic Suspension; INSTILL 1 DROP IN BOTH EYES TWICE DAILY; Therapy: (Recorded:57Yqz4286) to Recorded   7  Bengay Ultra Strength CREA; APPLY INCH  PRN;    Therapy: (Recorded:69Qds8346) to Recorded   8  Calcitriol 0 25 MCG Oral Capsule (Rocaltrol); Take one capsule every   Monday/Wednesday/Friday/Sunday; Therapy: 28Apr2015 to (Evaluate:25Oct2015); Last Rx:28Apr2015 Ordered   9  Coumadin 1 MG Oral Tablet (Warfarin Sodium); Take 1 capsule on Tuesday, and   wednesday; Therapy: (Recorded:03Jvv7246) to Recorded   10  Coumadin 6 MG Oral Tablet (Warfarin Sodium); Take 1 capsule on Sun, Mon, Thu, Fri,    Sat; Therapy: (Recorded:72Ovu2149) to Recorded   11  [employer]S Oyster Shell Calcium 500 MG Oral Tablet; Take 1 tablet daily; Therapy: (Recorded:21Aug2014) to Recorded   12  Docusate Sodium 100 MG Oral Tablet; Take 1 tablet twice daily; Therapy: (Recorded:28Ual1044) to Recorded   13  HumaLOG Pen 100 UNIT/ML SOLN; USE AS DIRECTED; Therapy: (Recorded:63Kgj4631) to Recorded   14  Hydrocortisone 2 5 % Rectal Cream; USE 1 APPLICATORFUL RECTALLY 1-2 TIMES    DAILY; Therapy: (Recorded:17Apr2015) to Recorded   15  Lantus SoloStar 100 UNIT/ML SOLN; USE AS DIRECTED; Therapy: (Recorded:34Zvx7062) to Recorded   16  Latanoprost 0 005 % Ophthalmic Solution; INSTILL 1 DROP IN BOTH EYES AT    BEDTIME; Therapy: (Recorded:88Ric4072) to Recorded   17  Levothyroxine Sodium 25 MCG Oral Tablet; Therapy: 24EWF9774 to Recorded   18  Lyrica 150 MG Oral Capsule; TAKE 1 CAPSULE TWICE DAILY; Therapy: (Recorded:75Ibi3354) to Recorded   19  Lyrica 75 MG Oral Capsule; TAKE 1 CAPSULE AT BEDTIME; Therapy: (Recorded:02Syu6120) to Recorded   20  Melatonin 5 MG Oral Tablet; TAKE AS DIRECTED; Therapy: (Recorded:32Nen9462) to Recorded   21  Metoprolol Tartrate 50 MG Oral Tablet; Take 1 tablet twice daily; Therapy: (Recorded:46Yqk2847) to Recorded   22  Omega 3 CAPS; take 1 capsule daily; Therapy: (Recorded:11Ybs9257) to Recorded   23  Omeprazole 20 MG Oral Capsule Delayed Release; TAKE 1 CAPSULE DAILY; Therapy: (Recorded:08Sez1035) to Recorded   24  Orajel GEL; USE AS DIRECTED;     Therapy: (Recorded:99Xts7147) to Recorded   25  Pravastatin Sodium 80 MG Oral Tablet; TAKE 1 TABLET DAILY; Therapy: (Recorded:21Aug2014) to Recorded   26  Procrit 80296 UNIT/ML Injection Solution; Every 30 days; Therapy: [phone] to Recorded   27  Q-Tussin CF LIQD; USE AS DIRECTED; Therapy: (Recorded:28Fyb5038) to Recorded   28  Senna 8 6 MG Oral Tablet; TAKE AS DIRECTED; Therapy: (Recorded:21Aug2014) to Recorded   29  Torsemide 20 MG Oral Tablet; Take 60mg in am and 40mg in pm;    Therapy: (Recorded:10Aug2015) to Recorded   30  TraMADol HCl - 50 MG Oral Tablet; TAKE TABLET  PRN; Therapy: (Recorded:17Apr2015) to Recorded   31  Triple Antibiotic 3 5-400-5000 External Ointment; Therapy: 18Apr2015 to Recorded   32  Vitamin C 500 MG Oral Tablet; TAKE 1 TABLET DAILY; Therapy: (Recorded:70Ihk8006) to Recorded   33  Voltaren 1 % Transdermal Gel; APPLY SPARINGLY TO RIGHT HIP AREA SPARINGLY    THREE TIMES A DAY AS NEEDED; Therapy: (Recorded:80Iig4271) to Recorded    Allergies    1  Keflex   2  Codeine Derivatives   3  Darvocet-N 50 TABS   4  Morphine Derivatives   5  Propoxyphene HCl CAPS    6   NO KNOWN DRUG ALLERGY    Signatures   Electronically signed by : SHADE Wolfe ; Jan 21 2016  1:36PM EST

## 2018-01-11 ENCOUNTER — ALLSCRIPTS OFFICE VISIT (OUTPATIENT)
Dept: OTHER | Facility: OTHER | Age: 73
End: 2018-01-11

## 2018-01-11 DIAGNOSIS — J44.9 CHRONIC OBSTRUCTIVE PULMONARY DISEASE (HCC): ICD-10-CM

## 2018-01-11 DIAGNOSIS — E66.2 MORBID (SEVERE) OBESITY WITH ALVEOLAR HYPOVENTILATION (HCC): ICD-10-CM

## 2018-01-11 DIAGNOSIS — J96.12 CHRONIC RESPIRATORY FAILURE WITH HYPERCAPNIA (HCC): ICD-10-CM

## 2018-01-11 NOTE — MISCELLANEOUS
Message  NP David Officer [phone] from palliative care called, she was seeing patient at [facility] and noted subclavian line still in  she called me about it and I explained what Dr Elida Farooq had previously said, and i gave her number of one day surgery to find out why line not previously removed  Active Problems    1  Acute deep vein thrombosis of lower limb, unspecified laterality   2  Acute kidney failure (584 9) (N17 9)   3  Anemia (285 9) (D64 9)   4  Benign hypertensive CKD (403 10) (I12 9)   5  Cellulitis of right leg (682 6) (L03 115)   6  Chronic diastolic congestive heart failure (428 32,428 0) (I50 32)   7  Chronic obstructive pulmonary disease (496) (J44 9)   8  Chronic renal disease, stage 4 (severe) (585 4) (N18 4)   9  Congestive heart failure (428 0) (I50 9)   10  Diabetes Mellitus (250 00)   11  Edema (782 3) (R60 9)   12  End-stage renal disease on hemodialysis (585 6,V45 11) (N18 6,Z99 2)   13  Gout (274 9) (M10 9)   14  Hemodialysis patient (V45 11) (Z99 2)   15  Hyperkalemia (276 7) (E87 5)   16  Hyperlipidemia (272 4) (E78 5)   17  Hypernatremia (276 0) (E87 0)   18  Hypertension (401 9) (I10)   19  Morbid or severe obesity due to excess calories (278 01) (E66 01)   20  Obstructive sleep apnea (327 23) (G47 33)   21  Proteinuria (791 0) (R80 9)   22  Pulmonary embolism (415 19) (I26 99)   23  Secondary hyperparathyroidism, renal (588 81) (N25 81)    Current Meds   1  Acetaminophen 325 MG Oral Tablet; TAKE 1 TO 2 TABLETS EVERY 4 HOURS AS   NEEDED; Therapy: ([phone]) to Recorded   2  Albuterol Sulfate (2 5 MG/3ML) 0 083% Inhalation Nebulization Solution; USE 1 UNIT   DOSE EVERY 4-6 HOURS AS NEEDED FOR WHEEZING ; Therapy: (Recorded:61Gup8509) to Recorded   3  Allopurinol 100 MG Oral Tablet; TAKE 1 TABLET DAILY; Therapy: (Recorded:35Cgf0071) to Recorded   4  AmLODIPine Besylate 2 5 MG Oral Tablet; TAKE 1 TABLET DAILY AS DIRECTED;    Therapy: 09KAP0840 to  Requested for: 56KBH2242; Last   Rx:06Apr2016 Ordered   5  Antacid TABS; take 1 tablet daily prn; Therapy: (Recorded:16Lze3960) to Recorded   6  Bengay Ultra Strength CREA; APPLY INCH  PRN; Therapy: (Recorded:21Peo2049) to Recorded   7  Calcitriol 0 25 MCG Oral Capsule (Rocaltrol); Take one capsule every   Monday/Wednesday/Friday/Sunday; Therapy: 28Apr2015 to (Evaluate:25Oct2015); Last Rx:28Apr2015 Ordered   8  Coumadin 1 MG Oral Tablet (Warfarin Sodium); Take 1 capsule on Tuesday, and   wednesday; Therapy: (Recorded:86Pyx8000) to Recorded   9  Coumadin 6 MG Oral Tablet (Warfarin Sodium); Take 1 capsule on Sun, Mon, Thu, Fri,   Sat; Therapy: (Recorded:95Tkm8315) to Recorded   10  CVS Oyster Shell Calcium 500 MG TABS; Take 1 tablet daily; Therapy: (Recorded:72Fsc0181) to Recorded   11  Docusate Sodium 100 MG Oral Tablet; Take 1 tablet twice daily; Therapy: (Recorded:71Aew6368) to Recorded   12  Furosemide 40 MG Oral Tablet; Take 40mg am and 20mg pm;    Therapy: (Recorded:12Nqf1719) to Recorded   13  HumaLOG Pen 100 UNIT/ML SOLN; USE AS DIRECTED; Therapy: (Recorded:27Xca0811) to Recorded   14  Lantus SoloStar 100 UNIT/ML SOLN; USE AS DIRECTED; Therapy: (Recorded:06Bzt9400) to Recorded   15  Latanoprost 0 005 % Ophthalmic Solution; INSTILL 1 DROP IN BOTH EYES AT    BEDTIME; Therapy: (Recorded:01Xll0193) to Recorded   16  Levothyroxine Sodium 25 MCG Oral Tablet; Therapy: 02DDJ5274 to Recorded   17  Lyrica 75 MG Oral Capsule; TAKE 1 CAPSULE AT BEDTIME; Therapy: (Recorded:46Mef3098) to Recorded   18  Metoprolol Succinate ER 50 MG Oral Tablet Extended Release 24 Hour; Take 1 tablet    twice daily; Therapy: (Recorded:06Apr2016) to Recorded   19  Metoprolol Tartrate 25 MG Oral Tablet; Therapy: 75GSJ2446 to Recorded   20  Omega 3 CAPS; take 1 capsule daily; Therapy: (Recorded:69Ecn8300) to Recorded   21  Omega-3-acid Ethyl Esters 1 GM Oral Capsule;     Therapy: 04Apr2016 to Recorded 22  Omeprazole 20 MG Oral Capsule Delayed Release; TAKE 1 CAPSULE DAILY; Therapy: (Recorded:94Qpe0161) to Recorded   23  Pravastatin Sodium 20 MG Oral Tablet; Therapy: 72QIP3340 to Recorded   24  Pravastatin Sodium 80 MG Oral Tablet; TAKE 1 TABLET DAILY; Therapy: (Recorded:75Myx5924) to Recorded   25  Procrit 45245 UNIT/ML Injection Solution; Every 30 days; Therapy: [phone]) to Recorded   26  Restasis 0 05 % Ophthalmic Emulsion; 1 drop in botg eyes in the morning; Therapy: (Recorded:06Apr2016) to Recorded   27  Senna 8 6 MG Oral Tablet; TAKE AS DIRECTED; Therapy: (Recorded:91Fsa6230) to Recorded   28  Sulfamethoxazole-Trimethoprim 800-160 MG Oral Tablet; Therapy: 75GUF5850 to Recorded   29  TraMADol HCl - 50 MG Oral Tablet; TAKE TABLET  PRN; Therapy: (Recorded:87Nzi2313) to Recorded   30  Triple Antibiotic 3 5-400-5000 External Ointment; Therapy: 18Apr2015 to Recorded   31  Veltassa 8 4 GM Oral Packet; Dissolve contents of 1 packet in 3 ounces of water and    drink full amount once daily; Therapy: 12Apr2016 to (Last Rx:18Apr2016) Ordered   32  Vitamin C 500 MG Oral Tablet; TAKE 1 TABLET DAILY; Therapy: (Recorded:74Xdm0786) to Recorded    Allergies    1  Keflex   2  Codeine Derivatives   3  Darvocet-N 50 TABS   4  Morphine Derivatives   5  Propoxyphene HCl CAPS    6  IVP Dye   7  Seafood  Denied    8  NO KNOWN DRUG ALLERGY    Signatures   Electronically signed by :  Rowdy Tyson, ; Aug 26 2016 11:12AM EST                       (Author)

## 2018-01-11 NOTE — MISCELLANEOUS
Message   Recorded as Task   Date: 05/02/2016 02:27 PM, Created By: Isabell Harrington   Task Name: Miscellaneous   Assigned To: Omar Evelyn   Regarding Patient: Lola Shoulders, Status: Active   CommentRennis Cheek - 02 May 2016 2:27 PM     TASK CREATED  I spoke with Lorene Sainz, the patient's nurse at [facility]  She states that patient does not have any SOB, peripheral edema, nausea or vomiting  Patient did start the [address], but not until this past Friday (4/29)  I updated the med list in her chart       Signatures   Electronically signed by : SHADE Mccarthy ; May  3 2016  4:19PM EST                       (Author)

## 2018-01-11 NOTE — MISCELLANEOUS
Message  spoke with Jennifer Phipps NP @ [facility] and faxed her copy of Dr Naomi Can note  will let her know when silviat scheduled for procedure  her number is [phone]      Active Problems    1  Acute deep vein thrombosis of lower limb, unspecified laterality   2  Acute kidney failure (584 9) (N17 9)   3  Anemia (285 9) (D64 9)   4  Benign hypertensive CKD (403 10) (I12 9)   5  Cellulitis of right leg (682 6) (L03 115)   6  Chronic diastolic congestive heart failure (428 32,428 0) (I50 32)   7  Chronic hypoxemic respiratory failure (518 83,799 02) (J96 11)   8  Chronic obstructive pulmonary disease (496) (J44 9)   9  Chronic renal disease, stage 4 (severe) (585 4) (N18 4)   10  Chronic respiratory failure with hypercapnia (518 83) (J96 12)   11  Congestive heart failure (428 0) (I50 9)   12  Current use of long term anticoagulation (V58 61) (Z79 01)   13  Diabetes Mellitus (250 00)   14  Dyspnea (786 09) (R06 00)   15  Edema (782 3) (R60 9)   16  End-stage renal disease on hemodialysis (585 6,V45 11) (N18 6,Z99 2)   17  Excess skin of arm (701 9) (L98 7)   18  Gout (274 9) (M10 9)   19  Hemodialysis patient (V45 11) (Z99 2)   20  Hyperkalemia (276 7) (E87 5)   21  Hyperlipidemia (272 4) (E78 5)   22  Hypernatremia (276 0) (E87 0)   23  Hypertension (401 9) (I10)   24  Morbid or severe obesity due to excess calories (278 01) (E66 01)   25  Obesity hypoventilation syndrome (278 03) (E66 2)   26  Obstructive sleep apnea (327 23) (G47 33)   27  Proteinuria (791 0) (R80 9)   28  Pulmonary embolism (415 19) (I26 99)   29  Secondary hyperparathyroidism, renal (588 81) (N25 81)    Current Meds   1  Acetaminophen 325 MG Oral Tablet; TAKE 1 TO 2 TABLETS EVERY 4 HOURS AS   NEEDED; Therapy: [phone]) to Recorded   2  Albuterol Sulfate (2 5 MG/3ML) 0 083% Inhalation Nebulization Solution; USE 1 UNIT   DOSE EVERY 4-6 HOURS AS NEEDED FOR WHEEZING ; Therapy: (Recorded:48Jlq4149) to Recorded   3   Allopurinol 100 MG Oral Tablet; TAKE 1 TABLET DAILY; Therapy: (Recorded:51Oze3357) to Recorded   4  Anbesol Maximum Strength 20 % Mouth/Throat Gel; Therapy: (Recorded:72Zxm5630) to Recorded   5  Bengay Ultra Strength CREA; APPLY INCH  PRN; Therapy: (Recorded:43Fbv4533) to Recorded   6  Calcitriol 0 25 MCG Oral Capsule (Rocaltrol); Take one capsule every   Monday/Wednesday/Friday/Sunday; Therapy: 28Apr2015 to (Evaluate:25Oct2015); Last Rx:28Apr2015 Ordered   7  Coumadin 3 MG Oral Tablet (Warfarin Sodium); Therapy: (Recorded:30Xku4372) to Recorded   8  CVS Oyster Shell Calcium 500 MG TABS; Take 1 tablet daily; Therapy: (Recorded:87Lzs9319) to Recorded   9  Cyanocobalamin 1000 MCG TABS; Therapy: (Recorded:80Nbz7613) to Recorded   10  Desitin Maximum Strength PSTE; Therapy: (Recorded:34Hmw7532) to Recorded   11  Docusate Sodium 100 MG Oral Tablet; Take 1 tablet twice daily; Therapy: (Recorded:98Nwk0531) to Recorded   12  Famotidine 20 MG Oral Tablet; Therapy: (Recorded:41Pxg8085) to Recorded   13  Furosemide 40 MG Oral Tablet; Take 40mg am and 20mg pm;    Therapy: (Recorded:72Hsi9631) to Recorded   14  HumaLOG Pen 100 UNIT/ML SOLN; USE AS DIRECTED; Therapy: (Recorded:68Rux4362) to Recorded   15  Lantus SoloStar 100 UNIT/ML SOLN; USE AS DIRECTED; Therapy: (Recorded:35Bjz0620) to Recorded   16  Latanoprost 0 005 % Ophthalmic Solution; INSTILL 1 DROP IN BOTH EYES AT    BEDTIME; Therapy: (Recorded:32Fxb8679) to Recorded   17  Levothyroxine Sodium 25 MCG Oral Tablet; Therapy: 90LMJ4170 to Recorded   18  Lyrica 150 MG Oral Capsule; Therapy: (Recorded:19Uss5061) to Recorded   19  Metoprolol Tartrate 25 MG Oral Tablet; TAKE 0 5 TABLET  As Directed Danna Leger ,    Sat; Therapy: 01KMW1871 to (Evaluate:38Jnd4148) Recorded   20  Micro-Guard 2 5 % POWD;    Therapy: (Recorded:63Doh0086) to Recorded   21  Omega-3-acid Ethyl Esters 1 GM Oral Capsule; Therapy: 04Apr2016 to Recorded   22   Omeprazole 20 MG Oral Capsule Delayed Release; TAKE 1 CAPSULE DAILY; Therapy: (Recorded:96Hat8929) to Recorded   23  Polyethylene Glycol 3350 Oral Powder; Therapy: (Recorded:44Ewl3896) to Recorded   24  Pravastatin Sodium 80 MG Oral Tablet; TAKE 1 TABLET DAILY; Therapy: (Recorded:67Hyj3274) to Recorded   25  Renvela 800 MG Oral Tablet; Therapy: (Recorded:16Feb2017) to Recorded   26  Robitussin A-C SYRP;    Therapy: (Recorded:16Feb2017) to Recorded   27  Senna 8 6 MG Oral Tablet; TAKE AS DIRECTED; Therapy: (Recorded:11Fmk7031) to Recorded   28  Sodium Polystyrene Sulfonate 15 GM/60ML SUSP; Therapy: (Recorded:16Feb2017) to Recorded   29  Vitamin D3 1000 UNIT Oral Capsule; Therapy: (Recorded:16Feb2017) to Recorded   30  Welchol 625 MG Oral Tablet; Therapy: 29Dyu9287 to Recorded    Allergies    1  Keflex   2  Codeine Derivatives   3  Darvocet-N 50 TABS   4  iodine   5  Morphine Derivatives   6  NSAIDs   7  Propoxyphene HCl CAPS    8  IVP Dye   9  Seafood   10  Shellfish  Denied    11  NO KNOWN DRUG ALLERGY    Signatures   Electronically signed by :  Louise Phalen, ; Feb 22 2017  9:23AM EST                       (Author)

## 2018-01-11 NOTE — MISCELLANEOUS
Provider Comments  Provider Comments:   PATIENT WAS A NO SHOW FOR THE APPOINTMENT      Signatures   Electronically signed by : SHADE Madison ; May 19 2016  9:04AM EST

## 2018-01-12 ENCOUNTER — HOSPITAL ENCOUNTER (OUTPATIENT)
Dept: RADIOLOGY | Facility: HOSPITAL | Age: 73
Discharge: HOME | End: 2018-01-12
Attending: INTERNAL MEDICINE
Payer: MEDICARE

## 2018-01-12 ENCOUNTER — GENERIC CONVERSION - ENCOUNTER (OUTPATIENT)
Dept: OTHER | Facility: OTHER | Age: 73
End: 2018-01-12

## 2018-01-12 VITALS
RESPIRATION RATE: 20 BRPM | HEART RATE: 92 BPM | DIASTOLIC BLOOD PRESSURE: 55 MMHG | SYSTOLIC BLOOD PRESSURE: 110 MMHG | OXYGEN SATURATION: 100 %

## 2018-01-12 VITALS
DIASTOLIC BLOOD PRESSURE: 82 MMHG | HEART RATE: 86 BPM | HEIGHT: 64 IN | RESPIRATION RATE: 18 BRPM | SYSTOLIC BLOOD PRESSURE: 122 MMHG

## 2018-01-12 DIAGNOSIS — N18.30 CHRONIC RENAL FAILURE, STAGE 3 (MODERATE) (HCC): ICD-10-CM

## 2018-01-12 DIAGNOSIS — N18.6 ESRD (END STAGE RENAL DISEASE) (HCC): ICD-10-CM

## 2018-01-12 PROCEDURE — 77001 FLUOROGUIDE FOR VEIN DEVICE: CPT

## 2018-01-12 PROCEDURE — C1725 CATH, TRANSLUMIN NON-LASER: HCPCS

## 2018-01-12 PROCEDURE — C1894 INTRO/SHEATH, NON-LASER: HCPCS

## 2018-01-12 PROCEDURE — 36558 INSERT TUNNELED CV CATH: CPT

## 2018-01-12 PROCEDURE — C1757 CATH, THROMBECTOMY/EMBOLECT: HCPCS

## 2018-01-12 PROCEDURE — 76937 US GUIDE VASCULAR ACCESS: CPT

## 2018-01-12 PROCEDURE — 36907 BALO ANGIOP CTR DIALYSIS SEG: CPT

## 2018-01-12 PROCEDURE — 36905 THRMBC/NFS DIALYSIS CIRCUIT: CPT

## 2018-01-12 PROCEDURE — C1769 GUIDE WIRE: HCPCS

## 2018-01-12 RX ORDER — FENTANYL CITRATE 50 UG/ML
INJECTION, SOLUTION INTRAMUSCULAR; INTRAVENOUS CODE/TRAUMA/SEDATION MEDICATION
Status: COMPLETED | OUTPATIENT
Start: 2018-01-12 | End: 2018-01-12

## 2018-01-12 RX ORDER — HEPARIN SODIUM 1000 [USP'U]/ML
INJECTION, SOLUTION INTRAVENOUS; SUBCUTANEOUS CODE/TRAUMA/SEDATION MEDICATION
Status: COMPLETED | OUTPATIENT
Start: 2018-01-12 | End: 2018-01-12

## 2018-01-12 RX ADMIN — FENTANYL CITRATE 50 MCG: 50 INJECTION, SOLUTION INTRAMUSCULAR; INTRAVENOUS at 19:18

## 2018-01-12 RX ADMIN — ALTEPLASE 4 MG: 2.2 INJECTION, POWDER, LYOPHILIZED, FOR SOLUTION INTRAVENOUS at 18:54

## 2018-01-12 RX ADMIN — FENTANYL CITRATE 50 MCG: 50 INJECTION, SOLUTION INTRAMUSCULAR; INTRAVENOUS at 18:47

## 2018-01-12 RX ADMIN — FENTANYL CITRATE 50 MCG: 50 INJECTION, SOLUTION INTRAMUSCULAR; INTRAVENOUS at 20:30

## 2018-01-12 RX ADMIN — HEPARIN SODIUM 2200 UNITS: 1000 INJECTION INTRAVENOUS; SUBCUTANEOUS at 20:52

## 2018-01-12 RX ADMIN — IODIXANOL 85 ML: 320 INJECTION, SOLUTION INTRAVASCULAR at 21:21

## 2018-01-12 RX ADMIN — FENTANYL CITRATE 50 MCG: 50 INJECTION, SOLUTION INTRAMUSCULAR; INTRAVENOUS at 20:36

## 2018-01-12 NOTE — MISCELLANEOUS
Message  Received call from [facility]  They would like to know if there are  any new orders from office visit today  Request that orders be faxed to [phone]  Per Dr Riley Lu, there are no new orders  Faxed update to [facility] as   requested  Fax placed to be scanned  Active Problems    1  Acute deep vein thrombosis of lower limb, unspecified laterality   2  Acute kidney failure (584 9) (N17 9)   3  Anemia (285 9) (D64 9)   4  Benign hypertensive CKD (403 10) (I12 9)   5  Chronic diastolic congestive heart failure (428 32,428 0) (I50 32)   6  Chronic hypoxemic respiratory failure (518 83,799 02) (J96 11)   7  Chronic obstructive pulmonary disease (496) (J44 9)   8  Chronic respiratory failure with hypercapnia (518 83) (J96 12)   9  Congestive heart failure (428 0) (I50 9)   10  Current use of long term anticoagulation (V58 61) (Z79 01)   11  Diabetes Mellitus (250 00)   12  Dyspnea (786 09) (R06 00)   13  Edema (782 3) (R60 9)   14  End-stage renal disease on hemodialysis (585 6,V45 11) (N18 6,Z99 2)   15  Excess skin of arm (701 9) (L98 7)   16  Gout (274 9) (M10 9)   17  Hemodialysis patient (V45 11) (Z99 2)   18  Hyperkalemia (276 7) (E87 5)   19  Hyperlipidemia (272 4) (E78 5)   20  Hypernatremia (276 0) (E87 0)   21  Hypertension (401 9) (I10)   22  Morbid or severe obesity due to excess calories (278 01) (E66 01)   23  Obesity hypoventilation syndrome (278 03) (E66 2)   24  Obstructive sleep apnea (327 23) (G47 33)   25  Proteinuria (791 0) (R80 9)   26  Pulmonary embolism (415 19) (I26 99)   27  Secondary hyperparathyroidism, renal (588 81) (N25 81)    Current Meds   1  Acetaminophen 325 MG Oral Tablet; TAKE 1 TO 2 TABLETS EVERY 4 HOURS AS   NEEDED; Therapy: [phone]) to Recorded   2  Albuterol Sulfate (2 5 MG/3ML) 0 083% Inhalation Nebulization Solution; USE 1 UNIT   DOSE EVERY 4-6 HOURS AS NEEDED FOR WHEEZING ; Therapy: (Recorded:99Nrw3725) to Recorded   3   Allopurinol 100 MG Oral Tablet; TAKE 1 TABLET DAILY; Therapy: (Recorded:53Pui6379) to Recorded   4  Anbesol Maximum Strength 20 % Mouth/Throat Gel; Therapy: (Recorded:31Myr3525) to Recorded   5  Bengay Ultra Strength CREA; APPLY INCH  PRN; Therapy: (Recorded:24Poy2793) to Recorded   6  Calcitriol 0 25 MCG Oral Capsule (Rocaltrol); Take one capsule every   Monday/Wednesday/Friday/Sunday; Therapy: 28Apr2015 to (Evaluate:25Oct2015); Last Rx:28Apr2015 Ordered   7  Coumadin 3 MG Oral Tablet (Warfarin Sodium); Therapy: (Recorded:90Pfo1224) to Recorded   8  CVS Oyster Shell Calcium 500 MG TABS; Take 1 tablet daily; Therapy: (Recorded:29Too3171) to Recorded   9  Cyanocobalamin 1000 MCG TABS; Therapy: (Recorded:11Zwx7877) to Recorded   10  Desitin Maximum Strength PSTE; Therapy: (Recorded:02Njr3636) to Recorded   11  Docusate Sodium 100 MG Oral Tablet; Take 1 tablet twice daily; Therapy: (Recorded:18Hqz3544) to Recorded   12  Famotidine 20 MG Oral Tablet; Therapy: (Recorded:59Zbd9716) to Recorded   13  Furosemide 40 MG Oral Tablet; Take 40mg am and 20mg pm;    Therapy: (Recorded:27Wlr7408) to Recorded   14  HumaLOG Pen 100 UNIT/ML SOLN; USE AS DIRECTED; Therapy: (Recorded:76Nls0241) to Recorded   15  Lantus SoloStar 100 UNIT/ML SOLN; USE AS DIRECTED; Therapy: (Recorded:46Rop8940) to Recorded   16  Latanoprost 0 005 % Ophthalmic Solution; INSTILL 1 DROP IN BOTH EYES AT    BEDTIME; Therapy: (Recorded:03Lhi0183) to Recorded   17  Levothyroxine Sodium 25 MCG Oral Tablet; Therapy: 53GUY8999 to Recorded   18  Lyrica 150 MG Oral Capsule; Therapy: (Recorded:01Fwn2145) to Recorded   19  Metoprolol Tartrate 25 MG Oral Tablet; TAKE 0 5 TABLET  As Directed Danna Leger ,    Sat; Therapy: 31QJR9990 to (Evaluate:38Bsp4160) Recorded   20  Micro-Guard 2 5 % POWD;    Therapy: (Recorded:48Laf2658) to Recorded   21  Omega-3-acid Ethyl Esters 1 GM Oral Capsule; Therapy: 04Apr2016 to Recorded   22   Omeprazole 20 MG Oral Capsule Delayed Release; TAKE 1 CAPSULE DAILY; Therapy: (Recorded:68Cun9518) to Recorded   23  Polyethylene Glycol 3350 Oral Powder; Therapy: (Recorded:79Zrn2160) to Recorded   24  Pravastatin Sodium 80 MG Oral Tablet; TAKE 1 TABLET DAILY; Therapy: (Recorded:61Ixp2927) to Recorded   25  Renvela 800 MG Oral Tablet; Therapy: (Recorded:16Feb2017) to Recorded   26  Robitussin A-C SYRP;    Therapy: (Recorded:16Feb2017) to Recorded   27  Senna 8 6 MG Oral Tablet; TAKE AS DIRECTED; Therapy: (Recorded:77Vgt8677) to Recorded   28  Sodium Polystyrene Sulfonate 15 GM/60ML SUSP; Therapy: (Recorded:26Jii4739) to Recorded   29  Vitamin D3 1000 UNIT Oral Capsule; Therapy: (Recorded:42Epf4806) to Recorded   30  Welchol 625 MG Oral Tablet; Therapy: 97Aco3155 to Recorded    Allergies    1  Keflex   2  Codeine Derivatives   3  Darvocet-N 50 TABS   4  iodine   5  Morphine Derivatives   6  NSAIDs   7  Propoxyphene HCl CAPS    8  IVP Dye   9  Seafood   10  Shellfish  Denied    11   NO KNOWN DRUG ALLERGY    Signatures   Electronically signed by : Radha Davis RN; May  9 2017 11:08AM EST                       (Author)

## 2018-01-12 NOTE — MISCELLANEOUS
Message  rec a call from [address] [place] [address] at [facility] that patient returned from hospital with a single lumen cath above left breast   I called Dr Hong Zarate and he said Dr Shahida Solis had told him she had to use subclavian line because they were unable to get peripheral access due to her arm size  Dr Hong Zarate said that [facility] nurse can remove , if not patient would have to go to IR to remove  I spoke with [address] [place] [address] and he said they would remove  Active Problems    1  Acute deep vein thrombosis of lower limb, unspecified laterality   2  Acute kidney failure (584 9) (N17 9)   3  Anemia (285 9) (D64 9)   4  Benign hypertensive CKD (403 10) (I12 9)   5  Cellulitis of right leg (682 6) (L03 115)   6  Chronic diastolic congestive heart failure (428 32,428 0) (I50 32)   7  Chronic obstructive pulmonary disease (496) (J44 9)   8  Chronic renal disease, stage 4 (severe) (585 4) (N18 4)   9  Congestive heart failure (428 0) (I50 9)   10  Diabetes Mellitus (250 00)   11  Edema (782 3) (R60 9)   12  End-stage renal disease on hemodialysis (585 6,V45 11) (N18 6,Z99 2)   13  Gout (274 9) (M10 9)   14  Hemodialysis patient (V45 11) (Z99 2)   15  Hyperkalemia (276 7) (E87 5)   16  Hyperlipidemia (272 4) (E78 5)   17  Hypernatremia (276 0) (E87 0)   18  Hypertension (401 9) (I10)   19  Morbid or severe obesity due to excess calories (278 01) (E66 01)   20  Obstructive sleep apnea (327 23) (G47 33)   21  Proteinuria (791 0) (R80 9)   22  Pulmonary embolism (415 19) (I26 99)   23  Secondary hyperparathyroidism, renal (588 81) (N25 81)    Current Meds   1  Acetaminophen 325 MG Oral Tablet; TAKE 1 TO 2 TABLETS EVERY 4 HOURS AS   NEEDED; Therapy: (25-62-29-72) to Recorded   2  Albuterol Sulfate (2 5 MG/3ML) 0 083% Inhalation Nebulization Solution; USE 1 UNIT   DOSE EVERY 4-6 HOURS AS NEEDED FOR WHEEZING ; Therapy: (Recorded:12Bmb7438) to Recorded   3  Allopurinol 100 MG Oral Tablet; TAKE 1 TABLET DAILY;    Therapy: (Recorded:50Omu9184) to Recorded   4  AmLODIPine Besylate 2 5 MG Oral Tablet; TAKE 1 TABLET DAILY AS DIRECTED; Therapy: 52JST0495 to (Lillian Almonte)  Requested for: 93VPQ4670; Last   Rx:06Apr2016 Ordered   5  Antacid TABS; take 1 tablet daily prn; Therapy: (Recorded:64Mth1619) to Recorded   6  Bengay Ultra Strength CREA; APPLY INCH  PRN; Therapy: (Recorded:32Fdr5757) to Recorded   7  Calcitriol 0 25 MCG Oral Capsule (Rocaltrol); Take one capsule every   Monday/Wednesday/Friday/Sunday; Therapy: 28Apr2015 to (Evaluate:25Oct2015); Last Rx:28Apr2015 Ordered   8  Coumadin 1 MG Oral Tablet (Warfarin Sodium); Take 1 capsule on Tuesday, and   wednesday; Therapy: (Recorded:39Nhi3473) to Recorded   9  Coumadin 6 MG Oral Tablet (Warfarin Sodium); Take 1 capsule on Sun, Mon, Thu, Fri,   Sat; Therapy: (Recorded:28Vsu9608) to Recorded   10  CVS Oyster Shell Calcium 500 MG TABS; Take 1 tablet daily; Therapy: (Recorded:03Fkb6442) to Recorded   11  Docusate Sodium 100 MG Oral Tablet; Take 1 tablet twice daily; Therapy: (Recorded:82Dun3987) to Recorded   12  Furosemide 40 MG Oral Tablet; Take 40mg am and 20mg pm;    Therapy: (Recorded:99Nlb7761) to Recorded   13  HumaLOG Pen 100 UNIT/ML SOLN; USE AS DIRECTED; Therapy: (Recorded:79Srz0258) to Recorded   14  Lantus SoloStar 100 UNIT/ML SOLN; USE AS DIRECTED; Therapy: (Recorded:44Nih0096) to Recorded   15  Latanoprost 0 005 % Ophthalmic Solution; INSTILL 1 DROP IN BOTH EYES AT    BEDTIME; Therapy: (Recorded:70Wxz1356) to Recorded   16  Levothyroxine Sodium 25 MCG Oral Tablet; Therapy: 94HLM9402 to Recorded   17  Lyrica 75 MG Oral Capsule; TAKE 1 CAPSULE AT BEDTIME; Therapy: (Recorded:53Ndj7184) to Recorded   18  Metoprolol Succinate ER 50 MG Oral Tablet Extended Release 24 Hour; Take 1 tablet    twice daily; Therapy: (Recorded:06Apr2016) to Recorded   19  Metoprolol Tartrate 25 MG Oral Tablet; Therapy: 18BVW9308 to Recorded   20   Omega 3 CAPS; take 1 capsule daily; Therapy: (Recorded:80Six7175) to Recorded   21  Omega-3-acid Ethyl Esters 1 GM Oral Capsule; Therapy: 04Apr2016 to Recorded   22  Omeprazole 20 MG Oral Capsule Delayed Release; TAKE 1 CAPSULE DAILY; Therapy: (Recorded:11Ihz2788) to Recorded   23  Pravastatin Sodium 20 MG Oral Tablet; Therapy: 73TNV8019 to Recorded   24  Pravastatin Sodium 80 MG Oral Tablet; TAKE 1 TABLET DAILY; Therapy: (Recorded:21Aug2014) to Recorded   25  Procrit 16809 UNIT/ML Injection Solution; Every 30 days; Therapy: (29-29-69-33) to Recorded   26  Restasis 0 05 % Ophthalmic Emulsion; 1 drop in botg eyes in the morning; Therapy: (Recorded:06Apr2016) to Recorded   27  Senna 8 6 MG Oral Tablet; TAKE AS DIRECTED; Therapy: (Recorded:98Pnb8949) to Recorded   28  Sulfamethoxazole-Trimethoprim 800-160 MG Oral Tablet; Therapy: 52OUZ3721 to Recorded   29  TraMADol HCl - 50 MG Oral Tablet; TAKE TABLET  PRN; Therapy: (Recorded:69Nza8477) to Recorded   30  Triple Antibiotic 3 5-400-5000 External Ointment; Therapy: 18Apr2015 to Recorded   31  Veltassa 8 4 GM Oral Packet; Dissolve contents of 1 packet in 3 ounces of water and    drink full amount once daily; Therapy: 12Apr2016 to (Last Rx:18Apr2016) Ordered   32  Vitamin C 500 MG Oral Tablet; TAKE 1 TABLET DAILY; Therapy: (Recorded:71Tlp6944) to Recorded    Allergies    1  Keflex   2  Codeine Derivatives   3  Darvocet-N 50 TABS   4  Morphine Derivatives   5  Propoxyphene HCl CAPS    6  IVP Dye   7  Seafood  Denied    8  NO KNOWN DRUG ALLERGY    Signatures   Electronically signed by :  Evert Singh, ; Aug 22 2016  4:54PM EST                       (Author)

## 2018-01-12 NOTE — PROGRESS NOTES
Plan   Chronic obstructive pulmonary disease, Chronic respiratory failure with hypercapnia,    Obesity hypoventilation syndrome    · (1) Arterial Blood Gases; Status:Active; Requested YPR:[id number]; Perform:[facility]; MPF:[id number]; Last Updated By:Brianna Pacheco; 1/11/2018 10:30:38 AM;Ordered; For:Chronic obstructive pulmonary disease, Chronic respiratory failure with hypercapnia, Obesity hypoventilation syndrome; Ordered By:Edinson Ibanez; Discussion/Summary   Discussion Summary:    74-year-old female with past medical history of morbid obesity chronic diastolic HF, DM, Morbid obesity, ESRD on HD, DVT on coumadin, HTN who comes in for chronic hypoxic and hypercapnic respiratory failure  Chronic hypoxic and hypercapnic respiratory failure secondary to obesity hypoventilation and restrictive lung disease from obesity  Poor effort with in office PFTs but consistent with severe restriction  2L of oyxgen during the day, BIPAP with 3L at night     - Continue BiPAP at 16/8 with 3L  She is unable to tolerate 18/8 now  Will check ABG to ensure hypercapnia is well controlled  - follow compliance data at next visit   - Continue 2L NC through the day for chronic hypoxic respiratory failure   - Continue volume removal with dialysis    Hx of DVT - continue coumadin, maintain INR 2-3        Active Problems   1  Acute deep vein thrombosis of lower limb, unspecified laterality   2  Acute kidney failure (584 9) (N17 9)   3  Anemia (285 9) (D64 9)   4  Benign hypertensive CKD (403 10) (I12 9)   5  Chronic diastolic congestive heart failure (428 32,428 0) (I50 32)   6  Chronic hypoxemic respiratory failure (518 83,799 02) (J96 11)   7  Chronic obstructive pulmonary disease (496) (J44 9)   8  Chronic respiratory failure with hypercapnia (518 83) (J96 12)   9  Congestive heart failure (428 0) (I50 9)   10  Current use of long term anticoagulation (V58 61) (Z79 01)   11   Diabetes Mellitus (250 00) 12  Dyspnea (786 09) (R06 00)   13  Edema (782 3) (R60 9)   14  End-stage renal disease on hemodialysis (585 6,V45 11) (N18 6,Z99 2)   15  Excess skin of arm (701 9) (L98 7)   16  Gout (274 9) (M10 9)   17  Hemodialysis patient (V45 11) (Z99 2)   18  Hyperkalemia (276 7) (E87 5)   19  Hyperlipidemia (272 4) (E78 5)   20  Hypernatremia (276 0) (E87 0)   21  Hypertension (401 9) (I10)   22  Morbid or severe obesity due to excess calories (278 01) (E66 01)   23  Obesity hypoventilation syndrome (278 03) (E66 2)   24  Obstructive sleep apnea (327 23) (G47 33)   25  Proteinuria (791 0) (R80 9)   26  Pulmonary embolism (415 19) (I26 99)   27  Secondary hyperparathyroidism, renal (588 81) (N25 81)   28  Thrombocythemia (238 71) (D47 3)   29  Type 2 diabetes mellitus with diabetic neuropathy, unspecified long term insulin use      status (250 60,357 2) (E11 40)    Chief Complaint   Chief Complaint Free Text Note Form: follow up for chronic hypercapnic respiratory failure      History of Present Illness   HPI: Mrs Ty Krabbe returns to the office today to follow up on her chronic hypercapnic respiratory failure  She was recently admitted to [facility] with chest pain while undergoing dialysis  She also had a recent admission for pneumonia  She has since been doing well from a respiratory standpoint  She denies any significant dyspnea during the day and is su on her 2L of oxygen through the day  At bedtime she has been having some difficulty with her BiPAP  When she was hospitalized her IPAP was reduced to 16  She was doing well with that switch so that when she came home she was at 25  This was too much pressure for her so she asked it to be reduced  Since then she has been tolerating it well and does not have any trouble with daytime sleepiness or nocturnal awakenings        Review of Systems   Complete-Female - Pulm:      Constitutional: No fever, no chills, feels well, no tiredness, no recent weight gain or weight loss  Eyes: no complaints of vision problems  ENT: no rhinitis, no PND, no epistaxis  Cardiovascular: no palpitations, no chest pain  Respiratory: as noted in HPI  Gastrointestinal: no complaints of esophageal reflux, no abdominal pain  Genitourinary: no dysuria  Musculoskeletal: no arthralgias, no joint swelling, no myalgias  Integumentary: no rash, no lesions  Neurological: no headache, no fainting, no weakness  ROS Reviewed:    ROS reviewed  Past Medical History   1  History of Absolute Glaucoma Of Both Eyes (360 42)   2  History of Cellulitis of right leg (682 6) (L03 115)   3  History of bacterial pneumonia (V12 61) (Z87 01)   4  History of gastroesophageal reflux (GERD) (V12 79) (Z87 19)   5  History of shortness of breath (V13 89) (Z87 898)   6  History of Osteoarthritis (V13 4)   7  History of Streptococcal Septicemia (038 0)    Surgical History   1  History of Appendectomy   2  History of Cataract Surgery   3  History of Cholecystectomy   4  History of Hysterectomy   5  History of Neuroplasty Decompression Median Nerve At Carpal Tunnel  Surgical History Reviewed: The surgical history was reviewed and updated today  Family History   Mother    1  Family history of Acute Myocardial Infarction (V17 3)   2  Family history of Diabetes Mellitus (V18 0)  Father    3  Family history of Coronary Artery Disease (V17 49)  Sister    4  Family history of Motor Vehicle Collision While In [employer]  Family History Reviewed: The family history was reviewed and updated today  Social History    · Being A Social Drinker   · Never A Smoker  Social History Reviewed: The social history was reviewed and updated today  The social history was reviewed and is unchanged  Current Meds    1  Acetaminophen 325 MG Oral Tablet; TAKE 1 TO 2 TABLETS EVERY 4 HOURS AS     NEEDED; Therapy: [phone]) to Recorded   2   Albuterol Sulfate (2 5 MG/3ML) 0 083% Inhalation Nebulization Solution; USE 1 UNIT     DOSE EVERY 4-6 HOURS AS NEEDED FOR WHEEZING ; Therapy: (Recorded:60Tnp0690) to Recorded   3  Allopurinol 100 MG Oral Tablet; TAKE 1 TABLET DAILY; Therapy: (Recorded:98Uun2187) to Recorded   4  Anbesol Maximum Strength 20 % Mouth/Throat Gel; Therapy: (Recorded:42Irt3332) to Recorded   5  Basaglar KwikPen 100 UNIT/ML Subcutaneous Solution Pen-injector; INJECT 45 UNIT     Bedtime; Therapy: 84UVV6886 to (Sobeida Pillai)  Requested for: 81PRC6896; Last     TN:55RMN5811 Ordered   6  Bengay Ultra Strength CREA; APPLY INCH  PRN; Therapy: (Recorded:47Tlh9656) to Recorded   7  Calcitriol 0 25 MCG Oral Capsule; Take one capsule every     Monday/Wednesday/Friday/Sunday; Therapy: 28Apr2015 to (Evaluate:25Oct2015); Last Rx:28Apr2015 Ordered   8  Coumadin 3 MG Oral Tablet; Therapy: (Recorded:75Zvg0471) to Recorded   9  [employer] Oyster Shell Calcium 500 MG TABS; Take 1 tablet daily; Therapy: (Recorded:78Jwi2991) to Recorded   10  Cyanocobalamin 1000 MCG TABS; Therapy: (Recorded:58Red0062) to Recorded   11  Desitin Maximum Strength PSTE; Therapy: (Recorded:41Lvo4151) to Recorded   12  DiphenhydrAMINE HCl - 50 MG Oral Capsule; Take 1 capsule 1 hr prior to cat scan with      IV dye (bring this with you to the hospital); Therapy: 40IGT4935 to (Last Rx:08Jun2017) Ordered   13  Docusate Sodium 100 MG Oral Tablet; Take 1 tablet twice daily; Therapy: (Recorded:65Nsi3628) to Recorded   14  Famotidine 20 MG Oral Tablet; Therapy: (Recorded:03Ykz8927) to Recorded   15  Furosemide 40 MG Oral Tablet; Take 40mg am and 20mg pm;      Therapy: (Recorded:94Czg5793) to Recorded   16  HumaLOG 100 UNIT/ML Subcutaneous Solution; USE AS DIRECTED; Therapy: 82LSI7989 to (Sobeida Pillai)  Requested for: 32PPK4776; Last      Rx:03Jan2018 Ordered   17  HumaLOG Pen 100 UNIT/ML SOLN; USE AS DIRECTED;       Therapy: (Recorded:39Dwp6802) to Recorded   18  Lantus SoloStar 100 UNIT/ML SOLN; USE AS DIRECTED; Therapy: (Recorded:75Poj4632) to Recorded   19  Latanoprost 0 005 % Ophthalmic Solution; INSTILL 1 DROP IN BOTH EYES AT      BEDTIME; Therapy: (Recorded:24Vso8483) to Recorded   20  Levothyroxine Sodium 25 MCG Oral Tablet; Therapy: 70HUR5330 to Recorded   21  Lyrica 150 MG Oral Capsule; Therapy: (Recorded:71Tsx4970) to Recorded   22  MethylPREDNISolone 32 MG Oral Tablet; 32mg po 12 hours prior to contrast and 32mg      po 2 hours prior to contast;      Therapy: 73FCL0946 to (Last Rx:08Jun2017) Ordered   23  Metoprolol Tartrate 25 MG Oral Tablet; TAKE 0 5 TABLET  As Directed Danna Leger ,      Sat; Therapy: 40LNV5677 to (Evaluate:23Msr1176) Recorded   24  Micro-Guard 2 5 % POWD;      Therapy: (Recorded:01Nqi9314) to Recorded   25  Omega-3-acid Ethyl Esters 1 GM Oral Capsule; Therapy: 62ARS2323 to Recorded   26  Omeprazole 20 MG Oral Capsule Delayed Release; TAKE 1 CAPSULE DAILY; Therapy: (Recorded:80Bqf5857) to Recorded   27  Polyethylene Glycol 3350 Oral Powder; Therapy: (Recorded:03Gdo8932) to Recorded   28  Pravastatin Sodium 80 MG Oral Tablet; TAKE 1 TABLET DAILY; Therapy: (Recorded:41Gdo4872) to Recorded   29  Renvela 800 MG Oral Tablet; Therapy: (Recorded:59Kaa8305) to Recorded   30  Robitussin A-C SYRP;      Therapy: (Recorded:90Aeb1083) to Recorded   31  Senna 8 6 MG Oral Tablet; TAKE AS DIRECTED; Therapy: (Recorded:14Rsa1977) to Recorded   32  Sodium Polystyrene Sulfonate 15 GM/60ML SUSP; Therapy: (Recorded:81Sov9769) to Recorded   33  Vitamin D3 1000 UNIT Oral Capsule; Therapy: (Recorded:60Mpp6860) to Recorded   34  Welchol 625 MG Oral Tablet; Therapy: 39Qjm0807 to Recorded  Medication List Reviewed: The medication list was reviewed and updated today  Allergies   1  Keflex   2  Codeine Derivatives   3  Darvocet-N 50 TABS   4  iodine   5   Morphine Derivatives   6  NSAIDs   7  Propoxyphene HCl CAPS  8  IVP Dye   9  Seafood   10  Shellfish  Denied    11  NO KNOWN DRUG ALLERGY    Vitals   Vital Signs    Recorded: 64WHQ1259 09:45AM   Temperature 98 7 F, Tympanic   Heart Rate 77   Respiration 20   Systolic 523, LUE, Sitting   Diastolic 50, LUE, Sitting   Height Unobtainable Yes   Weight Unobtainable Yes   O2 Saturation 100, Nasal Cannula   FiO2 2L/min, Nasal Cannula     Physical Exam        Constitutional      General appearance: Abnormal  -- morbidly obese  Head and Face      Head and face: Normal        Palpation of the face and sinuses: No sinus tenderness  Ears, Nose, Mouth, and Throat      Nasal mucosa, septum, and turbinates: Normal without edema or erythema  Lips, teeth, and gums: Normal, good dentition  Oropharynx: Normal with no erythema, edema, exudate or lesions  Neck      Neck: Supple, symmetric, trachea midline, no masses  Thyroid: Normal, no thyromegaly  Jugular veins: Normal        Pulmonary      Chest: Normal        Respiratory effort: No increased work of breathing or signs of respiratory distress  Percussion of chest: Normal        Palpation of chest: Normal        Auscultation of lungs: Abnormal  -- Decreased breath sounds, no wheezing, rhonchi or rales  Cardiovascular      Auscultation of heart: Normal rate and rhythm, normal S1 and S2, no murmurs  Pedal pulses: 2+ bilaterally  Examination of extremities for edema and/or varicosities: Normal        Abdomen      Abdomen: Non-tender, no masses  Liver and spleen: No hepatomegaly or splenomegaly  Lymphatic      Palpation of lymph nodes in neck: No lymphadenopathy  Palpation of lymph nodes in axillae: No lymphadenopathy  Palpation of lymph nodes in groin: No lymphadenopathy  Palpation of lymph nodes in other areas: No lymphadenopathy         Musculoskeletal      Gait and station: Normal        Muscle strength/tone: Normal        Skin      Skin and subcutaneous tissue: Normal without rashes or lesions  Palpation of skin and subcutaneous tissue: Normal turgor  Neurologic      Sensation: No sensory loss  Coordination: Normal finger to nose and heel to shin         Psychiatric      Orientation to person, place and time: Normal        Mood and affect: Normal        Signatures    Electronically signed by : Tiago Urias DO; Jan 11 2018 12:20PM EST                       (Author)

## 2018-01-13 VITALS
SYSTOLIC BLOOD PRESSURE: 130 MMHG | RESPIRATION RATE: 20 BRPM | DIASTOLIC BLOOD PRESSURE: 72 MMHG | HEIGHT: 64 IN | BODY MASS INDEX: 50.02 KG/M2 | SYSTOLIC BLOOD PRESSURE: 132 MMHG | HEART RATE: 72 BPM | DIASTOLIC BLOOD PRESSURE: 68 MMHG | RESPIRATION RATE: 18 BRPM | HEART RATE: 88 BPM | WEIGHT: 293 LBS

## 2018-01-13 NOTE — BRIEF OP NOTE (RAD/CATH)
Fistulagram declot, unsuccessful  Procedure Note    PATIENT NAME: Aubrey Lopez  :   MRN: [id number]     Pre-op Diagnosis:   1  ESRD (end stage renal disease) ([facility] )      Post-op Diagnosis:   1  ESRD (end stage renal disease) ([place] [address] )        Surgeon:   Domenic Lee MD  Assistants:     No qualified resident was available, Resident is only observing    Estimated Blood Loss: less than 50 mL  Findings:     Declot attempt  Despite angioplasty, mechanical and pharmacologic thrombectomy, clot could not be completely cleared, and a permacath was placed via the right EJ  Specimens: [email or web address]    Complications:  Fistula still clotted       Anesthesia: Vijay Allison MD     Date: 2018  Time: 9:04 PM

## 2018-01-13 NOTE — PROCEDURES
Procedures by Rigoberto Ruelas MD  at 8/14/2017  3:08 PM      Author:  Rigoberto Ruelas MD Service:  Critical Care/ICU Author Type:  Resident    Filed:  8/14/2017  3:14 PM Date of Service:  8/14/2017  3:08 PM Status:  Attested    :  Rigoberto Ruelas MD (Resident)  Cosigner:  Miriam Nair DO at 8/14/2017  3:23 PM      Procedure Orders:       1  CENTRAL LINE [[id number]] ordered by Ravinder Boston MD at 08/14/17 1508                 Post-procedure Diagnoses:       1  Gastrointestinal hemorrhage, unspecified gastrointestinal hemorrhage type [K92 2]              Attestation signed by Miriam Nair DO at 8/14/2017  3:23 PM             Teaching Physician Statement   I agree with the resident's documented findings and plan of care  I provided indirect supervision and was immediatly available  Central Line Insertion  Date/Time: 8/14/2017 3:08 PM  Performed by: Bernadette Doss by: Nasir Coreas     Patient location:  ED  Other Assisting Provider: Yes (comment)  Jomar Gasca MD; Rigoberto Ruelas MD)    Consent:     Consent obtained:  Written    Consent given by:  Patient  Pre-procedure details:     Hand hygiene: Hand hygiene performed prior to insertion      Sterile barrier technique: All elements of maximal sterile technique followed      Skin preparation:  2% chlorhexidine  Indications:     Central line indications: vascular access    Anesthesia (see MAR for exact dosages): Anesthesia method:  Local infiltration    Local anesthetic:  Lidocaine 1% w/o epi  Procedure details:     Location:  Left internal jugular    Vessel type: vein      Approach: percutaneous technique used      Patient position:  Trendelenburg    Catheter type:  Cordis    Ultrasound guidance: yes    Post-procedure details:     Post-procedure:  Dressing applied and line sutured    Assessment:  Blood return through all ports and free fluid flow    Patient tolerance of procedure:   Tolerated well, no immediate complications  Comments:       Will check post-procedure XR chest                         Received for:Dionicio Macias MD  Aug 14 2017  3:23PM [facility] Standard Time

## 2018-01-14 VITALS
TEMPERATURE: 99.3 F | BODY MASS INDEX: 50.02 KG/M2 | SYSTOLIC BLOOD PRESSURE: 130 MMHG | HEART RATE: 73 BPM | RESPIRATION RATE: 18 BRPM | DIASTOLIC BLOOD PRESSURE: 80 MMHG | HEIGHT: 64 IN | OXYGEN SATURATION: 97 % | WEIGHT: 293 LBS

## 2018-01-14 VITALS
RESPIRATION RATE: 18 BRPM | TEMPERATURE: 99.1 F | OXYGEN SATURATION: 98 % | WEIGHT: 293 LBS | SYSTOLIC BLOOD PRESSURE: 124 MMHG | HEART RATE: 72 BPM | HEIGHT: 64 IN | DIASTOLIC BLOOD PRESSURE: 80 MMHG | BODY MASS INDEX: 50.02 KG/M2

## 2018-01-14 VITALS — HEIGHT: 64 IN | RESPIRATION RATE: 20 BRPM | HEART RATE: 90 BPM

## 2018-01-14 NOTE — PROGRESS NOTES
Plan  Chronic hypoxemic respiratory failure, Chronic respiratory failure with hypercapnia    · Follow-up visit in 3 months Evaluation and Treatment  Follow-up  Status: Hold For -  Scheduling  Requested for: 50DNO5706   Ordered; For: Chronic hypoxemic respiratory failure, Chronic respiratory failure with hypercapnia; Ordered By: Cielo Haile Performed:  Due: 15TDK7470  Obesity hypoventilation syndrome    · (1) Arterial Blood Gases; Status:Active; Requested VWN:[id number];    Perform:[facility]; INZ:[id number]; Ordered;  For:Obesity hypoventilation syndrome; Ordered By:Yi Ibanez; Results/Data  Results Free Text Form [facility]:   Results   Other ABG -  12/29/16 Â 8:45 AM   Flag   pH, Art i-STAT 7 350 - 7 450  7 277 L  pCO2, Art i-STAT 36 0 - 44 0 mm HG  57 3 H  pO2, ART i-STAT 75 0 - 129 0 mm HG  66 0 L  BE, i-STAT -2 - 3 mmol/L 0   HCO3, Art i-STAT 22 0 - 28 0 mmol/L 26 7   CO2, i-STAT 21 - 32 mmol/L 28   O2 Sat, i-STAT 95 - 98 % 90 L  SODIUM, I-STAT 136 - 145 mmol/l 135 L  Potassium, i-STAT 3 5 - 5 3 mmol/L 4 4   Calcium, Ionized i-STAT 1 12 - 1 32 mmol/L 1 20   Hct, i-STAT 34 8 - 46 1 % 27 L  Hgb, i-STAT 11 5 - 15 4 g/dl 9 2 L  Glucose, i-STAT 65 - 140 mg/dl 236 H  POC FIO2 L 28   Specimen Type  ARTERIAL     Specimen Collected: 12/29/16 Â 8:45 AM Last Resulted: 12/29/16 Â 8:50 AM    Diagnostic study - no evidence of VIVIAN but respiratory events there, desaturation inadequate due to oxygen administered at 2L   BIPAP titration for chronic hypercapnia - BiPAP 16/8 without titration, 3L added, inadequate study to deal with hypercapnia    Discussion/Summary  Discussion Summary:   50-year-old female with past medical history of Chronic diastolic HF, DM, Morbid obesity, ESRD on HD, DVT on coumadin, HTN who comes in for follow up on chronic hypoxic and hypercapnic respiratory failure and for preoperative clearance for AV fistula    1  Preoperative pulmonary clearance - she may proceed to surgery with a high risk for pulmonary complications due to chronic hypoxic and hypercapnic respiratory failure  I explained to Mrs Aydin Bray that due to her morbid obesity which is causing hypoxia and alveolar hypoventilation and hypercapnia, she is at high risk for remaining intubated after surgery and may need prolonged ventilation afterwards with possible tracheostomy  - If she is extubated post operatively she should be placed on BiPAP at home settings with ABG to follow   - minimize narcotics and benzo as possible to prevent further hypoventilation   - pharmacologic prophylaxis    - encourage incentive spirometry to prevent atelectasis     2  Chronic hypoxic and hypercapnic respiratory failure secondary to obesity hypoventilation and restrictive lung disease from obesity    - on BIPAP at 16/8 with 3L  ABG with worsening hypercapnia noted  Will attempt to titration to 18/8 with 3L and recheck ABG to help optimize BiPAP settings  If she is unable to tolerated 18/8 will go back to 16/8    3  Hx of DVT - continue coumadin, maintein INR 2-3     Active Problems    1  Acute deep vein thrombosis of lower limb, unspecified laterality   2  Acute kidney failure (584 9) (N17 9)   3  Anemia (285 9) (D64 9)   4  Benign hypertensive CKD (403 10) (I12 9)   5  Cellulitis of right leg (682 6) (L03 115)   6  Chronic diastolic congestive heart failure (428 32,428 0) (I50 32)   7  Chronic hypoxemic respiratory failure (518 83,799 02) (J96 11)   8  Chronic obstructive pulmonary disease (496) (J44 9)   9  Chronic renal disease, stage 4 (severe) (585 4) (N18 4)   10  Chronic respiratory failure with hypercapnia (518 83) (J96 12)   11  Congestive heart failure (428 0) (I50 9)   12  Current use of long term anticoagulation (V58 61) (Z79 01)   13  Diabetes Mellitus (250 00)   14  Dyspnea (786 09) (R06 00)   15  Edema (782 3) (R60 9)   16  End-stage renal disease on hemodialysis (585 6,V45 11) (N18 6,Z99 2)   17   Excess skin of arm (701  9) (L98 7)   18  Gout (274 9) (M10 9)   19  Hemodialysis patient (V45 11) (Z99 2)   20  Hyperkalemia (276 7) (E87 5)   21  Hyperlipidemia (272 4) (E78 5)   22  Hypernatremia (276 0) (E87 0)   23  Hypertension (401 9) (I10)   24  Morbid or severe obesity due to excess calories (278 01) (E66 01)   25  Obesity hypoventilation syndrome (278 03) (E66 2)   26  Obstructive sleep apnea (327 23) (G47 33)   27  Proteinuria (791 0) (R80 9)   28  Pulmonary embolism (415 19) (I26 99)   29  Secondary hyperparathyroidism, renal (588 81) (N25 81)    Chief Complaint  Chief Complaint Free Text Note Form: Preoperative pulmonary clearance, follow up on chronic hypercapnic respiratory failure      History of Present Illness  HPI: Mrs Fanta Bonner returns to the office today to follow up on her chronic hypercapnic respiratory failure and to be evaluated prior to undergoing surgery for an AV fistula  Currently she states that she has been doing well with her breathing  While she is not active, she denies any dyspnea, chest tightness, cough, wheezing, pleuritic pain lightheadedness or dizziness  She states that dialysis has helped keep fluid off her body and out of her legs and she has remained stable with her breathing  As far as her nocturnal ventilation she states that she has been wearing her BiPAP at 16/8 with 3L bleed in  This is contrary to what was stated by the nurses at the nursing home who have stated prior to that she was noncompliant with her machine  It is unclear if the ABG drawn was with her consistent use or inconsistent use  She states that when I requested the switch to BiPAP at 20/8 she had a lot of difficulty tolerating the mask due to excessive leak and the pressure being too high  They have switched her back to 16/8 on 3L and she has been stable with that        Review of Systems  Complete-Female - Pulm:   Constitutional: feeling tired, but no fever, not feeling poorly, no recent weight gain, no chills and no recent weight loss  ENT: no rhinitis, no PND, no epistaxis  Cardiovascular: no palpitations, no chest pain  Respiratory: as noted in HPI  Gastrointestinal: no complaints of esophageal reflux, no abdominal pain  ROS Reviewed:   ROS reviewed  Past Medical History    1  History of Absolute Glaucoma Of Both Eyes (360 42)   2  History of bacterial pneumonia (V12 61) (Z87 01)   3  History of gastroesophageal reflux (GERD) (V12 79) (Z87 19)   4  History of shortness of breath (V13 89) (Z87 898)   5  History of Osteoarthritis (V13 4)   6  History of Streptococcal Septicemia (038 0)    Surgical History    1  History of Appendectomy   2  History of Cataract Surgery   3  History of Cholecystectomy   4  History of Hysterectomy   5  History of Neuroplasty Decompression Median Nerve At Carpal Tunnel  Surgical History Reviewed: The surgical history was reviewed and updated today  Family History  Mother    1  Family history of Acute Myocardial Infarction (V17 3)   2  Family history of Diabetes Mellitus (V18 0)  Father    3  Family history of Coronary Artery Disease (V17 49)  Sister    4  Family history of Motor Vehicle Collision While In [employer]  Family History Reviewed: The family history was reviewed and updated today  Social History    · Being A Social Drinker   · Never A Smoker  Social History Reviewed: The social history was reviewed and updated today  The social history was reviewed and is unchanged  Current Meds   1  Acetaminophen 325 MG Oral Tablet; TAKE 1 TO 2 TABLETS EVERY 4 HOURS AS   NEEDED; Therapy: () to Recorded   2  Albuterol Sulfate (2 5 MG/3ML) 0 083% Inhalation Nebulization Solution; USE 1 UNIT   DOSE EVERY 4-6 HOURS AS NEEDED FOR WHEEZING ; Therapy: (Recorded:51Asi2566) to Recorded   3  Allopurinol 100 MG Oral Tablet; TAKE 1 TABLET DAILY; Therapy: (Recorded:40Frr8830) to Recorded   4  Anbesol Maximum Strength 20 % Mouth/Throat Gel;    Therapy: (Recorded:63Sti3047) to Recorded   5  Bengay Ultra Strength CREA; APPLY INCH  PRN; Therapy: (Recorded:66Pqf4447) to Recorded   6  Calcitriol 0 25 MCG Oral Capsule; Take one capsule every   Monday/Wednesday/Friday/Sunday; Therapy: 28Apr2015 to (Evaluate:25Oct2015); Last Rx:28Apr2015 Ordered   7  Coumadin 3 MG Oral Tablet; Therapy: (Recorded:04Esj2219) to Recorded   8  [employer]S Oyster Shell Calcium 500 MG TABS; Take 1 tablet daily; Therapy: (Recorded:46Nex5583) to Recorded   9  Cyanocobalamin 1000 MCG TABS; Therapy: (Recorded:94Alr6842) to Recorded   10  Desitin Maximum Strength PSTE; Therapy: (Recorded:24Cmx9960) to Recorded   11  Docusate Sodium 100 MG Oral Tablet; Take 1 tablet twice daily; Therapy: (Recorded:02Vfj8251) to Recorded   12  Famotidine 20 MG Oral Tablet; Therapy: (Recorded:37Omv4009) to Recorded   13  Furosemide 40 MG Oral Tablet; Take 40mg am and 20mg pm;    Therapy: (Recorded:02Pqi4343) to Recorded   14  HumaLOG Pen 100 UNIT/ML SOLN; USE AS DIRECTED; Therapy: (Recorded:29Kim6237) to Recorded   15  Lantus SoloStar 100 UNIT/ML SOLN; USE AS DIRECTED; Therapy: (Recorded:52Uys3724) to Recorded   16  Latanoprost 0 005 % Ophthalmic Solution; INSTILL 1 DROP IN BOTH EYES AT    BEDTIME; Therapy: (Recorded:67Gxb1382) to Recorded   17  Levothyroxine Sodium 25 MCG Oral Tablet; Therapy: 89KIF4683 to Recorded   18  Lyrica 150 MG Oral Capsule; Therapy: (Recorded:03Bbb4255) to Recorded   19  Metoprolol Tartrate 25 MG Oral Tablet; TAKE 0 5 TABLET  As Directed Danna Leger Sat; Therapy: 21MLU6821 to (Evaluate:27Xel2636) Recorded   20  Micro-Guard 2 5 % POWD;    Therapy: (Recorded:22Cjb8301) to Recorded   21  Omega-3-acid Ethyl Esters 1 GM Oral Capsule; Therapy: 04Apr2016 to Recorded   22  Omeprazole 20 MG Oral Capsule Delayed Release; TAKE 1 CAPSULE DAILY; Therapy: (Recorded:35Gim8892) to Recorded   23  Polyethylene Glycol 3350 Oral Powder;     Therapy: (Recorded:41Cxy5004) to Recorded   24  Pravastatin Sodium 80 MG Oral Tablet; TAKE 1 TABLET DAILY; Therapy: (Recorded:52Mld6426) to Recorded   25  Renvela 800 MG Oral Tablet; Therapy: (Recorded:60Soz7294) to Recorded   26  Robitussin A-C SYRP;    Therapy: (Recorded:76Bjy6407) to Recorded   27  Senna 8 6 MG Oral Tablet; TAKE AS DIRECTED; Therapy: (Recorded:30Tbu0025) to Recorded   28  Sodium Polystyrene Sulfonate 15 GM/60ML SUSP; Therapy: (Recorded:59Jus4493) to Recorded   29  Vitamin D3 1000 UNIT Oral Capsule; Therapy: (Recorded:86Kgt2716) to Recorded   30  Welchol 625 MG Oral Tablet; Therapy: 76Rat7667 to Recorded  Medication List Reviewed: The medication list was reviewed and updated today  Allergies    1  Keflex   2  Codeine Derivatives   3  Darvocet-N 50 TABS   4  iodine   5  Morphine Derivatives   6  NSAIDs   7  Propoxyphene HCl CAPS    8  IVP Dye   9  Seafood   10  Shellfish  Denied    11  NO KNOWN DRUG ALLERGY    Vitals  Vital Signs    Recorded: 76UJZ4735 04:01PM   Temperature 99 3 F   Heart Rate 73   Respiration 18   Systolic 532   Diastolic 80   Height 5 ft 4 in   Weight 342 lb    BMI Calculated 58 7   BSA Calculated 2 46   O2 Saturation 97     Physical Exam    Constitutional   General appearance: Abnormal   morbid obesity  Ears, Nose, Mouth, and Throat   Nasal mucosa, septum, and turbinates: Normal without edema or erythema  Lips, teeth, and gums: Normal, good dentition  Oropharynx: Normal with no erythema, edema, exudate or lesions  Neck   Neck: Supple, symmetric, trachea midline, no masses  Thyroid: Normal, no thyromegaly  Jugular veins: Normal     Pulmonary   Chest: Normal     Respiratory effort: No increased work of breathing or signs of respiratory distress  Percussion of chest: Normal     Palpation of chest: Normal     Auscultation of lungs: Clear to auscultation  CTA bilaterally, no wheezing, rhonchi or rales     Cardiovascular   Auscultation of heart: Normal rate and rhythm, normal S1 and S2, no murmurs  Pedal pulses: 2+ bilaterally  Examination of extremities for edema and/or varicosities: Normal   no significant lower extremity edema  Abdomen   Abdomen: Non-tender, no masses  Liver and spleen: No hepatomegaly or splenomegaly  Lymphatic   Palpation of lymph nodes in neck: No lymphadenopathy  Palpation of lymph nodes in axillae: No lymphadenopathy  Palpation of lymph nodes in groin: No lymphadenopathy  Palpation of lymph nodes in other areas: No lymphadenopathy  Musculoskeletal   Gait and station: Normal     Muscle strength/tone: Normal     Skin   Skin and subcutaneous tissue: Normal without rashes or lesions  Palpation of skin and subcutaneous tissue: Normal turgor      Psychiatric   Orientation to person, place and time: Normal     Mood and affect: Normal        Future Appointments    Date/Time Provider Specialty Site   09/06/2017 10:00 AM Endocrinology, Room 1  [facility]   05/11/2017 10:40 AM Butch Lawrence DO Pulmonary Medicine [place] [id number]     Signatures   Electronically signed by : Yu Callahan DO; Mar 10 2017 10:27AM EST                       (Author)

## 2018-01-15 NOTE — MISCELLANEOUS
Message   Recorded as Task   Date: 04/11/2016 09:28 PM, Created By: Jamaal Horan   Task Name: Follow Up   Assigned To: Zulma Hernandez   Regarding Patient: Femi Ledezma, Status: Active   CommentKerin Games - 11 Apr 2016 9:28 PM     TASK CREATED  need to start Patiromer 8 4 grams per day w BMP qweek x 4 starting 1 week after started  If delay in starting, give kayexalate 15gm x 1   I spoke with Daryn Conroy at [facility] and she is aware of all the above  The patient will not be able to get the Veltassa until the end of the week, therefore, she will get the dose of Kayexalate today  kja      Active Problems    1  Acute deep vein thrombosis of lower limb, unspecified laterality   2  Acute kidney failure (584 9) (N17 9)   3  Anemia (285 9) (D64 9)   4  Benign hypertensive CKD (403 10) (I12 9)   5  Cellulitis of right leg (682 6) (L03 115)   6  Chronic diastolic congestive heart failure (428 32,428 0) (I50 32)   7  Chronic obstructive pulmonary disease (496) (J44 9)   8  Chronic renal disease, stage 4 (severe) (585 4) (N18 4)   9  Congestive heart failure (428 0) (I50 9)   10  Diabetes Mellitus (250 00)   11  Edema (782 3) (R60 9)   12  Gout (274 9) (M10 9)   13  Hyperkalemia (276 7) (E87 5)   14  Hyperlipidemia (272 4) (E78 5)   15  Hypernatremia (276 0) (E87 0)   16  Hypertension (401 9) (I10)   17  Morbid or severe obesity due to excess calories (278 01) (E66 01)   18  Obstructive sleep apnea (327 23) (G47 33)   19  Proteinuria (791 0) (R80 9)   20  Pulmonary embolism (415 19) (I26 99)   21  Secondary hyperparathyroidism, renal (588 81) (N25 81)    Current Meds   1  Acetaminophen 325 MG Oral Tablet; TAKE 1 TO 2 TABLETS EVERY 4 HOURS AS   NEEDED; Therapy: [phone]) to Recorded   2  Albuterol Sulfate (2 5 MG/3ML) 0 083% Inhalation Nebulization Solution; USE 1 UNIT   DOSE EVERY 4-6 HOURS AS NEEDED FOR WHEEZING ; Therapy: (Recorded:03Aow0931) to Recorded   3   Allopurinol 100 MG Oral Tablet; TAKE 1 TABLET DAILY; Therapy: (Recorded:20Amn6940) to Recorded   4  AmLODIPine Besylate 2 5 MG Oral Tablet; TAKE 1 TABLET DAILY AS DIRECTED; Therapy: 59LLI2107 to (Elías Belle)  Requested for: 56VIH6991; Last   Rx:06Apr2016 Ordered   5  Antacid TABS; take 1 tablet daily prn; Therapy: (Recorded:74Tjf3789) to Recorded   6  Bengay Ultra Strength CREA; APPLY INCH  PRN; Therapy: (Recorded:96Osz8819) to Recorded   7  Calcitriol 0 25 MCG Oral Capsule (Rocaltrol); Take one capsule every   Monday/Wednesday/Friday/Sunday; Therapy: 28Apr2015 to (Evaluate:25Oct2015); Last Rx:28Apr2015 Ordered   8  Coumadin 1 MG Oral Tablet (Warfarin Sodium); Take 1 capsule on Tuesday, and   wednesday; Therapy: (Recorded:33Tqn8890) to Recorded   9  Coumadin 6 MG Oral Tablet (Warfarin Sodium); Take 1 capsule on Sun, Mon, Thu, Fri,   Sat; Therapy: (Recorded:63Dqi0030) to Recorded   10  CVS Oyster Shell Calcium 500 MG Oral Tablet; Take 1 tablet daily; Therapy: (Recorded:70Kia2566) to Recorded   11  Docusate Sodium 100 MG Oral Tablet; Take 1 tablet twice daily; Therapy: (Recorded:16Ksm7008) to Recorded   12  Furosemide 40 MG Oral Tablet; Take 40mg am and 20mg pm;    Therapy: (Recorded:42Xlg1777) to Recorded   13  HumaLOG Pen 100 UNIT/ML SOLN; USE AS DIRECTED; Therapy: (Recorded:03Mmw4751) to Recorded   14  Lantus SoloStar 100 UNIT/ML SOLN; USE AS DIRECTED; Therapy: (Recorded:18Aku0593) to Recorded   15  Latanoprost 0 005 % Ophthalmic Solution; INSTILL 1 DROP IN BOTH EYES AT    BEDTIME; Therapy: (Recorded:09Fzm4157) to Recorded   16  Levothyroxine Sodium 25 MCG Oral Tablet; Therapy: 81JHI5151 to Recorded   17  Lyrica 75 MG Oral Capsule; TAKE 1 CAPSULE AT BEDTIME; Therapy: (Recorded:52Fof8774) to Recorded   18  Metoprolol Succinate ER 50 MG Oral Tablet Extended Release 24 Hour; Take 1 tablet    twice daily; Therapy: (Recorded:06Apr2016) to Recorded   19  Omega 3 CAPS; take 1 capsule daily;     Therapy: (Recorded:30Bge3643) to Recorded   20  Omeprazole 20 MG Oral Capsule Delayed Release; TAKE 1 CAPSULE DAILY; Therapy: (Recorded:42Mnr0346) to Recorded   21  Pravastatin Sodium 80 MG Oral Tablet; TAKE 1 TABLET DAILY; Therapy: (Recorded:21Aug2014) to Recorded   22  Procrit 19494 UNIT/ML Injection Solution; Every 30 days; Therapy: [phone]) to Recorded   23  Restasis 0 05 % Ophthalmic Emulsion; 1 drop in botg eyes in the morning; Therapy: (Recorded:06Apr2016) to Recorded   24  Senna 8 6 MG Oral Tablet; TAKE AS DIRECTED; Therapy: (Recorded:62Dsv8468) to Recorded   25  TraMADol HCl - 50 MG Oral Tablet; TAKE TABLET  PRN; Therapy: (Recorded:17Apr2015) to Recorded   26  Triple Antibiotic 3 5-400-5000 External Ointment; Therapy: 18Apr2015 to Recorded   27  Vitamin C 500 MG Oral Tablet; TAKE 1 TABLET DAILY; Therapy: (Recorded:82Oge3533) to Recorded    Allergies    1  Keflex   2  Codeine Derivatives   3  Darvocet-N 50 TABS   4  Morphine Derivatives   5  Propoxyphene HCl CAPS    6   NO KNOWN DRUG ALLERGY    Signatures   Electronically signed by : SHADE Rinaldi ; Apr 12 2016 10:40AM EST

## 2018-01-15 NOTE — MISCELLANEOUS
Provider Comments  Provider Comments:   Patient did not show for scheduled appointment today        Signatures   Electronically signed by : SHADE Rabago ; Oct 27 2017 12:17PM EST                       (Author)

## 2018-01-16 ENCOUNTER — GENERIC CONVERSION - ENCOUNTER (OUTPATIENT)
Dept: OTHER | Facility: OTHER | Age: 73
End: 2018-01-16

## 2018-01-16 NOTE — MISCELLANEOUS
Message   Recorded as Task   Date: 04/15/2016 01:15 PM, Created By: Maine Malik   Task Name: Miscellaneous   Assigned To: Zulma Hernandez   Regarding Patient: Ellen Koehler, Status: Active   Comment:    Maine Malik - 15 Apr 2016 1:15 PM     TASK CREATED  Nursing home called asking some questions about Valtessa  They spoke to the drug company and they recommended the patient take it without other medications, 6 hour window  The patient takes meds at 8am, 4pm, 6pm, and 8pm  So we realized the perfect time would be noon, to take the [facility]  They need an order stating the patient should take Valtessa at noon  Fax [phone] or call back Jonatan Mcclure at [phone] if you have any questions  I spoke with Jonatan Mcclure at [facility] and she is aware that noontime will be okay for the patient to take the [address]      Active Problems    1  Acute deep vein thrombosis of lower limb, unspecified laterality   2  Acute kidney failure (584 9) (N17 9)   3  Anemia (285 9) (D64 9)   4  Benign hypertensive CKD (403 10) (I12 9)   5  Cellulitis of right leg (682 6) (L03 115)   6  Chronic diastolic congestive heart failure (428 32,428 0) (I50 32)   7  Chronic obstructive pulmonary disease (496) (J44 9)   8  Chronic renal disease, stage 4 (severe) (585 4) (N18 4)   9  Congestive heart failure (428 0) (I50 9)   10  Diabetes Mellitus (250 00)   11  Edema (782 3) (R60 9)   12  Gout (274 9) (M10 9)   13  Hyperkalemia (276 7) (E87 5)   14  Hyperlipidemia (272 4) (E78 5)   15  Hypernatremia (276 0) (E87 0)   16  Hypertension (401 9) (I10)   17  Morbid or severe obesity due to excess calories (278 01) (E66 01)   18  Obstructive sleep apnea (327 23) (G47 33)   19  Proteinuria (791 0) (R80 9)   20  Pulmonary embolism (415 19) (I26 99)   21  Secondary hyperparathyroidism, renal (588 81) (N25 81)    Current Meds   1  Acetaminophen 325 MG Oral Tablet; TAKE 1 TO 2 TABLETS EVERY 4 HOURS AS   NEEDED; Therapy: ([id number]) to Recorded   2  Albuterol Sulfate (2 5 MG/3ML) 0 083% Inhalation Nebulization Solution; USE 1 UNIT   DOSE EVERY 4-6 HOURS AS NEEDED FOR WHEEZING ; Therapy: (Recorded:67Yrg8113) to Recorded   3  Allopurinol 100 MG Oral Tablet; TAKE 1 TABLET DAILY; Therapy: (Recorded:13Ozl1457) to Recorded   4  AmLODIPine Besylate 2 5 MG Oral Tablet; TAKE 1 TABLET DAILY AS DIRECTED; Therapy: 13GWD3219 to (Cary Correa)  Requested for: 78XAS5845; Last   Rx:06Apr2016 Ordered   5  Antacid TABS; take 1 tablet daily prn; Therapy: (Recorded:60Vmc3960) to Recorded   6  Bengay Ultra Strength CREA; APPLY INCH  PRN; Therapy: (Recorded:32Ouz5316) to Recorded   7  Calcitriol 0 25 MCG Oral Capsule (Rocaltrol); Take one capsule every   Monday/Wednesday/Friday/Sunday; Therapy: 28Apr2015 to (Evaluate:25Oct2015); Last Rx:28Apr2015 Ordered   8  Coumadin 1 MG Oral Tablet (Warfarin Sodium); Take 1 capsule on Tuesday, and   wednesday; Therapy: (Recorded:56Lgy6920) to Recorded   9  Coumadin 6 MG Oral Tablet (Warfarin Sodium); Take 1 capsule on Sun, Mon, Thu, Fri,   Sat; Therapy: (Recorded:07Cda3924) to Recorded   10  CVS Oyster Shell Calcium 500 MG Oral Tablet; Take 1 tablet daily; Therapy: (Recorded:98Lnr1249) to Recorded   11  Docusate Sodium 100 MG Oral Tablet; Take 1 tablet twice daily; Therapy: (Recorded:63Nfu8362) to Recorded   12  Furosemide 40 MG Oral Tablet; Take 40mg am and 20mg pm;    Therapy: (Recorded:26Vua3268) to Recorded   13  HumaLOG Pen 100 UNIT/ML SOLN; USE AS DIRECTED; Therapy: (Recorded:66Rqt5261) to Recorded   14  Lantus SoloStar 100 UNIT/ML SOLN; USE AS DIRECTED; Therapy: (Recorded:06Zzv0459) to Recorded   15  Latanoprost 0 005 % Ophthalmic Solution; INSTILL 1 DROP IN BOTH EYES AT    BEDTIME; Therapy: (Recorded:16Aaf0523) to Recorded   16  Levothyroxine Sodium 25 MCG Oral Tablet; Therapy: 01UBG2049 to Recorded   17  Lyrica 75 MG Oral Capsule; TAKE 1 CAPSULE AT BEDTIME;     Therapy: (Recorded:54Mrl1188) to Recorded   18  Metoprolol Succinate ER 50 MG Oral Tablet Extended Release 24 Hour; Take 1 tablet    twice daily; Therapy: (Recorded:06Apr2016) to Recorded   19  Omega 3 CAPS; take 1 capsule daily; Therapy: (Recorded:17Apr2015) to Recorded   20  Omeprazole 20 MG Oral Capsule Delayed Release; TAKE 1 CAPSULE DAILY; Therapy: (Recorded:89Eep4963) to Recorded   21  Pravastatin Sodium 80 MG Oral Tablet; TAKE 1 TABLET DAILY; Therapy: (Recorded:21Aug2014) to Recorded   22  Procrit 59696 UNIT/ML Injection Solution; Every 30 days; Therapy: [phone]) to Recorded   23  Restasis 0 05 % Ophthalmic Emulsion; 1 drop in bot eyes in the morning; Therapy: (Recorded:06Apr2016) to Recorded   24  Senna 8 6 MG Oral Tablet; TAKE AS DIRECTED; Therapy: (Recorded:21Aug2014) to Recorded   25  TraMADol HCl - 50 MG Oral Tablet; TAKE TABLET  PRN; Therapy: (Recorded:17Apr2015) to Recorded   26  Triple Antibiotic 3 5-400-5000 External Ointment; Therapy: 18Apr2015 to Recorded   27  Veltassa 8 4 GM Oral Packet; Dissolve contents of 1 packet in 3 ounces of water and    drink full amount once daily; Therapy: 12Apr2016 to Recorded   28  Vitamin C 500 MG Oral Tablet; TAKE 1 TABLET DAILY; Therapy: (Recorded:14Vol8900) to Recorded    Allergies    1  Keflex   2  Codeine Derivatives   3  Darvocet-N 50 TABS   4  Morphine Derivatives   5  Propoxyphene HCl CAPS    6   NO KNOWN DRUG ALLERGY    Signatures   Electronically signed by : SHADE Alexander ; Apr 19 2016  1:57PM EST

## 2018-01-16 NOTE — MISCELLANEOUS
Message  Carmen Emmanuel called from [facility] in regards to this patient and the status of her surgery being scheduled  I did make her aware that we are in the process of scheduling the patient  She has an appt for 02/16 to discuss the procedure and to sign consents  Active Problems    1  Acute deep vein thrombosis of lower limb, unspecified laterality   2  Acute kidney failure (584 9) (N17 9)   3  Anemia (285 9) (D64 9)   4  Benign hypertensive CKD (403 10) (I12 9)   5  Cellulitis of right leg (682 6) (L03 115)   6  Chronic diastolic congestive heart failure (428 32,428 0) (I50 32)   7  Chronic hypoxemic respiratory failure (518 83,799 02) (J96 11)   8  Chronic obstructive pulmonary disease (496) (J44 9)   9  Chronic renal disease, stage 4 (severe) (585 4) (N18 4)   10  Chronic respiratory failure with hypercapnia (518 83) (J96 12)   11  Congestive heart failure (428 0) (I50 9)   12  Diabetes Mellitus (250 00)   13  Dyspnea (786 09) (R06 00)   14  Edema (782 3) (R60 9)   15  End-stage renal disease on hemodialysis (585 6,V45 11) (N18 6,Z99 2)   16  Excess skin of arm (701 9) (L98 7)   17  Gout (274 9) (M10 9)   18  Hemodialysis patient (V45 11) (Z99 2)   19  Hyperkalemia (276 7) (E87 5)   20  Hyperlipidemia (272 4) (E78 5)   21  Hypernatremia (276 0) (E87 0)   22  Hypertension (401 9) (I10)   23  Morbid or severe obesity due to excess calories (278 01) (E66 01)   24  Obesity hypoventilation syndrome (278 03) (E66 2)   25  Obstructive sleep apnea (327 23) (G47 33)   26  Proteinuria (791 0) (R80 9)   27  Pulmonary embolism (415 19) (I26 99)   28  Secondary hyperparathyroidism, renal (588 81) (N25 81)    Current Meds   1  Acetaminophen 325 MG Oral Tablet; TAKE 1 TO 2 TABLETS EVERY 4 HOURS AS   NEEDED; Therapy: ([phone]) to Recorded   2  Albuterol Sulfate (2 5 MG/3ML) 0 083% Inhalation Nebulization Solution; USE 1 UNIT   DOSE EVERY 4-6 HOURS AS NEEDED FOR WHEEZING ;    Therapy: (Recorded:52Teg0060) to Recorded   3  Allopurinol 100 MG Oral Tablet; TAKE 1 TABLET DAILY; Therapy: (Recorded:87Fsf7555) to Recorded   4  AmLODIPine Besylate 2 5 MG Oral Tablet; TAKE 1 TABLET DAILY AS DIRECTED; Therapy: 50HRL9786 to (Ronda Boss)  Requested for: 42BYR5365; Last   Rx:06Apr2016 Ordered   5  Antacid TABS; take 1 tablet daily prn; Therapy: (Recorded:48Xsj2035) to Recorded   6  Bengay Ultra Strength CREA; APPLY INCH  PRN; Therapy: (Recorded:06Ygs3627) to Recorded   7  Calcitriol 0 25 MCG Oral Capsule (Rocaltrol); Take one capsule every   Monday/Wednesday/Friday/Sunday; Therapy: 28Apr2015 to (Evaluate:25Oct2015); Last Rx:28Apr2015 Ordered   8  Coumadin 1 MG Oral Tablet (Warfarin Sodium); Take 1 capsule on Tuesday, and   wednesday; Therapy: (Recorded:27Mdr8194) to Recorded   9  Coumadin 6 MG Oral Tablet (Warfarin Sodium); Take 1 capsule on Sun, Mon, Thu, Fri,   Sat; Therapy: (Recorded:16Csv4076) to Recorded   10  [employer] Oyster Shell Calcium 500 MG TABS; Take 1 tablet daily; Therapy: (Recorded:22Cpz6933) to Recorded   11  Docusate Sodium 100 MG Oral Tablet; Take 1 tablet twice daily; Therapy: (Recorded:48Ljk0636) to Recorded   12  Furosemide 40 MG Oral Tablet; Take 40mg am and 20mg pm;    Therapy: (Recorded:55Fyz1330) to Recorded   13  HumaLOG Pen 100 UNIT/ML SOLN; USE AS DIRECTED; Therapy: (Recorded:94Sgd3550) to Recorded   14  Lantus SoloStar 100 UNIT/ML SOLN; USE AS DIRECTED; Therapy: (Recorded:02Rhv5255) to Recorded   15  Latanoprost 0 005 % Ophthalmic Solution; INSTILL 1 DROP IN BOTH EYES AT    BEDTIME; Therapy: (Recorded:33Ewa9625) to Recorded   16  Levothyroxine Sodium 25 MCG Oral Tablet; Therapy: 05AIX5440 to Recorded   17  Lyrica 75 MG Oral Capsule; TAKE 1 CAPSULE AT BEDTIME; Therapy: (Recorded:57Oea6239) to Recorded   18  Metoprolol Succinate ER 50 MG Oral Tablet Extended Release 24 Hour; Take 1 tablet    twice daily; Therapy: (Recorded:06Apr2016) to Recorded   19   Metoprolol Tartrate 25 MG Oral Tablet; Therapy: [id number] to Recorded   20  Omega 3 CAPS; take 1 capsule daily; Therapy: (Recorded:70Hjo4035) to Recorded   21  Omega-3-acid Ethyl Esters 1 GM Oral Capsule; Therapy: 04Apr2016 to Recorded   22  Omeprazole 20 MG Oral Capsule Delayed Release; TAKE 1 CAPSULE DAILY; Therapy: (Recorded:76Cxl2403) to Recorded   23  Pravastatin Sodium 20 MG Oral Tablet; Therapy: 11OFK5460 to Recorded   24  Pravastatin Sodium 80 MG Oral Tablet; TAKE 1 TABLET DAILY; Therapy: (Recorded:41Fka0266) to Recorded   25  Procrit 58999 UNIT/ML Injection Solution; Every 30 days; Therapy: [phone]) to Recorded   26  Restasis 0 05 % Ophthalmic Emulsion; 1 drop in botg eyes in the morning; Therapy: (Recorded:06Apr2016) to Recorded   27  Senna 8 6 MG Oral Tablet; TAKE AS DIRECTED; Therapy: (Recorded:36Hqk6047) to Recorded   28  Sulfamethoxazole-Trimethoprim 800-160 MG Oral Tablet; Therapy: 25JFI9166 to Recorded   29  TraMADol HCl - 50 MG Oral Tablet; TAKE TABLET  PRN; Therapy: (Recorded:97Gsh4264) to Recorded   30  Triple Antibiotic 3 5-400-5000 External Ointment; Therapy: 18Apr2015 to Recorded   31  Veltassa 8 4 GM Oral Packet; Dissolve contents of 1 packet in 3 ounces of water and    drink full amount once daily; Therapy: 12Apr2016 to (Last Rx:18Apr2016) Ordered   32  Vitamin C 500 MG Oral Tablet; TAKE 1 TABLET DAILY; Therapy: (Recorded:77Glb8613) to Recorded    Allergies    1  Keflex   2  Codeine Derivatives   3  Darvocet-N 50 TABS   4  Morphine Derivatives   5  Propoxyphene HCl CAPS    6  IVP Dye   7  Seafood  Denied    8   NO KNOWN DRUG ALLERGY    Signatures   Electronically signed by : Genaro Alfredo, ; Feb 8 2017  8:49AM EST                       (Author)

## 2018-01-18 NOTE — MISCELLANEOUS
Message   Recorded as Task   Date: 05/03/2016 09:16 AM, Created By: Rosa Cates   Task Name: Miscellaneous   Assigned To: Rosa Cates   Regarding Patient: Pema Reid, Status: Active   CommentHayes Antis - 03 May 2016 9:16 AM     TASK CREATED  Hold 2 doses of Lasix  Please update medication list including any when necessary medications  Repeat BMP this coming Monday  Zulma Hernandez - 03 May 2016 9:24 AM     TASK REPLIED TO: Previously Assigned To [facility],Team  I spoke with Krysta Aparicio at [facility] and she relayed to me that the patient is disoriented and "not herself" today  Therefore, she will be going to the ER at [facility]       Signatures   Electronically signed by : SHADE Anderson Ala ; May  3 2016 11:36AM EST                       (Author)

## 2018-01-19 ENCOUNTER — TRANSCRIBE ORDERS (OUTPATIENT)
Dept: LAB | Facility: HOSPITAL | Age: 73
End: 2018-01-19

## 2018-01-19 ENCOUNTER — HOSPITAL ENCOUNTER (OUTPATIENT)
Dept: PULMONOLOGY | Facility: HOSPITAL | Age: 73
Discharge: HOME | End: 2018-01-19
Attending: INTERNAL MEDICINE
Payer: MEDICARE

## 2018-01-19 DIAGNOSIS — J44.9 CHRONIC OBSTRUCTIVE PULMONARY DISEASE (HCC): ICD-10-CM

## 2018-01-19 DIAGNOSIS — E66.2 MORBID (SEVERE) OBESITY WITH ALVEOLAR HYPOVENTILATION (HCC): ICD-10-CM

## 2018-01-19 DIAGNOSIS — J96.12 CHRONIC RESPIRATORY FAILURE WITH HYPERCAPNIA (HCC): ICD-10-CM

## 2018-01-19 LAB
ARTERIAL PATENCY WRIST A: 4
BASE EXCESS BLDA CALC-SCNC: 5 MMOL/L (ref -2–3)
CA-I BLD-SCNC: 1.16 MMOL/L (ref 1.12–1.32)
FIO2 GAS DIL.REBREATH: 28 L
GLUCOSE SERPL-MCNC: 155 MG/DL (ref 65–140)
HCO3 BLDA-SCNC: 29.4 MMOL/L (ref 22–28)
HCT VFR BLD CALC: 24 % (ref 34.8–46.1)
HGB BLDA-MCNC: 8.2 G/DL (ref 11.5–15.4)
PCO2 BLD: 31 MMOL/L (ref 21–32)
PCO2 BLD: 45.1 MM HG (ref 36–44)
PH BLD: 7.42 [PH] (ref 7.35–7.45)
PO2 BLD: 88 MM HG (ref 75–129)
POTASSIUM BLD-SCNC: 3.6 MMOL/L (ref 3.5–5.3)
SAO2 % BLD FROM PO2: 97 % (ref 95–98)
SODIUM BLD-SCNC: 136 MMOL/L (ref 136–145)
SPECIMEN SOURCE: ABNORMAL

## 2018-01-19 PROCEDURE — 82947 ASSAY GLUCOSE BLOOD QUANT: CPT

## 2018-01-19 PROCEDURE — 82330 ASSAY OF CALCIUM: CPT

## 2018-01-19 PROCEDURE — 82803 BLOOD GASES ANY COMBINATION: CPT

## 2018-01-19 PROCEDURE — 84132 ASSAY OF SERUM POTASSIUM: CPT

## 2018-01-19 PROCEDURE — 85014 HEMATOCRIT: CPT

## 2018-01-19 PROCEDURE — 84295 ASSAY OF SERUM SODIUM: CPT

## 2018-01-19 PROCEDURE — 36600 WITHDRAWAL OF ARTERIAL BLOOD: CPT

## 2018-01-23 VITALS
HEART RATE: 77 BPM | DIASTOLIC BLOOD PRESSURE: 50 MMHG | RESPIRATION RATE: 20 BRPM | TEMPERATURE: 98.7 F | OXYGEN SATURATION: 100 % | SYSTOLIC BLOOD PRESSURE: 104 MMHG

## 2018-01-23 NOTE — RESULT NOTES
Message  I informed the patient's HD nurse at [facility] that she now has a permacath in place as AVF clotted and nonfunctional  Needs outpatient vascular follow up  Plan for HD via permacath        Signatures   Electronically signed by : Sarai Banda DO; Jan 16 2018 12:31PM EST                       (Author)

## 2018-01-24 VITALS — SYSTOLIC BLOOD PRESSURE: 112 MMHG | DIASTOLIC BLOOD PRESSURE: 70 MMHG | TEMPERATURE: 97.7 F | HEART RATE: 88 BPM

## 2018-02-08 ENCOUNTER — HOSPITAL ENCOUNTER (OUTPATIENT)
Dept: RADIOLOGY | Facility: HOSPITAL | Age: 73
Discharge: HOME | End: 2018-02-08
Attending: INTERNAL MEDICINE | Admitting: RADIOLOGY
Payer: MEDICARE

## 2018-02-08 VITALS
SYSTOLIC BLOOD PRESSURE: 124 MMHG | DIASTOLIC BLOOD PRESSURE: 69 MMHG | OXYGEN SATURATION: 99 % | RESPIRATION RATE: 14 BRPM | HEART RATE: 82 BPM

## 2018-02-08 DIAGNOSIS — N18.6 ESRD (END STAGE RENAL DISEASE) (HCC): ICD-10-CM

## 2018-02-08 PROCEDURE — C1769 GUIDE WIRE: HCPCS

## 2018-02-08 PROCEDURE — C1750 CATH, HEMODIALYSIS,LONG-TERM: HCPCS

## 2018-02-08 PROCEDURE — 77001 FLUOROGUIDE FOR VEIN DEVICE: CPT

## 2018-02-08 PROCEDURE — 36581 REPLACE TUNNELED CV CATH: CPT

## 2018-02-08 PROCEDURE — 36593 DECLOT VASCULAR DEVICE: CPT

## 2018-02-08 PROCEDURE — 36598 INJ W/FLUOR EVAL CV DEVICE: CPT

## 2018-02-08 PROCEDURE — 36598 INJ W/FLUOR EVAL CV DEVICE: CPT | Performed by: RADIOLOGY

## 2018-02-08 RX ORDER — FENTANYL CITRATE 50 UG/ML
INJECTION, SOLUTION INTRAMUSCULAR; INTRAVENOUS CODE/TRAUMA/SEDATION MEDICATION
Status: COMPLETED | OUTPATIENT
Start: 2018-02-08 | End: 2018-02-08

## 2018-02-08 RX ORDER — DIPHENHYDRAMINE HYDROCHLORIDE 50 MG/ML
INJECTION INTRAMUSCULAR; INTRAVENOUS CODE/TRAUMA/SEDATION MEDICATION
Status: COMPLETED | OUTPATIENT
Start: 2018-02-08 | End: 2018-02-08

## 2018-02-08 RX ADMIN — ALTEPLASE 2 MG: 2.2 INJECTION, POWDER, LYOPHILIZED, FOR SOLUTION INTRAVENOUS at 15:55

## 2018-02-08 RX ADMIN — IOHEXOL 20 ML: 300 INJECTION, SOLUTION INTRAVENOUS at 15:50

## 2018-02-08 RX ADMIN — DIPHENHYDRAMINE HYDROCHLORIDE 50 MG: 50 INJECTION, SOLUTION INTRAMUSCULAR; INTRAVENOUS at 15:04

## 2018-02-08 RX ADMIN — FENTANYL CITRATE 50 MCG: 50 INJECTION, SOLUTION INTRAMUSCULAR; INTRAVENOUS at 17:33

## 2018-02-08 RX ADMIN — HYDROCORTISONE SODIUM SUCCINATE 100 MG: 100 INJECTION, POWDER, FOR SOLUTION INTRAMUSCULAR; INTRAVENOUS at 15:14

## 2018-02-08 NOTE — BRIEF OP NOTE (RAD/CATH)
Rhys hernandez contacted, Carmenza Lo confirmed pre-meds for contrast dye allergy not given at [facility]

## 2018-02-09 ENCOUNTER — TELEPHONE (OUTPATIENT)
Dept: NEPHROLOGY | Facility: CLINIC | Age: 73
End: 2018-02-09

## 2018-02-09 NOTE — TELEPHONE ENCOUNTER
I spoke with the patient's HD nurse, Mary Garza, at [facility]  I informed her that the patient is s/p HD [facility] exchange as TPA infusion to both ports by IR unsuccessful

## 2020-12-05 NOTE — SOCIAL WORK
CM was informed that pt was admitted from THE [facility]  CM spoke to [facility] who states pt is a 15day MA bedhold  Pt receives Dialysis at [facility] on [address]--MWF at 9:30am and is transported via [address] [place] [address]  CM called [facility] and spoke to nurse Yolanda to discuss pts baseline functioning level  Mitcheal Patterson states pt is a total assist--pt requires a kelly lift out of bed  Pt is bed bound--non ambulatory  Pt requires 2L O2 at baseline  No mental health or D&A rehab  Contact: Davis Carrel (friend) [phone]  CM called pts contact Davis Carrel to discuss dc plan--no answer and unable to leave a message  None

## 2021-04-12 NOTE — MISCELLANEOUS
Message  I informed the dialysis unit at [facility] that the patient had successful permacath removal today        Signatures   Electronically signed by : Mariola Prater DO; Jan 4 2018  3:41PM EST                       (Author)
BACK PAIN

## 2024-10-31 NOTE — CASE MANAGEMENT
CONCERNS: None    DIET:  Appetite:Good      STOOLS: 1 / day  Abdominal Pain: no         SLEEPING:      Night - 9 hr    Headache: no       SOCIAL HISTORY:   Name of School:  [employer]   Extracurricular activities: None    CHOLESTEROL SCREENING:  Parent with cholesterol >240mg/dl: no     Parent with CAD (age 40s): no       Past Covid Infection questions:   In the past year, have you had a Confirmed Covid-19 infection?  no     If yes: Did the fever last more than 4 days?  no     Did you need to stay overnight in the hospital?  no     Did you have any heart symptoms including palpitation during the illness?  no              Initial Clinical Review    Admission: Date/Time/Statement:  12/15/17 @ 1826 -- OBS -- changed to INPATIENT 12/16 @ 1530     Start   Ordered   12/16/17 1530  Inpatient Admission Once     Transfer Service: General Medicine       Question Answer Comment   Admitting Physician TAYLA HUDSON    Level of Care Med Surg    Estimated length of stay More than 2 Midnights    Certification I certify that inpatient services are medically necessary for this patient for a duration of greater than two midnights  See H&P and MD Progress Notes for additional information about the patient's course of treatment  12/16/17 1530         Orders Placed This Encounter   Procedures    Place in Observation (expected length of stay for this patient is less than two midnights)     Standing Status:   Standing     Number of Occurrences:   1     Order Specific Question:   Admitting Physician     Answer:   Rubin Deleon [[id number]]     Order Specific Question:   Level of Care     Answer:   Med Surg [16]       ED: Date/Time/Mode of Arrival:   ED Arrival Information     Expected Arrival Acuity Means of Arrival Escorted By Service Admission Type    - 12/15/2017 15:58 [facility] Emergency    Arrival Complaint    CHEST PAIN          Chief Complaint:   Chief Complaint   Patient presents with    Epigastric Pain     at dialysis today for 5 hours, reports a gnawing and burning feeling in her chest  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 and "feels this way when she eats something she is not supposed to"       History of Illness: 67 y o  female with PMH of ESRD on MWF hemodialysis, IDDM, Diastolic CHF, Hx of PE 30 years ago not currently on anticoagulation, gout, hypothyroidism, anemia, Garcia's esophagus, morbid obesity, sleep apnea on cpap who presented to the ED with a 1 day history of chest pressure   She was being dialyzed earlier today and shortly after drinking a box of juice, started experiencing 7/10 constant pressure in the center of her chest  The pain was worsened when sitting up, but was not associated with exertion, palpation, inspiration  She denied any radiation, nausea, diaphoresis, dyspnea, abdominal pain or other associated symptoms  She states that it almost felt like indigestion because she has a history of GERD with Garcia's  She states that she often drinks the same substance (but doesn't recall the name) at prior hemodialysis sessions, and it has never caused the same symptoms before  Her other complaints include headache "due to the chest pain", and chronic constipation      ED Vital Signs:   ED Triage Vitals   Temperature Pulse Respirations Blood Pressure SpO2   12/16/17 0000 12/15/17 1606 12/15/17 1606 12/15/17 1606 12/15/17 1606   98 1 °F (36 7 °C) 101 22 123/54 100 %      Temp Source Heart Rate Source Patient Position - Orthostatic VS BP Location FiO2 (%)   12/16/17 0000 12/15/17 1606 12/15/17 1606 12/15/17 1606 --   Oral Monitor Sitting Right arm       Pain Score       12/15/17 1606       5        Wt Readings from Last 1 Encounters:   12/16/17 132 kg (291 lb 14 2 oz)       Abnormal Labs/Diagnostic Test Results:    Units 12/15/17  1637   12/12/17  0514   WBC Thousand/uL 7 56  < > 9 13   RBC Million/uL 2 79*  < > 2 62*   HEMOGLOBIN g/dL 8 5*  < > 7 8*   HEMATOCRIT % 26 4*  < > 24 5*   MCV fL 95  < > 94   MCH pg 30 5  < > 29 8   MCHC g/dL 32 2  < > 31 8   RDW % 15 1  < > 14 9   PLATELETS Thousands/uL 54*  < > 39*   NRBC AUTO /100 WBCs 0  < > 0   NEUTROS PCT %  --   --  75   LYMPHS PCT %  --   --  14   LYMPHO PCT % 9*  < >  --    MONOS PCT %  --   --  10   MONO PCT MAN % 4  < >  --    EOS PCT %  --   --  1   EOSINO PCT MANUAL % 1  < >  --    BASOS PCT %  --   --  0   BASOS PCT MAN % 0  < >  --    NEUTROS ABS Thousands/µL  --   --  6 59   LYMPHS ABS Thousands/µL  --   --  1 28   MONOS ABS Thousand/µL  --   --  0 90   EOS ABS Thousand/µL  --   --  0 06   EOS ABS MAN Thousand/uL 0 08  < >  -- Lab Units 12/15/17  1637   SODIUM mmol/L 134*   POTASSIUM mmol/L 3 4*   CHLORIDE mmol/L 95*   CO2 mmol/L 31   ANION GAP mmol/L 8   BUN mg/dL 26*   CREATININE mg/dL 1 57*   GLUCOSE RANDOM mg/dL 320*   CALCIUM mg/dL 8 4   AST U/L 14   ALT U/L 22   ALK PHOS U/L 66   TOTAL PROTEIN g/dL 6 0*   ALBUMIN g/dL 2 1*   BILIRUBIN TOTAL mg/dL 0 33   EGFR ml/min/1 73sq m 32     Lab Units 12/09/17  2046   PROTIME seconds 13 7   INR   1 05   PTT seconds 25     Lab Units 12/15/17  1644 12/15/17  1637   TROPONIN I ng/mL  --  0 08*   POC TROPONIN I  ng/ml 0 05  --    ,  Lab Units 12/15/17  1637   LIPASE u/L 427*   ,  Lab Units 12/15/17  1637   EGFR ml/min/1 73sq m 32   GLUCOSE RANDOM mg/dL 320*   HEMOGLOBIN g/dL 8 5*       ED Treatment:   Medication Administration from 12/15/2017 1558 to 12/15/2017 2221       Date/Time Order Dose Route Action Action by Comments     12/15/2017 1626 aspirin chewable tablet 324 mg 324 mg Oral Not Given Krysten Garcias RN patient had 324 asa by ems     12/15/2017 1900 famotidine (PEPCID) tablet 20 mg 20 mg Oral Given Krysten Garcias RN      12/15/2017 1900 lidocaine viscous (XYLOCAINE) 2 % mucosal solution 15 mL 15 mL Swish & Swallow Given Krysten Garcias RN           Past Medical/Surgical History:   Past Medical History:   Diagnosis Date    Adrenal insufficiency ([facility] )     Adrenocortical insufficiency (HCC)     Anemia     Garcia esophagus     Bradycardia     Cardiac disease     Cataract     CHF (congestive heart failure) (HCC)     Constipation     CPAP (continuous positive airway pressure) dependence     Dependence on supplemental oxygen     Diabetes mellitus (HCC)     Difficulty walking     Disease of thyroid gland     Dysphagia     Encephalopathy     ESRD on hemodialysis ([facility] )     GERD (gastroesophageal reflux disease)     Glaucoma     Gout     History of transfusion     Hyperlipidemia     Hypertension     Insomnia     Obesity     Osteoarthritis     PONV (postoperative nausea and vomiting)     Sleep apnea        Admitting Diagnosis: Morbid obesity ([facility] ) [E66 01]  Epigastric pain [R10 13]  Chest pain [R07 9]  Essential hypertension [I10]  History of pulmonary embolism [Z86 711]  Electrolyte abnormality [E87 8]  At risk for cardiac dysfunction [Z91 89]  IDDM (insulin dependent diabetes mellitus) ([facility] ) [E11 9, Z79 4]  End-stage renal disease on hemodialysis ([facility] ) [N18 6, Z99 2]  Chronic respiratory failure with hypoxia and hypercapnia ([facility] ) [J96 11, J96 12]    Age/Sex: 67 y o  female    Assessment/Plan:   Chest Pain  Patient complained of 7/10 chest tightness, nonradiating that lasted for several hours and currently resolved  Trop on admission 0 08  EKG showed NSR with LVH  No D-dimer was drawn because a measurement is unreliable in the setting of ESRD  Wells score 2, Heart score 4  Differential includes ACS, PE, GERD  Patient unable to receive CTA due to ESRD  Will defer to day team regarding ordering a V/Q scan  Continue aspirin, metoprolol, protonix  Monitor on tele, f/u trops, cbc, bmp, mg, phos, lipid panel, am EKG  Overnight the patient's troponins increased from 0 08 to 1 50 then 1 98  Started her on a heparin gtt to provide dual coverage for ACS and PE since we were not able to rule out PE at the time       IDDM  HgbA1c on 12/9/17 was 8 6  Fingersticks before meals and at bedtime  Continue insulin 10u before lunch, breakfast, 14u before dinner, 40u bedtime      ESRD  On admission, BUN/Cr was 26/1  57  eGFR 32 although her baseline is usually in the 10-20 range  Is on MWF hemodialysis, last session on 12/15/17  Will consult nephrology for management of next session Monday 12/18       Elevated Lipase  Lipase 427 on admission, upper range of normal  Currently asymptomatic  Will monitor clinically      Electrolyte abnormality  On admission, Na 134, K 3 4, Cl 95  Secondary to ESRD  She undergoes HD on M,W,F  Repleted with seven   Will also consult nephrology     Hypertension  BP has been stable on this admission  Continue metoprolol 12 5mg po daily except for MWF before HD      HLD  F/u am lipid panel  Continue pravastatin 80mg po daily     Anemia  Hgb on admission 8 5, stable secondary to ESRD  Transfusion if needed for Hgb < 7 0  Will monitor CBC, continue ferrous sulfate 325mg po tid       History of PE  Occurred 30 years ago  Patient was taking coumadin, then switched to eliquis, but most recently off anticoagulation and only on aspirin  Will hold heparin sc in the setting of thrombocytopenia on this admission, and continue aspirin and bilateral SCDs      Thrombocytopenia  Platelets 54 on admission, at baseline secondary to ESRD  She has bruising on the left hand and over the bilateral ankles  Will monitor CBC     Diastolic Heart Failure  Echo on 5/2016 showed EF 65% with grade 1 diastolic dysfunction  Currently stable, physical exam unremarkable  Continue metoprolol 12 5mg po daily excluding M, W, F before dialysis      Hypothyroidism  TSH on 12/11/17 1 5  Continue levothyroxine 25mcg po daily     Gout  Currently stable, no acute flair  Continue home dosage allopurinol 200mg po Sun, Tu, We, Fri, Sat and 300mg po Mon, Th      Garcia's Esophagus  Secondary to longstanding GERD  Patient reports her last EGD was several years ago  It is a likely source of her chest pain pain  Continue protonix 20mg po daily  F/u with GI as an outpatient     Constipation  Chronic history of constipation, last bowel movement was 1 day prior to admission  Continue bowel regimen and laxatives prn      Morbid Obesity  Patient weighs 131kg at the time of admission      Sleep Apnea  Secondary to morbid obesity  Patient wears a cpap at night  Will continue CPAP on this admission and nasal cannula in the daytime as needed      Ambulatory Dysfunction  Secondary to morbid obesity    PT/OT consulted      Admission Orders:  M/S/Tele unit  Telem  Serial troponins  accuchecks qid w/ ssi  ryland b/l nicole  Renal diet  Monitor I&O's, daily weights  Consult cardiology, nephrology      Scheduled Meds:   allopurinol 200 mg Oral Once per day on Sun Tue Wed Fri Sat   [START ON 12/18/2017] allopurinol 300 mg Oral Once per day on Mon Thu   aspirin 324 mg Oral Once   aspirin 81 mg Oral Daily   calcium carbonate 2 tablet Oral TID With Meals   cholecalciferol 1,000 Units Oral Daily   cyanocobalamin 1,000 mcg Oral Daily   docusate sodium 100 mg Oral BID   ferrous sulfate 325 mg Oral TID With Meals   insulin glargine 40 Units Subcutaneous HS   insulin lispro 1-6 Units Subcutaneous HS   insulin lispro 2-12 Units Subcutaneous TID AC   insulin NPH 10 Units Subcutaneous BID before breakfast/lunch   insulin NPH 14 Units Subcutaneous Before Dinner   lactulose 20 g Oral Daily   latanoprost 1 drop Both Eyes HS   levothyroxine 25 mcg Oral Daily   menthol-methyl salicylate  Apply externally BID   metoprolol tartrate 12 5 mg Oral See Admin Instructions   pantoprazole 20 mg Oral Daily   polyethylene glycol 17 g Oral Daily   pravastatin 80 mg Oral Daily     Continuous Infusions:   heparin (porcine) 3-30 Units/kg/hr (Order-Specific) Last Rate: 18 Units/kg/hr (12/16/17 0551)     PRN Meds:   acetaminophen    albuterol    bisacodyl    dextromethorphan-guaiFENesin    heparin (porcine)    heparin (porcine)    miconazole    ondansetron    senna    Insert peripheral IV **AND** sodium chloride (PF)

## 2025-04-22 NOTE — DISCHARGE SUMMARY
The first step is a clean out to remove the retained stool that has built up in the colon.  The second step is a maintenance program to help your child consistently pass stool normally again.    The clean out will take a full day at home.  There will be a large amount of stool output.  Stool may start out solid, then become looser, and finally will be liquid.  If your child only has formed stools during a clean out, then there is probably still some stool left in the colon.  For the clean out, give Polyethylene Glycol (PEG, MiraLAX, Glycolax) ____6______capfuls (17 grams each capful).  Mix each capful in 6-8 oz of liquid (water, juice, gatorade).    Take a total of 3-4  Bisacodyl tablets.  (Dulcolax 5 mg each).  Take 2 before the Miralax and 1-2 tablets after the Miralax depending on stool output.      After the clean out, drink ____1_____capful of polyethylene glycol mixed in 6-8 oz of fluid every day.  Start the day after the clean out.  You may also continue to take Dulcolax --one tablet twice a week.     Encourage a healthy diet with plenty of fresh fruits and vegetables, daily exercise, and plenty of water.    Try to sit on the toilet after meals to pass stool.      Transition of Care Discharge Summary - [facility]    Patient Information: Lindell Pallas 67 y o  female MRN: [id number]  [facility]: [id number]    Discharging Physician / Practitioner: Nichole Ortega MD  PCP: Avelino Vanegas MD  Admission Date: 12/8/2017  Discharge Date: 12/14/17    Disposition:      Short Term Rehab or SNF at [place] [id number] (see below)    For Discharges to [facility] SNF:   · [facility] ([address]) - Tan Parrish MD - Unable to speak to physician, no message left  Reason for Admission:  Abnormal labs    Discharge Diagnoses:     Principal Problem: Thrombocytopenia ([facility] )  Active Problems:    IDDM (insulin dependent diabetes mellitus) ([facility] )    Morbid obesity ([facility] )    End-stage renal disease on hemodialysis ([facility] )    History of pulmonary embolism    Anemia of chronic disease  Resolved Problems:    * No resolved hospital problems  *      Consultations During Hospital Stay:  · Hematology  · Nephrology  · Wound care    Procedures Performed:     · HemoDialysis: Monday Wednesday Friday    Medication Adjustments and Discharge Medications:  · Summary of Medication Adjustments made as a result of this hospitalization:  Taken off oral anticoagulation, Eliquis discontinued  Started on aspirin 81 mg instead  · Medication Dosing Tapers - Please refer to Discharge Medication List for details on any medication dosing tapers (if applicable to patient)  · Medications being temporarily held (include recommended restart time):  None  · Discharge Medication List: See after visit summary for reconciled discharge medications  Wound Care Recommendations:  When applicable, please see wound care section of After Visit Summary      Diet Recommendations at Discharge:  Diet -     Instructions for any Catheters / Lines Present at Discharge (including removal date, if applicable):     Significant Findings / Test Results: · Heparin induced platelet antibody negative    Incidental Findings:   · Platelet count 19,860 - 40,000 on admission     Test Results Pending at Discharge (will require follow up): · None     Outpatient Tests Requested:  · Repeat CBC in 2 days on 12/16/2017 and then weekly at least    Complications:  None    Hospital Course:     Nowell Crigler is a 67 y o  female patient who originally presented to the hospital on 12/8/2017 due to abnormal labs  She was recently admitted from November 23rd and discharged on December 3rd for gram-negative pneumonia  Likewise, the patient has morbid obesity with insulin-dependent diabetes mellitus, chronic congestive heart failure (diastolic), and pulmonary embolism  Because of morbid obesity, essentially the patient is very much in active and according to her she stays on bed all day  The patient had recently a new hemodialysis catheter placed on the left arm which has been there since 2 weeks ago  The patient denies any active bleeding  She also denies any melena or hematochezia  She claims not to have any vomiting of blood  The patient denies any active source of bleeding at all  In any case, the patient then was seen to have adequate platelets even amounting to 252 on September 7, with the time that the patient was seen and admitted on November 23, her platelets were 025  For some unknown reason it continues to drop  The patient was sent here because her CBC showed a platelet of 27  The patient again denies any bleeding  Upon repeat laboratories done here in the emergency room, her platelet count was 35    · Progressive thrombocytopenia:  On admission patient's platelet count between 30-59544  Hematology was consulted, hit panel was sent which came out negative  Also Nephrology confirmed the patient had not been receiving heparin during her dialysis either as inpatient or as outpatient    On the possibility would be, cytopenia secondary to Eliquis which has very low likelihood  In the meantime by itself the platelet count 80171  Discussed with Hematology about Eliquis and oral anticoagulation  On chart review found the patient had history of PE several years ago approximately 2004 since then she has been on anticoagulation, patient says that she was worried about getting off it and having a recurrence of PE  She had been always on Coumadin, but since her in 2 admissions in August 2017 with GI bleed, on 2nd admission she was discharged on oral Eliquis 2 5 mg Twice a day  Since he has had only 1 episode of PE in the past, on discussed with Hematology about either switching her from liquids to Coumadin versus completely discontinue anticoagulation  Her activity status is poor hence she is at risk of thrombosis, but she also has had falls in the past and with low platelets she is a bleeding risk as well  At this time Hematology recommended considering oral aspirin  I discussed this was extensively with the patient as well and the various options of considering Coumadin versus aspirin  At this time patient understands that with aspirin is not as effective as Coumadin but at the same time would have less risk of bleeding and she would want to opt for baby aspirin as she has had history of gastric ulcers  She is aware about the risk of developing DVT or PE in future and is advised to monitor for that  Discussed discontinued and placed on aspirin 81 mg  Patient count 49,000 on the day of discharge  Advised to repeat labs in 2 days, advised to follow up with Hematology as outpatient and also consider weekly CBC  · ESRD: HD MWF  Has a dialysis catheter in her left neck  Her last dialysis session they were able to use her fistula hence she may need removal of the catheter in the near future if the fistula continues to work well  · Chronic respiratory failure:  On baseline O2 2 L  · History of DVT PE once in 2004: Stop AC as above, start aspirin as above  · Chronic diastolic CHF: EF 17%, volume management during HD  · VIVIAN/ohs:  CPAP HS  · Diabetes with hyperglycemia: Continue Lantus 40 units at bedtime, NPH 10 units before breakfast and lunch and 14 units before dinner  · Hypothyroidism:  Continue Synthroid, normal TSH  · Anemia of chronic disease:  Stable  · Patient does not know why he is on prednisone, tapered here  · Sacral wound, wound care consulted - and instructions placed on patient's discharge instructions    Condition at Discharge: stable     Discharge Day Visit / Exam:     Subjective:  Feels okay denies any chest pain shortness of breath, no abdominal pain nausea and vomiting, no bleeding    Vitals: Blood Pressure: 133/58 (12/14/17 0700)  Pulse: 76 (12/14/17 0700)  Temperature: 97 5 °F (36 4 °C) (12/14/17 0700)  Temp Source: Oral (12/14/17 0700)  Respirations: 18 (12/14/17 0700)  Height: 5' 4" (162 6 cm) (12/12/17 1452)  Weight - Scale: 135 kg (298 lb) (12/08/17 2054)  SpO2: 97 % (12/14/17 0700)  Exam:   Physical Exam   Constitutional: She is oriented to person, place, and time  She appears well-developed  Morbid obesity   HENT:   Head: Normocephalic and atraumatic  Eyes: Conjunctivae are normal  No scleral icterus  Cardiovascular: Normal rate, regular rhythm and normal heart sounds  Exam reveals no gallop and no friction rub  No murmur heard  Pulmonary/Chest: Effort normal and breath sounds normal  No respiratory distress  She has no wheezes  Abdominal: Soft  She exhibits no distension  There is no tenderness  Musculoskeletal: She exhibits no edema  Neurological: She is alert and oriented to person, place, and time         Discussion with Family:     Goals of Care Discussions:  · Code Status at Discharge: Prior  · Were there any Goals of Care Discussions during Hospitalization?: No  · Results of any General Goals of Care Discussions:    · POLST Completed: No   · If POLST Completed, Summary of POLST Agreement Provided Here:    · OK to [employer] if Needed? Yes    Discharge instructions/Information to patient and family:   See after visit summary section titled Discharge Instructions for information provided to patient and family  Planned Readmission: no      Discharge Statement:  I spent 45 minutes discharging the patient  This time was spent on the day of discharge  I had direct contact with the patient on the day of discharge  Greater than 50% of the total time was spent examining patient, answering all patient questions, arranging and discussing plan of care with patient as well as directly providing post-discharge instructions  Additional time then spent on discharge activities      ** Please Note: This note has been constructed using a voice recognition system **

## (undated) DEVICE — SUT PROLENE 6-0 BV130 30 IN 8709H

## (undated) DEVICE — SKIN MARKER DUAL TIP WITH RULER CAP, FLEXIBLE RULER AND LABELS: Brand: DEVON

## (undated) DEVICE — SUT ETHILON 3-0 PS-1 18 IN 1663H

## (undated) DEVICE — SUT MONOCRYL 3-0 SH 27 IN Y416H

## (undated) DEVICE — HEX-LOCKING BLADE ELECTRODE: Brand: EDGE

## (undated) DEVICE — INTENDED FOR TISSUE SEPARATION, AND OTHER PROCEDURES THAT REQUIRE A SHARP SURGICAL BLADE TO PUNCTURE OR CUT.: Brand: BARD-PARKER SAFETY BLADES SIZE 15, STERILE

## (undated) DEVICE — GLOVE INDICATOR PI UNDERGLOVE SZ 6.5 BLUE

## (undated) DEVICE — SUT SILK 4-0 18 IN A183H

## (undated) DEVICE — CHLORAPREP HI-LITE 26ML ORANGE

## (undated) DEVICE — SUT SILK 3-0 18 IN A184H

## (undated) DEVICE — LIGACLIP MCA MULTIPLE CLIP APPLIERS, 20 MEDIUM CLIPS: Brand: LIGACLIP

## (undated) DEVICE — 1/2 FORCE SURGICAL SPRING CLIP: Brand: STEALTH® SPRING CLIP

## (undated) DEVICE — SUT MONOCRYL 4-0 PS-2 27 IN Y426H

## (undated) DEVICE — JP CHAN DRN SIL HUBLESS 15FR W/TRO: Brand: CARDINAL HEALTH

## (undated) DEVICE — STERILE MUSCLE FLAP PACK: Brand: CARDINAL HEALTH

## (undated) DEVICE — LIGACLIP MCA MULTIPLE CLIP APPLIERS, 20 SMALL CLIPS: Brand: LIGACLIP

## (undated) DEVICE — JACKSON-PRATT 100CC BULB RESERVOIR: Brand: CARDINAL HEALTH

## (undated) DEVICE — ADHESIVE SKN CLSR HISTOACRYL FLEX 0.5ML LF

## (undated) DEVICE — MICRODISSECTION NEEDLE: Brand: COLORADO

## (undated) DEVICE — GAUZE SPONGES,16 PLY: Brand: CURITY

## (undated) DEVICE — INTENDED FOR TISSUE SEPARATION, AND OTHER PROCEDURES THAT REQUIRE A SHARP SURGICAL BLADE TO PUNCTURE OR CUT.: Brand: BARD-PARKER SAFETY BLADES SIZE 10, STERILE

## (undated) DEVICE — STERILE A V FISTULA PACK: Brand: CARDINAL HEALTH

## (undated) DEVICE — SUT SILK 2-0 18 IN A185H

## (undated) DEVICE — 3000CC GUARDIAN II: Brand: GUARDIAN

## (undated) DEVICE — SCD SEQUENTIAL COMPRESSION COMFORT SLEEVE MEDIUM KNEE LENGTH: Brand: KENDALL SCD

## (undated) DEVICE — INTENDED FOR TISSUE SEPARATION, AND OTHER PROCEDURES THAT REQUIRE A SHARP SURGICAL BLADE TO PUNCTURE OR CUT.: Brand: BARD-PARKER ® CARBON RIB-BACK BLADES

## (undated) DEVICE — NEEDLE 25G X 1 1/2

## (undated) DEVICE — 1200CC GUARDIAN II: Brand: GUARDIAN

## (undated) DEVICE — SUT VICRYL 3-0 SH 27 IN J416H

## (undated) DEVICE — PACK UNIVERSAL DRAPES SUB-Q ICD

## (undated) DEVICE — GLOVE SRG BIOGEL ECLIPSE 6

## (undated) DEVICE — GLOVE INDICATOR PI UNDERGLOVE SZ 8 BLUE

## (undated) DEVICE — PLUMEPEN PRO 10FT

## (undated) DEVICE — BAG DECANTER

## (undated) DEVICE — TRAY FOLEY 16FR URIMETER SURESTEP

## (undated) DEVICE — MEDI-VAC YANKAUER SUCTION HANDLE W/STRAIGHT TIP & CONTROL VENT: Brand: CARDINAL HEALTH

## (undated) DEVICE — VESSEL LOOPS X-RAY DETECTABLE: Brand: DEROYAL

## (undated) DEVICE — GLOVE SRG BIOGEL ECLIPSE 7.5